# Patient Record
Sex: MALE | Race: BLACK OR AFRICAN AMERICAN | Employment: FULL TIME | URBAN - METROPOLITAN AREA
[De-identification: names, ages, dates, MRNs, and addresses within clinical notes are randomized per-mention and may not be internally consistent; named-entity substitution may affect disease eponyms.]

---

## 2017-01-03 ENCOUNTER — TELEPHONE (OUTPATIENT)
Dept: INTERNAL MEDICINE CLINIC | Age: 42
End: 2017-01-03

## 2017-01-16 DIAGNOSIS — I10 ESSENTIAL HYPERTENSION: ICD-10-CM

## 2017-01-16 NOTE — TELEPHONE ENCOUNTER
Future Appointments:  4/28/2017 3:30 PM Jie Mike MD  Last Appointment:  12/30/2016     Called and spoke with pt, who verified meds he needs refilled. Requested med refills pended and sent to Dr. Melissa Shelton for approval; pt aware.

## 2017-01-17 RX ORDER — METFORMIN HYDROCHLORIDE 750 MG/1
1500 TABLET, EXTENDED RELEASE ORAL DAILY
Qty: 60 TAB | Refills: 11 | Status: SHIPPED | OUTPATIENT
Start: 2017-01-17 | End: 2018-04-30

## 2017-01-17 RX ORDER — GLIMEPIRIDE 4 MG/1
4 TABLET ORAL
Qty: 30 TAB | Refills: 11 | Status: SHIPPED | OUTPATIENT
Start: 2017-01-17 | End: 2018-04-08 | Stop reason: SDUPTHER

## 2017-01-17 RX ORDER — LANCETS
EACH MISCELLANEOUS
Qty: 90 EACH | Refills: 11 | Status: SHIPPED | OUTPATIENT
Start: 2017-01-17 | End: 2018-08-14 | Stop reason: SDUPTHER

## 2017-01-17 RX ORDER — LOSARTAN POTASSIUM AND HYDROCHLOROTHIAZIDE 25; 100 MG/1; MG/1
1 TABLET ORAL DAILY
Qty: 30 TAB | Refills: 11 | Status: SHIPPED | OUTPATIENT
Start: 2017-01-17 | End: 2017-07-09

## 2017-05-03 ENCOUNTER — OFFICE VISIT (OUTPATIENT)
Dept: INTERNAL MEDICINE CLINIC | Age: 42
End: 2017-05-03

## 2017-05-03 VITALS
TEMPERATURE: 98.5 F | HEART RATE: 78 BPM | WEIGHT: 315 LBS | OXYGEN SATURATION: 96 % | HEIGHT: 71 IN | DIASTOLIC BLOOD PRESSURE: 99 MMHG | RESPIRATION RATE: 16 BRPM | BODY MASS INDEX: 44.1 KG/M2 | SYSTOLIC BLOOD PRESSURE: 141 MMHG

## 2017-05-03 DIAGNOSIS — I10 ESSENTIAL HYPERTENSION: ICD-10-CM

## 2017-05-03 DIAGNOSIS — E78.5 HYPERLIPIDEMIA, UNSPECIFIED HYPERLIPIDEMIA TYPE: ICD-10-CM

## 2017-05-03 DIAGNOSIS — K76.0 NAFLD (NONALCOHOLIC FATTY LIVER DISEASE): ICD-10-CM

## 2017-05-03 NOTE — PATIENT INSTRUCTIONS
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
## 2017-05-03 NOTE — MR AVS SNAPSHOT
Visit Information Date & Time Provider Department Dept. Phone Encounter #  
 5/3/2017  3:45 PM Den Ryder, [address] [place] [address],[address] [phone] [id number] Follow-up Instructions Return in about 4 months (around 9/3/2017) for DM. Upcoming Health Maintenance Date Due  
 EYE EXAM RETINAL OR DILATED Q1 8/2/1984 Pneumococcal 19-64 Medium Risk (1 of 1 - PPSV23) 8/2/1993 DTaP/Tdap/Td series (1 - Tdap) 8/2/1995 FOOT EXAM Q1 6/30/2017 HEMOGLOBIN A1C Q6M 6/30/2017 INFLUENZA AGE 9 TO ADULT 8/1/2017 MICROALBUMIN Q1 9/28/2017 LIPID PANEL Q1 12/30/2017 Allergies as of 5/3/2017  Review Complete On: 5/3/2017 By: Den Ryder MD  
 No Known Allergies Current Immunizations  Never Reviewed Name Date Influenza Vaccine (Quad) PF 12/30/2016, 1/26/2016 Not reviewed this visit You Were Diagnosed With   
  
 Codes Comments Uncontrolled diabetes mellitus type 2 without complications, unspecified long term insulin use status [facility]    -  Primary ICD-10-CM: E11.65 ICD-9-CM: 250.02 Essential hypertension     ICD-10-CM: I10 
ICD-9-CM: 401.9 Hyperlipidemia, unspecified hyperlipidemia type     ICD-10-CM: E78.5 ICD-9-CM: 272.4 NAFLD (nonalcoholic fatty liver disease)     ICD-10-CM: K76.0 ICD-9-CM: 571.8 Vitals BP Pulse Temp Resp Height(growth percentile) Weight(growth percentile) (!) 141/99 (BP 1 Location: Left arm, BP Patient Position: Sitting) 78 98.5 °F (36.9 °C) (Oral) 16 5' 11\" (1.803 m) 348 lb (157.9 kg) SpO2 BMI Smoking Status 96% 48.54 kg/m2 Never Smoker BMI and BSA Data Body Mass Index Body Surface Area 48.54 kg/m 2 2.81 m 2 Preferred Pharmacy Pharmacy Name Phone [facility] [address]  [place] [phone] Your Updated Medication List  
  
   
 This list is accurate as of: 5/3/17  4:36 PM.  Always use your most recent med list.  
  
  
  
  
 Blood-Glucose Meter monitoring kit Use as directed to test blood sugar three times daily -DX-DM-E11.9  
  
 canagliflozin 300 mg tablet Commonly known as:  Larinda Harjit Take 1 Tab by mouth Daily (before breakfast). diclofenac EC 50 mg EC tablet Commonly known as:  VOLTAREN Take 1 Tab by mouth two (2) times a day. FISH OIL PO Take  by mouth.  
  
 glimepiride 4 mg tablet Commonly known as:  AMARYL Take 1 Tab by mouth every morning. glucose blood VI test strips strip Commonly known as:  blood glucose test  
Use as directed to test blood sugar three times daily -DX-DM-E11.9 Lancets Misc Use as directed to test blood sugar three times daily -DX-DM-E11.9  
  
 losartan-hydroCHLOROthiazide 100-25 mg per tablet Commonly known as:  HYZAAR Take 1 Tab by mouth daily. metFORMIN  mg tablet Commonly known as:  GLUCOPHAGE XR Take 2 Tabs by mouth daily. naproxen 500 mg tablet Commonly known as:  NAPROSYN Take 1 Tab by mouth two (2) times daily (with meals). ondansetron 4 mg disintegrating tablet Commonly known as:  ZOFRAN ODT Take 1 Tab by mouth every eight (8) hours as needed for Nausea. traMADol 50 mg tablet Commonly known as:  ULTRAM  
Take 1 Tab by mouth every six (6) hours as needed for Pain. Max Daily Amount: 200 mg. We Performed the Following AMB POC FUNDUS PHOTOGRAPHY WITH INTERP AND REPORT [62947 CPT(R)] HEMOGLOBIN A1C WITH EAG [54286 CPT(R)] LIPID PANEL [39596 CPT(R)] METABOLIC PANEL, COMPREHENSIVE [59828 CPT(R)] MICROALBUMIN, UR, RAND [id number] CPT(R)] Follow-up Instructions Return in about 4 months (around 9/3/2017) for DM. Patient Instructions Learning About Cutting Calories How do calories affect your weight? Food gives your body energy. Energy from the food you eat is measured in calories. This energy keeps your heart beating, your brain active, and your muscles working. Your body needs a certain number of calories each day. After your body uses the calories it needs, it stores extra calories as fat. To lose weight safely, you have to eat fewer calories while eating in a healthy way. How many calories do you need each day? The more active you are, the more calories you need. When you are less active, you need fewer calories. How many calories you need each day also depends on several things, including your age and whether you are male or female. Here are some general guidelines for adults: 
· Less active women and older adults need 1,600 to 2,000 calories each day. · Active women and less active men need 2,000 to 2,400 calories each day. · Active men need 2,400 to 3,000 calories each day. How can you cut calories and eat healthy meals? Whole grains, vegetables and fruits, and dried beans are good lower-calorie foods. They give you lots of nutrients and fiber. And they fill you up. Sweets, energy drinks, and soda pop are high in calories. They give you few nutrients and no fiber. Try to limit soda pop, fruit juice, and energy drinks. Drink water instead. Some fats can be part of a healthy diet. But cutting back on fats from highly processed foods like fast foods and many snack foods is a good way to lower the calories in your diet. Also, use smaller amounts of fats like butter, margarine, salad dressing, and mayonnaise. Add fresh garlic, lemon, or herbs to your meals to add flavor without adding fat. Meats and dairy products can be a big source of hidden fats. Try to choose lean or low-fat versions of these products. Fat-free cookies, candies, chips, and frozen treats can still be high in sugar and calories.  Some fat-free foods have more calories than regular ones. Eat fat-free treats in moderation, as you would other foods. If your favorite foods are high in fat, salt, sugar, or calories, limit how often you eat them. Eat smaller servings, or look for healthy substitutes. Fill up on fruits, vegetables, and whole grains. Eating at home · Use meat as a side dish instead of as the main part of your meal. 
· Try main dishes that use whole wheat pasta, brown rice, dried beans, or vegetables. · Find ways to cook with little or no fat, such as broiling, steaming, or grilling. · Use cooking spray instead of oil. If you use oil, use a monounsaturated oil, such as canola or olive oil. · Trim fat from meats before you cook them. · Drain off fat after you brown the meat or while you roast it. · Chill soups and stews after you cook them. Then skim the fat off the top after it hardens. Eating out · Order foods that are broiled or poached rather than fried or breaded. · Cut back on the amount of butter or margarine that you use on bread. · Order sauces, gravies, and salad dressings on the side, and use only a little. · When you order pasta, choose tomato sauce rather than cream sauce. · Ask for salsa with your baked potato instead of sour cream, butter, cheese, or walekr. · Order meals in a small size instead of upgrading to a large. · Share an entree, or take part of your food home to eat as another meal. 
· Share appetizers and desserts. Where can you learn more? Go to http://chang-karson.info/. Enter 99 [id number] in the search box to learn more about \"Learning About Cutting Calories. \" Current as of: November 9, 2016 Content Version: 11.2 © 5045-8686 [employer]. Care instructions adapted under license by [employer] (which disclaims liability or warranty for this information).  If you have questions about a medical condition or this instruction, always ask your healthcare professional. [employer] disclaims any warranty or liability for your use of this information. Introducing [facility]! Isacmoi Lazo introduces [employer] patient portal. Now you can access parts of your medical record, email your doctor's office, and request medication refills online. 1. In your internet browser, go to [email or web address] 2. Click on the First Time User? Click Here link in the Sign In box. You will see the New Member Sign Up page. 3. Enter your Compring Access Code exactly as it appears below. You will not need to use this code after youve completed the sign-up process. If you do not sign up before the expiration date, you must request a new code. · Compring Access Code: [email or web address] Expires: 8/1/2017  4:36 PM 
 
4. Enter the last four digits of your Social Security Number (xxxx) and Date of Birth (mm/dd/yyyy) as indicated and click Submit. You will be taken to the next sign-up page. 5. Create a Compring ID. This will be your Compring login ID and cannot be changed, so think of one that is secure and easy to remember. 6. Create a Compring password. You can change your password at any time. 7. Enter your Password Reset Question and Answer. This can be used at a later time if you forget your password. 8. Enter your e-mail address. You will receive e-mail notification when new information is available in [address] 9. Click Sign Up. You can now view and download portions of your medical record. 10. Click the Download Summary menu link to download a portable copy of your medical information. If you have questions, please visit the Frequently Asked Questions section of the [employer]ring website. Remember, [employer] is NOT to be used for urgent needs. For medical emergencies, dial 911. Now available from your iPhone and Android! Please provide this summary of care documentation to your next provider. Your primary care clinician is listed as [place]. If you have any questions after today's visit, please call [phone].

## 2017-07-07 ENCOUNTER — OFFICE VISIT (OUTPATIENT)
Dept: INTERNAL MEDICINE CLINIC | Age: 42
End: 2017-07-07

## 2017-07-07 VITALS
OXYGEN SATURATION: 96 % | RESPIRATION RATE: 20 BRPM | HEART RATE: 106 BPM | WEIGHT: 313.4 LBS | TEMPERATURE: 98 F | BODY MASS INDEX: 43.88 KG/M2 | HEIGHT: 71 IN | SYSTOLIC BLOOD PRESSURE: 143 MMHG | DIASTOLIC BLOOD PRESSURE: 88 MMHG

## 2017-07-07 DIAGNOSIS — R10.13 EPIGASTRIC ABDOMINAL PAIN: Primary | ICD-10-CM

## 2017-07-07 RX ORDER — ONDANSETRON 4 MG/1
4 TABLET, ORALLY DISINTEGRATING ORAL
Qty: 30 TAB | Refills: 1 | Status: SHIPPED | OUTPATIENT
Start: 2017-07-07 | End: 2017-07-09

## 2017-07-07 RX ORDER — FAMOTIDINE 20 MG/1
20 TABLET, FILM COATED ORAL 2 TIMES DAILY
Qty: 30 TAB | Refills: 11 | Status: SHIPPED | OUTPATIENT
Start: 2017-07-07 | End: 2017-07-09

## 2017-07-07 NOTE — PROGRESS NOTES
SUBJECTIVE  Mr. Estephania Alexander presents today acutely for     Chief Complaint   Patient presents with    Epigastric Pain     pt here today c/o pain in stomach,diarrhea vomiting, and not being able to keep anything down x 2 weeks       \"Upset stomach early in the morning. \"  Also late at night. \"Hard to hold stuff down. \"  He has some pain in epigastric area. Emesis is mostly liquid, \"mostly what I ate. \" Nonbloody, nonbilious. He has been having about 1 stool, soft. \"I just feel drained. \"  Symptoms present for 2 weeks. OBJECTIVE  Visit Vitals    /88 (BP 1 Location: Right arm, BP Patient Position: Sitting)    Pulse (!) 106    Temp 98 °F (36.7 °C) (Oral)    Resp 20    Ht 5' 11\" (1.803 m)    Wt 313 lb 6.4 oz (142.2 kg)    SpO2 96%    BMI 43.71 kg/m2     Gen: Pleasant 42 y.o.  male in [facility].   HEENT: PERRLA. EOMI. OP moist and pink.  Neck: Supple.  No LAD.  HEART: RRR, No M/G/R.   LUNGS: CTAB No W/R.   ABDOMEN: S, Vague epigastric tenderness, no guarding or RT, ND, BS+.   EXTREMITIES: Warm. No C/C/E. MUSCULOSKELETAL: Normal ROM, muscle strength 5/5 all groups. NEURO: Alert and oriented x 3.  Cranial nerves grossly intact.  No focal sensory or motor deficits noted. SKIN: Warm. Dry. No rashes or other lesions noted. ASSESSMENT / PLAN    ICD-10-CM ICD-9-CM    1. Epigastric abdominal pain R10.13 789.06 CBC WITH AUTOMATED DIFF      METABOLIC PANEL, COMPREHENSIVE      AMYLASE      LIPASE      US ABD COMP      REFERRAL TO GASTROENTEROLOGY       I have reviewed with the patient details of the assessment and plan and all questions were answered. Relevant patient education was performed. Follow-up Disposition:  Return if symptoms worsen or fail to improve.

## 2017-07-07 NOTE — PATIENT INSTRUCTIONS
Avoid \"NSAIDs\" (Nonsteroidal antiinflammatory drugs, such as diclofenac, ibu profen, aleve). Avoid Alcohol. Avoid highly acidic foods and drinks. Abdominal Pain: Care Instructions  Your Care Instructions    Abdominal pain has many possible causes. Some aren't serious and get better on their own in a few days. Others need more testing and treatment. If your pain continues or gets worse, you need to be rechecked and may need more tests to find out what is wrong. You may need surgery to correct the problem. Don't ignore new symptoms, such as fever, nausea and vomiting, urination problems, pain that gets worse, and dizziness. These may be signs of a more serious problem. Your doctor may have recommended a follow-up visit in the next 8 to 12 hours. If you are not getting better, you may need more tests or treatment. The doctor has checked you carefully, but problems can develop later. If you notice any problems or new symptoms, get medical treatment right away. Follow-up care is a key part of your treatment and safety. Be sure to make and go to all appointments, and call your doctor if you are having problems. It's also a good idea to know your test results and keep a list of the medicines you take. How can you care for yourself at home? · Rest until you feel better. · To prevent dehydration, drink plenty of fluids, enough so that your urine is light yellow or clear like water. Choose water and other caffeine-free clear liquids until you feel better. If you have kidney, heart, or liver disease and have to limit fluids, talk with your doctor before you increase the amount of fluids you drink. · If your stomach is upset, eat mild foods, such as rice, dry toast or crackers, bananas, and applesauce. Try eating several small meals instead of two or three large ones. · Wait until 48 hours after all symptoms have gone away before you have spicy foods, alcohol, and drinks that contain caffeine.   · Do not eat foods that are high in fat. · Avoid anti-inflammatory medicines such as aspirin, ibuprofen (Advil, Motrin), and naproxen (Aleve). These can cause stomach upset. Talk to your doctor if you take daily aspirin for another health problem. When should you call for help? Call 911 anytime you think you may need emergency care. For example, call if:  · You passed out (lost consciousness). · You pass maroon or very bloody stools. · You vomit blood or what looks like coffee grounds. · You have new, severe belly pain. Call your doctor now or seek immediate medical care if:  · Your pain gets worse, especially if it becomes focused in one area of your belly. · You have a new or higher fever. · Your stools are black and look like tar, or they have streaks of blood. · You have unexpected vaginal bleeding. · You have symptoms of a urinary tract infection. These may include:  ¨ Pain when you urinate. ¨ Urinating more often than usual.  ¨ Blood in your urine. · You are dizzy or lightheaded, or you feel like you may faint. Watch closely for changes in your health, and be sure to contact your doctor if:  · You are not getting better after 1 day (24 hours). Where can you learn more? Go to http://chang-karson.info/. Enter K812 in the search box to learn more about \"Abdominal Pain: Care Instructions. \"  Current as of: March 20, 2017  Content Version: 11.3  © 9010-8829 [employer]. Care instructions adapted under license by [employer] (which disclaims liability or warranty for this information). If you have questions about a medical condition or this instruction, always ask your healthcare professional. [place] [address] any warranty or liability for your use of this information.

## 2017-07-07 NOTE — LETTER
NOTIFICATION RETURN TO WORK / SCHOOL 
 
7/7/2017 11:14 AM 
 
Mr. Jake Duran [address] [facility] [phone] To Whom It May Concern: 
 
Jake Duran is currently under the care of [facility]. He will return to work/school on: 7/8/17. If there are questions or concerns please have the patient contact our office.  
 
 
 
Sincerely, 
 
 
Crystal Garcia MD

## 2017-07-07 NOTE — MR AVS SNAPSHOT
Visit Information Date & Time Provider Department Dept. Phone Encounter #  
 7/7/2017 10:30 AM Kimberly Olivarez, [address] [id number] Follow-up Instructions Return if symptoms worsen or fail to improve. Follow-up and Disposition History Your Appointments 9/5/2017  9:45 AM  
ROUTINE CARE with Kimberly Olivarez MD  
[facility] [address]) Appt Note: 4 month follow up  
 [facility] [address] [id number]  
[address] [facility] [address] [place] [address] Upcoming Health Maintenance Date Due Pneumococcal 19-64 Medium Risk (1 of 1 - PPSV23) 8/2/1993 DTaP/Tdap/Td series (1 - Tdap) 8/2/1995 FOOT EXAM Q1 6/30/2017 HEMOGLOBIN A1C Q6M 6/30/2017 INFLUENZA AGE 9 TO ADULT 8/1/2017 MICROALBUMIN Q1 9/28/2017 LIPID PANEL Q1 12/30/2017 EYE EXAM RETINAL OR DILATED Q1 5/4/2018 Allergies as of 7/7/2017  Review Complete On: 7/7/2017 By: Kimberly Olivarez MD  
 No Known Allergies Current Immunizations  Never Reviewed Name Date Influenza Vaccine (Quad) PF 12/30/2016, 1/26/2016 Not reviewed this visit You Were Diagnosed With   
  
 Codes Comments Epigastric abdominal pain    -  Primary ICD-10-CM: R10.13 ICD-9-CM: 789.06 Vitals BP Pulse Temp Resp Height(growth percentile) Weight(growth percentile) 143/88 (BP 1 Location: Right arm, BP Patient Position: Sitting) (!) 106 98 °F (36.7 °C) (Oral) 20 5' 11\" (1.803 m) 313 lb 6.4 oz (142.2 kg) SpO2 BMI Smoking Status 96% 43.71 kg/m2 Never Smoker Vitals History BMI and BSA Data Body Mass Index Body Surface Area 43.71 kg/m 2 2.67 m 2 Preferred Pharmacy Pharmacy Name Phone [facility] [address]  [place] [phone] Your Updated Medication List  
  
   
 This list is accurate as of: 7/7/17  4:32 PM.  Always use your most recent med list.  
  
  
  
  
 Blood-Glucose Meter monitoring kit Use as directed to test blood sugar three times daily -DX-DM-E11.9  
  
 canagliflozin 300 mg tablet Commonly known as:  Racheal Castellanos Take 1 Tab by mouth Daily (before breakfast). diclofenac EC 50 mg EC tablet Commonly known as:  VOLTAREN Take 1 Tab by mouth two (2) times a day. famotidine 20 mg tablet Commonly known as:  PEPCID Take 1 Tab by mouth two (2) times a day. FISH OIL PO Take  by mouth.  
  
 glimepiride 4 mg tablet Commonly known as:  AMARYL Take 1 Tab by mouth every morning. glucose blood VI test strips strip Commonly known as:  blood glucose test  
Use as directed to test blood sugar three times daily -DX-DM-E11.9 Lancets Misc Use as directed to test blood sugar three times daily -DX-DM-E11.9  
  
 losartan-hydroCHLOROthiazide 100-25 mg per tablet Commonly known as:  HYZAAR Take 1 Tab by mouth daily. metFORMIN  mg tablet Commonly known as:  GLUCOPHAGE XR Take 2 Tabs by mouth daily. naproxen 500 mg tablet Commonly known as:  NAPROSYN Take 1 Tab by mouth two (2) times daily (with meals). ondansetron 4 mg disintegrating tablet Commonly known as:  ZOFRAN ODT Take 1 Tab by mouth every eight (8) hours as needed for Nausea. traMADol 50 mg tablet Commonly known as:  ULTRAM  
Take 1 Tab by mouth every six (6) hours as needed for Pain. Max Daily Amount: 200 mg. Prescriptions Sent to Pharmacy Refills  
 ondansetron (ZOFRAN ODT) 4 mg disintegrating tablet 1 Sig: Take 1 Tab by mouth every eight (8) hours as needed for Nausea. Class: Normal  
 Pharmacy: [facility] [address] #: [phone]  Route: Oral  
 famotidine (PEPCID) 20 mg tablet 11  
 Sig: Take 1 Tab by mouth two (2) times a day. Class: Normal  
 Pharmacy: [facility] [address] #: [phone] Route: Oral  
  
We Performed the Following AMYLASE [id number] CPT(R)] CBC WITH AUTOMATED DIFF [20415 CPT(R)] LIPASE [id number] CPT(R)] METABOLIC PANEL, COMPREHENSIVE [38209 CPT(R)] REFERRAL TO GASTROENTEROLOGY [PIV85 Custom] Follow-up Instructions Return if symptoms worsen or fail to improve. To-Do List   
 07/07/2017 Imaging:  US ABD COMP Referral Information Referral ID Referred By Referred To  
  
 [id number] Rose Gold MD   
   [address]  Phone: [phone] Fax: [phone] Visits Status Start Date End Date 1 New Request 7/7/17 7/7/18 If your referral has a status of pending review or denied, additional information will be sent to support the outcome of this decision. Patient Instructions Avoid \"NSAIDs\" (Nonsteroidal antiinflammatory drugs, such as diclofenac, ibu profen, aleve). Avoid Alcohol. Avoid highly acidic foods and drinks. Abdominal Pain: Care Instructions Your Care Instructions Abdominal pain has many possible causes. Some aren't serious and get better on their own in a few days. Others need more testing and treatment. If your pain continues or gets worse, you need to be rechecked and may need more tests to find out what is wrong. You may need surgery to correct the problem. Don't ignore new symptoms, such as fever, nausea and vomiting, urination problems, pain that gets worse, and dizziness. These may be signs of a more serious problem. Your doctor may have recommended a follow-up visit in the next 8 to 12 hours. If you are not getting better, you may need more tests or treatment. The doctor has checked you carefully, but problems can develop later.  If you notice any problems or new symptoms, get medical treatment right away. Follow-up care is a key part of your treatment and safety. Be sure to make and go to all appointments, and call your doctor if you are having problems. It's also a good idea to know your test results and keep a list of the medicines you take. How can you care for yourself at home? · Rest until you feel better. · To prevent dehydration, drink plenty of fluids, enough so that your urine is light yellow or clear like water. Choose water and other caffeine-free clear liquids until you feel better. If you have kidney, heart, or liver disease and have to limit fluids, talk with your doctor before you increase the amount of fluids you drink. · If your stomach is upset, eat mild foods, such as rice, dry toast or crackers, bananas, and applesauce. Try eating several small meals instead of two or three large ones. · Wait until 48 hours after all symptoms have gone away before you have spicy foods, alcohol, and drinks that contain caffeine. · Do not eat foods that are high in fat. · Avoid anti-inflammatory medicines such as aspirin, ibuprofen (Advil, Motrin), and naproxen (Aleve). These can cause stomach upset. Talk to your doctor if you take daily aspirin for another health problem. When should you call for help? Call 911 anytime you think you may need emergency care. For example, call if: 
· You passed out (lost consciousness). · You pass maroon or very bloody stools. · You vomit blood or what looks like coffee grounds. · You have new, severe belly pain. Call your doctor now or seek immediate medical care if: 
· Your pain gets worse, especially if it becomes focused in one area of your belly. · You have a new or higher fever. · Your stools are black and look like tar, or they have streaks of blood. · You have unexpected vaginal bleeding. · You have symptoms of a urinary tract infection. These may include: 
¨ Pain when you urinate. ¨ Urinating more often than usual. 
¨ Blood in your urine. · You are dizzy or lightheaded, or you feel like you may faint. Watch closely for changes in your health, and be sure to contact your doctor if: 
· You are not getting better after 1 day (24 hours). Where can you learn more? Go to http://chang-karson.info/. Enter R373 in the search box to learn more about \"Abdominal Pain: Care Instructions. \" Current as of: March 20, 2017 Content Version: 11.3 © 8190-1362 t-Art. Care instructions adapted under license by [employer] (which disclaims liability or warranty for this information). If you have questions about a medical condition or this instruction, always ask your healthcare professional. [address] any warranty or liability for your use of this information. Introducing [facility]! Aaron Saez introduces Me[employer] patient portal. Now you can access parts of your medical record, email your doctor's office, and request medication refills online. 1. In your internet browser, go to [email or web address] 2. Click on the First Time User? Click Here link in the Sign In box. You will see the New Member Sign Up page. 3. Enter your MeBeam Access Code exactly as it appears below. You will not need to use this code after youve completed the sign-up process. If you do not sign up before the expiration date, you must request a new code. · MeBeam Access Code: [email or web address] Expires: 8/1/2017  4:36 PM 
 
4. Enter the last four digits of your Social Security Number (xxxx) and Date of Birth (mm/dd/yyyy) as indicated and click Submit. You will be taken to the next sign-up page. 5. Create a MeBeam ID. This will be your MeBeam login ID and cannot be changed, so think of one that is secure and easy to remember. 6. Create a MeBeam password. You can change your password at any time. 7. Enter your Password Reset Question and Answer. This can be used at a later time if you forget your password. 8. Enter your e-mail address. You will receive e-mail notification when new information is available in [address] 9. Click Sign Up. You can now view and download portions of your medical record. 10. Click the Download Summary menu link to download a portable copy of your medical information. If you have questions, please visit the Frequently Asked Questions section of the [employer] website. Remember, [employer] is NOT to be used for urgent needs. For medical emergencies, dial 911. Now available from your iPhone and Android! Please provide this summary of care documentation to your next provider. Your primary care clinician is listed as [place]. If you have any questions after today's visit, please call [phone].

## 2017-07-07 NOTE — PROGRESS NOTES
1. Have you been to the ER, urgent care clinic since your last visit? Hospitalized since your last visit?no    2. Have you seen or consulted any other health care providers outside of the [address] [place] [address] since your last visit? Include any pap smears or colon screening.  no

## 2017-07-08 ENCOUNTER — TELEPHONE (OUTPATIENT)
Dept: INTERNAL MEDICINE CLINIC | Age: 42
End: 2017-07-08

## 2017-07-08 LAB
ALBUMIN SERPL-MCNC: 4.3 G/DL (ref 3.5–5.5)
ALBUMIN/GLOB SERPL: 1.2 {RATIO} (ref 1.2–2.2)
ALP SERPL-CCNC: 86 IU/L (ref 39–117)
ALT SERPL-CCNC: 34 IU/L (ref 0–44)
AMYLASE SERPL-CCNC: 86 U/L (ref 31–124)
AST SERPL-CCNC: 27 IU/L (ref 0–40)
BASOPHILS # BLD AUTO: 0 X10E3/UL (ref 0–0.2)
BASOPHILS NFR BLD AUTO: 0 %
BILIRUB SERPL-MCNC: 0.5 MG/DL (ref 0–1.2)
BUN SERPL-MCNC: 12 MG/DL (ref 6–24)
BUN/CREAT SERPL: 9 (ref 9–20)
CALCIUM SERPL-MCNC: 10.1 MG/DL (ref 8.7–10.2)
CHLORIDE SERPL-SCNC: 94 MMOL/L (ref 96–106)
CO2 SERPL-SCNC: 26 MMOL/L (ref 18–29)
CREAT SERPL-MCNC: 1.32 MG/DL (ref 0.76–1.27)
EOSINOPHIL # BLD AUTO: 0.1 X10E3/UL (ref 0–0.4)
EOSINOPHIL NFR BLD AUTO: 1 %
ERYTHROCYTE [DISTWIDTH] IN BLOOD BY AUTOMATED COUNT: 12 % (ref 12.3–15.4)
GLOBULIN SER CALC-MCNC: 3.6 G/DL (ref 1.5–4.5)
GLUCOSE SERPL-MCNC: 543 MG/DL (ref 65–99)
HCT VFR BLD AUTO: 49.9 % (ref 37.5–51)
HGB BLD-MCNC: 16.1 G/DL (ref 12.6–17.7)
IMM GRANULOCYTES # BLD: 0 X10E3/UL (ref 0–0.1)
IMM GRANULOCYTES NFR BLD: 0 %
LIPASE SERPL-CCNC: 63 U/L (ref 0–59)
LYMPHOCYTES # BLD AUTO: 1.8 X10E3/UL (ref 0.7–3.1)
LYMPHOCYTES NFR BLD AUTO: 26 %
MCH RBC QN AUTO: 31.9 PG (ref 26.6–33)
MCHC RBC AUTO-ENTMCNC: 32.3 G/DL (ref 31.5–35.7)
MCV RBC AUTO: 99 FL (ref 79–97)
MONOCYTES # BLD AUTO: 0.4 X10E3/UL (ref 0.1–0.9)
MONOCYTES NFR BLD AUTO: 6 %
NEUTROPHILS # BLD AUTO: 4.7 X10E3/UL (ref 1.4–7)
NEUTROPHILS NFR BLD AUTO: 67 %
PLATELET # BLD AUTO: 235 X10E3/UL (ref 150–379)
POTASSIUM SERPL-SCNC: 4.7 MMOL/L (ref 3.5–5.2)
PROT SERPL-MCNC: 7.9 G/DL (ref 6–8.5)
RBC # BLD AUTO: 5.05 X10E6/UL (ref 4.14–5.8)
SODIUM SERPL-SCNC: 137 MMOL/L (ref 134–144)
WBC # BLD AUTO: 7 X10E3/UL (ref 3.4–10.8)

## 2017-07-08 PROCEDURE — 81001 URINALYSIS AUTO W/SCOPE: CPT | Performed by: EMERGENCY MEDICINE

## 2017-07-08 PROCEDURE — 96365 THER/PROPH/DIAG IV INF INIT: CPT

## 2017-07-08 PROCEDURE — 99285 EMERGENCY DEPT VISIT HI MDM: CPT

## 2017-07-08 PROCEDURE — 96366 THER/PROPH/DIAG IV INF ADDON: CPT

## 2017-07-08 PROCEDURE — 96361 HYDRATE IV INFUSION ADD-ON: CPT

## 2017-07-08 NOTE — TELEPHONE ENCOUNTER
I also got a page from [facility].   He has glc > 500 and elevated anion gap indicating DKA. I called him and left a message to go to ER.      VESTA

## 2017-07-09 ENCOUNTER — HOSPITAL ENCOUNTER (INPATIENT)
Age: 42
LOS: 2 days | Discharge: HOME | DRG: 638 | End: 2017-07-11
Attending: EMERGENCY MEDICINE | Admitting: INTERNAL MEDICINE
Payer: COMMERCIAL

## 2017-07-09 DIAGNOSIS — N17.9 AKI (ACUTE KIDNEY INJURY) (HCC): ICD-10-CM

## 2017-07-09 DIAGNOSIS — E11.00 HYPEROSMOLAR NON-KETOTIC STATE IN PATIENT WITH TYPE 2 DIABETES MELLITUS (HCC): Primary | ICD-10-CM

## 2017-07-09 LAB
ADMINISTERED INITIALS, ADMINIT: NORMAL
ALBUMIN SERPL BCP-MCNC: 3.2 G/DL (ref 3.5–5)
ALBUMIN/GLOB SERPL: 0.8 {RATIO} (ref 1.1–2.2)
ALP SERPL-CCNC: 75 U/L (ref 45–117)
ALT SERPL-CCNC: 40 U/L (ref 12–78)
ANION GAP BLD CALC-SCNC: 10 MMOL/L (ref 5–15)
ANION GAP BLD CALC-SCNC: 9 MMOL/L (ref 5–15)
ANION GAP BLD CALC-SCNC: 9 MMOL/L (ref 5–15)
APPEARANCE UR: CLEAR
AST SERPL W P-5'-P-CCNC: 27 U/L (ref 15–37)
BACTERIA URNS QL MICRO: NEGATIVE /HPF
BASOPHILS # BLD AUTO: 0 K/UL (ref 0–0.1)
BASOPHILS # BLD: 0 % (ref 0–1)
BILIRUB SERPL-MCNC: 0.5 MG/DL (ref 0.2–1)
BILIRUB UR QL: NEGATIVE
BUN SERPL-MCNC: 11 MG/DL (ref 6–20)
BUN SERPL-MCNC: 13 MG/DL (ref 6–20)
BUN SERPL-MCNC: 16 MG/DL (ref 6–20)
BUN/CREAT SERPL: 10 (ref 12–20)
CALCIUM SERPL-MCNC: 8.1 MG/DL (ref 8.5–10.1)
CALCIUM SERPL-MCNC: 8.4 MG/DL (ref 8.5–10.1)
CALCIUM SERPL-MCNC: 8.6 MG/DL (ref 8.5–10.1)
CHLORIDE SERPL-SCNC: 103 MMOL/L (ref 97–108)
CHLORIDE SERPL-SCNC: 89 MMOL/L (ref 97–108)
CHLORIDE SERPL-SCNC: 99 MMOL/L (ref 97–108)
CO2 SERPL-SCNC: 25 MMOL/L (ref 21–32)
CO2 SERPL-SCNC: 26 MMOL/L (ref 21–32)
CO2 SERPL-SCNC: 27 MMOL/L (ref 21–32)
COLOR UR: ABNORMAL
CREAT SERPL-MCNC: 1.06 MG/DL (ref 0.7–1.3)
CREAT SERPL-MCNC: 1.25 MG/DL (ref 0.7–1.3)
CREAT SERPL-MCNC: 1.6 MG/DL (ref 0.7–1.3)
D50 ADMINISTERED, D50ADM: 0 ML
D50 ORDER, D50ORD: 0 ML
EOSINOPHIL # BLD: 0.1 K/UL (ref 0–0.4)
EOSINOPHIL NFR BLD: 1 % (ref 0–7)
EPITH CASTS URNS QL MICRO: ABNORMAL /LPF
ERYTHROCYTE [DISTWIDTH] IN BLOOD BY AUTOMATED COUNT: 11.7 % (ref 11.5–14.5)
EST. AVERAGE GLUCOSE BLD GHB EST-MCNC: 315 MG/DL
GLOBULIN SER CALC-MCNC: 3.9 G/DL (ref 2–4)
GLSCOM COMMENTS: NORMAL
GLUCOSE BLD STRIP.AUTO-MCNC: 221 MG/DL (ref 65–100)
GLUCOSE BLD STRIP.AUTO-MCNC: 235 MG/DL (ref 65–100)
GLUCOSE BLD STRIP.AUTO-MCNC: 249 MG/DL (ref 65–100)
GLUCOSE BLD STRIP.AUTO-MCNC: 251 MG/DL (ref 65–100)
GLUCOSE BLD STRIP.AUTO-MCNC: 295 MG/DL (ref 65–100)
GLUCOSE BLD STRIP.AUTO-MCNC: 298 MG/DL (ref 65–100)
GLUCOSE BLD STRIP.AUTO-MCNC: 327 MG/DL (ref 65–100)
GLUCOSE BLD STRIP.AUTO-MCNC: 350 MG/DL (ref 65–100)
GLUCOSE BLD STRIP.AUTO-MCNC: 416 MG/DL (ref 65–100)
GLUCOSE BLD STRIP.AUTO-MCNC: 535 MG/DL (ref 65–100)
GLUCOSE BLD STRIP.AUTO-MCNC: 542 MG/DL (ref 65–100)
GLUCOSE BLD STRIP.AUTO-MCNC: >600 MG/DL (ref 65–100)
GLUCOSE SERPL-MCNC: 270 MG/DL (ref 65–100)
GLUCOSE SERPL-MCNC: 439 MG/DL (ref 65–100)
GLUCOSE SERPL-MCNC: 872 MG/DL (ref 65–100)
GLUCOSE UR STRIP.AUTO-MCNC: >1000 MG/DL
GLUCOSE, GLC: 221 MG/DL
GLUCOSE, GLC: 235 MG/DL
GLUCOSE, GLC: 249 MG/DL
GLUCOSE, GLC: 251 MG/DL
GLUCOSE, GLC: 298 MG/DL
GLUCOSE, GLC: 350 MG/DL
GLUCOSE, GLC: 416 MG/DL
GLUCOSE, GLC: 542 MG/DL
GLUCOSE, GLC: 601 MG/DL
HBA1C MFR BLD: 12.6 % (ref 4.2–6.3)
HCT VFR BLD AUTO: 42 % (ref 36.6–50.3)
HGB BLD-MCNC: 14.7 G/DL (ref 12.1–17)
HGB UR QL STRIP: NEGATIVE
HIGH TARGET, HITG: 300 MG/DL
INSULIN ADMINSTERED, INSADM: 0 UNITS/HOUR
INSULIN ADMINSTERED, INSADM: 10.7 UNITS/HOUR
INSULIN ADMINSTERED, INSADM: 10.8 UNITS/HOUR
INSULIN ADMINSTERED, INSADM: 11.6 UNITS/HOUR
INSULIN ADMINSTERED, INSADM: 14.5 UNITS/HOUR
INSULIN ADMINSTERED, INSADM: 6.4 UNITS/HOUR
INSULIN ADMINSTERED, INSADM: 7 UNITS/HOUR
INSULIN ADMINSTERED, INSADM: 7.6 UNITS/HOUR
INSULIN ADMINSTERED, INSADM: 9.5 UNITS/HOUR
INSULIN ORDER, INSORD: 10.7 UNITS/HOUR
INSULIN ORDER, INSORD: 10.8 UNITS/HOUR
INSULIN ORDER, INSORD: 11.6 UNITS/HOUR
INSULIN ORDER, INSORD: 14.5 UNITS/HOUR
INSULIN ORDER, INSORD: 6.4 UNITS/HOUR
INSULIN ORDER, INSORD: 7 UNITS/HOUR
INSULIN ORDER, INSORD: 7.6 UNITS/HOUR
INSULIN ORDER, INSORD: 7.6 UNITS/HOUR
INSULIN ORDER, INSORD: 9.5 UNITS/HOUR
KETONES UR QL STRIP.AUTO: ABNORMAL MG/DL
LEUKOCYTE ESTERASE UR QL STRIP.AUTO: NEGATIVE
LIPASE SERPL-CCNC: 338 U/L (ref 73–393)
LOW TARGET, LOT: 200 MG/DL
LYMPHOCYTES # BLD AUTO: 33 % (ref 12–49)
LYMPHOCYTES # BLD: 2.2 K/UL (ref 0.8–3.5)
MAGNESIUM SERPL-MCNC: 2.3 MG/DL (ref 1.6–2.4)
MAGNESIUM SERPL-MCNC: 2.6 MG/DL (ref 1.6–2.4)
MAGNESIUM SERPL-MCNC: 2.6 MG/DL (ref 1.6–2.4)
MCH RBC QN AUTO: 33.2 PG (ref 26–34)
MCHC RBC AUTO-ENTMCNC: 35 G/DL (ref 30–36.5)
MCV RBC AUTO: 94.8 FL (ref 80–99)
MINUTES UNTIL NEXT BG, NBG: 120 MIN
MINUTES UNTIL NEXT BG, NBG: 120 MIN
MINUTES UNTIL NEXT BG, NBG: 60 MIN
MONOCYTES # BLD: 0.5 K/UL (ref 0–1)
MONOCYTES NFR BLD AUTO: 7 % (ref 5–13)
MULTIPLIER, MUL: 0.02
MULTIPLIER, MUL: 0.03
MULTIPLIER, MUL: 0.03
MULTIPLIER, MUL: 0.04
NEUTS SEG # BLD: 4 K/UL (ref 1.8–8)
NEUTS SEG NFR BLD AUTO: 59 % (ref 32–75)
NITRITE UR QL STRIP.AUTO: NEGATIVE
ORDER INITIALS, ORDINIT: NORMAL
PH UR STRIP: 5.5 [PH] (ref 5–8)
PHOSPHATE SERPL-MCNC: 2.2 MG/DL (ref 2.6–4.7)
PLATELET # BLD AUTO: 201 K/UL (ref 150–400)
POTASSIUM SERPL-SCNC: 3.4 MMOL/L (ref 3.5–5.1)
POTASSIUM SERPL-SCNC: 3.6 MMOL/L (ref 3.5–5.1)
POTASSIUM SERPL-SCNC: 4.3 MMOL/L (ref 3.5–5.1)
PROT SERPL-MCNC: 7.1 G/DL (ref 6.4–8.2)
PROT UR STRIP-MCNC: NEGATIVE MG/DL
RBC # BLD AUTO: 4.43 M/UL (ref 4.1–5.7)
RBC #/AREA URNS HPF: ABNORMAL /HPF (ref 0–5)
SERVICE CMNT-IMP: ABNORMAL
SODIUM SERPL-SCNC: 125 MMOL/L (ref 136–145)
SODIUM SERPL-SCNC: 134 MMOL/L (ref 136–145)
SODIUM SERPL-SCNC: 138 MMOL/L (ref 136–145)
SP GR UR REFRACTOMETRY: <1.005 (ref 1–1.03)
UA: UC IF INDICATED,UAUC: ABNORMAL
UROBILINOGEN UR QL STRIP.AUTO: 0.2 EU/DL (ref 0.2–1)
WBC # BLD AUTO: 6.7 K/UL (ref 4.1–11.1)
WBC URNS QL MICRO: ABNORMAL /HPF (ref 0–4)

## 2017-07-09 PROCEDURE — 84100 ASSAY OF PHOSPHORUS: CPT | Performed by: EMERGENCY MEDICINE

## 2017-07-09 PROCEDURE — 83036 HEMOGLOBIN GLYCOSYLATED A1C: CPT | Performed by: EMERGENCY MEDICINE

## 2017-07-09 PROCEDURE — 80048 BASIC METABOLIC PNL TOTAL CA: CPT | Performed by: EMERGENCY MEDICINE

## 2017-07-09 PROCEDURE — 74011000258 HC RX REV CODE- 258: Performed by: INTERNAL MEDICINE

## 2017-07-09 PROCEDURE — 82962 GLUCOSE BLOOD TEST: CPT

## 2017-07-09 PROCEDURE — 74011636637 HC RX REV CODE- 636/637: Performed by: INTERNAL MEDICINE

## 2017-07-09 PROCEDURE — 80053 COMPREHEN METABOLIC PANEL: CPT | Performed by: EMERGENCY MEDICINE

## 2017-07-09 PROCEDURE — 83690 ASSAY OF LIPASE: CPT | Performed by: EMERGENCY MEDICINE

## 2017-07-09 PROCEDURE — 36415 COLL VENOUS BLD VENIPUNCTURE: CPT | Performed by: EMERGENCY MEDICINE

## 2017-07-09 PROCEDURE — 65660000000 HC RM CCU STEPDOWN

## 2017-07-09 PROCEDURE — 74011250636 HC RX REV CODE- 250/636: Performed by: INTERNAL MEDICINE

## 2017-07-09 PROCEDURE — 74011250636 HC RX REV CODE- 250/636: Performed by: EMERGENCY MEDICINE

## 2017-07-09 PROCEDURE — 85025 COMPLETE CBC W/AUTO DIFF WBC: CPT | Performed by: EMERGENCY MEDICINE

## 2017-07-09 PROCEDURE — 74011250637 HC RX REV CODE- 250/637: Performed by: INTERNAL MEDICINE

## 2017-07-09 PROCEDURE — 74011000258 HC RX REV CODE- 258: Performed by: EMERGENCY MEDICINE

## 2017-07-09 PROCEDURE — 74011636637 HC RX REV CODE- 636/637: Performed by: EMERGENCY MEDICINE

## 2017-07-09 PROCEDURE — 83735 ASSAY OF MAGNESIUM: CPT | Performed by: EMERGENCY MEDICINE

## 2017-07-09 RX ORDER — SODIUM CHLORIDE 0.9 % (FLUSH) 0.9 %
5-10 SYRINGE (ML) INJECTION EVERY 8 HOURS
Status: DISCONTINUED | OUTPATIENT
Start: 2017-07-09 | End: 2017-07-09

## 2017-07-09 RX ORDER — SODIUM CHLORIDE 0.9 % (FLUSH) 0.9 %
5-10 SYRINGE (ML) INJECTION AS NEEDED
Status: DISCONTINUED | OUTPATIENT
Start: 2017-07-09 | End: 2017-07-09

## 2017-07-09 RX ORDER — ONDANSETRON 2 MG/ML
4 INJECTION INTRAMUSCULAR; INTRAVENOUS
Status: DISCONTINUED | OUTPATIENT
Start: 2017-07-09 | End: 2017-07-11 | Stop reason: HOSPADM

## 2017-07-09 RX ORDER — MAGNESIUM SULFATE 100 %
4 CRYSTALS MISCELLANEOUS AS NEEDED
Status: DISCONTINUED | OUTPATIENT
Start: 2017-07-09 | End: 2017-07-11 | Stop reason: HOSPADM

## 2017-07-09 RX ORDER — SODIUM CHLORIDE 0.9 % (FLUSH) 0.9 %
5-10 SYRINGE (ML) INJECTION AS NEEDED
Status: DISCONTINUED | OUTPATIENT
Start: 2017-07-09 | End: 2017-07-11 | Stop reason: HOSPADM

## 2017-07-09 RX ORDER — ACETAMINOPHEN 325 MG/1
650 TABLET ORAL
Status: DISCONTINUED | OUTPATIENT
Start: 2017-07-09 | End: 2017-07-11 | Stop reason: HOSPADM

## 2017-07-09 RX ORDER — INSULIN GLARGINE 100 [IU]/ML
10 INJECTION, SOLUTION SUBCUTANEOUS DAILY
Status: DISCONTINUED | OUTPATIENT
Start: 2017-07-09 | End: 2017-07-10

## 2017-07-09 RX ORDER — DEXTROSE 50 % IN WATER (D50W) INTRAVENOUS SYRINGE
12.5-25 AS NEEDED
Status: DISCONTINUED | OUTPATIENT
Start: 2017-07-09 | End: 2017-07-11 | Stop reason: HOSPADM

## 2017-07-09 RX ORDER — ENOXAPARIN SODIUM 100 MG/ML
40 INJECTION SUBCUTANEOUS EVERY 12 HOURS
Status: DISCONTINUED | OUTPATIENT
Start: 2017-07-09 | End: 2017-07-11 | Stop reason: HOSPADM

## 2017-07-09 RX ORDER — INSULIN LISPRO 100 [IU]/ML
INJECTION, SOLUTION INTRAVENOUS; SUBCUTANEOUS
Status: DISCONTINUED | OUTPATIENT
Start: 2017-07-09 | End: 2017-07-11 | Stop reason: HOSPADM

## 2017-07-09 RX ORDER — SODIUM CHLORIDE 9 MG/ML
150 INJECTION, SOLUTION INTRAVENOUS CONTINUOUS
Status: DISCONTINUED | OUTPATIENT
Start: 2017-07-09 | End: 2017-07-09

## 2017-07-09 RX ORDER — INSULIN LISPRO 100 [IU]/ML
INJECTION, SOLUTION INTRAVENOUS; SUBCUTANEOUS
Status: DISCONTINUED | OUTPATIENT
Start: 2017-07-09 | End: 2017-07-09

## 2017-07-09 RX ORDER — SODIUM CHLORIDE 0.9 % (FLUSH) 0.9 %
5-10 SYRINGE (ML) INJECTION EVERY 8 HOURS
Status: DISCONTINUED | OUTPATIENT
Start: 2017-07-09 | End: 2017-07-11 | Stop reason: HOSPADM

## 2017-07-09 RX ORDER — POTASSIUM CHLORIDE 20 MEQ/1
20 TABLET, EXTENDED RELEASE ORAL
Status: COMPLETED | OUTPATIENT
Start: 2017-07-09 | End: 2017-07-09

## 2017-07-09 RX ORDER — DEXTROSE MONOHYDRATE AND SODIUM CHLORIDE 5; .9 G/100ML; G/100ML
150 INJECTION, SOLUTION INTRAVENOUS CONTINUOUS
Status: DISCONTINUED | OUTPATIENT
Start: 2017-07-09 | End: 2017-07-11 | Stop reason: HOSPADM

## 2017-07-09 RX ADMIN — SODIUM CHLORIDE 7.6 UNITS/HR: 900 INJECTION, SOLUTION INTRAVENOUS at 09:02

## 2017-07-09 RX ADMIN — ENOXAPARIN SODIUM 40 MG: 40 INJECTION SUBCUTANEOUS at 09:54

## 2017-07-09 RX ADMIN — SODIUM CHLORIDE 1000 ML: 900 INJECTION, SOLUTION INTRAVENOUS at 05:24

## 2017-07-09 RX ADMIN — INSULIN LISPRO 5 UNITS: 100 INJECTION, SOLUTION INTRAVENOUS; SUBCUTANEOUS at 17:42

## 2017-07-09 RX ADMIN — ENOXAPARIN SODIUM 40 MG: 40 INJECTION SUBCUTANEOUS at 21:59

## 2017-07-09 RX ADMIN — INSULIN LISPRO 4 UNITS: 100 INJECTION, SOLUTION INTRAVENOUS; SUBCUTANEOUS at 14:47

## 2017-07-09 RX ADMIN — DEXTROSE MONOHYDRATE AND SODIUM CHLORIDE 150 ML/HR: 5; .9 INJECTION, SOLUTION INTRAVENOUS at 09:54

## 2017-07-09 RX ADMIN — INSULIN GLARGINE 10 UNITS: 100 INJECTION, SOLUTION SUBCUTANEOUS at 13:36

## 2017-07-09 RX ADMIN — SODIUM CHLORIDE 11.6 UNITS/HR: 900 INJECTION, SOLUTION INTRAVENOUS at 06:05

## 2017-07-09 RX ADMIN — SODIUM CHLORIDE 1000 ML: 900 INJECTION, SOLUTION INTRAVENOUS at 00:58

## 2017-07-09 RX ADMIN — SODIUM CHLORIDE 6.4 UNITS/HR: 900 INJECTION, SOLUTION INTRAVENOUS at 10:10

## 2017-07-09 RX ADMIN — SODIUM CHLORIDE 10.8 UNITS/HR: 900 INJECTION, SOLUTION INTRAVENOUS at 02:39

## 2017-07-09 RX ADMIN — SODIUM CHLORIDE 7 UNITS/HR: 900 INJECTION, SOLUTION INTRAVENOUS at 11:25

## 2017-07-09 RX ADMIN — INSULIN LISPRO 9 UNITS: 100 INJECTION, SOLUTION INTRAVENOUS; SUBCUTANEOUS at 21:58

## 2017-07-09 RX ADMIN — POTASSIUM CHLORIDE 20 MEQ: 1500 TABLET, EXTENDED RELEASE ORAL at 09:56

## 2017-07-09 RX ADMIN — Medication 10 ML: at 21:58

## 2017-07-09 RX ADMIN — SODIUM CHLORIDE 150 ML/HR: 900 INJECTION, SOLUTION INTRAVENOUS at 03:49

## 2017-07-09 NOTE — IP AVS SNAPSHOT
Current Discharge Medication List  
  
START taking these medications Dose & Instructions Dispensing Information Comments Morning Noon Evening Bedtime  
 insulin glargine 100 unit/mL injection Commonly known as:  LANTUS Your last dose was: Your next dose is:    
   
   
 Dose:  15 Units 15 Units by SubCUTAneous route nightly. Quantity:  1 Vial  
Refills:  11 CONTINUE these medications which have NOT CHANGED Dose & Instructions Dispensing Information Comments Morning Noon Evening Bedtime Blood-Glucose Meter monitoring kit Your last dose was: Your next dose is:    
   
   
 Use as directed to test blood sugar three times daily -DX-DM-E11.9 Quantity:  1 Kit Refills:  0  
     
   
   
   
  
 FISH OIL PO Your last dose was: Your next dose is: Take  by mouth. Refills:  0  
     
   
   
   
  
 glimepiride 4 mg tablet Commonly known as:  AMARYL Your last dose was: Your next dose is:    
   
   
 Dose:  4 mg Take 1 Tab by mouth every morning. Quantity:  30 Tab Refills:  11  
     
   
   
   
  
 glucose blood VI test strips strip Commonly known as:  blood glucose test  
   
Your last dose was: Your next dose is:    
   
   
 Use as directed to test blood sugar three times daily -DX-DM-E11.9 Quantity:  90 Strip Refills:  11 Lancets Misc Your last dose was: Your next dose is:    
   
   
 Use as directed to test blood sugar three times daily -DX-DM-E11.9 Quantity:  90 Each Refills:  11  
     
   
   
   
  
 metFORMIN  mg tablet Commonly known as:  GLUCOPHAGE XR Your last dose was: Your next dose is:    
   
   
 Dose:  1500 mg Take 2 Tabs by mouth daily. Quantity:  60 Tab Refills:  11 ASK your doctor about these medications Dose & Instructions Dispensing Information Comments Morning Noon Evening Bedtime CANAGLIFLOZIN PO Ask about: Which instructions should I use? Your last dose was: Your next dose is:    
   
   
 Dose:  100 mg Take 100 mg by mouth. Dm   Indications: 'unaaware of names of meds Refills:  0 Where to Get Your Medications These medications were sent to [address], [address], [address] [facility] AT [facility] [address], [address] Hours:  24-hours Phone:  [phone] insulin glargine 100 unit/mL injection

## 2017-07-09 NOTE — PROGRESS NOTES
TRANSFER - IN REPORT:    Verbal report received from Leandra(name) on [address]  being received from ED(unit) for routine progression of care      Report consisted of patients Situation, Background, Assessment and   Recommendations(SBAR). Information from the following report(s) SBAR, Kardex, ED Summary, [facility] and Cardiac Rhythm NSR was reviewed with the receiving nurse. Opportunity for questions and clarification was provided. Assessment completed upon patients arrival to unit and care assumed.            Anival DUBOIS RN

## 2017-07-09 NOTE — ED PROVIDER NOTES
HPI Comments: Linda Villarreal is a 42 y.o. male with PMhx significant for HTN and DM who presents ambulatory to the ED for further evaluation of abnormal lab results from PCP yesterday. The pt states that he has been feeling nauseous, decreased appetite, diarrhea, and generally feeling \"out of it. \" He expresses that he was seen by his PCP yesterday for these sx noting he had blood drawn and was referred to the ED after abnormal results. He discloses that he is supposed to be on oral medications to control his blood sugar but has been non-compliant with his medications for the last 2-3 months. He specifically denies any vomiting, abdominal pain, dysuria, hematuria, fevers, chills, cough, chest pain, headache, or SOB. PCP: Reva Schwartz MD      There are no other complaints, changes or physical findings at this time. Written by Johnathan Zuñiga ED Scribe, as dictated by Cindy Calvert MD     The history is provided by the patient. No  was used. Past Medical History:   Diagnosis Date    Hypertension 10/31/2014    Insomnia 5/6/2013    Morbid obesity ([facility] [address] 5/6/2013    Type II diabetes mellitus, uncontrolled ([facility] [address] 6/30/2016       Past Surgical History:   Procedure Laterality Date    HX HEENT      wisdom teeth x4         Family History:   Problem Relation Age of Onset    Asthma Mother     Diabetes Mother     Diabetes Father        Social History     Social History    Marital status: SINGLE     Spouse name: N/A    Number of children: N/A    Years of education: N/A     Occupational History    Not on file.      Social History Main Topics    Smoking status: Never Smoker    Smokeless tobacco: Never Used    Alcohol use Yes      Comment: socially    Drug use: Yes     Special: Marijuana    Sexual activity: Yes     Partners: Female     Other Topics Concern    Not on file     Social History Narrative         ALLERGIES: Review of patient's allergies indicates no known allergies. Review of Systems   Constitutional: Positive for appetite change. Negative for chills, fatigue and fever. HENT: Negative for congestion, rhinorrhea and sore throat. Eyes: Negative for pain, discharge and visual disturbance. Respiratory: Negative for cough, chest tightness, shortness of breath and wheezing. Cardiovascular: Negative for chest pain, palpitations and leg swelling. Gastrointestinal: Positive for diarrhea and nausea. Negative for abdominal pain, constipation and vomiting. Genitourinary: Negative for dysuria, frequency and hematuria. Musculoskeletal: Negative for arthralgias, back pain and myalgias. Skin: Negative for rash. Neurological: Negative for dizziness, weakness, light-headedness and headaches. Psychiatric/Behavioral: Negative. Patient Vitals for the past 12 hrs:   Temp Pulse Resp BP SpO2   07/09/17 0530 - 95 21 (!) 131/91 92 %   07/09/17 0500 - 93 20 121/78 92 %   07/09/17 0430 - 95 22 131/74 90 %   07/09/17 0402 - (!) 109 21 124/76 92 %   07/09/17 0030 - - - 123/74 98 %   07/08/17 2335 99.1 °F (37.3 °C) (!) 112 16 129/76 98 %      Physical Exam   Constitutional: He is oriented to person, place, and time. He appears well-developed and well-nourished. No distress. HENT:   Head: Normocephalic and atraumatic. Eyes: EOM are normal. Right eye exhibits no discharge. Left eye exhibits no discharge. No scleral icterus. Neck: Normal range of motion. Neck supple. No tracheal deviation present. Cardiovascular: Regular rhythm, normal heart sounds and intact distal pulses. Tachycardia present. Exam reveals no gallop and no friction rub. No murmur heard. Heart rate in the low 100's    Pulmonary/Chest: Effort normal and breath sounds normal. No respiratory distress. He has no wheezes. He has no rales. Abdominal: Soft. He exhibits no distension. There is no tenderness. Musculoskeletal: Normal range of motion. He exhibits no edema.    Lymphadenopathy: He has no cervical adenopathy. Neurological: He is alert and oriented to person, place, and time. Skin: Skin is warm and dry. No rash noted. Psychiatric: He has a normal mood and affect. Nursing note and vitals reviewed. MDM  Number of Diagnoses or Management Options  LITTLE (acute kidney injury) ([facility] [address]): Hyperosmolar non-ketotic state in patient with type 2 diabetes mellitus [facility]):   Diagnosis management comments:     Patient presents to ED with HHS after several months of noncompliance with diabetes medications. , AG 9. Will treat with IVF and insulin gtt. Plan to admit for further management. Amount and/or Complexity of Data Reviewed  Clinical lab tests: ordered and reviewed  Review and summarize past medical records: yes  Discuss the patient with other providers: yes (Hospitalist )    Patient Progress  Patient progress: stable    ED Course       Procedures    CONSULT NOTE:   2:01 AM  Cindy Calvert MD spoke with Dr. Devika Hernandez,   Specialty: Hospitalist  Discussed pt's hx, disposition, and available diagnostic and imaging results. Reviewed care plans. Consultant will evaluate pt for admission. Written by Harry Schumacher ED Scribe, as dictated by Cindy Calvert MD.    CRITICAL CARE NOTE :  6:28 AM  IMPENDING DETERIORATION -Metabolic  ASSOCIATED RISK FACTORS - hyperglycemia  MANAGEMENT- Bedside Assessment and Supervision of Care  INTERPRETATION -  Blood Pressure and labs  INTERVENTIONS - IV fluids and insulin drip   CASE REVIEW - Hospitalist and nursing  TREATMENT RESPONSE -Improved  PERFORMED BY - Self  NOTES   :  I have spent 45 minutes of critical care time involved in lab review, consultations with specialist, family decision- making, bedside attention and documentation. During this entire length of time I was immediately available to the patient . Written by Johnathan Zuñiga ED Scribe as dictated by Cindy Calvert MD     LABORATORY TESTS:  Recent Results (from the past 12 hour(s))   URINALYSIS W/ REFLEX CULTURE    Collection Time: 07/08/17 11:45 PM   Result Value Ref Range    Color YELLOW/STRAW      Appearance CLEAR CLEAR      Specific gravity <1.005 1.003 - 1.030    pH (UA) 5.5 5.0 - 8.0      Protein NEGATIVE  NEG mg/dL    Glucose >1000 (A) NEG mg/dL    Ketone TRACE (A) NEG mg/dL    Bilirubin NEGATIVE  NEG      Blood NEGATIVE  NEG      Urobilinogen 0.2 0.2 - 1.0 EU/dL    Nitrites NEGATIVE  NEG      Leukocyte Esterase NEGATIVE  NEG      WBC 0-4 0 - 4 /hpf    RBC 0-5 0 - 5 /hpf    Epithelial cells FEW FEW /lpf    Bacteria NEGATIVE  NEG /hpf    UA:UC IF INDICATED CULTURE NOT INDICATED BY UA RESULT CNI     GLUCOSE, POC    Collection Time: 07/09/17 12:23 AM   Result Value Ref Range    Glucose (POC) >600 (HH) 65 - 100 mg/dL    Performed by Yvonne Martinez    GLUCOSE, POC    Collection Time: 07/09/17 12:24 AM   Result Value Ref Range    Glucose (POC) >600 (HH) 65 - 100 mg/dL    Performed by Karthik Balderas    CBC WITH AUTOMATED DIFF    Collection Time: 07/09/17 12:47 AM   Result Value Ref Range    WBC 6.7 4.1 - 11.1 K/uL    RBC 4.43 4.10 - 5.70 M/uL    HGB 14.7 12.1 - 17.0 g/dL    HCT 42.0 36.6 - 50.3 %    MCV 94.8 80.0 - 99.0 FL    MCH 33.2 26.0 - 34.0 PG    MCHC 35.0 30.0 - 36.5 g/dL    RDW 11.7 11.5 - 14.5 %    PLATELET 023 745 - 097 K/uL    NEUTROPHILS 59 32 - 75 %    LYMPHOCYTES 33 12 - 49 %    MONOCYTES 7 5 - 13 %    EOSINOPHILS 1 0 - 7 %    BASOPHILS 0 0 - 1 %    ABS. NEUTROPHILS 4.0 1.8 - 8.0 K/UL    ABS. LYMPHOCYTES 2.2 0.8 - 3.5 K/UL    ABS. MONOCYTES 0.5 0.0 - 1.0 K/UL    ABS. EOSINOPHILS 0.1 0.0 - 0.4 K/UL    ABS.  BASOPHILS 0.0 0.0 - 0.1 K/UL   METABOLIC PANEL, COMPREHENSIVE    Collection Time: 07/09/17 12:47 AM   Result Value Ref Range    Sodium 125 (L) 136 - 145 mmol/L    Potassium 4.3 3.5 - 5.1 mmol/L    Chloride 89 (L) 97 - 108 mmol/L    CO2 27 21 - 32 mmol/L    Anion gap 9 5 - 15 mmol/L    Glucose 872 (HH) 65 - 100 mg/dL    BUN 16 6 - 20 MG/DL Creatinine 1.60 (H) 0.70 - 1.30 MG/DL    BUN/Creatinine ratio 10 (L) 12 - 20      GFR est AA 58 (L) >60 ml/min/1.73m2    GFR est non-AA 48 (L) >60 ml/min/1.73m2    Calcium 8.4 (L) 8.5 - 10.1 MG/DL    Bilirubin, total 0.5 0.2 - 1.0 MG/DL    ALT (SGPT) 40 12 - 78 U/L    AST (SGOT) 27 15 - 37 U/L    Alk.  phosphatase 75 45 - 117 U/L    Protein, total 7.1 6.4 - 8.2 g/dL    Albumin 3.2 (L) 3.5 - 5.0 g/dL    Globulin 3.9 2.0 - 4.0 g/dL    A-G Ratio 0.8 (L) 1.1 - 2.2     MAGNESIUM    Collection Time: 07/09/17 12:47 AM   Result Value Ref Range    Magnesium 2.3 1.6 - 2.4 mg/dL   LIPASE    Collection Time: 07/09/17 12:47 AM   Result Value Ref Range    Lipase 338 73 - 393 U/L   GLUCOSE, POC    Collection Time: 07/09/17  2:32 AM   Result Value Ref Range    Glucose (POC) >600 (HH) 65 - 100 mg/dL    Performed by Ceferino Ruiz    Collection Time: 07/09/17  2:38 AM   Result Value Ref Range    Glucose 601 mg/dL    Insulin order 10.8 units/hour    Insulin adminstered 10.8 units/hour    Multiplier 0.020     Low target 200 mg/dL    High target 300 mg/dL    D50 order 0.0 ml    D50 administered 0.00 ml    Minutes until next BG 60 min    Order initials BKA     Administered initials BKA     GLSCOM Comments verified by Kimberlee Osman RN    GLUCOSE, POC    Collection Time: 07/09/17  3:46 AM   Result Value Ref Range    Glucose (POC) 542 (H) 65 - 100 mg/dL    Performed by Ceferino Ruiz    Collection Time: 07/09/17  3:48 AM   Result Value Ref Range    Glucose 542 mg/dL    Insulin order 14.5 units/hour    Insulin adminstered 14.5 units/hour    Multiplier 0.030     Low target 200 mg/dL    High target 300 mg/dL    D50 order 0.0 ml    D50 administered 0.00 ml    Minutes until next BG 60 min    Order initials TN     Administered initials TN     GLSCOM Comments     GLUCOSE, POC    Collection Time: 07/09/17  4:54 AM   Result Value Ref Range    Glucose (POC) 416 (H) 65 - 100 mg/dL    Performed by Peggyann Schwab R. GLUCOSTABILIZER    Collection Time: 07/09/17  4:55 AM   Result Value Ref Range    Glucose 416 mg/dL    Insulin order 10.7 units/hour    Insulin adminstered 10.7 units/hour    Multiplier 0.030     Low target 200 mg/dL    High target 300 mg/dL    D50 order 0.0 ml    D50 administered 0.00 ml    Minutes until next BG 60 min    Order initials arl     Administered initials arl     GLSCOM Comments     METABOLIC PANEL, BASIC    Collection Time: 07/09/17  4:58 AM   Result Value Ref Range    Sodium 134 (L) 136 - 145 mmol/L    Potassium 3.4 (L) 3.5 - 5.1 mmol/L    Chloride 99 97 - 108 mmol/L    CO2 25 21 - 32 mmol/L    Anion gap 10 5 - 15 mmol/L    Glucose 439 (H) 65 - 100 mg/dL    BUN 13 6 - 20 MG/DL    Creatinine 1.25 0.70 - 1.30 MG/DL    BUN/Creatinine ratio 10 (L) 12 - 20      GFR est AA >60 >60 ml/min/1.73m2    GFR est non-AA >60 >60 ml/min/1.73m2    Calcium 8.6 8.5 - 10.1 MG/DL   MAGNESIUM    Collection Time: 07/09/17  4:58 AM   Result Value Ref Range    Magnesium 2.6 (H) 1.6 - 2.4 mg/dL   PHOSPHORUS    Collection Time: 07/09/17  4:58 AM   Result Value Ref Range    Phosphorus 2.2 (L) 2.6 - 4.7 MG/DL   GLUCOSE, POC    Collection Time: 07/09/17  6:04 AM   Result Value Ref Range    Glucose (POC) 350 (H) 65 - 100 mg/dL    Performed by Juan Francisco MEHTA     GLUCOSTABILIZER    Collection Time: 07/09/17  6:04 AM   Result Value Ref Range    Glucose 350 mg/dL    Insulin order 11.6 units/hour    Insulin adminstered 11.6 units/hour    Multiplier 0.040     Low target 200 mg/dL    High target 300 mg/dL    D50 order 0.0 ml    D50 administered 0.00 ml    Minutes until next BG 60 min    Order initials arl     Administered initials arl     GLSCOM Comments           MEDICATIONS GIVEN:  Medications   sodium chloride (NS) flush 5-10 mL (not administered)   sodium chloride (NS) flush 5-10 mL (not administered)   insulin regular (NOVOLIN R, HUMULIN R) 100 Units in 0.9% sodium chloride 100 mL infusion (11.6 Units/hr IntraVENous New Bag 7/9/17 5765)   insulin lispro (HUMALOG) injection (not administered)   glucose chewable tablet 16 g (not administered)   dextrose (D50W) injection syrg 12.5-25 g (not administered)   glucagon (GLUCAGEN) injection 1 mg (not administered)   0.9% sodium chloride infusion (150 mL/hr IntraVENous New Bag 7/9/17 0349)   sodium chloride 0.9 % bolus infusion 1,000 mL (1,000 mL IntraVENous New Bag 7/9/17 0058)   sodium chloride 0.9 % bolus infusion 1,000 mL (1,000 mL IntraVENous New Bag 7/9/17 0524)       IMPRESSION:  1. Hyperosmolar non-ketotic state in patient with type 2 diabetes mellitus ([facility] [address]    2. LITTLE (acute kidney injury) ([facility]        PLAN:  1. Admission  Admit Note:  2:01 AM  Patient is being admitted to the hospital by Dr. Estuardo Youngblood. The results of their tests and reasons for their admission have been discussed with the patient and/or available family. They convey their agreement and understanding for the need to be admitted and for their admission diagnosis. Written by Valentino Kitchen Barrett, ED Scribe, as dictated by Marilyn Cazares MD.    Attestation: This note is prepared by Nayan Bowen. Yogesh, acting as Scribe for Marilyn Cazares MD.      Marilyn Cazares MD: The scribe's documentation has been prepared under my direction and personally reviewed by me in its entirety. I confirm that the note above accurately reflects all work, treatment, procedures, and medical decision making performed by me.

## 2017-07-09 NOTE — ED NOTES
Spoke with Dr. Ruddy Connolly, patient will now use a sliding scale for insulin and no maintenance IV fluids needed at this time. Tray ordered for patient, will assess glucose prior to eating.

## 2017-07-09 NOTE — ED NOTES
Patient ate a portion of his lunch. Carb count conversion is 4 units of Lispro which was administered. Patient resting in bed in no apparent distress. Call bell in reach, bed in low locked position.

## 2017-07-09 NOTE — PROGRESS NOTES
S: Mr. Eddie Duncan was seen by me today during rounds. At this time, he is resting comfortably. His abdom discomfort and nausea have improved. The patient has no new complaints today. ROS otherwise unremarkable except as noted elsewhere. O: Blood pressure 135/83, pulse 86, temperature 99.1 °F (37.3 °C), resp. rate 17, height 5' 10\" (1.778 m), weight 319 lb 3.6 oz (144.8 kg), SpO2 95 %. Gen: Patient is in no acute distress. Lungs: CTAB. Heart: RRR. Abd: S, NT, ND, BS present. Extremities: Warm.     Recent Results (from the past 12 hour(s))   URINALYSIS W/ REFLEX CULTURE    Collection Time: 07/08/17 11:45 PM   Result Value Ref Range    Color YELLOW/STRAW      Appearance CLEAR CLEAR      Specific gravity <1.005 1.003 - 1.030    pH (UA) 5.5 5.0 - 8.0      Protein NEGATIVE  NEG mg/dL    Glucose >1000 (A) NEG mg/dL    Ketone TRACE (A) NEG mg/dL    Bilirubin NEGATIVE  NEG      Blood NEGATIVE  NEG      Urobilinogen 0.2 0.2 - 1.0 EU/dL    Nitrites NEGATIVE  NEG      Leukocyte Esterase NEGATIVE  NEG      WBC 0-4 0 - 4 /hpf    RBC 0-5 0 - 5 /hpf    Epithelial cells FEW FEW /lpf    Bacteria NEGATIVE  NEG /hpf    UA:UC IF INDICATED CULTURE NOT INDICATED BY UA RESULT CNI     GLUCOSE, POC    Collection Time: 07/09/17 12:23 AM   Result Value Ref Range    Glucose (POC) >600 (HH) 65 - 100 mg/dL    Performed by Yvonne Martinez    GLUCOSE, POC    Collection Time: 07/09/17 12:24 AM   Result Value Ref Range    Glucose (POC) >600 (HH) 65 - 100 mg/dL    Performed by Karthik Balderas    CBC WITH AUTOMATED DIFF    Collection Time: 07/09/17 12:47 AM   Result Value Ref Range    WBC 6.7 4.1 - 11.1 K/uL    RBC 4.43 4.10 - 5.70 M/uL    HGB 14.7 12.1 - 17.0 g/dL    HCT 42.0 36.6 - 50.3 %    MCV 94.8 80.0 - 99.0 FL    MCH 33.2 26.0 - 34.0 PG    MCHC 35.0 30.0 - 36.5 g/dL    RDW 11.7 11.5 - 14.5 %    PLATELET 458 314 - 439 K/uL    NEUTROPHILS 59 32 - 75 %    LYMPHOCYTES 33 12 - 49 %    MONOCYTES 7 5 - 13 %    EOSINOPHILS 1 0 - 7 %    BASOPHILS 0 0 - 1 %    ABS. NEUTROPHILS 4.0 1.8 - 8.0 K/UL    ABS. LYMPHOCYTES 2.2 0.8 - 3.5 K/UL    ABS. MONOCYTES 0.5 0.0 - 1.0 K/UL    ABS. EOSINOPHILS 0.1 0.0 - 0.4 K/UL    ABS. BASOPHILS 0.0 0.0 - 0.1 K/UL   METABOLIC PANEL, COMPREHENSIVE    Collection Time: 07/09/17 12:47 AM   Result Value Ref Range    Sodium 125 (L) 136 - 145 mmol/L    Potassium 4.3 3.5 - 5.1 mmol/L    Chloride 89 (L) 97 - 108 mmol/L    CO2 27 21 - 32 mmol/L    Anion gap 9 5 - 15 mmol/L    Glucose 872 (HH) 65 - 100 mg/dL    BUN 16 6 - 20 MG/DL    Creatinine 1.60 (H) 0.70 - 1.30 MG/DL    BUN/Creatinine ratio 10 (L) 12 - 20      GFR est AA 58 (L) >60 ml/min/1.73m2    GFR est non-AA 48 (L) >60 ml/min/1.73m2    Calcium 8.4 (L) 8.5 - 10.1 MG/DL    Bilirubin, total 0.5 0.2 - 1.0 MG/DL    ALT (SGPT) 40 12 - 78 U/L    AST (SGOT) 27 15 - 37 U/L    Alk.  phosphatase 75 45 - 117 U/L    Protein, total 7.1 6.4 - 8.2 g/dL    Albumin 3.2 (L) 3.5 - 5.0 g/dL    Globulin 3.9 2.0 - 4.0 g/dL    A-G Ratio 0.8 (L) 1.1 - 2.2     MAGNESIUM    Collection Time: 07/09/17 12:47 AM   Result Value Ref Range    Magnesium 2.3 1.6 - 2.4 mg/dL   LIPASE    Collection Time: 07/09/17 12:47 AM   Result Value Ref Range    Lipase 338 73 - 393 U/L   GLUCOSE, POC    Collection Time: 07/09/17  2:32 AM   Result Value Ref Range    Glucose (POC) >600 (HH) 65 - 100 mg/dL    Performed by Milton Muhammad    Collection Time: 07/09/17  2:38 AM   Result Value Ref Range    Glucose 601 mg/dL    Insulin order 10.8 units/hour    Insulin adminstered 10.8 units/hour    Multiplier 0.020     Low target 200 mg/dL    High target 300 mg/dL    D50 order 0.0 ml    D50 administered 0.00 ml    Minutes until next BG 60 min    Order initials BKA     Administered initials BKA     GLSCOM Comments verified by Sheela Boland RN    GLUCOSE, POC    Collection Time: 07/09/17  3:46 AM   Result Value Ref Range    Glucose (POC) 542 (H) 65 - 100 mg/dL    Performed by Lisette MCNALLY    GLUCOSTABILIZER Collection Time: 07/09/17  3:48 AM   Result Value Ref Range    Glucose 542 mg/dL    Insulin order 14.5 units/hour    Insulin adminstered 14.5 units/hour    Multiplier 0.030     Low target 200 mg/dL    High target 300 mg/dL    D50 order 0.0 ml    D50 administered 0.00 ml    Minutes until next BG 60 min    Order initials TN     Administered initials TN     GLSCOM Comments     GLUCOSE, POC    Collection Time: 07/09/17  4:54 AM   Result Value Ref Range    Glucose (POC) 416 (H) 65 - 100 mg/dL    Performed by Tanisha MEHTA     GLUCOSTABILIZER    Collection Time: 07/09/17  4:55 AM   Result Value Ref Range    Glucose 416 mg/dL    Insulin order 10.7 units/hour    Insulin adminstered 10.7 units/hour    Multiplier 0.030     Low target 200 mg/dL    High target 300 mg/dL    D50 order 0.0 ml    D50 administered 0.00 ml    Minutes until next BG 60 min    Order initials arl     Administered initials arl     GLSCOM Comments     METABOLIC PANEL, BASIC    Collection Time: 07/09/17  4:58 AM   Result Value Ref Range    Sodium 134 (L) 136 - 145 mmol/L    Potassium 3.4 (L) 3.5 - 5.1 mmol/L    Chloride 99 97 - 108 mmol/L    CO2 25 21 - 32 mmol/L    Anion gap 10 5 - 15 mmol/L    Glucose 439 (H) 65 - 100 mg/dL    BUN 13 6 - 20 MG/DL    Creatinine 1.25 0.70 - 1.30 MG/DL    BUN/Creatinine ratio 10 (L) 12 - 20      GFR est AA >60 >60 ml/min/1.73m2    GFR est non-AA >60 >60 ml/min/1.73m2    Calcium 8.6 8.5 - 10.1 MG/DL   MAGNESIUM    Collection Time: 07/09/17  4:58 AM   Result Value Ref Range    Magnesium 2.6 (H) 1.6 - 2.4 mg/dL   PHOSPHORUS    Collection Time: 07/09/17  4:58 AM   Result Value Ref Range    Phosphorus 2.2 (L) 2.6 - 4.7 MG/DL   HEMOGLOBIN A1C WITH EAG    Collection Time: 07/09/17  4:58 AM   Result Value Ref Range    Hemoglobin A1c 12.6 (H) 4.2 - 6.3 %    Est. average glucose 315 mg/dL   GLUCOSE, POC    Collection Time: 07/09/17  6:04 AM   Result Value Ref Range    Glucose (POC) 350 (H) 65 - 100 mg/dL    Performed by Loyd Jolley Brayan Caldera. GLUCOSTABILIZER    Collection Time: 07/09/17  6:04 AM   Result Value Ref Range    Glucose 350 mg/dL    Insulin order 11.6 units/hour    Insulin adminstered 11.6 units/hour    Multiplier 0.040     Low target 200 mg/dL    High target 300 mg/dL    D50 order 0.0 ml    D50 administered 0.00 ml    Minutes until next BG 60 min    Order initials arl     Administered initials arl     GLSCOM Comments     GLUCOSE, POC    Collection Time: 07/09/17  7:28 AM   Result Value Ref Range    Glucose (POC) 298 (H) 65 - 100 mg/dL    Performed by Suraj MEHTA     GLUCOSTABILIZER    Collection Time: 07/09/17  7:28 AM   Result Value Ref Range    Glucose 298 mg/dL    Insulin order 9.5 units/hour    Insulin adminstered 9.5 units/hour    Multiplier 0.040     Low target 200 mg/dL    High target 300 mg/dL    D50 order 0.0 ml    D50 administered 0.00 ml    Minutes until next BG 60 min    Order initials arl     Administered initials arl     GLSCOM Comments     GLUCOSE, POC    Collection Time: 07/09/17  8:59 AM   Result Value Ref Range    Glucose (POC) 249 (H) 65 - 100 mg/dL    Performed by Sesar Tyler    Collection Time: 07/09/17  9:00 AM   Result Value Ref Range    Glucose 249 mg/dL    Insulin order 7.6 units/hour    Insulin adminstered 7.6 units/hour    Multiplier 0.040     Low target 200 mg/dL    High target 300 mg/dL    D50 order 0.0 ml    D50 administered 0.00 ml    Minutes until next BG 60 min    Order initials aa     Administered initials aa     GLSCOM Comments     GLUCOSE, POC    Collection Time: 07/09/17 10:01 AM   Result Value Ref Range    Glucose (POC) 221 (H) 65 - 100 mg/dL    Performed by Sesar Tyler    Collection Time: 07/09/17 10:09 AM   Result Value Ref Range    Glucose 221 mg/dL    Insulin order 6.4 units/hour    Insulin adminstered 6.4 units/hour    Multiplier 0.040     Low target 200 mg/dL    High target 300 mg/dL    D50 order 0.0 ml    D50 administered 0.00 ml    Minutes until next BG 60 min    Order initials aa     Administered initials aa     GLSCOM Comments          A: Active Problems: Morbid obesity ([facility] [address] (5/6/2013)      Type [facility], [facility] (6/30/2016)      Hyperosmolar non-ketotic state in patient with type 2 diabetes mellitus ([facility] [address] (7/9/2017)      LITTLE (acute kidney injury) ([facility] (7/9/2017)          P: Sugars improving. Continue insulin drip per protocol. Shift to basal insulin soon.

## 2017-07-09 NOTE — IP AVS SNAPSHOT
[address] [phone] Patient: Lelia Donald MRN: [id number] KI You are allergic to the following No active allergies Recent Documentation Height Weight BMI Smoking Status 1.778 m 144.8 kg 45.8 kg/m2 Never Smoker Emergency Contacts Name Discharge Info Relation Home Work Mobile Carmen Montalvo CAREGIVER [3] Mother [14] [phone] Catarino  Parent [1] [phone] About your hospitalization You were admitted on:  2017 You last received care in the:  [facility] PROGRESSIVE CARE You were discharged on:  2017 Why you were hospitalized Your primary diagnosis was:  Not on File Your diagnoses also included:  Hyperosmolar Non-Ketotic State In Patient With Type 2 Diabetes Mellitus (Hcc), Type Ii Diabetes Mellitus, Uncontrolled (Hcc), Morbid Obesity (Hcc), Bao (Acute Kidney Injury) (Hcc) Providers Seen During Your Hospitalizations Provider Role Specialty Primary office phone Venkatesh Barragan MD Attending Provider Emergency Medicine [phone] Malgorzata Christensen MD Attending Provider Internal Medicine [phone] Your Primary Care Physician (PCP) Primary Care Physician Office Phone Office Fax Lyubov Ta [phone] Follow-up Information Follow up With Details Comments Contact Info Malgorzata Christensen MD Go on 2017 PCP follow up at 10:45 AM [address] [phone] Your Appointments 2017 10:45 AM EDT HOSPITAL FOLLOW-UP with Malgorzata Christensen, [address] [place] [address],[address] [facility]) [address] [phone] Current Discharge Medication List  
  
 START taking these medications Dose & Instructions Dispensing Information Comments Morning Noon Evening Bedtime  
 insulin glargine 100 unit/mL injection Commonly known as:  LANTUS Your last dose was: Your next dose is:    
   
   
 Dose:  25 Units 25 Units by SubCUTAneous route nightly. Quantity:  1 Vial  
Refills:  11 CONTINUE these medications which have CHANGED Dose & Instructions Dispensing Information Comments Morning Noon Evening Bedtime  
 canagliflozin 100 mg tablet Commonly known as:  Vj Little What changed:   
- medication strength - when to take this 
- additional instructions Your last dose was: Your next dose is:    
   
   
 Dose:  100 mg Take 1 Tab by mouth Daily (before breakfast). Quantity:  30 Tab Refills:  11 CONTINUE these medications which have NOT CHANGED Dose & Instructions Dispensing Information Comments Morning Noon Evening Bedtime Blood-Glucose Meter monitoring kit Your last dose was: Your next dose is:    
   
   
 Use as directed to test blood sugar three times daily -DX-DM-E11.9 Quantity:  1 Kit Refills:  0  
     
   
   
   
  
 FISH OIL PO Your last dose was: Your next dose is: Take  by mouth. Refills:  0  
     
   
   
   
  
 glimepiride 4 mg tablet Commonly known as:  AMARYL Your last dose was: Your next dose is:    
   
   
 Dose:  4 mg Take 1 Tab by mouth every morning. Quantity:  30 Tab Refills:  11  
     
   
   
   
  
 glucose blood VI test strips strip Commonly known as:  blood glucose test  
   
Your last dose was: Your next dose is:    
   
   
 Use as directed to test blood sugar three times daily -DX-DM-E11.9 Quantity:  90 Strip Refills:  11 Lancets Misc Your last dose was: Your next dose is: Use as directed to test blood sugar three times daily -DX-DM-E11.9 Quantity:  90 Each Refills:  11  
     
   
   
   
  
 metFORMIN  mg tablet Commonly known as:  GLUCOPHAGE XR Your last dose was: Your next dose is:    
   
   
 Dose:  1500 mg Take 2 Tabs by mouth daily. Quantity:  60 Tab Refills:  11 Where to Get Your Medications Information on where to get these meds will be given to you by the nurse or doctor. ! Ask your nurse or doctor about these medications  
  canagliflozin 100 mg tablet  
 insulin glargine 100 unit/mL injection Discharge Instructions PATIENT DISCHARGE INSTRUCTIONS PATIENT DISCHARGE INSTRUCTIONS [facility] / [id number] : 1975 Admitted 2017 Discharged: 7/10/2017 · It is important that you take the medication exactly as they are prescribed. · Keep your medication in the bottles provided by the pharmacist and keep a list of the medication names, dosages, and times to be taken in your wallet. · Do not take other medications without consulting your doctor. What to do at [facility] Recommended Diet: Diabetic Diet Recommended Activity: Activity as tolerated Signed By: Malgorzata Christensen MD   
 July 10, 2017 [employer] Activation Thank you for requesting access to [employer]. Please follow the instructions below to securely access and download your online medical record. [employer] allows you to send messages to your doctor, view your test results, renew your prescriptions, schedule appointments, and more. How Do I Sign Up? 1. In your internet browser, go to [email or web address] 
2. Click on the First Time User? Click Here link in the Sign In box. You will be redirect to the New Member Sign Up page. 3. Enter your [employer] Access Code exactly as it appears below. You will not need to use this code after youve completed the sign-up process.  If you do not sign up before the expiration date, you must request a new code. [employer] Access Code: [email or web address] Expires: 2017  4:36 PM (This is the date your [employer] access code will ) 4. Enter the last four digits of your Social Security Number (xxxx) and Date of Birth (mm/dd/yyyy) as indicated and click Submit. You will be taken to the next sign-up page. 5. Create a [employer] ID. This will be your [employer] login ID and cannot be changed, so think of one that is secure and easy to remember. 6. Create a [employer] password. You can change your password at any time. 7. Enter your Password Reset Question and Answer. This can be used at a later time if you forget your password. 8. Enter your e-mail address. You will receive e-mail notification when new information is available in [address] 9. Click Sign Up. You can now view and download portions of your medical record. 10. Click the Download Summary menu link to download a portable copy of your medical information. Additional Information If you have questions, please visit the Frequently Asked Questions section of the [employer] website at [email or web address] Remember, [employer] is NOT to be used for urgent needs. For medical emergencies, dial 911. Discharge Orders None General Information Please provide this summary of care documentation to your next provider. Introducing [facility] [place] [facility]! Chandrika Roman introduces [employer] patient portal. Now you can access parts of your medical record, email your doctor's office, and request medication refills online. 1. In your internet browser, go to [email or web address] 2. Click on the First Time User? Click Here link in the Sign In box. You will see the New Member Sign Up page. 3. Enter your [employer] Access Code exactly as it appears below. You will not need to use this code after youve completed the sign-up process.  If you do not sign up before the expiration date, you must request a new code. · [employer] Access Code: [email or web address] Expires: 8/1/2017  4:36 PM 
 
4. Enter the last four digits of your Social Security Number (xxxx) and Date of Birth (mm/dd/yyyy) as indicated and click Submit. You will be taken to the next sign-up page. 5. Create a [employer] ID. This will be your [employer] login ID and cannot be changed, so think of one that is secure and easy to remember. 6. Create a [employer] password. You can change your password at any time. 7. Enter your Password Reset Question and Answer. This can be used at a later time if you forget your password. 8. Enter your e-mail address. You will receive e-mail notification when new information is available in [address] 9. Click Sign Up. You can now view and download portions of your medical record. 10. Click the Download Summary menu link to download a portable copy of your medical information. If you have questions, please visit the Frequently Asked Questions section of the [employer] website. Remember, [employer] is NOT to be used for urgent needs. For medical emergencies, dial 911. Now available from your iPhone and Android! Patient Signature:  ____________________________________________________________ Date:  ____________________________________________________________  
  
[facility] Endo Provider Signature:  ____________________________________________________________ Date:  ____________________________________________________________

## 2017-07-09 NOTE — H&P
Hospitalist Admission Note    NAME: Armani Mendoza   :  1975   MRN:  [id number]     Date/Time:  2017 5:22 AM    Patient PCP: Eduin Rascon MD  ________________________________________________________________________    My assessment of this patient's clinical condition and my plan of care is as follows. Assessment / Plan:  Non Ketotic Hyperosmolar State with baseline DM type 2  Admit to PCU  Given 1L bolus will give another 1L bolus  Aggressive IV hydration  Insulin ggt for now  Non compliant with home regimen, will hold home regimen for now  Check A1C  Frequent BMP to make sure lytes remain stable    Pseudohyponatremia due to hyperosmolar state  Monitor Na while treated    LITTLE (acute kidney injury)  Due to hyperosmolar state  IVF and monitor lytes    Morbid obesity   Need diet and exercise    Code Status: Full    DVT Prophylaxis: Lovenox    Baseline: functional        Subjective:   CHIEF COMPLAINT: told by primary care physician office to come to ED    HISTORY OF PRESENT ILLNESS:     Russell Angelo is a 42 y.o.  male who presents to ED because he was called by PCP office to come to ED because of abnormal lab. Pt has known history of DM, seems to be on multiple medication but is taking only one medication of which he doesn't know the name. Pt complaining of frequent urination and increase thirst. He went to lab work at PCP office and yesterday he received a call to come to ED because of abnormal lab. Pt denies any fever, chills, nausea, vomiting, diarrhea, chest pain, shortness of breath. In ED he noted to have blood sugar in 800s and Cr above his baseline. We were asked to admit for work up and evaluation of the above problems.      Past Medical History:   Diagnosis Date    Hypertension 10/31/2014    Insomnia 2013    Morbid obesity ([address] 2013    Type II diabetes mellitus, uncontrolled ([address] 2016        Past Surgical History:   Procedure Laterality Date  HX HEENT      wisdom teeth x4       Social History   Substance Use Topics    Smoking status: Never Smoker    Smokeless tobacco: Never Used    Alcohol use Yes      Comment: socially        Family History   Problem Relation Age of Onset   [facility] Mother     Diabetes Mother     Diabetes Father      No Known Allergies     Prior to Admission medications    Medication Sig Start Date End Date Taking? Authorizing Provider   metFORMIN ER (GLUCOPHAGE XR) 750 mg tablet Take 2 Tabs by mouth daily. 1/17/17  Yes Robert Dias MD   glimepiride (AMARYL) 4 mg tablet Take 1 Tab by mouth every morning. 1/17/17  Yes Robert Dias MD   canagliflozin [facility]) 300 mg tablet Take 1 Tab by mouth Daily (before breakfast). 1/17/17  Yes Robert Dias MD   Lancets misc Use as directed to test blood sugar three times daily -DX-DM-E11.9 1/17/17  Yes Robert Dias MD   glucose blood VI test strips (BLOOD GLUCOSE TEST) strip Use as directed to test blood sugar three times daily -DX-DM-E11.9 1/17/17  Yes Robert Dias MD   Blood-Glucose Meter monitoring kit Use as directed to test blood sugar three times daily -DX-DM-E11.9 1/29/16  Yes Robert Dias MD   [facility] ACID/EPA (FISH OIL PO) Take  by mouth. Yes Historical Provider       REVIEW OF SYSTEMS:     I am not able to complete the review of systems because:    The patient is intubated and sedated    The patient has altered mental status due to his acute medical problems    The patient has baseline aphasia from prior stroke(s)    The patient has baseline dementia and is not reliable historian    The patient is in acute medical distress and unable to provide information           Total of 12 systems reviewed as follows:       POSITIVE= underlined text  Negative = text not underlined  General:  fever, chills, sweats, generalized weakness, weight loss/gain,      loss of appetite, polydipsia  Eyes:    blurred vision, eye pain, loss of vision, double vision  ENT: rhinorrhea, pharyngitis   Respiratory:   cough, sputum production, SOB, WELSH, wheezing, pleuritic pain   Cardiology:   chest pain, palpitations, orthopnea, PND, edema, syncope   Gastrointestinal:  abdominal pain , N/V, diarrhea, dysphagia, constipation, bleeding   Genitourinary:  frequency, urgency, dysuria, hematuria, incontinence   Muskuloskeletal :  arthralgia, myalgia, back pain  Hematology:  easy bruising, nose or gum bleeding, lymphadenopathy   Dermatological: rash, ulceration, pruritis, color change / jaundice  Endocrine:   hot flashes or polydipsia   Neurological:  headache, dizziness, confusion, focal weakness, paresthesia,     Speech difficulties, memory loss, gait difficulty  Psychological: Feelings of anxiety, depression, agitation    Objective:   VITALS:    Visit Vitals    /74    Pulse (!) 112    Temp 99.1 °F (37.3 °C)    Resp 16    Ht 5' 10\" (1.778 m)    Wt 144.8 kg (319 lb 3.6 oz)    SpO2 98%    BMI 45.8 kg/m2       PHYSICAL EXAM:    General:    Alert, cooperative, no distress, appears stated age. HEENT: Atraumatic, anicteric sclerae, pink conjunctivae     No oral ulcers, mucosa dry, throat clear, dentition fair  Neck:  Supple, symmetrical,  thyroid: non tender  Lungs:   Clear to auscultation bilaterally. No Wheezing or Rhonchi. No rales. Chest wall:  No tenderness  No Accessory muscle use. Heart:   Regular  rhythm,  No  murmur   No edema  Abdomen:   Soft, non-tender. Not distended. Bowel sounds normal  Extremities: No cyanosis. No clubbing,      Skin turgor normal, Capillary refill normal, Radial dial pulse 2+  Skin:     Not pale. Not Jaundiced  No rashes   Psych:  Good insight. Not depressed. Not anxious or agitated. Neurologic: EOMs intact. No facial asymmetry. No aphasia or slurred speech. Symmetrical strength, Sensation grossly intact.  Alert and oriented X 4.     _______________________________________________________________________  Care Plan discussed with: Comments   Patient y    Family      RN y    Care Manager                    Consultant:  fatoumata ED physician   _______________________________________________________________________  Expected  Disposition:   Home with Family y   HH/PT/OT/RN    SNF/LTC    DOMI    ________________________________________________________________________  TOTAL TIME: 61 Minutes    Critical Care Provided     Minutes non procedure based      Comments    y Reviewed previous records   >50% of visit spent in counseling and coordination of care y Discussion with patient and questions answered       ________________________________________________________________________  Signed: Aung Lujan MD    Procedures: see electronic medical records for all procedures/Xrays and details which were not copied into this note but were reviewed prior to creation of Plan.     LAB DATA REVIEWED:    Recent Results (from the past 24 hour(s))   URINALYSIS W/ REFLEX CULTURE    Collection Time: 07/08/17 11:45 PM   Result Value Ref Range    Color YELLOW/STRAW      Appearance CLEAR CLEAR      Specific gravity <1.005 1.003 - 1.030    pH (UA) 5.5 5.0 - 8.0      Protein NEGATIVE  NEG mg/dL    Glucose >1000 (A) NEG mg/dL    Ketone TRACE (A) NEG mg/dL    Bilirubin NEGATIVE  NEG      Blood NEGATIVE  NEG      Urobilinogen 0.2 0.2 - 1.0 EU/dL    Nitrites NEGATIVE  NEG      Leukocyte Esterase NEGATIVE  NEG      WBC 0-4 0 - 4 /hpf    RBC 0-5 0 - 5 /hpf    Epithelial cells FEW FEW /lpf    Bacteria NEGATIVE  NEG /hpf    UA:UC IF INDICATED CULTURE NOT INDICATED BY UA RESULT CNI     GLUCOSE, POC    Collection Time: 07/09/17 12:23 AM   Result Value Ref Range    Glucose (POC) >600 (HH) 65 - 100 mg/dL    Performed by Aldo Brunner    GLUCOSE, POC    Collection Time: 07/09/17 12:24 AM   Result Value Ref Range    Glucose (POC) >600 (HH) 65 - 100 mg/dL    Performed by Nataliia Balderas    CBC WITH AUTOMATED DIFF    Collection Time: 07/09/17 12:47 AM   Result Value Ref Range    WBC 6.7 4.1 - 11.1 K/uL    RBC 4.43 4.10 - 5.70 M/uL    HGB 14.7 12.1 - 17.0 g/dL    HCT 42.0 36.6 - 50.3 %    MCV 94.8 80.0 - 99.0 FL    MCH 33.2 26.0 - 34.0 PG    MCHC 35.0 30.0 - 36.5 g/dL    RDW 11.7 11.5 - 14.5 %    PLATELET 443 455 - 544 K/uL    NEUTROPHILS 59 32 - 75 %    LYMPHOCYTES 33 12 - 49 %    MONOCYTES 7 5 - 13 %    EOSINOPHILS 1 0 - 7 %    BASOPHILS 0 0 - 1 %    ABS. NEUTROPHILS 4.0 1.8 - 8.0 K/UL    ABS. LYMPHOCYTES 2.2 0.8 - 3.5 K/UL    ABS. MONOCYTES 0.5 0.0 - 1.0 K/UL    ABS. EOSINOPHILS 0.1 0.0 - 0.4 K/UL    ABS. BASOPHILS 0.0 0.0 - 0.1 K/UL   METABOLIC PANEL, COMPREHENSIVE    Collection Time: 07/09/17 12:47 AM   Result Value Ref Range    Sodium 125 (L) 136 - 145 mmol/L    Potassium 4.3 3.5 - 5.1 mmol/L    Chloride 89 (L) 97 - 108 mmol/L    CO2 27 21 - 32 mmol/L    Anion gap 9 5 - 15 mmol/L    Glucose 872 (HH) 65 - 100 mg/dL    BUN 16 6 - 20 MG/DL    Creatinine 1.60 (H) 0.70 - 1.30 MG/DL    BUN/Creatinine ratio 10 (L) 12 - 20      GFR est AA 58 (L) >60 ml/min/1.73m2    GFR est non-AA 48 (L) >60 ml/min/1.73m2    Calcium 8.4 (L) 8.5 - 10.1 MG/DL    Bilirubin, total 0.5 0.2 - 1.0 MG/DL    ALT (SGPT) 40 12 - 78 U/L    AST (SGOT) 27 15 - 37 U/L    Alk.  phosphatase 75 45 - 117 U/L    Protein, total 7.1 6.4 - 8.2 g/dL    Albumin 3.2 (L) 3.5 - 5.0 g/dL    Globulin 3.9 2.0 - 4.0 g/dL    A-G Ratio 0.8 (L) 1.1 - 2.2     MAGNESIUM    Collection Time: 07/09/17 12:47 AM   Result Value Ref Range    Magnesium 2.3 1.6 - 2.4 mg/dL   LIPASE    Collection Time: 07/09/17 12:47 AM   Result Value Ref Range    Lipase 338 73 - 393 U/L   GLUCOSE, POC    Collection Time: 07/09/17  2:32 AM   Result Value Ref Range    Glucose (POC) >600 (HH) 65 - 100 mg/dL    Performed by Corinne Gold    Collection Time: 07/09/17  2:38 AM   Result Value Ref Range    Glucose 601 mg/dL    Insulin order 10.8 units/hour    Insulin adminstered 10.8 units/hour    Multiplier 0.020     Low target 200 mg/dL    High target 300 mg/dL    D50 order 0.0 ml    D50 administered 0.00 ml    Minutes until next BG 60 min    Order initials BKA     Administered initials BKA     GLSCOM Comments verified by Ramu Shi RN    GLUCOSE, POC    Collection Time: 07/09/17  3:46 AM   Result Value Ref Range    Glucose (POC) 542 (H) 65 - 100 mg/dL    Performed by [employer]    Collection Time: 07/09/17  3:48 AM   Result Value Ref Range    Glucose 542 mg/dL    Insulin order 14.5 units/hour    Insulin adminstered 14.5 units/hour    Multiplier 0.030     Low target 200 mg/dL    High target 300 mg/dL    D50 order 0.0 ml    D50 administered 0.00 ml    Minutes until next BG 60 min    Order initials TN     Administered initials TN     GLSCOM Comments     GLUCOSE, POC    Collection Time: 07/09/17  4:54 AM   Result Value Ref Range    Glucose (POC) 416 (H) 65 - 100 mg/dL    Performed by Bakari MEHTA     GLUCOSTABILIZER    Collection Time: 07/09/17  4:55 AM   Result Value Ref Range    Glucose 416 mg/dL    Insulin order 10.7 units/hour    Insulin adminstered 10.7 units/hour    Multiplier 0.030     Low target 200 mg/dL    High target 300 mg/dL    D50 order 0.0 ml    D50 administered 0.00 ml    Minutes until next BG 60 min    Order initials arl     Administered initials arl     GLSCOM Comments

## 2017-07-09 NOTE — ROUTINE PROCESS
TRANSFER - OUT REPORT:    Verbal report given to [address] [place] [id number] Se RN (name) on Carly Serrato  being transferred to [facility]) for routine progression of care       Report consisted of patients Situation, Background, Assessment and   Recommendations(SBAR). Information from the following report(s) SBAR, ED Summary and MAR was reviewed with the receiving nurse. Lines:   Peripheral IV 07/09/17 Right Hand (Active)   Site Assessment Clean, dry, & intact 7/9/2017  1:42 AM   Phlebitis Assessment 0 7/9/2017  1:42 AM   Infiltration Assessment 0 7/9/2017  1:42 AM   Dressing Status Clean, dry, & intact 7/9/2017  1:42 AM   Dressing Type Transparent 7/9/2017  1:42 AM   Hub Color/Line Status Pink;Flushed 7/9/2017  1:42 AM       Peripheral IV 07/09/17 Left Hand (Active)   Site Assessment Clean, dry, & intact 7/9/2017  1:42 AM   Phlebitis Assessment 0 7/9/2017  1:42 AM   Infiltration Assessment 0 7/9/2017  1:42 AM   Dressing Status Clean, dry, & intact 7/9/2017  1:42 AM   Dressing Type Transparent 7/9/2017  1:42 AM   Hub Color/Line Status Pink;Flushed 7/9/2017  1:42 AM        Opportunity for questions and clarification was provided.       Patient transported with:   Registered Nurse

## 2017-07-09 NOTE — IP AVS SNAPSHOT
[address] [facility] 
[phone] Patient: Maris Hwang MRN: [id number] WVN:3[birth year]29 You are allergic to the following No active allergies Recent Documentation Height Weight BMI Smoking Status 1.778 m 144.8 kg 45.8 kg/m2 Never Smoker Emergency Contacts Name Discharge Info Relation Home Work Mobile Shoshana Robertson CAREGIVER [3] Mother [14] [phone] Catarino  Parent [1] [phone] About your hospitalization You were admitted on:  July 9, 2017 You last received care in the:  [facility] PROGRESSIVE CARE You were discharged on:  July 11, 2017 Unit phone number:  [phone] Why you were hospitalized Your primary diagnosis was:  Not on File Your diagnoses also included:  Hyperosmolar Non-Ketotic State In Patient With Type 2 Diabetes Mellitus (Hcc), Type Ii Diabetes Mellitus, Uncontrolled (Hcc), Morbid Obesity (Hcc), Bao (Acute Kidney Injury) (Hcc) Providers Seen During Your Hospitalizations Provider Role Specialty Primary office phone Fercho Mancini MD Attending Provider Emergency Medicine [phone] Josue Garcia MD Attending Provider Internal Medicine [phone] Your Primary Care Physician (PCP) Primary Care Physician Office Phone Office Fax Johanny Pino [phone] Follow-up Information Follow up With Details Comments Contact Info Josue Garcia MD Go on 7/17/2017 PCP follow up at 10:45 AM Silvia Campbell [address] [facility] [address] [facility] 
[phone] Your Appointments Monday July 17, 2017 10:45 AM EDT HOSPITAL FOLLOW-UP with Josue Garcia, [address] [place] [address] [address] [facility]  
[phone] Current Discharge Medication List  
 START taking these medications Dose & Instructions Dispensing Information Comments Morning Noon Evening Bedtime  
 insulin glargine 100 unit/mL injection Commonly known as:  LANTUS Your last dose was: Your next dose is:    
   
   
 Dose:  25 Units 25 Units by SubCUTAneous route nightly. Quantity:  1 Vial  
Refills:  11 CONTINUE these medications which have CHANGED Dose & Instructions Dispensing Information Comments Morning Noon Evening Bedtime  
 canagliflozin 100 mg tablet Commonly known as:  Lars Liu What changed:   
- medication strength - when to take this 
- additional instructions Your last dose was: Your next dose is:    
   
   
 Dose:  100 mg Take 1 Tab by mouth Daily (before breakfast). Quantity:  30 Tab Refills:  11 CONTINUE these medications which have NOT CHANGED Dose & Instructions Dispensing Information Comments Morning Noon Evening Bedtime Blood-Glucose Meter monitoring kit Your last dose was: Your next dose is:    
   
   
 Use as directed to test blood sugar three times daily -DX-DM-E11.9 Quantity:  1 Kit Refills:  0  
     
   
   
   
  
 FISH OIL PO Your last dose was: Your next dose is: Take  by mouth. Refills:  0  
     
   
   
   
  
 glimepiride 4 mg tablet Commonly known as:  AMARYL Your last dose was: Your next dose is:    
   
   
 Dose:  4 mg Take 1 Tab by mouth every morning. Quantity:  30 Tab Refills:  11  
     
   
   
   
  
 glucose blood VI test strips strip Commonly known as:  blood glucose test  
   
Your last dose was: Your next dose is:    
   
   
 Use as directed to test blood sugar three times daily -DX-DM-E11.9 Quantity:  90 Strip Refills:  11 Lancets Misc Your last dose was: Your next dose is: Use as directed to test blood sugar three times daily -DX-DM-E11.9 Quantity:  90 Each Refills:  11  
     
   
   
   
  
 metFORMIN  mg tablet Commonly known as:  GLUCOPHAGE XR Your last dose was: Your next dose is:    
   
   
 Dose:  1500 mg Take 2 Tabs by mouth daily. Quantity:  60 Tab Refills:  11 Where to Get Your Medications Information on where to get these meds will be given to you by the nurse or doctor. ! Ask your nurse or doctor about these medications  
  canagliflozin 100 mg tablet  
 insulin glargine 100 unit/mL injection Discharge Instructions PATIENT DISCHARGE INSTRUCTIONS PATIENT DISCHARGE INSTRUCTIONS Eddie Duncan / [id number] : 1975 Admitted 2017 Discharged: 7/10/2017 · It is important that you take the medication exactly as they are prescribed. · Keep your medication in the bottles provided by the pharmacist and keep a list of the medication names, dosages, and times to be taken in your wallet. · Do not take other medications without consulting your doctor. What to do at [facility] Recommended Diet: Diabetic Diet Recommended Activity: Activity as tolerated Signed By: Li Silvestre MD   
 July 10, 2017 Stazoo.com Activation Thank you for requesting access to Stazoo.[email or web address]. Please follow the instructions below to securely access and download your online medical record. Stazoo.com allows you to send messages to your doctor, view your test results, renew your prescriptions, schedule appointments, and more. How Do I Sign Up? 1. In your internet browser, go to [email or web address] 
2. Click on the First Time User? Click Here link in the Sign In box. You will be redirect to the New Member Sign Up page. 3. Enter your Stazoo.com Access Code exactly as it appears below. You will not need to use this code after youve completed the sign-up process.  If you do not sign up before the expiration date, you must request a new code. Skillz Access Code: [email or web address] Expires: 2017  4:36 PM (This is the date your Skillz access code will ) 4. Enter the last four digits of your Social Security Number (xxxx) and Date of Birth (mm/dd/yyyy) as indicated and click Submit. You will be taken to the next sign-up page. 5. Create a Skillz ID. This will be your Skillz login ID and cannot be changed, so think of one that is secure and easy to remember. 6. Create a Skillz password. You can change your password at any time. 7. Enter your Password Reset Question and Answer. This can be used at a later time if you forget your password. 8. Enter your e-mail address. You will receive e-mail notification when new information is available in [address] 9. Click Sign Up. You can now view and download portions of your medical record. 10. Click the Download Summary menu link to download a portable copy of your medical information. Additional Information If you have questions, please visit the Frequently Asked Questions section of the Skillz website at [email or web address] Remember, Skillz is NOT to be used for urgent needs. For medical emergencies, dial 911. Discharge Orders None Skillz Announcement We are excited to announce that we are making your provider's discharge notes available to you in Skillz. You will see these notes when they are completed and signed by the physician that discharged you from your recent hospital stay. If you have any questions or concerns about any information you see in Skillz, please call the Health Information Department where you were seen or reach out to your Primary Care Provider for more information about your plan of care. Introducing [facility] & HE[facility]!    
 [employer] introduces Skillz patient portal. Now you can access parts of your medical record, email your doctor's office, and request medication refills online. 1. In your internet browser, go to [email or web address] 2. Click on the First Time User? Click Here link in the Sign In box. You will see the New Member Sign Up page. 3. Enter your [employer] Access Code exactly as it appears below. You will not need to use this code after youve completed the sign-up process. If you do not sign up before the expiration date, you must request a new code. · [employer] Access Code: [email or web address] Expires: 8/1/2017  4:36 PM 
 
4. Enter the last four digits of your Social Security Number (xxxx) and Date of Birth (mm/dd/yyyy) as indicated and click Submit. You will be taken to the next sign-up page. 5. Create a [employer] ID. This will be your [employer] login ID and cannot be changed, so think of one that is secure and easy to remember. 6. Create a [employer] password. You can change your password at any time. 7. Enter your Password Reset Question and Answer. This can be used at a later time if you forget your password. 8. Enter your e-mail address. You will receive e-mail notification when new information is available in [address] 9. Click Sign Up. You can now view and download portions of your medical record. 10. Click the Download Summary menu link to download a portable copy of your medical information. If you have questions, please visit the Frequently Asked Questions section of the [employer] website. Remember, [employer] is NOT to be used for urgent needs. For medical emergencies, dial 911. Now available from your iPhone and Android! General Information Please provide this summary of care documentation to your next provider. Patient Signature:  ____________________________________________________________ Date:  ____________________________________________________________  
  
Teddy Endo  Provider Signature: ____________________________________________________________ Date:  ____________________________________________________________

## 2017-07-09 NOTE — ED NOTES
Pt sent by Dr. Michael Rodriguez after office visit yesterday for possible DKA. Pt's only complaint is a mild rib pain rating 3/10 with polyuria and polydipsia. Pt notes he has only been diagnosed with diabetes for about 1 year. Pt has been drinking slurpees the last few days to help with his nausea. Pt placed on monitor x3, call bell within reach and plan of care discussed.

## 2017-07-09 NOTE — PROGRESS NOTES
6:40 PM  Pt arrived to room, ambulatory without c/o. Pt has already had dinner and been treated with SSI.      Primary Nurse Kristen Boogie RN and LEIGHA Neil performed a dual skin assessment on this patient No impairment noted  Jose score is 23

## 2017-07-09 NOTE — IP AVS SNAPSHOT
Summary of Care Report The Summary of Care report has been created to help improve care coordination. Users with access to Globa.li or [address] (Web-based application) may access additional patient information including the Discharge Summary. If you are not currently a [address] user and need more information, please call the number listed below in the [address] section and ask to be connected with Medical Records. Facility Information Name Address Phone [address] [address] [phone] Patient Information Patient Name Sex JOSÉ Ramirez ([id number]) Male 1975 Discharge Information Admitting Provider Service Area Unit Sania Mejias MD / [facility] / [phone] Discharge Provider Discharge Date/Time Discharge Disposition Destination (none) 2017 (Pending) AHR (none) Patient Language Language ENGLISH [13] Hospital Problems as of 2017  Reviewed: 2017  5:25 AM by Sania Mejias MD  
  
  
  
 Class Noted - Resolved Last Modified POA Active Problems Morbid obesity ([address]  2013 - Present 2017 by Sania Mejias MD Yes Entered by Kirsten Brody MD  
  Type II diabetes mellitus, uncontrolled ([address]  2016 - Present 2017 by Sania Mejias MD Yes Entered by Kirsten Brody MD  
  Hyperosmolar non-ketotic state in patient with type 2 diabetes mellitus ([address]  2017 - Present 2017 by Sania Mejias MD Unknown Entered by Sania Mejias MD  
  LITTLE (acute kidney injury) ([address]  2017 - Present 2017 by Sania Mejias MD Unknown Entered by Sania Mejias MD  
  
Non-Hospital Problems as of 2017  Reviewed: 2017  5:25 AM by Sania Mejias MD  
  
  
  
 Class Noted - Resolved Last Modified Active Problems Hyperlipidemia  5/1/2012 - Present 5/1/2012 by Juanita Macias MD  
  Entered by Juanita Macias MD  
  Insomnia  5/6/2013 - Present 5/6/2013 by Juanita Macias MD  
  Entered by Juanita Macias MD  
  Pain  10/16/2014 - Present 10/16/2014 by Morenita Bo NP Entered by Morenita Bo NP Hypertension  10/31/2014 - Present 10/31/2014 by Juanita Macias MD  
  Entered by Juanita Macias MD  
  NAFLD (nonalcoholic fatty liver disease)  7/27/2016 - Present 7/27/2016 by Tania Brewer NP Entered by Tania Brewer NP You are allergic to the following No active allergies Current Discharge Medication List  
  
START taking these medications Dose & Instructions Dispensing Information Comments  
 insulin glargine 100 unit/mL injection Commonly known as:  LANTUS Dose:  25 Units 25 Units by SubCUTAneous route nightly. Quantity:  1 Vial  
Refills:  11 CONTINUE these medications which have CHANGED Dose & Instructions Dispensing Information Comments  
 canagliflozin 100 mg tablet Commonly known as:  Yudy Kent What changed:   
- medication strength - when to take this 
- additional instructions Dose:  100 mg Take 1 Tab by mouth Daily (before breakfast). Quantity:  30 Tab Refills:  11 CONTINUE these medications which have NOT CHANGED Dose & Instructions Dispensing Information Comments Blood-Glucose Meter monitoring kit Use as directed to test blood sugar three times daily -DX-DM-E11.9 Quantity:  1 Kit Refills:  0  
   
 FISH OIL PO Take  by mouth. Refills:  0  
   
 glimepiride 4 mg tablet Commonly known as:  AMARYL Dose:  4 mg Take 1 Tab by mouth every morning. Quantity:  30 Tab Refills:  11  
   
 glucose blood VI test strips strip Commonly known as:  blood glucose test  
 Use as directed to test blood sugar three times daily -DX-DM-E11.9 Quantity:  90 Strip Refills:  11  
 Lancets Misc Use as directed to test blood sugar three times daily -DX-DM-E11.9 Quantity:  90 Each Refills:  11  
   
 metFORMIN  mg tablet Commonly known as:  GLUCOPHAGE XR Dose:  1500 mg Take 2 Tabs by mouth daily. Quantity:  60 Tab Refills:  11  
   
  
  
  
Current Immunizations Name Date Influenza Vaccine (Quad) PF 2016, 2016 Follow-up Information Follow up With Details Comments Contact Info Dillan Scott MD Go on 2017 PCP follow up at 10:45 AM [address] [facility] [address] [place] [address] 
[phone] Discharge Instructions PATIENT DISCHARGE INSTRUCTIONS PATIENT DISCHARGE INSTRUCTIONS Melvin Jiménez / [id number] : 1975 Admitted 2017 Discharged: 7/10/2017 · It is important that you take the medication exactly as they are prescribed. · Keep your medication in the bottles provided by the pharmacist and keep a list of the medication names, dosages, and times to be taken in your wallet. · Do not take other medications without consulting your doctor. What to do at [facility] Recommended Diet: Diabetic Diet Recommended Activity: Activity as tolerated Signed By: Dillan Scott MD   
 July 10, 2017 Cubeyou Activation Thank you for requesting access to Cubeyou. Please follow the instructions below to securely access and download your online medical record. Cubeyou allows you to send messages to your doctor, view your test results, renew your prescriptions, schedule appointments, and more. How Do I Sign Up? 1. In your internet browser, go to [email or web address] 
2. Click on the First Time User? Click Here link in the Sign In box. You will be redirect to the New Member Sign Up page. 3. Enter your Cub[employer]ou Access Code exactly as it appears below. You will not need to use this code after youve completed the sign-up process.  If you do not sign up before the expiration date, you must request a new code. [employer] Access Code: [email or web address] Expires: 2017  4:36 PM (This is the date your [employer] access code will ) 4. Enter the last four digits of your Social Security Number (xxxx) and Date of Birth (mm/dd/yyyy) as indicated and click Submit. You will be taken to the next sign-up page. 5. Create a [employer] ID. This will be your [employer] login ID and cannot be changed, so think of one that is secure and easy to remember. 6. Create a [employer] password. You can change your password at any time. 7. Enter your Password Reset Question and Answer. This can be used at a later time if you forget your password. 8. Enter your e-mail address. You will receive e-mail notification when new information is available in [address] 9. Click Sign Up. You can now view and download portions of your medical record. 10. Click the Download Summary menu link to download a portable copy of your medical information. Additional Information If you have questions, please visit the Frequently Asked Questions section of the [employer] website at [email or web address]://"[email or web address]. Criers [email or web address]/[email or web address] Remember, [employer] is NOT to be used for urgent needs. For medical emergencies, dial 911. Chart Review Routing History Recipient Method Report Sent By Kun Dias MD  
Phone: [phone] In Basket IP Auto Routed Notes Katie Couch MD [[id number]] 2017  5:33 AM 2017

## 2017-07-09 NOTE — ED NOTES
Provided pt with water and diabetes education about what fluids are good to intake when thirsty and to stay away from sugary beverages such as slurpees and gatorade.

## 2017-07-09 NOTE — ED NOTES
Received verbal report from [facility] and will assume care of the patient at this time. Patient resting in bed with eyes closed, no apparent distress. O2 2L via nasal cannula in place due to patient's sleep apnea overnight. Kylie MARIEL.

## 2017-07-10 LAB
ALBUMIN SERPL BCP-MCNC: 2.8 G/DL (ref 3.5–5)
ALBUMIN/GLOB SERPL: 0.7 {RATIO} (ref 1.1–2.2)
ALP SERPL-CCNC: 67 U/L (ref 45–117)
ALT SERPL-CCNC: 56 U/L (ref 12–78)
ANION GAP BLD CALC-SCNC: 8 MMOL/L (ref 5–15)
AST SERPL W P-5'-P-CCNC: 49 U/L (ref 15–37)
BASOPHILS # BLD AUTO: 0 K/UL (ref 0–0.1)
BASOPHILS # BLD: 0 % (ref 0–1)
BILIRUB SERPL-MCNC: 0.8 MG/DL (ref 0.2–1)
BUN SERPL-MCNC: 13 MG/DL (ref 6–20)
BUN/CREAT SERPL: 12 (ref 12–20)
CALCIUM SERPL-MCNC: 8.3 MG/DL (ref 8.5–10.1)
CHLORIDE SERPL-SCNC: 97 MMOL/L (ref 97–108)
CO2 SERPL-SCNC: 25 MMOL/L (ref 21–32)
CREAT SERPL-MCNC: 1.06 MG/DL (ref 0.7–1.3)
EOSINOPHIL # BLD: 0.2 K/UL (ref 0–0.4)
EOSINOPHIL NFR BLD: 3 % (ref 0–7)
ERYTHROCYTE [DISTWIDTH] IN BLOOD BY AUTOMATED COUNT: 11.6 % (ref 11.5–14.5)
GLOBULIN SER CALC-MCNC: 3.8 G/DL (ref 2–4)
GLUCOSE BLD STRIP.AUTO-MCNC: 367 MG/DL (ref 65–100)
GLUCOSE BLD STRIP.AUTO-MCNC: 367 MG/DL (ref 65–100)
GLUCOSE BLD STRIP.AUTO-MCNC: 388 MG/DL (ref 65–100)
GLUCOSE BLD STRIP.AUTO-MCNC: 479 MG/DL (ref 65–100)
GLUCOSE SERPL-MCNC: 477 MG/DL (ref 65–100)
HCT VFR BLD AUTO: 41.5 % (ref 36.6–50.3)
HGB BLD-MCNC: 14.4 G/DL (ref 12.1–17)
LYMPHOCYTES # BLD AUTO: 35 % (ref 12–49)
LYMPHOCYTES # BLD: 2.7 K/UL (ref 0.8–3.5)
MCH RBC QN AUTO: 32.1 PG (ref 26–34)
MCHC RBC AUTO-ENTMCNC: 34.7 G/DL (ref 30–36.5)
MCV RBC AUTO: 92.6 FL (ref 80–99)
MONOCYTES # BLD: 0.5 K/UL (ref 0–1)
MONOCYTES NFR BLD AUTO: 7 % (ref 5–13)
NEUTS SEG # BLD: 4.3 K/UL (ref 1.8–8)
NEUTS SEG NFR BLD AUTO: 55 % (ref 32–75)
PLATELET # BLD AUTO: 166 K/UL (ref 150–400)
POTASSIUM SERPL-SCNC: 4.4 MMOL/L (ref 3.5–5.1)
PROT SERPL-MCNC: 6.6 G/DL (ref 6.4–8.2)
RBC # BLD AUTO: 4.48 M/UL (ref 4.1–5.7)
SERVICE CMNT-IMP: ABNORMAL
SODIUM SERPL-SCNC: 130 MMOL/L (ref 136–145)
WBC # BLD AUTO: 7.6 K/UL (ref 4.1–11.1)

## 2017-07-10 PROCEDURE — 74011636637 HC RX REV CODE- 636/637: Performed by: INTERNAL MEDICINE

## 2017-07-10 PROCEDURE — 80053 COMPREHEN METABOLIC PANEL: CPT | Performed by: INTERNAL MEDICINE

## 2017-07-10 PROCEDURE — 74011250637 HC RX REV CODE- 250/637: Performed by: INTERNAL MEDICINE

## 2017-07-10 PROCEDURE — 74011250636 HC RX REV CODE- 250/636: Performed by: INTERNAL MEDICINE

## 2017-07-10 PROCEDURE — 82962 GLUCOSE BLOOD TEST: CPT

## 2017-07-10 PROCEDURE — 85025 COMPLETE CBC W/AUTO DIFF WBC: CPT | Performed by: INTERNAL MEDICINE

## 2017-07-10 PROCEDURE — 65660000000 HC RM CCU STEPDOWN

## 2017-07-10 PROCEDURE — 36415 COLL VENOUS BLD VENIPUNCTURE: CPT | Performed by: INTERNAL MEDICINE

## 2017-07-10 RX ORDER — METFORMIN HYDROCHLORIDE 500 MG/1
1000 TABLET, EXTENDED RELEASE ORAL
Status: DISCONTINUED | OUTPATIENT
Start: 2017-07-11 | End: 2017-07-11 | Stop reason: HOSPADM

## 2017-07-10 RX ORDER — INSULIN LISPRO 100 [IU]/ML
8 INJECTION, SOLUTION INTRAVENOUS; SUBCUTANEOUS ONCE
Status: COMPLETED | OUTPATIENT
Start: 2017-07-10 | End: 2017-07-10

## 2017-07-10 RX ORDER — INSULIN GLARGINE 100 [IU]/ML
15 INJECTION, SOLUTION SUBCUTANEOUS DAILY
Status: DISCONTINUED | OUTPATIENT
Start: 2017-07-11 | End: 2017-07-10

## 2017-07-10 RX ORDER — INSULIN LISPRO 100 [IU]/ML
5 INJECTION, SOLUTION INTRAVENOUS; SUBCUTANEOUS ONCE
Status: COMPLETED | OUTPATIENT
Start: 2017-07-10 | End: 2017-07-10

## 2017-07-10 RX ORDER — INSULIN GLARGINE 100 [IU]/ML
15 INJECTION, SOLUTION SUBCUTANEOUS
Qty: 1 VIAL | Refills: 11 | Status: SHIPPED | OUTPATIENT
Start: 2017-07-10 | End: 2017-07-11

## 2017-07-10 RX ORDER — INSULIN GLARGINE 100 [IU]/ML
25 INJECTION, SOLUTION SUBCUTANEOUS DAILY
Status: DISCONTINUED | OUTPATIENT
Start: 2017-07-11 | End: 2017-07-11 | Stop reason: HOSPADM

## 2017-07-10 RX ORDER — GLIMEPIRIDE 1 MG/1
4 TABLET ORAL
Status: DISCONTINUED | OUTPATIENT
Start: 2017-07-10 | End: 2017-07-11 | Stop reason: HOSPADM

## 2017-07-10 RX ORDER — METFORMIN HYDROCHLORIDE 500 MG/1
1000 TABLET, EXTENDED RELEASE ORAL
Status: COMPLETED | OUTPATIENT
Start: 2017-07-10 | End: 2017-07-10

## 2017-07-10 RX ADMIN — INSULIN LISPRO 5 UNITS: 100 INJECTION, SOLUTION INTRAVENOUS; SUBCUTANEOUS at 18:33

## 2017-07-10 RX ADMIN — INSULIN GLARGINE 10 UNITS: 100 INJECTION, SOLUTION SUBCUTANEOUS at 08:57

## 2017-07-10 RX ADMIN — GLIMEPIRIDE 4 MG: 1 TABLET ORAL at 12:38

## 2017-07-10 RX ADMIN — INSULIN LISPRO 8 UNITS: 100 INJECTION, SOLUTION INTRAVENOUS; SUBCUTANEOUS at 12:40

## 2017-07-10 RX ADMIN — Medication 10 ML: at 05:50

## 2017-07-10 RX ADMIN — INSULIN LISPRO 7 UNITS: 100 INJECTION, SOLUTION INTRAVENOUS; SUBCUTANEOUS at 12:39

## 2017-07-10 RX ADMIN — INSULIN LISPRO 7 UNITS: 100 INJECTION, SOLUTION INTRAVENOUS; SUBCUTANEOUS at 18:33

## 2017-07-10 RX ADMIN — ENOXAPARIN SODIUM 40 MG: 40 INJECTION SUBCUTANEOUS at 21:49

## 2017-07-10 RX ADMIN — INSULIN LISPRO 4 UNITS: 100 INJECTION, SOLUTION INTRAVENOUS; SUBCUTANEOUS at 21:50

## 2017-07-10 RX ADMIN — METFORMIN HYDROCHLORIDE 1000 MG: 500 TABLET, EXTENDED RELEASE ORAL at 12:38

## 2017-07-10 RX ADMIN — INSULIN LISPRO 7 UNITS: 100 INJECTION, SOLUTION INTRAVENOUS; SUBCUTANEOUS at 08:58

## 2017-07-10 RX ADMIN — Medication 5 ML: at 14:00

## 2017-07-10 RX ADMIN — Medication 10 ML: at 21:50

## 2017-07-10 NOTE — PROGRESS NOTES
CM Initial Assessment        Current admission assessment--  Patient came into ed for abdnormal lab from pcp office. He also complained of nausea, decrease appetite, and diarrhea. He lives in one story home and he takes care of his mother. He works. He states prior to coming into the hospital he was independent in adl's and iadl's. He gets his medications from [facility] on [address]. He denies using home health or rehab in the past. He states he was being hard headed when asked why he did not take his blood sugar medication for 2-3 months. Informed him of the importance of taking his medication and keeping check on his blood sugar. He voiced understanding. He states he went to a class that his pcp had sent him to and he is interested in getting more education about diabetes. Patient has a consult with diabetes educator while here in the hospital. Nursing aware. Patient will be going home when medically stable. Care Management Interventions  PCP Verified by CM: Yes  Discharge Durable Medical Equipment:  (Patient has glaucometer, cpap at home. )  Current Support Network:  Other (His mother lives with him.)  Confirm Follow Up Transport: Family  Plan discussed with Pt/Family/Caregiver: Yes  Discharge Location  Discharge Placement: Home                Shauna [id number] EXT [id number]

## 2017-07-10 NOTE — PROGRESS NOTES
Bedside shift change report given to [address] (oncoming nurse) by Sirena Lomas RN (offgoing nurse). Report included the following information SBAR, Kardex, Procedure Summary, Intake/Output, MAR, Recent Results and Cardiac Rhythm Lines.

## 2017-07-10 NOTE — PROGRESS NOTES
Paged  [facility]IZON because patients blood sugar was 535 @ 2140;  Orders received to give max dose on scale plus an additional 5 units. Will continue to monitor .

## 2017-07-10 NOTE — PROGRESS NOTES
S: Mr. Arslan River was seen by me today during rounds. At this time, he is resting comfortably. His abdom discomfort and nausea have improved. The patient has no new complaints today. ROS otherwise unremarkable except as noted elsewhere. O: Blood pressure 122/69, pulse 93, temperature 98.6 °F (37 °C), resp. rate 18, height 5' 10\" (1.778 m), weight 319 lb 3.6 oz (144.8 kg), SpO2 98 %. Gen: Patient is in no acute distress. Lungs: CTAB. Heart: RRR. Abd: S, NT, ND, BS present. Extremities: Warm. Recent Results (from the past 12 hour(s))   METABOLIC PANEL, COMPREHENSIVE    Collection Time: 07/10/17  3:02 AM   Result Value Ref Range    Sodium 130 (L) 136 - 145 mmol/L    Potassium 4.4 3.5 - 5.1 mmol/L    Chloride 97 97 - 108 mmol/L    CO2 25 21 - 32 mmol/L    Anion gap 8 5 - 15 mmol/L    Glucose 477 (H) 65 - 100 mg/dL    BUN 13 6 - 20 MG/DL    Creatinine 1.06 0.70 - 1.30 MG/DL    BUN/Creatinine ratio 12 12 - 20      GFR est AA >60 >60 ml/min/1.73m2    GFR est non-AA >60 >60 ml/min/1.73m2    Calcium 8.3 (L) 8.5 - 10.1 MG/DL    Bilirubin, total 0.8 0.2 - 1.0 MG/DL    ALT (SGPT) 56 12 - 78 U/L    AST (SGOT) 49 (H) 15 - 37 U/L    Alk. phosphatase 67 45 - 117 U/L    Protein, total 6.6 6.4 - 8.2 g/dL    Albumin 2.8 (L) 3.5 - 5.0 g/dL    Globulin 3.8 2.0 - 4.0 g/dL    A-G Ratio 0.7 (L) 1.1 - 2.2     CBC WITH AUTOMATED DIFF    Collection Time: 07/10/17  3:02 AM   Result Value Ref Range    WBC 7.6 4.1 - 11.1 K/uL    RBC 4.48 4.10 - 5.70 M/uL    HGB 14.4 12.1 - 17.0 g/dL    HCT 41.5 36.6 - 50.3 %    MCV 92.6 80.0 - 99.0 FL    MCH 32.1 26.0 - 34.0 PG    MCHC 34.7 30.0 - 36.5 g/dL    RDW 11.6 11.5 - 14.5 %    PLATELET 339 958 - 252 K/uL    NEUTROPHILS 55 32 - 75 %    LYMPHOCYTES 35 12 - 49 %    MONOCYTES 7 5 - 13 %    EOSINOPHILS 3 0 - 7 %    BASOPHILS 0 0 - 1 %    ABS. NEUTROPHILS 4.3 1.8 - 8.0 K/UL    ABS. LYMPHOCYTES 2.7 0.8 - 3.5 K/UL    ABS. MONOCYTES 0.5 0.0 - 1.0 K/UL    ABS. EOSINOPHILS 0.2 0.0 - 0.4 K/UL    ABS. BASOPHILS 0.0 0.0 - 0.1 K/UL   GLUCOSE, POC    Collection Time: 07/10/17  7:13 AM   Result Value Ref Range    Glucose (POC) 388 (H) 65 - 100 mg/dL    Performed by [facility] (PCT)    GLUCOSE, POC    Collection Time: 07/10/17 11:20 AM   Result Value Ref Range    Glucose (POC) 479 (H) 65 - 100 mg/dL    Performed by [facility] (PCT)         A: Active Problems: Morbid obesity ([facility] (5/6/2013)      Type II diabetes mellitus, uncontrolled ([facility] (6/30/2016)      Hyperosmolar non-ketotic state in patient with type 2 diabetes mellitus ([facility] (7/9/2017)      LITTLE (acute kidney injury) ([facility] (7/9/2017)          P: Sugars still quite high. Off insulin drip per protocol, on basal insulin. Increase lantus dose. Add back home metformin and glimepiride. (There appears to be no way to order inpatient Invokana).

## 2017-07-10 NOTE — DIABETES MGMT
DTC Consult Note    Recommendations/ Comments: If appropriate, please consider resuming home medications at discharge. Patient has not been taking his diabetes medications for over 7 months. Patient states if he is given insulin at discharge, he likely will not take it. Consult received for:  [x]             Assessment of home management     [x]             Insulin instruction- declined- states he gives his mother inulin everyday and is aware how to give it. Chart reviewed and initial evaluation complete on [address]. Patient is a 42 y.o. male with known diabetes- has not been taking his diabetes medications. Patient attended DTC classes last year when he was first diagnosed. Patient is knowledgable about diabetes, however, admits that he has been noncompliant given taking care of his mother. BG monitoring at home 0 times per day. Assessed and instructed patient on the following:   ·  interpretation of lab results, blood sugar goals, exercise, nutrition and referred to Diabetes Educator    Encouraged the following:   · increased exercise, dietary modifications: omit sodas    Provided patient with the following: [x]             Survival skills education materials               [x]             Insulin education materials                          [x]             Outpatient DTC contact number        Discussed with patient and/or family need for follow up appointment for diabetes management after discharge. Appointment has been scheduled for 7/17/17 to follow up with Dr. Marcos French.       A1c:   Lab Results   Component Value Date/Time    Hemoglobin A1c 12.6 07/09/2017 04:58 AM       Recent Glucose Results:   Lab Results   Component Value Date/Time     (H) 07/10/2017 03:02 AM    GLUCPOC 388 (H) 07/10/2017 07:13 AM    GLUCPOC 535 (H) 07/09/2017 09:37 PM    GLUCPOC 295 (H) 07/09/2017 05:17 PM        Lab Results   Component Value Date/Time    Creatinine 1.06 07/10/2017 03:02 AM Estimated Creatinine Clearance: 130.6 mL/min (based on Cr of 1.06). Active Orders   Diet    DIET DIABETIC CONSISTENT CARB Regular        PO intake: No data found. Will continue to follow as needed. Thank you.     Edie Francois, [address], [address] [facility]  Office:  [phone]

## 2017-07-10 NOTE — DISCHARGE INSTRUCTIONS
PATIENT DISCHARGE INSTRUCTIONS      PATIENT DISCHARGE INSTRUCTIONS    Estephania Alexander / [id number] : 1975    Admitted 2017 Discharged: 7/10/2017       · It is important that you take the medication exactly as they are prescribed. · Keep your medication in the bottles provided by the pharmacist and keep a list of the medication names, dosages, and times to be taken in your wallet. · Do not take other medications without consulting your doctor. What to do at Home    Recommended Diet: Diabetic Diet    Recommended Activity: Activity as tolerated        Signed By: Daniel Campos MD     July 10, 2017       [employer] Activation    Thank you for requesting access to [employer]. Please follow the instructions below to securely access and download your online medical record. [employer] allows you to send messages to your doctor, view your test results, renew your prescriptions, schedule appointments, and more. How Do I Sign Up? 1. In your internet browser, go to [email or web address]  2. Click on the First Time User? Click Here link in the Sign In box. You will be redirect to the New Member Sign Up page. 3. Enter your [employer] Access Code exactly as it appears below. You will not need to use this code after youve completed the sign-up process. If you do not sign up before the expiration date, you must request a new code. [employer] Access Code: [email or web address]  Expires: 2017  4:36 PM (This is the date your [employer] access code will )    4. Enter the last four digits of your Social Security Number (xxxx) and Date of Birth (mm/dd/yyyy) as indicated and click Submit. You will be taken to the next sign-up page. 5. Create a [employer] ID. This will be your [employer] login ID and cannot be changed, so think of one that is secure and easy to remember. 6. Create a [employer] password. You can change your password at any time. 7. Enter your Password Reset Question and Answer.  This can be used at a later time if you forget your password. 8. Enter your e-mail address. You will receive e-mail notification when new information is available in [address] 9. Click Sign Up. You can now view and download portions of your medical record. 10. Click the Download Summary menu link to download a portable copy of your medical information. Additional Information    If you have questions, please visit the Frequently Asked Questions section of the [employer] website at [email or web address]. [email or web address] Remember, [employer] is NOT to be used for urgent needs. For medical emergencies, dial 911.

## 2017-07-10 NOTE — ROUTINE PROCESS
Dr Sena Wu paged due to current blood sugar of 479, and other high blood sugars this am.  Discharge cancelled. Intend to have pt give his own insulin.

## 2017-07-11 ENCOUNTER — TELEPHONE (OUTPATIENT)
Dept: INTERNAL MEDICINE CLINIC | Age: 42
End: 2017-07-11

## 2017-07-11 ENCOUNTER — OFFICE VISIT (OUTPATIENT)
Dept: INTERNAL MEDICINE CLINIC | Age: 42
End: 2017-07-11

## 2017-07-11 VITALS
WEIGHT: 315 LBS | RESPIRATION RATE: 16 BRPM | TEMPERATURE: 98.6 F | DIASTOLIC BLOOD PRESSURE: 83 MMHG | HEART RATE: 93 BPM | OXYGEN SATURATION: 98 % | BODY MASS INDEX: 45.1 KG/M2 | HEIGHT: 70 IN | SYSTOLIC BLOOD PRESSURE: 130 MMHG

## 2017-07-11 VITALS — HEIGHT: 70 IN

## 2017-07-11 DIAGNOSIS — E11.00 HYPEROSMOLAR NON-KETOTIC STATE IN PATIENT WITH TYPE 2 DIABETES MELLITUS (HCC): Primary | ICD-10-CM

## 2017-07-11 LAB
GLUCOSE BLD STRIP.AUTO-MCNC: 330 MG/DL (ref 65–100)
GLUCOSE BLD STRIP.AUTO-MCNC: 333 MG/DL (ref 65–100)
SERVICE CMNT-IMP: ABNORMAL
SERVICE CMNT-IMP: ABNORMAL

## 2017-07-11 PROCEDURE — 74011636637 HC RX REV CODE- 636/637: Performed by: INTERNAL MEDICINE

## 2017-07-11 PROCEDURE — 82962 GLUCOSE BLOOD TEST: CPT

## 2017-07-11 PROCEDURE — 74011250637 HC RX REV CODE- 250/637: Performed by: INTERNAL MEDICINE

## 2017-07-11 RX ORDER — INSULIN GLARGINE 100 [IU]/ML
25 INJECTION, SOLUTION SUBCUTANEOUS
Qty: 1 VIAL | Refills: 11 | Status: ON HOLD | OUTPATIENT
Start: 2017-07-11 | End: 2018-04-30

## 2017-07-11 RX ADMIN — INSULIN LISPRO 7 UNITS: 100 INJECTION, SOLUTION INTRAVENOUS; SUBCUTANEOUS at 12:05

## 2017-07-11 RX ADMIN — INSULIN GLARGINE 25 UNITS: 100 INJECTION, SOLUTION SUBCUTANEOUS at 08:15

## 2017-07-11 RX ADMIN — INSULIN LISPRO 7 UNITS: 100 INJECTION, SOLUTION INTRAVENOUS; SUBCUTANEOUS at 08:14

## 2017-07-11 RX ADMIN — GLIMEPIRIDE 4 MG: 1 TABLET ORAL at 08:16

## 2017-07-11 NOTE — PROGRESS NOTES
SUBJECTIVE  Mr. Mariano Zamora presents today acutely for     Chief Complaint   Patient presents with   [facility] Follow Up     pt here today for ER f/u        He was just admitted for [facility], and released from hospital today. See the relevant discharge summary. He wants a work note to stay out of work for the next week. OBJECTIVE  Visit Vitals    Ht 5' 10\" (1.778 m)     Gen: Pleasant 42 y.o.  male in NAD.        ASSESSMENT / PLAN    ICD-10-CM ICD-9-CM    1. Hyperosmolar non-ketotic state in patient with type 2 diabetes mellitus [facility] E11.01 250.20      Work note given. I have reviewed with the patient details of the assessment and plan and all questions were answered. Relevant patient education was performed. Follow-up Disposition: Keep planned visit in 1 week.

## 2017-07-11 NOTE — PROGRESS NOTES
1. Have you been to the ER, urgent care clinic since your last visit? Hospitalized since your last visit?no    2. Have you seen or consulted any other health care providers outside of the [address] [place] [address] since your last visit? Include any pap smears or colon screening.  no

## 2017-07-11 NOTE — PROGRESS NOTES
PCU SHIFT NURSING NOTE      Bedside and Verbal shift change report given to Allison Phillips (oncoming nurse) by Venkatesh Lopez (offgoing nurse). Report included the following information SBAR, Kardex and Recent Results. Shift Summary:   Paged Dr. Jaylin Pitts for order for patient's blood sugar 367. Admission Date 7/9/2017   Admission Diagnosis Hyperosmolar non-ketotic state in patient with type 2 diabetes mellitus ([facility]   Consults IP CONSULT TO PRIMARY CARE PROVIDER        Consults   []PT   []OT   []Speech   []Case Management      [] Palliative      Cardiac Monitoring Order   []Yes   []No     IV drips   []Yes    Drip:                            Dose:  Drip:                            Dose:  Drip:                            Dose:   []No     GI Prophylaxis   []Yes   []No         DVT Prophylaxis   SCDs:             Gen stockings:         [] Medication   []Contraindicated   []None      Activity Level Activity Level: Up ad verena     Activity Assistance: No assistance needed   Purposeful Rounding every 1-2 hour? []Yes   Lamb Score  Total Score: 1   Bed Alarm (If score 3 or >)   []Yes   [] Refused (See signed refusal form in chart)   Jose Score  Jose Score: 23   Jose Score (if score 14 or less)   []PMT consult   []Wound Care consult      []Specialty bed   [] Nutrition consult          Needs prior to discharge:   Home O2 required:    []Yes   []No    If yes, how much O2 required?     Other:    Last Bowel Movement: Last Bowel Movement Date: 07/09/17      Influenza Vaccine Received Flu Vaccine for Current Season (usually Sept-March): Yes        Pneumonia Vaccine           Diet Active Orders   Diet    DIET DIABETIC CONSISTENT CARB Regular      LDAs               Peripheral IV 07/09/17 Right Hand (Active)   Site Assessment Clean, dry, & intact 7/10/2017  4:51 PM   Phlebitis Assessment 0 7/10/2017  4:51 PM   Infiltration Assessment 0 7/10/2017  4:51 PM   Dressing Status Clean, dry, & intact 7/10/2017  4:51 PM   Dressing Type Transparent 7/10/2017  4:51 PM   Hub Color/Line Status Pink;Flushed 7/10/2017  4:51 PM       Peripheral IV 07/09/17 Left Hand (Active)   Site Assessment Clean, dry, & intact 7/10/2017  4:51 PM   Phlebitis Assessment 0 7/10/2017  4:51 PM   Infiltration Assessment 0 7/10/2017  4:51 PM   Dressing Status Clean, dry, & intact 7/10/2017  4:51 PM   Dressing Type Transparent 7/10/2017  4:51 PM   Hub Color/Line Status Pink;Flushed 7/10/2017  4:51 PM                      Urinary Catheter      Intake & Output   Date 07/09/17 1900 - 07/10/17 0659 07/10/17 0700 - 07/11/17 0659   Shift 9759-0944 24 Hour Total 9666-8877 8907-3233 24 Hour Total   I  N  T  A  K  E   P.O.   1400  1400      P. O.   1400  1400    Shift Total  (mL/kg)   1400  (9.7)  1400  (9.7)   O  U  T  P  U  T   Shift Total  (mL/kg)        NET   1400  1400   Weight (kg) 144.8 144.8 144.8 144.8 144.8         Readmission Risk Assessment Tool Score Low Risk            12       Total Score        3 Relationship with PCP    4 More than 1 Admission in calendar year    5 Charlson Comorbidity Score        Criteria that do not apply:    Patient Living Status    Patient Length of Stay > 5    Patient Insurance is Medicare, Medicaid or Self Pay       Expected Length of Stay 3d 0h   Actual Length of Stay 1

## 2017-07-11 NOTE — PROGRESS NOTES
Visited by Gretchen Jansen Partner 7/10/17.     Sophie Rees, [facility] Paging Service  [phone])

## 2017-07-11 NOTE — TELEPHONE ENCOUNTER
Patient states he needs a call back today in reference to what his dosage on his insulin is that he's supposed to take tonight as patient states he was recently discharged from the Hospital. Please call to advise.  Thank you

## 2017-07-11 NOTE — DISCHARGE SUMMARY
Physician Discharge Summary     Patient ID:  Elizabeth Gomez  [id number]  12 y.o.  1975    Admit date: 7/9/2017    Discharge date and time: 7/11/2017    Admission Diagnoses: Hyperosmolar non-ketotic state in patient with type 2 diabe*    Discharge Diagnoses:  Principal Diagnosis                                               Active Problems: Morbid obesity ([facility] (5/6/2013)      Type II diabetes mellitus, uncontrolled ([facility] (6/30/2016)      Hyperosmolar non-ketotic state in patient with type 2 diabetes mellitus [facility] (7/9/2017)      LITTLE (acute kidney injury) ([facility] (7/9/2017)       Consults: None    Significant Diagnostic Studies:   None. Hospital Course: Mr. Elizabeth Gomez is a patient of mine who presented with the problems above. See the initial H and P for full details. Marla Daigle is a 42 y.o.  male who presents to ED because he was called by PCP office to come to ED because of abnormal lab. Pt has known history of DM, seems to be on multiple medication but is taking only one medication of which he doesn't know the name. Pt complaining of frequent urination and increase thirst. He went to lab work at PCP office and yesterday he received a call to come to ED because of abnormal lab. Pt denies any fever, chills, nausea, vomiting, diarrhea, chest pain, shortness of breath. In ED he noted to have blood sugar in 800s and Cr above his baseline. He was admitted and treated per protocol with IV fluids and IV insulin. His glucose improved, and we started him on lantus insulin, which he is to continue at home. His abdominal discomfort and nausea have improved. Discharge Exam:  Visit Vitals    /83    Pulse 93    Temp 98.6 °F (37 °C)    Resp 16    Ht 5' 10\" (1.778 m)    Wt 319 lb 3.6 oz (144.8 kg)    SpO2 98%    BMI 45.8 kg/m2     Gen: Pleasant 42 y.o. obese male in NAD.   HEENT: PERRLA. EOMI.  OP moist and pink.  Neck: Supple.  No LAD.  HEART: RRR, No M/G/R.   LUNGS: CTAB No W/R.   ABDOMEN: S, NT, ND, BS+.   EXTREMITIES: Warm. SKIN: Warm. Dry. No rashes or other lesions noted. Disposition: home    Patient Instructions:   Current Discharge Medication List      START taking these medications    Details   insulin glargine (LANTUS) 100 unit/mL injection 25 Units by SubCUTAneous route nightly. Qty: 1 Vial, Refills: 11         CONTINUE these medications which have CHANGED    Details   canagliflozin (INVOKANA) 100 mg tablet Take 1 Tab by mouth Daily (before breakfast). Qty: 30 Tab, Refills: 11         CONTINUE these medications which have NOT CHANGED    Details   metFORMIN ER (GLUCOPHAGE XR) 750 mg tablet Take 2 Tabs by mouth daily. Qty: 60 Tab, Refills: 11    Associated Diagnoses: Uncontrolled diabetes mellitus type 2 without complications, unspecified long term insulin use status (HCC)      glimepiride (AMARYL) 4 mg tablet Take 1 Tab by mouth every morning. Qty: 30 Tab, Refills: 11    Associated Diagnoses: Uncontrolled diabetes mellitus type 2 without complications, unspecified long term insulin use status (HCC)      Lancets misc Use as directed to test blood sugar three times daily -DX-DM-E11.9  Qty: 90 Each, Refills: 11    Associated Diagnoses: Uncontrolled diabetes mellitus type 2 without complications, unspecified long term insulin use status (HCC)      glucose blood VI test strips (BLOOD GLUCOSE TEST) strip Use as directed to test blood sugar three times daily -DX-DM-E11.9  Qty: 90 Strip, Refills: 11    Associated Diagnoses: Uncontrolled diabetes mellitus type 2 without complications, unspecified long term insulin use status (HCC)      Blood-Glucose Meter monitoring kit Use as directed to test blood sugar three times daily -DX-DM-E11.9  Qty: 1 Kit, Refills: 0      DOCOSAHEXANOIC ACID/EPA (FISH OIL PO) Take  by mouth.            Activity: Activity as tolerated  Diet: Diabetic Diet      Follow-up With  Details  Why  Contact Andrew Cardenas on 7/17/2017  PCP follow up at 10:45 AM  [facility] [address]  [place] [facility]  [phone]              Signed:  Sima Hernandez MD  7/11/2017  8:44 AM    Note: Greater than 30 minutes were spent on activities related to this discharge.

## 2017-07-20 ENCOUNTER — TELEPHONE (OUTPATIENT)
Dept: INTERNAL MEDICINE CLINIC | Age: 42
End: 2017-07-20

## 2017-07-20 NOTE — TELEPHONE ENCOUNTER
Patient states he needs a call back in reference to documentation for the patient to have been out from work on 7/7/17. Please call to discuss.  Thank you

## 2017-07-25 ENCOUNTER — TELEPHONE (OUTPATIENT)
Dept: INTERNAL MEDICINE CLINIC | Age: 42
End: 2017-07-25

## 2017-07-25 RX ORDER — PRAVASTATIN SODIUM 20 MG/1
20 TABLET ORAL
Qty: 30 TAB | Refills: 11 | Status: SHIPPED | OUTPATIENT
Start: 2017-07-25 | End: 2018-08-14 | Stop reason: SDUPTHER

## 2017-09-05 ENCOUNTER — OFFICE VISIT (OUTPATIENT)
Dept: INTERNAL MEDICINE CLINIC | Age: 42
End: 2017-09-05

## 2017-09-05 VITALS
HEART RATE: 92 BPM | RESPIRATION RATE: 16 BRPM | SYSTOLIC BLOOD PRESSURE: 134 MMHG | DIASTOLIC BLOOD PRESSURE: 87 MMHG | WEIGHT: 315 LBS | OXYGEN SATURATION: 96 % | TEMPERATURE: 98.4 F | BODY MASS INDEX: 45.1 KG/M2 | HEIGHT: 70 IN

## 2017-09-05 DIAGNOSIS — Z23 ENCOUNTER FOR IMMUNIZATION: ICD-10-CM

## 2017-09-05 DIAGNOSIS — N17.9 AKI (ACUTE KIDNEY INJURY) (HCC): ICD-10-CM

## 2017-09-05 DIAGNOSIS — I10 ESSENTIAL HYPERTENSION: ICD-10-CM

## 2017-09-05 DIAGNOSIS — E78.5 HYPERLIPIDEMIA, UNSPECIFIED HYPERLIPIDEMIA TYPE: ICD-10-CM

## 2017-09-05 DIAGNOSIS — Z11.3 ROUTINE SCREENING FOR STI (SEXUALLY TRANSMITTED INFECTION): ICD-10-CM

## 2017-09-05 DIAGNOSIS — K76.0 NAFLD (NONALCOHOLIC FATTY LIVER DISEASE): ICD-10-CM
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
## 2017-09-05 NOTE — MR AVS SNAPSHOT
Visit Information Date & Time Provider Department Dept. Phone Encounter #  
 9/5/2017  9:45 AM Reva Schwartz, [address] [id number] Follow-up Instructions Return in about 2 months (around 11/5/2017) for DM; Bring log of sugars. Upcoming Health Maintenance Date Due Pneumococcal 19-64 Medium Risk (1 of 1 - PPSV23) 8/2/1993 DTaP/Tdap/Td series (1 - Tdap) 8/2/1995 FOOT EXAM Q1 6/30/2017 INFLUENZA AGE 9 TO ADULT 8/1/2017 MICROALBUMIN Q1 9/28/2017 LIPID PANEL Q1 12/30/2017 HEMOGLOBIN A1C Q6M 1/9/2018 EYE EXAM RETINAL OR DILATED Q1 5/4/2018 Allergies as of 9/5/2017  Review Complete On: 9/5/2017 By: Krystal Young No Known Allergies Current Immunizations  Never Reviewed Name Date Influenza Vaccine (Quad) PF 12/30/2016, 1/26/2016 Not reviewed this visit You Were Diagnosed With   
  
 Codes Comments Uncontrolled diabetes mellitus type 2 without complications, unspecified long term insulin use status ([facility]    -  Primary ICD-10-CM: E11.65 ICD-9-CM: 250.02 Essential hypertension     ICD-10-CM: I10 
ICD-9-CM: 401.9 Hyperlipidemia, unspecified hyperlipidemia type     ICD-10-CM: E78.5 ICD-9-CM: 272.4 NAFLD (nonalcoholic fatty liver disease)     ICD-10-CM: K76.0 ICD-9-CM: 571.8 LITTLE (acute kidney injury) ([facility].)     ICD-10-CM: N17.9 ICD-9-CM: 584.9 Routine screening for STI (sexually transmitted infection)     ICD-10-CM: Z11.3 ICD-9-CM: V74.5 Vitals BP Pulse Temp Resp Height(growth percentile) Weight(growth percentile) 134/87 (BP 1 Location: Left arm, BP Patient Position: Sitting) 92 99 °F (37.2 °C) (Oral) 16 5' 10\" (1.778 m) 333 lb (151 kg) SpO2 BMI Smoking Status 96% 47.78 kg/m2 Never Smoker Vitals History BMI and BSA Data Body Mass Index Body Surface Area 47.78 kg/m 2 2.73 m 2 Preferred Pharmacy Pharmacy Name Phone [facility] [address]  [place] [phone] Your Updated Medication List  
  
   
This list is accurate as of: 9/5/17 10:22 AM.  Always use your most recent med list.  
  
  
  
  
 Blood-Glucose Meter monitoring kit Use as directed to test blood sugar three times daily -DX-DM-E11.9  
  
 canagliflozin 100 mg tablet Commonly known as:  Neda  Take 1 Tab by mouth Daily (before breakfast). FISH OIL PO Take  by mouth.  
  
 glimepiride 4 mg tablet Commonly known as:  AMARYL Take 1 Tab by mouth every morning. glucose blood VI test strips strip Commonly known as:  blood glucose test  
Use as directed to test blood sugar three times daily -DX-DM-E11.9  
  
 insulin glargine 100 unit/mL injection Commonly known as:  LANTUS  
25 Units by SubCUTAneous route nightly. Lancets Misc Use as directed to test blood sugar three times daily -DX-DM-E11.9  
  
 metFORMIN  mg tablet Commonly known as:  GLUCOPHAGE XR Take 2 Tabs by mouth daily. pravastatin 20 mg tablet Commonly known as:  PRAVACHOL Take 1 Tab by mouth nightly. We Performed the Following CBC WITH AUTOMATED DIFF [78526 CPT(R)] HEMOGLOBIN A1C WITH EAG [26583 CPT(R)] HEP B SURFACE AB [id number] CPT(R)] HEP B SURFACE AG [id number] CPT(R)] HEPATITIS C AB [81135 CPT(R)] HIV 1/2 AG/AB, 4TH GENERATION,W RFLX CONFIRM [[id number] Custom] LIPID PANEL [23541 CPT(R)] METABOLIC PANEL, COMPREHENSIVE [79788 CPT(R)] MICROALBUMIN, UR, RAND [id number] CPT(R)] RPR [62599 CPT(R)] Follow-up Instructions Return in about 2 months (around 11/5/2017) for DM; Bring log of sugars. Introducing hospitals & HEALTH SERVICES! [employer] introduces [employer] patient portal. Now you can access parts of your medical record, email your doctor's office, and request medication refills online.    
 
1. In your internet browser, go to [email or web address] 2. Click on the First Time User? Click Here link in the Sign In box. You will see the New Member Sign Up page. 3. Enter your Miradore Access Code exactly as it appears below. You will not need to use this code after youve completed the sign-up process. If you do not sign up before the expiration date, you must request a new code. · Miradore Access Code: [email or web address] Expires: 10/28/2017 12:32 PM 
 
4. Enter the last four digits of your Social Security Number (xxxx) and Date of Birth (mm/dd/yyyy) as indicated and click Submit. You will be taken to the next sign-up page. 5. Create a INSOMENIAt ID. This will be your Miradore login ID and cannot be changed, so think of one that is secure and easy to remember. 6. Create a Miradore password. You can change your password at any time. 7. Enter your Password Reset Question and Answer. This can be used at a later time if you forget your password. 8. Enter your e-mail address. You will receive e-mail notification when new information is available in [address] 9. Click Sign Up. You can now view and download portions of your medical record. 10. Click the Download Summary menu link to download a portable copy of your medical information. If you have questions, please visit the Frequently Asked Questions section of the Miradore website. Remember, Miradore is NOT to be used for urgent needs. For medical emergencies, dial 911. Now available from your iPhone and Android! Please provide this summary of care documentation to your next provider. Your primary care clinician is listed as [place]. If you have any questions after today's visit, please call [phone].

## 2017-09-05 NOTE — PROGRESS NOTES
1. Have you been to the ER, urgent care clinic since your last visit? Hospitalized since your last visit?no    2. Have you seen or consulted any other health care providers outside of the [address] [place] [address] since your last visit? Include any pap smears or colon screening.  no

## 2017-09-07 ENCOUNTER — TELEPHONE (OUTPATIENT)
Dept: INTERNAL MEDICINE CLINIC | Age: 42
End: 2017-09-07

## 2017-09-07 LAB
ALBUMIN SERPL-MCNC: 4.1 G/DL (ref 3.5–5.5)
ALBUMIN/GLOB SERPL: 1.5 {RATIO} (ref 1.2–2.2)
ALP SERPL-CCNC: 51 IU/L (ref 39–117)
ALT SERPL-CCNC: 37 IU/L (ref 0–44)
AST SERPL-CCNC: 32 IU/L (ref 0–40)
BASOPHILS # BLD AUTO: 0 X10E3/UL (ref 0–0.2)
BASOPHILS NFR BLD AUTO: 0 %
BILIRUB SERPL-MCNC: <0.2 MG/DL (ref 0–1.2)
BUN SERPL-MCNC: 15 MG/DL (ref 6–24)
BUN/CREAT SERPL: 16 (ref 9–20)
CALCIUM SERPL-MCNC: 9.4 MG/DL (ref 8.7–10.2)
CHLORIDE SERPL-SCNC: 101 MMOL/L (ref 96–106)
CHOLEST SERPL-MCNC: 130 MG/DL (ref 100–199)
CO2 SERPL-SCNC: 25 MMOL/L (ref 18–29)
CREAT SERPL-MCNC: 0.93 MG/DL (ref 0.76–1.27)
EOSINOPHIL # BLD AUTO: 0.1 X10E3/UL (ref 0–0.4)
EOSINOPHIL NFR BLD AUTO: 2 %
ERYTHROCYTE [DISTWIDTH] IN BLOOD BY AUTOMATED COUNT: 13.1 % (ref 12.3–15.4)
EST. AVERAGE GLUCOSE BLD GHB EST-MCNC: 126 MG/DL
GLOBULIN SER CALC-MCNC: 2.8 G/DL (ref 1.5–4.5)
GLUCOSE SERPL-MCNC: 124 MG/DL (ref 65–99)
HBA1C MFR BLD: 6 % (ref 4.8–5.6)
HBV SURFACE AB SER QL: NON REACTIVE
HBV SURFACE AG SERPL QL IA: NEGATIVE
HCT VFR BLD AUTO: 44.8 % (ref 37.5–51)
HCV AB S/CO SERPL IA: <0.1 S/CO RATIO (ref 0–0.9)
HDLC SERPL-MCNC: 29 MG/DL
HGB BLD-MCNC: 14.7 G/DL (ref 12.6–17.7)
HIV 1+2 AB+HIV1 P24 AG SERPL QL IA: NON REACTIVE
IMM GRANULOCYTES # BLD: 0 X10E3/UL (ref 0–0.1)
IMM GRANULOCYTES NFR BLD: 0 %
LDLC SERPL CALC-MCNC: 50 MG/DL (ref 0–99)
LYMPHOCYTES # BLD AUTO: 2 X10E3/UL (ref 0.7–3.1)
LYMPHOCYTES NFR BLD AUTO: 24 %
MCH RBC QN AUTO: 33.2 PG (ref 26.6–33)
MCHC RBC AUTO-ENTMCNC: 32.8 G/DL (ref 31.5–35.7)
MCV RBC AUTO: 101 FL (ref 79–97)
MICROALBUMIN UR-MCNC: 4.7 UG/ML
MONOCYTES # BLD AUTO: 0.6 X10E3/UL (ref 0.1–0.9)
MONOCYTES NFR BLD AUTO: 7 %
NEUTROPHILS # BLD AUTO: 5.6 X10E3/UL (ref 1.4–7)
NEUTROPHILS NFR BLD AUTO: 67 %
PLATELET # BLD AUTO: 259 X10E3/UL (ref 150–379)
POTASSIUM SERPL-SCNC: 4.1 MMOL/L (ref 3.5–5.2)
PROT SERPL-MCNC: 6.9 G/DL (ref 6–8.5)
RBC # BLD AUTO: 4.43 X10E6/UL (ref 4.14–5.8)
RPR SER QL: NON REACTIVE
SODIUM SERPL-SCNC: 142 MMOL/L (ref 134–144)
TRIGL SERPL-MCNC: 254 MG/DL (ref 0–149)
VLDLC SERPL CALC-MCNC: 51 MG/DL (ref 5–40)
WBC # BLD AUTO: 8.3 X10E3/UL (ref 3.4–10.8)

## 2017-12-05 ENCOUNTER — OFFICE VISIT (OUTPATIENT)
Dept: INTERNAL MEDICINE CLINIC | Age: 42
End: 2017-12-05

## 2017-12-05 VITALS
TEMPERATURE: 98.7 F | SYSTOLIC BLOOD PRESSURE: 122 MMHG | HEART RATE: 87 BPM | OXYGEN SATURATION: 95 % | BODY MASS INDEX: 35.36 KG/M2 | HEIGHT: 70 IN | RESPIRATION RATE: 16 BRPM | DIASTOLIC BLOOD PRESSURE: 82 MMHG | WEIGHT: 247 LBS

## 2017-12-05 DIAGNOSIS — I10 ESSENTIAL HYPERTENSION: ICD-10-CM

## 2017-12-05 DIAGNOSIS — K76.0 NAFLD (NONALCOHOLIC FATTY LIVER DISEASE): ICD-10-CM

## 2017-12-05 DIAGNOSIS — E78.5 HYPERLIPIDEMIA, UNSPECIFIED HYPERLIPIDEMIA TYPE: ICD-10-CM
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
## 2017-12-05 NOTE — PROGRESS NOTES
1. Have you been to the ER, urgent care clinic since your last visit? Hospitalized since your last visit?no  2. Have you seen or consulted any other health care providers outside of the [address] [place] [address] since your last visit? Include any pap smears or colon screening.  no

## 2017-12-05 NOTE — LETTER
NOTIFICATION RETURN TO WORK / SCHOOL 
 
12/5/2017 9:58 AM 
 
Mr. Hailey Thomas [address] [employer] [facility] [phone] To Whom It May Concern: 
 
Hailey Thomas is currently under the care of [facility]. He will return to work/school on: 12/7/17 If there are questions or concerns please have the patient contact our office.  
 
 
 
Sincerely, 
 
 
Martin Valencia MD

## 2017-12-05 NOTE — MR AVS SNAPSHOT
Visit Information Date & Time Provider Department Dept. Phone Encounter #  
 12/5/2017  9:00 AM Xander Hernandez, [address] [place] [address],[address] [phone] [id number] Follow-up Instructions Return in about 4 months (around 4/5/2018) for DM. Upcoming Health Maintenance Date Due Pneumococcal 19-64 Medium Risk (1 of 1 - PPSV23) 8/2/1993 DTaP/Tdap/Td series (1 - Tdap) 8/2/1995 FOOT EXAM Q1 6/30/2017 HEMOGLOBIN A1C Q6M 3/6/2018 EYE EXAM RETINAL OR DILATED Q1 5/4/2018 MICROALBUMIN Q1 9/6/2018 LIPID PANEL Q1 9/6/2018 Allergies as of 12/5/2017  Review Complete On: 12/5/2017 By: Xander Hernandez MD  
 No Known Allergies Current Immunizations  Reviewed on 9/5/2017 Name Date Influenza Vaccine (Quad) PF 9/5/2017, 12/30/2016, 1/26/2016 Not reviewed this visit You Were Diagnosed With   
  
 Codes Comments Uncontrolled diabetes mellitus type 2 without complications, unspecified long term insulin use status ([facility]    -  Primary ICD-10-CM: E11.65 ICD-9-CM: 250.02 Hyperlipidemia, unspecified hyperlipidemia type     ICD-10-CM: E78.5 ICD-9-CM: 272.4 Essential hypertension     ICD-10-CM: I10 
ICD-9-CM: 401.9 NAFLD (nonalcoholic fatty liver disease)     ICD-10-CM: K76.0 ICD-9-CM: 571.8 Vitals BP Pulse Temp Resp Height(growth percentile) Weight(growth percentile) 122/82 (BP 1 Location: Left arm, BP Patient Position: Sitting) 87 98.7 °F (37.1 °C) (Oral) 16 5' 10\" (1.778 m) 247 lb (112 kg) SpO2 BMI Smoking Status 95% 35.44 kg/m2 Never Smoker Vitals History BMI and BSA Data Body Mass Index Body Surface Area  
 35.44 kg/m 2 2.35 m 2 Preferred Pharmacy Pharmacy Name Phone [facility] [address]  [place] [phone] Your Updated Medication List  
  
   
This list is accurate as of: 12/5/17  9:52 AM.  Always use your most recent med list.  
  
  
  
  
 Blood-Glucose Meter monitoring kit Use as directed to test blood sugar three times daily -DX-DM-E11.9  
  
 canagliflozin 100 mg tablet Commonly known as:  Allayne Ringer Take 1 Tab by mouth Daily (before breakfast). FISH OIL PO Take  by mouth.  
  
 glimepiride 4 mg tablet Commonly known as:  AMARYL Take 1 Tab by mouth every morning. glucose blood VI test strips strip Commonly known as:  blood glucose test  
Use as directed to test blood sugar three times daily -DX-DM-E11.9  
  
 insulin glargine 100 unit/mL injection Commonly known as:  LANTUS  
25 Units by SubCUTAneous route nightly. Lancets Misc Use as directed to test blood sugar three times daily -DX-DM-E11.9  
  
 metFORMIN  mg tablet Commonly known as:  GLUCOPHAGE XR Take 2 Tabs by mouth daily. pravastatin 20 mg tablet Commonly known as:  PRAVACHOL Take 1 Tab by mouth nightly. We Performed the Following HEMOGLOBIN A1C WITH EAG [43775 CPT(R)]  DIABETES FOOT EXAM [HM7 Custom] LIPID PANEL [45038 CPT(R)] METABOLIC PANEL, COMPREHENSIVE [99858 CPT(R)] Follow-up Instructions Return in about 4 months (around 4/5/2018) for DM. Patient Instructions Body Mass Index: Care Instructions Your Care Instructions Body mass index (BMI) can help you see if your weight is raising your risk for health problems. It uses a formula to compare how much you weigh with how tall you are. · A BMI lower than 18.5 is considered underweight. · A BMI between 18.5 and 24.9 is considered healthy. · A BMI between 25 and 29.9 is considered overweight. A BMI of 30 or higher is considered obese. If your BMI is in the normal range, it means that you have a lower risk for weight-related health problems.  If your BMI is in the overweight or obese range, you may be at increased risk for weight-related health problems, such as high blood pressure, heart disease, stroke, arthritis or joint pain, and diabetes. If your BMI is in the underweight range, you may be at increased risk for health problems such as fatigue, lower protection (immunity) against illness, muscle loss, bone loss, hair loss, and hormone problems. BMI is just one measure of your risk for weight-related health problems. You may be at higher risk for health problems if you are not active, you eat an unhealthy diet, or you drink too much alcohol or use tobacco products. Follow-up care is a key part of your treatment and safety. Be sure to make and go to all appointments, and call your doctor if you are having problems. It's also a good idea to know your test results and keep a list of the medicines you take. How can you care for yourself at home? · Practice healthy eating habits. This includes eating plenty of fruits, vegetables, whole grains, lean protein, and low-fat dairy. · If your doctor recommends it, get more exercise. Walking is a good choice. Bit by bit, increase the amount you walk every day. Try for at least 30 minutes on most days of the week. · Do not smoke. Smoking can increase your risk for health problems. If you need help quitting, talk to your doctor about stop-smoking programs and medicines. These can increase your chances of quitting for good. · Limit alcohol to 2 drinks a day for men and 1 drink a day for women. Too much alcohol can cause health problems. If you have a BMI higher than 25 · Your doctor may do other tests to check your risk for weight-related health problems. This may include measuring the distance around your waist. A waist measurement of more than 40 inches in men or 35 inches in women can increase the risk of weight-related health problems. · Talk with your doctor about steps you can take to stay healthy or improve your health.  You may need to make lifestyle changes to lose weight and stay healthy, such as changing your diet and getting regular exercise. If you have a BMI lower than 18.5 · Your doctor may do other tests to check your risk for health problems. · Talk with your doctor about steps you can take to stay healthy or improve your health. You may need to make lifestyle changes to gain or maintain weight and stay healthy, such as getting more healthy foods in your diet and doing exercises to build muscle. Where can you learn more? Go to http://chang-karson.info/. Enter S176 in the search box to learn more about \"Body Mass Index: Care Instructions. \" Current as of: October 13, 2016 Content Version: 11.4 © 1151-0075 [employer]. Care instructions adapted under license by [employer] (which disclaims liability or warranty for this information). If you have questions about a medical condition or this instruction, always ask your healthcare professional. [address] any warranty or liability for your use of this information. Introducing [facility] & HEALTH SERVICES! [employer] introduces Elecar patient portal. Now you can access parts of your medical record, email your doctor's office, and request medication refills online. 1. In your internet browser, go to [email or web address] 2. Click on the First Time User? Click Here link in the Sign In box. You will see the New Member Sign Up page. 3. Enter your Elecar Access Code exactly as it appears below. You will not need to use this code after youve completed the sign-up process. If you do not sign up before the expiration date, you must request a new code. · Elecar Access Code: [email or web address] Expires: 3/5/2018  9:46 AM 
 
4. Enter the last four digits of your Social Security Number (xxxx) and Date of Birth (mm/dd/yyyy) as indicated and click Submit. You will be taken to the next sign-up page. 5. Create a [employer] [place] ID. This will be your [employer] login ID and cannot be changed, so think of one that is secure and easy to remember. 6. Create a [employer] [place] password. You can change your password at any time. 7. Enter your Password Reset Question and Answer. This can be used at a later time if you forget your password. 8. Enter your e-mail address. You will receive e-mail notification when new information is available in [address] 9. Click Sign Up. You can now view and download portions of your medical record. 10. Click the Download Summary menu link to download a portable copy of your medical information. If you have questions, please visit the Frequently Asked Questions section of the [employer] website. Remember, [employer] is NOT to be used for urgent needs. For medical emergencies, dial 911. Now available from your iPhone and Android! Please provide this summary of care documentation to your next provider. Your primary care clinician is listed as [place]. If you have any questions after today's visit, please call [phone].

## 2017-12-05 NOTE — PATIENT INSTRUCTIONS
Body Mass Index: Care Instructions  Your Care Instructions    Body mass index (BMI) can help you see if your weight is raising your risk for health problems. It uses a formula to compare how much you weigh with how tall you are. · A BMI lower than 18.5 is considered underweight. · A BMI between 18.5 and 24.9 is considered healthy. · A BMI between 25 and 29.9 is considered overweight. A BMI of 30 or higher is considered obese. If your BMI is in the normal range, it means that you have a lower risk for weight-related health problems. If your BMI is in the overweight or obese range, you may be at increased risk for weight-related health problems, such as high blood pressure, heart disease, stroke, arthritis or joint pain, and diabetes. If your BMI is in the underweight range, you may be at increased risk for health problems such as fatigue, lower protection (immunity) against illness, muscle loss, bone loss, hair loss, and hormone problems. BMI is just one measure of your risk for weight-related health problems. You may be at higher risk for health problems if you are not active, you eat an unhealthy diet, or you drink too much alcohol or use tobacco products. Follow-up care is a key part of your treatment and safety. Be sure to make and go to all appointments, and call your doctor if you are having problems. It's also a good idea to know your test results and keep a list of the medicines you take. How can you care for yourself at home? · Practice healthy eating habits. This includes eating plenty of fruits, vegetables, whole grains, lean protein, and low-fat dairy. · If your doctor recommends it, get more exercise. Walking is a good choice. Bit by bit, increase the amount you walk every day. Try for at least 30 minutes on most days of the week. · Do not smoke. Smoking can increase your risk for health problems. If you need help quitting, talk to your doctor about stop-smoking programs and medicines. These can increase your chances of quitting for good. · Limit alcohol to 2 drinks a day for men and 1 drink a day for women. Too much alcohol can cause health problems. If you have a BMI higher than 25  · Your doctor may do other tests to check your risk for weight-related health problems. This may include measuring the distance around your waist. A waist measurement of more than 40 inches in men or 35 inches in women can increase the risk of weight-related health problems. · Talk with your doctor about steps you can take to stay healthy or improve your health. You may need to make lifestyle changes to lose weight and stay healthy, such as changing your diet and getting regular exercise. If you have a BMI lower than 18.5  · Your doctor may do other tests to check your risk for health problems. · Talk with your doctor about steps you can take to stay healthy or improve your health. You may need to make lifestyle changes to gain or maintain weight and stay healthy, such as getting more healthy foods in your diet and doing exercises to build muscle. Where can you learn more? Go to http://chang-karson.info/. Enter S176 in the search box to learn more about \"Body Mass Index: Care Instructions. \"  Current as of: October 13, 2016  Content Version: 11.4  © 1860-1626 [employer]wise, [employer]. Care instructions adapted under license by [employer] (which disclaims liability or warranty for this information). If you have questions about a medical condition or this instruction, always ask your healthcare professional. [address] any warranty or liability for your use of this information.

## 2017-12-06 LAB
ALBUMIN SERPL-MCNC: 4.1 G/DL (ref 3.5–5.5)
ALBUMIN/GLOB SERPL: 1.4 {RATIO} (ref 1.2–2.2)
ALP SERPL-CCNC: 47 IU/L (ref 39–117)
ALT SERPL-CCNC: 33 IU/L (ref 0–44)
AST SERPL-CCNC: 26 IU/L (ref 0–40)
BILIRUB SERPL-MCNC: 0.4 MG/DL (ref 0–1.2)
BUN SERPL-MCNC: 14 MG/DL (ref 6–24)
BUN/CREAT SERPL: 13 (ref 9–20)
CALCIUM SERPL-MCNC: 9.4 MG/DL (ref 8.7–10.2)
CHLORIDE SERPL-SCNC: 103 MMOL/L (ref 96–106)
CHOLEST SERPL-MCNC: 133 MG/DL (ref 100–199)
CO2 SERPL-SCNC: 25 MMOL/L (ref 18–29)
CREAT SERPL-MCNC: 1.11 MG/DL (ref 0.76–1.27)
EST. AVERAGE GLUCOSE BLD GHB EST-MCNC: 114 MG/DL
GFR SERPLBLD CREATININE-BSD FMLA CKD-EPI: 81 ML/MIN/1.73
GFR SERPLBLD CREATININE-BSD FMLA CKD-EPI: 94 ML/MIN/1.73
GLOBULIN SER CALC-MCNC: 3 G/DL (ref 1.5–4.5)
GLUCOSE SERPL-MCNC: 123 MG/DL (ref 65–99)
HBA1C MFR BLD: 5.6 % (ref 4.8–5.6)
HDLC SERPL-MCNC: 29 MG/DL
LDLC SERPL CALC-MCNC: 61 MG/DL (ref 0–99)
POTASSIUM SERPL-SCNC: 4.2 MMOL/L (ref 3.5–5.2)
PROT SERPL-MCNC: 7.1 G/DL (ref 6–8.5)
SODIUM SERPL-SCNC: 144 MMOL/L (ref 134–144)
TRIGL SERPL-MCNC: 215 MG/DL (ref 0–149)
VLDLC SERPL CALC-MCNC: 43 MG/DL (ref 5–40)

## 2017-12-14 ENCOUNTER — TELEPHONE (OUTPATIENT)
Dept: INTERNAL MEDICINE CLINIC | Age: 42
End: 2017-12-14

## 2018-01-11 ENCOUNTER — TELEPHONE (OUTPATIENT)
Dept: INTERNAL MEDICINE CLINIC | Age: 43
End: 2018-01-11

## 2018-01-11 NOTE — TELEPHONE ENCOUNTER
Identified patient 2 identifiers verified. Patient wants to be called about concerns  taking Invokana. Patient works during the day if he does not answer the phone. He works until 5 in the evening.

## 2018-01-11 NOTE — TELEPHONE ENCOUNTER
Patient has questions about one of the medications he takes in the morning. His number is [phone].        Message received & copied from [facility]

## 2018-01-12 NOTE — TELEPHONE ENCOUNTER
He saw a  ad for Invokana, and is leery of it. Now that his A1C is 5.6, he can try coming off it. Keep a close eye on sugars.

## 2018-04-11 ENCOUNTER — HOSPITAL ENCOUNTER (INPATIENT)
Age: 43
LOS: 18 days | Discharge: HOME | DRG: 871 | End: 2018-04-30
Attending: EMERGENCY MEDICINE | Admitting: INTERNAL MEDICINE
Payer: COMMERCIAL

## 2018-04-11 ENCOUNTER — APPOINTMENT (OUTPATIENT)
Dept: GENERAL RADIOLOGY | Age: 43
DRG: 871 | End: 2018-04-11
Attending: EMERGENCY MEDICINE
Payer: COMMERCIAL

## 2018-04-11 DIAGNOSIS — G93.40 ACUTE ENCEPHALOPATHY: ICD-10-CM

## 2018-04-11 DIAGNOSIS — N17.9 AKI (ACUTE KIDNEY INJURY) (HCC): ICD-10-CM

## 2018-04-11 DIAGNOSIS — E13.01: Primary | ICD-10-CM

## 2018-04-11 DIAGNOSIS — G93.40 ENCEPHALOPATHY: ICD-10-CM

## 2018-04-11 DIAGNOSIS — I10 ESSENTIAL HYPERTENSION: ICD-10-CM

## 2018-04-11 DIAGNOSIS — M62.82 NON-TRAUMATIC RHABDOMYOLYSIS: ICD-10-CM

## 2018-04-11 LAB
BASE DEFICIT BLDV-SCNC: 7.3 MMOL/L
BDY SITE: ABNORMAL
ERYTHROCYTE [DISTWIDTH] IN BLOOD BY AUTOMATED COUNT: 12.5 % (ref 11.5–14.5)
GLUCOSE BLD STRIP.AUTO-MCNC: >600 MG/DL (ref 65–100)
GLUCOSE BLD STRIP.AUTO-MCNC: >600 MG/DL (ref 65–100)
HCO3 BLDV-SCNC: 20 MMOL/L (ref 23–28)
HCT VFR BLD AUTO: 64.1 % (ref 36.6–50.3)
HGB BLD-MCNC: 17.8 G/DL (ref 12.1–17)
LACTATE SERPL-SCNC: 3.7 MMOL/L (ref 0.4–2)
MCH RBC QN AUTO: 33.3 PG (ref 26–34)
MCHC RBC AUTO-ENTMCNC: 27.8 G/DL (ref 30–36.5)
MCV RBC AUTO: 119.8 FL (ref 80–99)
NRBC # BLD: 0 K/UL (ref 0–0.01)
NRBC BLD-RTO: 0 PER 100 WBC
PCO2 BLDV: 48 MMHG (ref 41–51)
PH BLDV: 7.24 [PH] (ref 7.32–7.42)
PLATELET # BLD AUTO: 231 K/UL (ref 150–400)
PO2 BLDV: 46 MMHG (ref 25–40)
RBC # BLD AUTO: 5.35 M/UL (ref 4.1–5.7)
SAO2 % BLDV: 74 % (ref 65–88)
SAO2% DEVICE SAO2% SENSOR NAME: ABNORMAL
SERVICE CMNT-IMP: ABNORMAL
SERVICE CMNT-IMP: ABNORMAL
SPECIMEN SITE: ABNORMAL
WBC # BLD AUTO: 14.3 K/UL (ref 4.1–11.1)

## 2018-04-11 PROCEDURE — 87040 BLOOD CULTURE FOR BACTERIA: CPT | Performed by: EMERGENCY MEDICINE

## 2018-04-11 PROCEDURE — 83605 ASSAY OF LACTIC ACID: CPT | Performed by: EMERGENCY MEDICINE

## 2018-04-11 PROCEDURE — 82803 BLOOD GASES ANY COMBINATION: CPT | Performed by: EMERGENCY MEDICINE

## 2018-04-11 PROCEDURE — 74011250636 HC RX REV CODE- 250/636: Performed by: EMERGENCY MEDICINE

## 2018-04-11 PROCEDURE — 80053 COMPREHEN METABOLIC PANEL: CPT | Performed by: EMERGENCY MEDICINE

## 2018-04-11 PROCEDURE — 74011636637 HC RX REV CODE- 636/637: Performed by: EMERGENCY MEDICINE

## 2018-04-11 PROCEDURE — 85027 COMPLETE CBC AUTOMATED: CPT | Performed by: EMERGENCY MEDICINE

## 2018-04-11 PROCEDURE — 36415 COLL VENOUS BLD VENIPUNCTURE: CPT | Performed by: EMERGENCY MEDICINE

## 2018-04-11 PROCEDURE — 71045 X-RAY EXAM CHEST 1 VIEW: CPT

## 2018-04-11 PROCEDURE — 96361 HYDRATE IV INFUSION ADD-ON: CPT

## 2018-04-11 PROCEDURE — 99285 EMERGENCY DEPT VISIT HI MDM: CPT

## 2018-04-11 PROCEDURE — 96374 THER/PROPH/DIAG INJ IV PUSH: CPT

## 2018-04-11 PROCEDURE — 82962 GLUCOSE BLOOD TEST: CPT

## 2018-04-11 RX ORDER — LOSARTAN POTASSIUM AND HYDROCHLOROTHIAZIDE 25; 100 MG/1; MG/1
1 TABLET ORAL DAILY
COMMUNITY
End: 2018-04-30

## 2018-04-11 RX ADMIN — SODIUM CHLORIDE 1000 ML: 900 INJECTION, SOLUTION INTRAVENOUS at 22:23

## 2018-04-11 RX ADMIN — SODIUM CHLORIDE 1000 ML: 900 INJECTION, SOLUTION INTRAVENOUS at 23:36

## 2018-04-11 RX ADMIN — INSULIN HUMAN 10 UNITS: 100 INJECTION, SOLUTION PARENTERAL at 22:22

## 2018-04-11 NOTE — IP AVS SNAPSHOT
[address] [facility] 280 [facility] 
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 Schedule T/TH/S at 6:15 AM [facility] [address] [facility] 1 [address] Phone:  [phone] Fracisco Garza MD Go on 5/7/2018 with PCP for post hospital follow up appointment at 10:45AM  [facility] [address] [facility] 
[phone] Jet Raza MD  as directed by him, for erosive esophagitis [address] [facility] 
[phone] Jessica Escalante MD  as directed, for hemolytic anemia [address] [facility] 
[phone] Your Scheduled Appointments Monday May 07, 2018 10:45 AM EDT HOSPITAL FOLLOW-UP with Fracisco Garza, [address] [place] [address],[address] [facility]) [facility] [address] [facility]  
[phone] Discharge Orders None A check hussein indicates which time of day the medication should be taken. My Medications START taking these medications Instructions Each Dose to Equal  
 Morning Noon Evening Bedtime  
 pantoprazole 40 mg tablet Commonly known as:  PROTONIX Your last dose was: Your next dose is: Take 1 Tab by mouth Before breakfast and dinner. 40 mg  
    
   
   
   
  
 sevelamer carbonate 800 mg Tab tab Commonly known as:  Eb Lynch Your last dose was: Your next dose is: Take 2 Tabs by mouth three (3) times daily (with meals). 1600 mg  
    
   
   
   
  
 sucralfate 100 mg/mL suspension Commonly known as:  Alana Desai Your last dose was: Your next dose is: Take 10 mL by mouth Before breakfast, lunch, dinner and at bedtime. 1 g CHANGE how you take these medications Instructions Each Dose to Equal  
 Morning Noon Evening Bedtime  
 insulin glargine 100 unit/mL injection Commonly known as:  LANTUS What changed:   
- how much to take - when to take this Your last dose was: Your next dose is:    
   
   
 30 Units by SubCUTAneous route two (2) times a day. 30 Units CONTINUE taking these medications Instructions Each Dose to Equal  
 Morning Noon Evening Bedtime Blood-Glucose Meter monitoring kit Your last dose was: Your next dose is:    
   
   
 Use as directed to test blood sugar three times daily -DX-DM-E11.9 FISH OIL PO Your last dose was: Your next dose is: Take  by mouth.  
     
   
   
   
  
 glimepiride 4 mg tablet Commonly known as:  AMARYL Your last dose was: Your next dose is: TAKE 1 TABLET BY MOUTH EVERY MORNING  
     
   
   
   
  
 glucose blood VI test strips strip Commonly known as:  blood glucose test  
   
Your last dose was: Your next dose is:    
   
   
 Use as directed to test blood sugar three times daily -DX-DM-E11.9 Lancets Misc Your last dose was: Your next dose is:    
   
   
 Use as directed to test blood sugar three times daily -DX-DM-E11.9 pravastatin 20 mg tablet Commonly known as:  PRAVACHOL Your last dose was: Your next dose is: Take 1 Tab by mouth nightly. 20 mg  
    
   
   
   
  
  
STOP taking these medications   
 losartan-hydroCHLOROthiazide 100-25 mg per tablet Commonly known as:  HYZAAR  
   
  
 metFORMIN  mg tablet Commonly known as:  GLUCOPHAGE XR Where to Get Your Medications These medications were sent to [facility] [facility], [address] [facility] AT [facility]  [address] [facility] [address], [address] [place] Hours:  24-hours Phone:  [phone] insulin glargine 100 unit/mL injection  
 pantoprazole 40 mg tablet  
 sevelamer carbonate 800 mg Tab tab  
 sucralfate 100 mg/mL suspension Discharge Instructions PATIENT DISCHARGE INSTRUCTIONS PATIENT DISCHARGE INSTRUCTIONS [facility] / [id number] : 1975 Admitted 2018 Discharged: 2018 · It is important that you take the medication exactly as they are prescribed. · Keep your medication in the bottles provided by the pharmacist and keep a list of the medication names, dosages, and times to be taken in your wallet. · Do not take other medications without consulting your doctor. What to do at [facility] Recommended Diet: Diabetic Diet and Renal Diet Recommended Activity: Activity as tolerated Signed By: Malgorzata Christensen MD   
 2018 Blueknow Announcement We are excited to announce that we are making your provider's discharge notes available to you in Blueknow. You will see these notes when they are completed and signed by the physician that discharged you from your recent hospital stay.   If you have any questions or concerns about any information you see in Blueknow, please call the Health Information Department where you were seen or reach out to your Primary Care Provider for more information about your plan of care. Introducing [facility]! [employer] introduces [employer]AM [employer] patient portal. Now you can access parts of your medical record, email your doctor's office, and request medication refills online. 1. In your internet browser, go to [email or web address] 2. Click on the First Time User? Click Here link in the Sign In box. You will see the New Member Sign Up page. 3. Enter your TabSys Access Code exactly as it appears below. You will not need to use this code after youve completed the sign-up process. If you do not sign up before the expiration date, you must request a new code. · TabSys Access Code: [email or web address] Expires: 7/10/2018  9:59 PM 
 
4. Enter the last four digits of your Social Security Number (xxxx) and Date of Birth (mm/dd/yyyy) as indicated and click Submit. You will be taken to the next sign-up page. 5. Create a TabSys ID. This will be your TabSys login ID and cannot be changed, so think of one that is secure and easy to remember. 6. Create a TabSys password. You can change your password at any time. 7. Enter your Password Reset Question and Answer. This can be used at a later time if you forget your password. 8. Enter your e-mail address. You will receive e-mail notification when new information is available in [address] 9. Click Sign Up. You can now view and download portions of your medical record. 10. Click the Download Summary menu link to download a portable copy of your medical information. If you have questions, please visit the Frequently Asked Questions section of the TabSys website. Remember, TabSys is NOT to be used for urgent needs. For medical emergencies, dial 911. Now available from your iPhone and Android! Introducing Nikolay Moreno As a [employer] patient, I wanted to make you aware of our electronic visit tool called Nikolay Moreno. SPIRIT Navigation allows you to connect within minutes with a medical provider 24 hours a day, seven days a week via a mobile device or tablet or logging into a secure website from your computer. You can access Anyvite from anywhere in the [place]. A virtual visit might be right for you when you have a simple condition and feel like you just dont want to get out of bed, or cant get away from work for an appointment, when your regular [facility] provider is not available (evenings, weekends or holidays), or when youre out of town and need minor care. Electronic visits cost only $49 and if the [facility] provider determines a prescription is needed to treat your condition, one can be electronically transmitted to a nearby pharmacy*. Please take a moment to enroll today if you have not already done so. The enrollment process is free and takes just a few minutes. To enroll, please download the SPIRIT Navigation denver to your tablet or phone, or visit [email or web address] to enroll on your computer. And, as an [address] [place] [address] patient with a [employer] account, the results of your visits will be scanned into your electronic medical record and your primary care provider will be able to view the scanned results. We urge you to continue to see your regular [facility] provider for your ongoing medical care. And while your primary care provider may not be the one available when you seek a [facility] virtual visit, the peace of mind you get from getting a real diagnosis real time can be priceless. For more information on [facility], view our Frequently Asked Questions (FAQs) at [email or web address]. Sincerely, 
 
Leah Montalvo MD 
Chief Medical Officer [address] *:  certain medications cannot be prescribed via Anyvite Unresulted Labs-Please follow up with your PCP about these lab tests Order Current Status IMMUNOELECTROPHORESIS [facility].) Preliminary result Providers Seen During Your Hospitalization Provider Specialty Primary office phone Ozzie Bailey MD Emergency Medicine [phone] Malgorzata Christensen MD Internal Medicine [phone] Your Primary Care Physician (PCP) Primary Care Physician Office Phone Office Fax Lyubov Ta [phone] You are allergic to the following No active allergies Recent Documentation Height Weight BMI Smoking Status 1.753 m 137 kg 44.6 kg/m2 Never Smoker Emergency Contacts Name Discharge Info Relation Home Work Mobile Carmen Montalvo CAREGIVER [3] Mother [14] [phone] [place] [24] [phone] Patient Belongings The following personal items are in your possession at time of discharge: 
  Dental Appliances: None  Visual Aid: None      Home Medications: Locked   Jewelry: None  Clothing: Pants, Shirt    Other Valuables: None Please provide this summary of care documentation to your next provider. Signatures-by signing, you are acknowledging that this After Visit Summary has been reviewed with you and you have received a copy. Patient Signature:  ____________________________________________________________ Date:  ____________________________________________________________  
  
[facility] Provider Signature:  ____________________________________________________________ Date:  ____________________________________________________________

## 2018-04-11 NOTE — IP AVS SNAPSHOT
[address] [facility] 
[phone] Patient: Prasad Javed MRN: [id number] GYR:0[birth year] A check hussein indicates which time of day the medication should be taken. My Medications START taking these medications Instructions Each Dose to Equal  
 Morning Noon Evening Bedtime  
 pantoprazole 40 mg tablet Commonly known as:  PROTONIX Your last dose was: Your next dose is: Take 1 Tab by mouth Before breakfast and dinner. 40 mg  
    
   
   
   
  
 sevelamer carbonate 800 mg Tab tab Commonly known as:  Jacqualine Simmers Your last dose was: Your next dose is: Take 2 Tabs by mouth three (3) times daily (with meals). 1600 mg  
    
   
   
   
  
 sucralfate 100 mg/mL suspension Commonly known as:  Ferdinand Blinks Your last dose was: Your next dose is: Take 10 mL by mouth Before breakfast, lunch, dinner and at bedtime. 1 g CHANGE how you take these medications Instructions Each Dose to Equal  
 Morning Noon Evening Bedtime  
 insulin glargine 100 unit/mL injection Commonly known as:  LANTUS What changed:   
- how much to take - when to take this Your last dose was: Your next dose is:    
   
   
 30 Units by SubCUTAneous route two (2) times a day. 30 Units CONTINUE taking these medications Instructions Each Dose to Equal  
 Morning Noon Evening Bedtime Blood-Glucose Meter monitoring kit Your last dose was: Your next dose is:    
   
   
 Use as directed to test blood sugar three times daily -DX-DM-E11.9 FISH OIL PO Your last dose was: Your next dose is: Take  by mouth.  
     
   
   
   
  
 glimepiride 4 mg tablet Commonly known as:  AMARYL Your last dose was: Your next dose is: TAKE 1 TABLET BY MOUTH EVERY MORNING  
     
   
   
   
  
 glucose blood VI test strips strip Commonly known as:  blood glucose test  
   
Your last dose was: Your next dose is:    
   
   
 Use as directed to test blood sugar three times daily -DX-DM-E11.9 Lancets Misc Your last dose was: Your next dose is:    
   
   
 Use as directed to test blood sugar three times daily -DX-DM-E11.9  
     
   
   
   
  
 pravastatin 20 mg tablet Commonly known as:  PRAVACHOL Your last dose was: Your next dose is: Take 1 Tab by mouth nightly. 20 mg  
    
   
   
   
  
  
STOP taking these medications   
 losartan-hydroCHLOROthiazide 100-25 mg per tablet Commonly known as:  HYZAAR  
   
  
 metFORMIN  mg tablet Commonly known as:  GLUCOPHAGE XR Where to Get Your Medications These medications were sent to [address] [facility] [facility] AT [address] [facility]  [address] [place] [address] [address], [address] [place] [address] Hours:  24-hours Phone:  [phone] insulin glargine 100 unit/mL injection  
 pantoprazole 40 mg tablet  
 sevelamer carbonate 800 mg Tab tab  
 sucralfate 100 mg/mL suspension

## 2018-04-12 PROBLEM — G93.40 ACUTE ENCEPHALOPATHY: Status: ACTIVE | Noted: 2018-04-12

## 2018-04-12 PROBLEM — E13.01 HYPEROSMOLAR COMA DUE TO SECONDARY DIABETES (HCC): Status: ACTIVE | Noted: 2018-04-12

## 2018-04-12 LAB
ADMINISTERED INITIALS, ADMINIT: NORMAL
ALBUMIN SERPL-MCNC: 3.5 G/DL (ref 3.5–5)
ALBUMIN/GLOB SERPL: 0.8 {RATIO} (ref 1.1–2.2)
ALP SERPL-CCNC: 105 U/L (ref 45–117)
ALT SERPL-CCNC: 32 U/L (ref 12–78)
ANION GAP SERPL CALC-SCNC: 11 MMOL/L (ref 5–15)
ANION GAP SERPL CALC-SCNC: 12 MMOL/L (ref 5–15)
ANION GAP SERPL CALC-SCNC: 12 MMOL/L (ref 5–15)
ANION GAP SERPL CALC-SCNC: 17 MMOL/L (ref 5–15)
ANION GAP SERPL CALC-SCNC: 22 MMOL/L (ref 5–15)
ANION GAP SERPL CALC-SCNC: 9 MMOL/L (ref 5–15)
APPEARANCE UR: CLEAR
AST SERPL-CCNC: 14 U/L (ref 15–37)
BACTERIA URNS QL MICRO: NEGATIVE /HPF
BILIRUB SERPL-MCNC: 0.7 MG/DL (ref 0.2–1)
BILIRUB UR QL: NEGATIVE
BUN SERPL-MCNC: 59 MG/DL (ref 6–20)
BUN SERPL-MCNC: 60 MG/DL (ref 6–20)
BUN SERPL-MCNC: 61 MG/DL (ref 6–20)
BUN SERPL-MCNC: 65 MG/DL (ref 6–20)
BUN SERPL-MCNC: 69 MG/DL (ref 6–20)
BUN SERPL-MCNC: 70 MG/DL (ref 6–20)
BUN/CREAT SERPL: 13 (ref 12–20)
BUN/CREAT SERPL: 14 (ref 12–20)
BUN/CREAT SERPL: 14 (ref 12–20)
BUN/CREAT SERPL: 16 (ref 12–20)
BUN/CREAT SERPL: 16 (ref 12–20)
BUN/CREAT SERPL: 17 (ref 12–20)
CALCIUM SERPL-MCNC: 7.4 MG/DL (ref 8.5–10.1)
CALCIUM SERPL-MCNC: 8.2 MG/DL (ref 8.5–10.1)
CALCIUM SERPL-MCNC: 8.3 MG/DL (ref 8.5–10.1)
CALCIUM SERPL-MCNC: 8.6 MG/DL (ref 8.5–10.1)
CALCIUM SERPL-MCNC: 8.6 MG/DL (ref 8.5–10.1)
CALCIUM SERPL-MCNC: 8.9 MG/DL (ref 8.5–10.1)
CHLORIDE SERPL-SCNC: 103 MMOL/L (ref 97–108)
CHLORIDE SERPL-SCNC: 119 MMOL/L (ref 97–108)
CHLORIDE SERPL-SCNC: 120 MMOL/L (ref 97–108)
CHLORIDE SERPL-SCNC: 127 MMOL/L (ref 97–108)
CHLORIDE SERPL-SCNC: 129 MMOL/L (ref 97–108)
CHLORIDE SERPL-SCNC: 97 MMOL/L (ref 97–108)
CO2 SERPL-SCNC: 18 MMOL/L (ref 21–32)
CO2 SERPL-SCNC: 18 MMOL/L (ref 21–32)
CO2 SERPL-SCNC: 20 MMOL/L (ref 21–32)
CO2 SERPL-SCNC: 22 MMOL/L (ref 21–32)
CO2 SERPL-SCNC: 23 MMOL/L (ref 21–32)
CO2 SERPL-SCNC: 24 MMOL/L (ref 21–32)
COLOR UR: ABNORMAL
CREAT SERPL-MCNC: 3.71 MG/DL (ref 0.7–1.3)
CREAT SERPL-MCNC: 3.74 MG/DL (ref 0.7–1.3)
CREAT SERPL-MCNC: 3.92 MG/DL (ref 0.7–1.3)
CREAT SERPL-MCNC: 4.21 MG/DL (ref 0.7–1.3)
CREAT SERPL-MCNC: 4.91 MG/DL (ref 0.7–1.3)
CREAT SERPL-MCNC: 5.19 MG/DL (ref 0.7–1.3)
D50 ADMINISTERED, D50ADM: 0 ML
D50 ORDER, D50ORD: 0 ML
EPITH CASTS URNS QL MICRO: ABNORMAL /LPF
EST. AVERAGE GLUCOSE BLD GHB EST-MCNC: 321 MG/DL
GLOBULIN SER CALC-MCNC: 4.2 G/DL (ref 2–4)
GLSCOM COMMENTS: NORMAL
GLUCOSE BLD STRIP.AUTO-MCNC: 212 MG/DL (ref 65–100)
GLUCOSE BLD STRIP.AUTO-MCNC: 216 MG/DL (ref 65–100)
GLUCOSE BLD STRIP.AUTO-MCNC: 228 MG/DL (ref 65–100)
GLUCOSE BLD STRIP.AUTO-MCNC: 237 MG/DL (ref 65–100)
GLUCOSE BLD STRIP.AUTO-MCNC: 260 MG/DL (ref 65–100)
GLUCOSE BLD STRIP.AUTO-MCNC: 267 MG/DL (ref 65–100)
GLUCOSE BLD STRIP.AUTO-MCNC: 285 MG/DL (ref 65–100)
GLUCOSE BLD STRIP.AUTO-MCNC: 385 MG/DL (ref 65–100)
GLUCOSE BLD STRIP.AUTO-MCNC: 500 MG/DL (ref 65–100)
GLUCOSE BLD STRIP.AUTO-MCNC: 522 MG/DL (ref 65–100)
GLUCOSE BLD STRIP.AUTO-MCNC: >600 MG/DL (ref 65–100)
GLUCOSE SERPL-MCNC: 1018 MG/DL (ref 65–100)
GLUCOSE SERPL-MCNC: 273 MG/DL (ref 65–100)
GLUCOSE SERPL-MCNC: 389 MG/DL (ref 65–100)
GLUCOSE SERPL-MCNC: 570 MG/DL (ref 65–100)
GLUCOSE SERPL-MCNC: >1500 MG/DL (ref 65–100)
GLUCOSE SERPL-MCNC: >1500 MG/DL (ref 65–100)
GLUCOSE UR STRIP.AUTO-MCNC: >1000 MG/DL
GLUCOSE, GLC: 212 MG/DL
GLUCOSE, GLC: 216 MG/DL
GLUCOSE, GLC: 228 MG/DL
GLUCOSE, GLC: 237 MG/DL
GLUCOSE, GLC: 260 MG/DL
GLUCOSE, GLC: 267 MG/DL
GLUCOSE, GLC: 285 MG/DL
GLUCOSE, GLC: 385 MG/DL
GLUCOSE, GLC: 500 MG/DL
GLUCOSE, GLC: 522 MG/DL
GLUCOSE, GLC: 601 MG/DL
HBA1C MFR BLD: 12.8 % (ref 4.2–6.3)
HGB UR QL STRIP: ABNORMAL
HIGH TARGET, HITG: 300 MG/DL
INSULIN ADMINSTERED, INSADM: 10.8 UNITS/HOUR
INSULIN ADMINSTERED, INSADM: 15.2 UNITS/HOUR
INSULIN ADMINSTERED, INSADM: 15.6 UNITS/HOUR
INSULIN ADMINSTERED, INSADM: 16.2 UNITS/HOUR
INSULIN ADMINSTERED, INSADM: 16.8 UNITS/HOUR
INSULIN ADMINSTERED, INSADM: 17.7 UNITS/HOUR
INSULIN ADMINSTERED, INSADM: 20 UNITS/HOUR
INSULIN ADMINSTERED, INSADM: 20.7 UNITS/HOUR
INSULIN ADMINSTERED, INSADM: 21.6 UNITS/HOUR
INSULIN ADMINSTERED, INSADM: 22.5 UNITS/HOUR
INSULIN ADMINSTERED, INSADM: 27.1 UNITS/HOUR
INSULIN ADMINSTERED, INSADM: 32.5 UNITS/HOUR
INSULIN ADMINSTERED, INSADM: 32.5 UNITS/HOUR
INSULIN ADMINSTERED, INSADM: 37.9 UNITS/HOUR
INSULIN ADMINSTERED, INSADM: 39.6 UNITS/HOUR
INSULIN ADMINSTERED, INSADM: 43.3 UNITS/HOUR
INSULIN ADMINSTERED, INSADM: 46.2 UNITS/HOUR
INSULIN ADMINSTERED, INSADM: 48.7 UNITS/HOUR
INSULIN ORDER, INSORD: 10.8 UNITS/HOUR
INSULIN ORDER, INSORD: 15.2 UNITS/HOUR
INSULIN ORDER, INSORD: 15.6 UNITS/HOUR
INSULIN ORDER, INSORD: 16.2 UNITS/HOUR
INSULIN ORDER, INSORD: 16.8 UNITS/HOUR
INSULIN ORDER, INSORD: 17.7 UNITS/HOUR
INSULIN ORDER, INSORD: 20 UNITS/HOUR
INSULIN ORDER, INSORD: 20.7 UNITS/HOUR
INSULIN ORDER, INSORD: 21.6 UNITS/HOUR
INSULIN ORDER, INSORD: 22.5 UNITS/HOUR
INSULIN ORDER, INSORD: 27.1 UNITS/HOUR
INSULIN ORDER, INSORD: 32.5 UNITS/HOUR
INSULIN ORDER, INSORD: 32.5 UNITS/HOUR
INSULIN ORDER, INSORD: 37.9 UNITS/HOUR
INSULIN ORDER, INSORD: 39.6 UNITS/HOUR
INSULIN ORDER, INSORD: 43.3 UNITS/HOUR
INSULIN ORDER, INSORD: 46.2 UNITS/HOUR
INSULIN ORDER, INSORD: 48.7 UNITS/HOUR
KETONES UR QL STRIP.AUTO: ABNORMAL MG/DL
LACTATE SERPL-SCNC: 3.4 MMOL/L (ref 0.4–2)
LACTATE SERPL-SCNC: 3.6 MMOL/L (ref 0.4–2)
LACTATE SERPL-SCNC: 4.4 MMOL/L (ref 0.4–2)
LEUKOCYTE ESTERASE UR QL STRIP.AUTO: NEGATIVE
LOW TARGET, LOT: 200 MG/DL
MAGNESIUM SERPL-MCNC: 3.5 MG/DL (ref 1.6–2.4)
MAGNESIUM SERPL-MCNC: 3.8 MG/DL (ref 1.6–2.4)
MAGNESIUM SERPL-MCNC: 4.2 MG/DL (ref 1.6–2.4)
MINUTES UNTIL NEXT BG, NBG: 120 MIN
MINUTES UNTIL NEXT BG, NBG: 60 MIN
MULTIPLIER, MUL: 0.02
MULTIPLIER, MUL: 0.03
MULTIPLIER, MUL: 0.04
MULTIPLIER, MUL: 0.05
MULTIPLIER, MUL: 0.06
MULTIPLIER, MUL: 0.07
MULTIPLIER, MUL: 0.08
MULTIPLIER, MUL: 0.09
MULTIPLIER, MUL: 0.09
MULTIPLIER, MUL: 0.1
NITRITE UR QL STRIP.AUTO: NEGATIVE
ORDER INITIALS, ORDINIT: NORMAL
PH UR STRIP: 5 [PH] (ref 5–8)
PHOSPHATE SERPL-MCNC: 0.6 MG/DL (ref 2.6–4.7)
PHOSPHATE SERPL-MCNC: 0.7 MG/DL (ref 2.6–4.7)
PHOSPHATE SERPL-MCNC: 2.9 MG/DL (ref 2.6–4.7)
POTASSIUM SERPL-SCNC: 3 MMOL/L (ref 3.5–5.1)
POTASSIUM SERPL-SCNC: 3.3 MMOL/L (ref 3.5–5.1)
POTASSIUM SERPL-SCNC: 3.5 MMOL/L (ref 3.5–5.1)
POTASSIUM SERPL-SCNC: 3.7 MMOL/L (ref 3.5–5.1)
POTASSIUM SERPL-SCNC: 3.9 MMOL/L (ref 3.5–5.1)
POTASSIUM SERPL-SCNC: 6.2 MMOL/L (ref 3.5–5.1)
PROT SERPL-MCNC: 7.7 G/DL (ref 6.4–8.2)
PROT UR STRIP-MCNC: NEGATIVE MG/DL
RBC #/AREA URNS HPF: ABNORMAL /HPF (ref 0–5)
SERVICE CMNT-IMP: ABNORMAL
SODIUM SERPL-SCNC: 137 MMOL/L (ref 136–145)
SODIUM SERPL-SCNC: 140 MMOL/L (ref 136–145)
SODIUM SERPL-SCNC: 150 MMOL/L (ref 136–145)
SODIUM SERPL-SCNC: 153 MMOL/L (ref 136–145)
SODIUM SERPL-SCNC: 159 MMOL/L (ref 136–145)
SODIUM SERPL-SCNC: 164 MMOL/L (ref 136–145)
SP GR UR REFRACTOMETRY: <1.005 (ref 1–1.03)
UA: UC IF INDICATED,UAUC: ABNORMAL
UROBILINOGEN UR QL STRIP.AUTO: 0.2 EU/DL (ref 0.2–1)
WBC URNS QL MICRO: ABNORMAL /HPF (ref 0–4)

## 2018-04-12 PROCEDURE — 36592 COLLECT BLOOD FROM PICC: CPT

## 2018-04-12 PROCEDURE — 74011636637 HC RX REV CODE- 636/637: Performed by: INTERNAL MEDICINE

## 2018-04-12 PROCEDURE — 81001 URINALYSIS AUTO W/SCOPE: CPT | Performed by: EMERGENCY MEDICINE

## 2018-04-12 PROCEDURE — 74011636637 HC RX REV CODE- 636/637: Performed by: EMERGENCY MEDICINE

## 2018-04-12 PROCEDURE — 80048 BASIC METABOLIC PNL TOTAL CA: CPT | Performed by: INTERNAL MEDICINE

## 2018-04-12 PROCEDURE — 65610000006 HC RM INTENSIVE CARE

## 2018-04-12 PROCEDURE — 83735 ASSAY OF MAGNESIUM: CPT | Performed by: INTERNAL MEDICINE

## 2018-04-12 PROCEDURE — 74011250636 HC RX REV CODE- 250/636: Performed by: INTERNAL MEDICINE

## 2018-04-12 PROCEDURE — 84100 ASSAY OF PHOSPHORUS: CPT | Performed by: INTERNAL MEDICINE

## 2018-04-12 PROCEDURE — 82962 GLUCOSE BLOOD TEST: CPT

## 2018-04-12 PROCEDURE — 74011250636 HC RX REV CODE- 250/636: Performed by: EMERGENCY MEDICINE

## 2018-04-12 PROCEDURE — 74011250637 HC RX REV CODE- 250/637: Performed by: INTERNAL MEDICINE

## 2018-04-12 PROCEDURE — 83605 ASSAY OF LACTIC ACID: CPT | Performed by: INTERNAL MEDICINE

## 2018-04-12 PROCEDURE — 83735 ASSAY OF MAGNESIUM: CPT | Performed by: EMERGENCY MEDICINE

## 2018-04-12 PROCEDURE — 83036 HEMOGLOBIN GLYCOSYLATED A1C: CPT | Performed by: EMERGENCY MEDICINE

## 2018-04-12 PROCEDURE — 77010033678 HC OXYGEN DAILY

## 2018-04-12 PROCEDURE — 74011000250 HC RX REV CODE- 250: Performed by: INTERNAL MEDICINE

## 2018-04-12 PROCEDURE — 80053 COMPREHEN METABOLIC PANEL: CPT | Performed by: EMERGENCY MEDICINE

## 2018-04-12 PROCEDURE — 74011000258 HC RX REV CODE- 258: Performed by: INTERNAL MEDICINE

## 2018-04-12 PROCEDURE — 84100 ASSAY OF PHOSPHORUS: CPT | Performed by: EMERGENCY MEDICINE

## 2018-04-12 PROCEDURE — 74011000258 HC RX REV CODE- 258: Performed by: EMERGENCY MEDICINE

## 2018-04-12 PROCEDURE — 36415 COLL VENOUS BLD VENIPUNCTURE: CPT | Performed by: INTERNAL MEDICINE

## 2018-04-12 RX ORDER — SODIUM CHLORIDE 450 MG/100ML
200 INJECTION, SOLUTION INTRAVENOUS CONTINUOUS
Status: DISCONTINUED | OUTPATIENT
Start: 2018-04-12 | End: 2018-04-12

## 2018-04-12 RX ORDER — METRONIDAZOLE 500 MG/100ML
500 INJECTION, SOLUTION INTRAVENOUS EVERY 8 HOURS
Status: DISCONTINUED | OUTPATIENT
Start: 2018-04-12 | End: 2018-04-12

## 2018-04-12 RX ORDER — MUPIROCIN 20 MG/G
OINTMENT TOPICAL EVERY 12 HOURS
Status: COMPLETED | OUTPATIENT
Start: 2018-04-12 | End: 2018-04-17

## 2018-04-12 RX ORDER — HEPARIN SODIUM 5000 [USP'U]/ML
5000 INJECTION, SOLUTION INTRAVENOUS; SUBCUTANEOUS EVERY 8 HOURS
Status: DISCONTINUED | OUTPATIENT
Start: 2018-04-12 | End: 2018-04-13

## 2018-04-12 RX ORDER — ONDANSETRON 2 MG/ML
4 INJECTION INTRAMUSCULAR; INTRAVENOUS
Status: DISCONTINUED | OUTPATIENT
Start: 2018-04-12 | End: 2018-04-30 | Stop reason: HOSPADM

## 2018-04-12 RX ORDER — LEVOFLOXACIN 5 MG/ML
750 INJECTION, SOLUTION INTRAVENOUS
Status: COMPLETED | OUTPATIENT
Start: 2018-04-12 | End: 2018-04-12

## 2018-04-12 RX ORDER — SODIUM CHLORIDE 0.9 % (FLUSH) 0.9 %
5-10 SYRINGE (ML) INJECTION EVERY 8 HOURS
Status: DISCONTINUED | OUTPATIENT
Start: 2018-04-12 | End: 2018-04-20

## 2018-04-12 RX ORDER — SODIUM CHLORIDE 0.9 % (FLUSH) 0.9 %
5-10 SYRINGE (ML) INJECTION AS NEEDED
Status: DISCONTINUED | OUTPATIENT
Start: 2018-04-12 | End: 2018-04-30 | Stop reason: HOSPADM

## 2018-04-12 RX ORDER — POTASSIUM CHLORIDE AND SODIUM CHLORIDE 450; 150 MG/100ML; MG/100ML
INJECTION, SOLUTION INTRAVENOUS CONTINUOUS
Status: DISCONTINUED | OUTPATIENT
Start: 2018-04-12 | End: 2018-04-12

## 2018-04-12 RX ORDER — MAGNESIUM SULFATE 100 %
4 CRYSTALS MISCELLANEOUS AS NEEDED
Status: DISCONTINUED | OUTPATIENT
Start: 2018-04-12 | End: 2018-04-14 | Stop reason: SDUPTHER

## 2018-04-12 RX ORDER — DEXTROSE 50 % IN WATER (D50W) INTRAVENOUS SYRINGE
12.5-25 AS NEEDED
Status: DISCONTINUED | OUTPATIENT
Start: 2018-04-12 | End: 2018-04-14 | Stop reason: SDUPTHER

## 2018-04-12 RX ORDER — ACETAMINOPHEN 325 MG/1
650 TABLET ORAL
Status: DISCONTINUED | OUTPATIENT
Start: 2018-04-12 | End: 2018-04-30 | Stop reason: HOSPADM

## 2018-04-12 RX ORDER — DEXTROSE MONOHYDRATE AND SODIUM CHLORIDE 5; .45 G/100ML; G/100ML
200 INJECTION, SOLUTION INTRAVENOUS CONTINUOUS
Status: DISCONTINUED | OUTPATIENT
Start: 2018-04-12 | End: 2018-04-13

## 2018-04-12 RX ORDER — SODIUM CHLORIDE 9 MG/ML
200 INJECTION, SOLUTION INTRAVENOUS CONTINUOUS
Status: DISCONTINUED | OUTPATIENT
Start: 2018-04-12 | End: 2018-04-12

## 2018-04-12 RX ORDER — LEVOFLOXACIN 5 MG/ML
750 INJECTION, SOLUTION INTRAVENOUS EVERY 24 HOURS
Status: DISCONTINUED | OUTPATIENT
Start: 2018-04-13 | End: 2018-04-12

## 2018-04-12 RX ORDER — NYSTATIN 100000 [USP'U]/G
POWDER TOPICAL 2 TIMES DAILY
Status: DISCONTINUED | OUTPATIENT
Start: 2018-04-12 | End: 2018-04-30 | Stop reason: HOSPADM

## 2018-04-12 RX ORDER — INSULIN LISPRO 100 [IU]/ML
INJECTION, SOLUTION INTRAVENOUS; SUBCUTANEOUS
Status: DISCONTINUED | OUTPATIENT
Start: 2018-04-12 | End: 2018-04-12

## 2018-04-12 RX ADMIN — Medication 10 ML: at 21:14

## 2018-04-12 RX ADMIN — SODIUM CHLORIDE 16.2 UNITS/HR: 900 INJECTION, SOLUTION INTRAVENOUS at 03:35

## 2018-04-12 RX ADMIN — Medication 10 ML: at 14:35

## 2018-04-12 RX ADMIN — MUPIROCIN: 20 OINTMENT TOPICAL at 16:57

## 2018-04-12 RX ADMIN — NYSTATIN: 100000 POWDER TOPICAL at 17:57

## 2018-04-12 RX ADMIN — SODIUM CHLORIDE 39.6 UNITS/HR: 900 INJECTION, SOLUTION INTRAVENOUS at 12:14

## 2018-04-12 RX ADMIN — SODIUM CHLORIDE 200 ML/HR: 900 INJECTION, SOLUTION INTRAVENOUS at 06:33

## 2018-04-12 RX ADMIN — SODIUM CHLORIDE 43.3 UNITS/HR: 900 INJECTION, SOLUTION INTRAVENOUS at 10:20

## 2018-04-12 RX ADMIN — SODIUM CHLORIDE 43.3 UNITS/HR: 900 INJECTION, SOLUTION INTRAVENOUS at 09:43

## 2018-04-12 RX ADMIN — DEXTROSE MONOHYDRATE AND SODIUM CHLORIDE 200 ML/HR: 5; .45 INJECTION, SOLUTION INTRAVENOUS at 17:55

## 2018-04-12 RX ADMIN — SODIUM CHLORIDE: 900 INJECTION, SOLUTION INTRAVENOUS at 13:26

## 2018-04-12 RX ADMIN — POTASSIUM CHLORIDE AND SODIUM CHLORIDE: 450; 150 INJECTION, SOLUTION INTRAVENOUS at 10:42

## 2018-04-12 RX ADMIN — Medication 10 ML: at 14:34

## 2018-04-12 RX ADMIN — HEPARIN SODIUM 5000 UNITS: 5000 INJECTION, SOLUTION INTRAVENOUS; SUBCUTANEOUS at 09:44

## 2018-04-12 RX ADMIN — SODIUM CHLORIDE 200 ML/HR: 900 INJECTION, SOLUTION INTRAVENOUS at 01:37

## 2018-04-12 RX ADMIN — SODIUM CHLORIDE: 900 INJECTION, SOLUTION INTRAVENOUS at 12:15

## 2018-04-12 RX ADMIN — POTASSIUM CHLORIDE AND SODIUM CHLORIDE: 450; 150 INJECTION, SOLUTION INTRAVENOUS at 05:41

## 2018-04-12 RX ADMIN — SODIUM CHLORIDE 200 ML/HR: 450 INJECTION, SOLUTION INTRAVENOUS at 16:40

## 2018-04-12 RX ADMIN — SODIUM CHLORIDE 21.6 UNITS/HR: 900 INJECTION, SOLUTION INTRAVENOUS at 04:51

## 2018-04-12 RX ADMIN — SODIUM CHLORIDE 1000 ML: 900 INJECTION, SOLUTION INTRAVENOUS at 02:53

## 2018-04-12 RX ADMIN — POTASSIUM PHOSPHATE, MONOBASIC AND POTASSIUM PHOSPHATE, DIBASIC: 224; 236 INJECTION, SOLUTION INTRAVENOUS at 16:53

## 2018-04-12 RX ADMIN — METRONIDAZOLE 500 MG: 500 INJECTION, SOLUTION INTRAVENOUS at 09:44

## 2018-04-12 RX ADMIN — SODIUM CHLORIDE 1000 ML: 900 INJECTION, SOLUTION INTRAVENOUS at 01:28

## 2018-04-12 RX ADMIN — POTASSIUM PHOSPHATE, MONOBASIC AND POTASSIUM PHOSPHATE, DIBASIC: 224; 236 INJECTION, SOLUTION INTRAVENOUS at 23:14

## 2018-04-12 RX ADMIN — PIPERACILLIN SODIUM,TAZOBACTAM SODIUM 3.38 G: 3; .375 INJECTION, POWDER, FOR SOLUTION INTRAVENOUS at 16:56

## 2018-04-12 RX ADMIN — SODIUM CHLORIDE 200 ML/HR: 900 INJECTION, SOLUTION INTRAVENOUS at 11:46

## 2018-04-12 RX ADMIN — METRONIDAZOLE 500 MG: 500 INJECTION, SOLUTION INTRAVENOUS at 03:03

## 2018-04-12 RX ADMIN — HEPARIN SODIUM 5000 UNITS: 5000 INJECTION, SOLUTION INTRAVENOUS; SUBCUTANEOUS at 16:57

## 2018-04-12 RX ADMIN — SODIUM CHLORIDE 10.8 UNITS/HR: 900 INJECTION, SOLUTION INTRAVENOUS at 02:04

## 2018-04-12 RX ADMIN — SODIUM CHLORIDE 32.5 UNITS/HR: 900 INJECTION, SOLUTION INTRAVENOUS at 07:29

## 2018-04-12 RX ADMIN — DEXTROSE MONOHYDRATE AND SODIUM CHLORIDE 200 ML/HR: 5; .45 INJECTION, SOLUTION INTRAVENOUS at 23:02

## 2018-04-12 RX ADMIN — SODIUM CHLORIDE 37.9 UNITS/HR: 900 INJECTION, SOLUTION INTRAVENOUS at 08:41

## 2018-04-12 RX ADMIN — LEVOFLOXACIN 750 MG: 5 INJECTION, SOLUTION INTRAVENOUS at 01:33

## 2018-04-12 RX ADMIN — SODIUM CHLORIDE 1000 ML: 900 INJECTION, SOLUTION INTRAVENOUS at 04:04

## 2018-04-12 RX ADMIN — INSULIN HUMAN 20 UNITS: 100 INJECTION, SOLUTION PARENTERAL at 01:26

## 2018-04-12 NOTE — ED NOTES
Pt placed on regular hosp bed. States better. Pt able to follow simple commands. CN in room. Agreed to remove restraints and monitor.

## 2018-04-12 NOTE — DIABETES MGMT
DTC Progress Note    Recommendations/ Comments: If appropriate, please continue with insulin drip until all criteria met per protocol. DTC to follow-up with pt for elevated A1c when pt more appropriate. Current hospital DM medication: insulin gtt    Chart reviewed on [address] for hyperglycemia. Patient is a 36 y.o. male with known history of Type 2 Diabetes on Glimepiride 4mg every morning, Lantus 25 units nightly, and Metformin ER 750mg - 2 tabs daily at home. A1c:   Lab Results   Component Value Date/Time    Hemoglobin A1c 12.8 (H) 04/12/2018 12:44 AM    Hemoglobin A1c 5.6 12/05/2017 10:15 AM       Recent Glucose Results:   Lab Results   Component Value Date/Time    GLU 1018 (HH) 04/12/2018 07:00 AM    GLU >1500 () 04/12/2018 03:05 AM    GLU >1500 () 04/12/2018 12:44 AM    GLUCPOC >600 () 04/12/2018 08:38 AM    GLUCPOC >600 () 04/12/2018 07:03 AM    GLUCPOC >600 () 04/12/2018 05:45 AM        Lab Results   Component Value Date/Time    Creatinine 4.21 (H) 04/12/2018 07:00 AM     Estimated Creatinine Clearance: 29.2 mL/min (based on Cr of 4.21). Active Orders   Diet    DIET NPO        PO intake: No data found. Will continue to follow as needed.     Thank you    Darrin Rodriguez, [address], [address]  Office: [phone]

## 2018-04-12 NOTE — PROGRESS NOTES
[facility] Consult Service Progress NOTE  Pulmonary, Critical Care, and Sleep Medicine    Name: Juvencio Irizarry MRN: [id number]   : 1975 Hospital: [facility]   Date: 2018  Admission Date: 2018     Chart and notes reviewed. Data reviewed. Pt seen on rounds earlier today. I have evaluated and examined the patient. Pt is unstable and acutely ill in the CCU. Hospital Day: 2      I was asked by Lyubov Page MD to see Juvencio Irizarry a 36 y.o.  male  in consultation for a chief complaint of DKA, encephalopathy    The patient is unable to give any meaningful history or review of systems due to patient factors. Excerpts from admission notes as follows:     Pau Beltran is a 36 y.o.  male who presents with altered mental status. As per pt's significant other she noted pt to be lethargic in the morning which gotten worse as the day progressed. In ED pt noted somnolent and hard to arouse. Pt noted to have blood sugar of > 600 on POC BS and upon sending lab, lab didn't run it as it was noted > 1500 and lab didn't believe the value. Per bedside RN, labs were sent 3 times already. Labs now sent again and lab is requested to run despite whatever the value is. History is limited due to patient's altered level of responsiveness. \"    Pt seen in CCU. Pts mother also in CCU and he has been visiting. Low grade temp. IMPRESSION:   1. DKA  2. Hypoxia- borderline 89% room air  3. Sepsis with leukocytosis and tachycardia with lactic acidosis   4. Right middle lobe PNA possible aspiration? Has also been visiting his mother here in ED [facility] CCU  5. Hypernatremia from fluid resuscitation  6. hypocalcemia  7. Acute Encephalopathy from hyperosmolar state  8. HTN  9. Morbid obesity  10. Severe hypophosphatemia  11. erytrhocytosis- wonder about chronic hypoxemia or hemoconcentration  12. Multiorgan dysfunction as outlined above  13.  Additional workup outlined below  14. Pt is critically ill and at high risk of sudden decline and decompensation with life threatening consequenses and continued end organ dysfunction and failure      RECOMMENDATIONS/PLAN:   1. CCU monitoring  2. Empiric abx- agree with IV zosyn for now  3. Supplemental oxygen to keep sats > 93%  4. repalce electrolytes but need to watch creatinine and K closely  5. adjsut IVF  6. Labs to follow electrolytes, renal function and and blood counts  7. Patient requiring restraints due to threat of injury to self, interference with medical devices. 8. Pt needs IV fluids with additives and Drug therapy requiring intensive monitoring for toxicity  9. Prescription drug management with home med reconciliation reviewed  10. DVT, SUP prophylaxis  11. Will be available to assist in medical management while in the CCU pending disposition     My assessment/management discussed with: Nursing for coordination of care    I have provided   35    minutes of critical care time rendering care exclusive of any procedures. During this entire length of time I was immediately available to the patient.      The reason for providing this level of medical care was due to a critical illness that impaired one or more vital organ systems, such that there was a high probability of imminent or life threatening deterioration in the patient's condition. Pt's condition is unstable and unpredictable. Risk of deterioration: high   [x] High complexity decision making was performed  [x] See my orders for details  ICU disposition :Unchanged.     MAR reviewed and pertinent medications noted or modified as needed   Current Facility-Administered Medications   Medication    insulin lispro (HUMALOG) injection    glucose chewable tablet 16 g    dextrose (D50W) injection syrg 12.5-25 g    glucagon (GLUCAGEN) injection 1 mg    nitroglycerin (NITROBID) 2 % ointment 1 Inch    sodium chloride (NS) flush 5-10 mL    sodium chloride (NS) flush 5-10 mL    acetaminophen (TYLENOL) tablet 650 mg    ondansetron (ZOFRAN) injection 4 mg    heparin (porcine) injection 5,000 Units    0.45% sodium chloride with KCl 20 mEq/L infusion    insulin regular 500 Units in 0.9% sodium chloride 500 mL IVPB    piperacillin-tazobactam (ZOSYN) 3.375 g in 0.9% sodium chloride (MBP/ADV) 100 mL    Followed by   Jonny Golden ON 2018] piperacillin-tazobactam (ZOSYN) 3.375 g in 0.9% sodium chloride (MBP/ADV) 100 mL    potassium phosphate 20 mmol in 0.9% sodium chloride 250 mL infusion      PMH:  has a past medical history of Hypertension (10/31/2014); Insomnia (2013); Morbid obesity ([facility] [address] (2013); and Type II diabetes mellitus, uncontrolled ([facility] [address] (2016). PSH:   has a past surgical history that includes hx heent. FHX: family history includes Asthma in his mother; Diabetes in his father and mother. SHX:  reports that he has never smoked. He has never used smokeless tobacco. He reports that he drinks alcohol. He reports that he uses illicit drugs, including Marijuana. ROS:Review of systems not obtained due to patient factors. Hemodynamics:    CO:    CI:    CVP:    SVR:   PAP Systolic:    PAP Diastolic:    PVR:    NR31:        Ventilator Settings:      Mode Rate TV Press PEEP FiO2 PIP Min.  Vent                            Vital Signs: Intake/Output: Intake/Output:   Visit Vitals    /65 (BP 1 Location: Left arm, BP Patient Position: At rest)    Pulse (!) 112    Temp 100 °F (37.8 °C)    Resp (!) 32    Ht 5' 9\" (1.753 m)    Wt 122.5 kg (270 lb)    SpO2 95%    BMI 39.87 kg/m2    Temp (24hrs), Av.2 °F (37.3 °C), Min:97.5 °F (36.4 °C), Max:100.1 °F (37.8 °C)        Telemetry:    normal sinus rhythm   O2 Device: Nasal cannula  O2 Flow Rate (L/min): 4 l/min  Wt Readings from Last 4 Encounters:   18 122.5 kg (270 lb)   17 112 kg (247 lb)   17 151 kg (333 lb)   17 144.8 kg (319 lb 3.6 oz)          Intake/Output Summary (Last 24 hours) at 04/12/18 1621  Last data filed at 04/12/18 1600   Gross per 24 hour   Intake          4673.87 ml   Output             2230 ml   Net          2443.87 ml     Last shift:      04/12 0701 - 04/12 1900  In: 4673.9 [I.V.:4673.9]  Out: 480 [Urine:480]  Last 3 shifts: 04/10 1901 - 04/12 0700  In: -   Out: 0235 [Urine:1750]     Physical Exam:    Allan Connolly American male; in no respiratory distress and acyanotic, severely ill, uncooperative, combative and disoriented; ;   HEAD: Normocephalic, without obvious abnormality, atraumatic   EYES: conjunctivae clear. PERRL,  AN Icteric sclerae   NOSE: nares normal, no drainage, no nasal flaring,    THROAT: mucous membranes dry; Lips, mucosa dry; No Thrush; tongue midline   Neck: Supple, symmetrical, trachea midline,  No accessory mm use; No Stridor/ cuff leak, No goiter or thyroid tenderness   LYMPH: No abnormally enlarged lymph nodes. in neck or groin   Chest: normal   Lungs: decreased air exchange bibasilar   Heart: Regular rate and rhythm ; ; NO edema   Abdomen: soft, protuberant, distended, nontender   : normal;  Grigsby, clear urine; NO gross hematuria   Extremity: negative, clubbing; no joint swelling or erythema   Neuro: somnolent; lethargic;  non verbal;  withdraws to pain; unable to check gait and station; does NOT follow simple commands   Psych: lethargic, not oriented to situation; Unable to assess; 2-3+ agitation   Skin: Skin unremarkable;    Pulses:Bilateral, Radial, 2+   Capillary refill: normal ;warm,      Tubes:   Grigsby    DATA:    Interval lab and diagnostic data was reviewed. Interval radiology images were independently viewed and available reports were reviewed. Lab results reviewed. For significant abnormal values and values requiring intervention, see assessment and plan.     MAR reviewed and pertinent medications noted or modified as needed  MEDS:   Current Facility-Administered Medications   Medication    insulin lispro (HUMALOG) injection    glucose chewable tablet 16 g    dextrose (D50W) injection syrg 12.5-25 g    glucagon (GLUCAGEN) injection 1 mg    nitroglycerin (NITROBID) 2 % ointment 1 Inch    sodium chloride (NS) flush 5-10 mL    sodium chloride (NS) flush 5-10 mL    acetaminophen (TYLENOL) tablet 650 mg    ondansetron (ZOFRAN) injection 4 mg    heparin (porcine) injection 5,000 Units    0.45% sodium chloride with KCl 20 mEq/L infusion    insulin regular 500 Units in 0.9% sodium chloride 500 mL IVPB    piperacillin-tazobactam (ZOSYN) 3.375 g in 0.9% sodium chloride (MBP/ADV) 100 mL    Followed by   Kyara Marcus ON 4/13/2018] piperacillin-tazobactam (ZOSYN) 3.375 g in 0.9% sodium chloride (MBP/ADV) 100 mL    potassium phosphate 20 mmol in 0.9% sodium chloride 250 mL infusion        Labs:    Recent Labs      04/11/18   2215   WBC  14.3*   HGB  17.8*   PLT  231     Recent Labs      04/12/18   1450  04/12/18   1334  04/12/18   1123  04/12/18   0700  04/12/18   0305  04/12/18   0044   NA  159*   --   150*  153*  140  137   K  3.7   --   6.2*  3.0*  3.3*  3.9   CL  127*   --   120*  119*  103  97   CO2  23   --   18*  22  20*  18*   GLU  389*   --   570*  1018*  >1500*  >1500*   BUN  60*   --   59*  61*  70*  69*   CREA  3.71*   --   3.74*  4.21*  5.19*  4.91*   CA  8.2*   --   7.4*  8.3*  8.6  8.9   MG  3.8*   --   3.5*   --    --   4.2*   PHOS  0.7*   --   0.6*   --    --   2.9   LAC   --   3.4*   --   4.4*  3.6*   --    ALB   --    --    --    --    --   3.5   SGOT   --    --    --    --    --   14*   ALT   --    --    --    --    --   32     No results for input(s): PH, PCO2, PO2, HCO3, FIO2 in the last 72 hours. No results for input(s): CPK, CKNDX, TROIQ in the last 72 hours.     No lab exists for component: CPKMB  No results found for: BNPP, BNP   Lab Results   Component Value Date/Time    Culture result: NO GROWTH AFTER 8 HOURS 04/11/2018 10:26 PM     Lab Results   Component Value Date/Time     04/16/2013 03:30 PM     No results found for: IRON, FE, TIBC, IBCT, PSAT, FERR  Lab Results   Component Value Date/Time    RPR Non Reactive 09/06/2017 09:19 AM    TSH 1.530 12/29/2014 11:32 AM      Lab Results   Component Value Date/Time    RPR Non Reactive 09/06/2017 09:19 AM       Imaging:      Results from Hospital Encounter encounter on 04/11/18   XR CHEST PORT   Narrative Indication: Hypoxia    Comparison: 1/6/2016    Portable exam of the chest obtained at 2251 demonstrates normal heart size. Minimal right middle lobe infiltrate is noted. The osseous structures are  unremarkable. Impression Impression: Minimal right middle lobe infiltrate. Results from [place] encounter on 12/14/15   CT ABD PELV WO CONT   Narrative **Final Report**      ICD Codes / Adm. Diagnosis: 327.20  G47.30 / Abdominal pain, other specifie    Unspecified abdominal pain  Examination:  CT ABD PELVIS WO CON  - [id number] - Dec 14 2015  6:30PM  Accession No:  [id number]  Reason:  RUQ abdom pain; rule out cholelithiasis. L flank pain; rule out   kidney stone      REPORT:  INDICATION:  Right upper quadrant abdominal pain x2 weeks. Flank and back   pain    COMPARISON:  No old study. TECHNIQUE:    Contiguous CT images were obtained of the abdomen and pelvis without   contrast.    Coronal and sagittal reformatted images were then obtained. CT SCAN ABDOMEN/PELVIS:    The kidneys show a tiny left mid pole intrarenal nonobstructing calculus in   the hydronephrosis. The liver shows hepatic steatosis  There are no focal abnormalities detected within the Liver, spleen, adrenal   glands, and pancreas. The abdominal aorta is normal in caliber. The gallbladder is minimally distended. The biliary ducts are not dilated. The vertebral bodies show satisfactory height and alignment. The small bowel is normal in caliber. The appendix is normal in configuration.     There is no inflammatory infiltration    The prostate gland is normal in size. The urinary bladder shows a satisfactory configuration. There is no ascites. IMPRESSION:   Tiny left mid pole intrarenal nonobstructing calculus; no hydronephrosis. Hepatic steatosis. Minimally distended gallbladder. No ascites or inflammatory infiltration. Signing/Reading Doctor: Adriana Foss ([id number])    Approved: Adriana Foss ([id number])  Dec 14 2015  8:52PM                                This care involved high complexity decision making which includes independently reviewing the patient's past medical records, current laboratory results, medication profiles that were immediately available to me and actual Xray images at the bedside in order to assess, support vital system function, and to treat this degree of vital organ system failure, and to prevent further life threatening deterioration of the patients condition. I was in direct communication with the nursing staff throughout this time. I have personally and independently reviewed the patients interval lab, diagnostic data, radiographs and the reports. I have ordered additional labs to follow the current medical conditions and to monitor treatment responses over the next 24 hours or sooner if needed. I will order additional imaging to follow longitudinal changes found on the most current imaging.      Katherine River MD

## 2018-04-12 NOTE — ED PROVIDER NOTES
EMERGENCY DEPARTMENT HISTORY AND PHYSICAL EXAM      Date: 4/11/2018  Patient Name: Jake Duran    History of Presenting Illness     Chief Complaint   Patient presents with    High Blood Sugar     Arrived via EMS with HI on BG check and unresponsive. Patient now responds to voice. History Provided By: EMS    HPI: Jake Duran, 36 y.o. male with PMHx significant for DM, HTN, presents via EMS to the ED with cc of unresponsiveness due to high blood sugar. EMS reports they were called by pt's family who found him unresponsive PTA. Per EMS, pt has been noncompliant with his medication for several days. Pt currently on 6 L O2 in the room. HPI is limited due to pt unresponsive. PCP: Crystal Garcia MD    There are no other complaints, changes, or physical findings at this time.     Current Facility-Administered Medications   Medication Dose Route Frequency Provider Last Rate Last Dose    insulin regular (NOVOLIN R, HUMULIN R) 100 Units in 0.9% sodium chloride 100 mL infusion  0-50 Units/hr IntraVENous TITRATE Soledad Islas MD 16.2 mL/hr at 04/12/18 0335 16.2 Units/hr at 04/12/18 0335    insulin lispro (HUMALOG) injection   SubCUTAneous TIDAC Soledad Islas MD        glucose chewable tablet 16 g  4 Tab Oral PRN Soledad Islas MD        dextrose (D50W) injection syrg 12.5-25 g  12.5-25 g IntraVENous PRN Soledad Islas MD        glucagon (GLUCAGEN) injection 1 mg  1 mg IntraMUSCular PRN Soledad Islas MD        metroNIDAZOLE (FLAGYL) IVPB premix 500 mg  500 mg IntraVENous Malgorzata Kirkland  mL/hr at 04/12/18 0303 500 mg at 04/12/18 0303    [START ON 4/13/2018] levoFLOXacin (LEVAQUIN) 750 mg in D5W IVPB  750 mg IntraVENous Q24H Daquan Fritz MD        sodium chloride 0.9 % bolus infusion 1,000 mL  1,000 mL IntraVENous Q1H Amparo Dean MD 1,000 mL/hr at 04/12/18 0404 1,000 mL at 04/12/18 0404    0.9% sodium chloride infusion  200 mL/hr IntraVENous CONTINUOUS Amparo Dean  mL/hr at 04/12/18 448 0386 200 mL/hr at 04/12/18 0137     Current Outpatient Prescriptions   Medication Sig Dispense Refill    losartan-hydroCHLOROthiazide (HYZAAR) 100-25 mg per tablet Take 1 Tab by mouth daily.  glimepiride (AMARYL) 4 mg tablet TAKE 1 TABLET BY MOUTH EVERY MORNING 30 Tab 5    pravastatin (PRAVACHOL) 20 mg tablet Take 1 Tab by mouth nightly. 30 Tab 11    insulin glargine (LANTUS) 100 unit/mL injection 25 Units by SubCUTAneous route nightly. 1 Vial 11    metFORMIN ER (GLUCOPHAGE XR) 750 mg tablet Take 2 Tabs by mouth daily. 60 Tab 11    Lancets misc Use as directed to test blood sugar three times daily -DX-DM-E11.9 90 Each 11    glucose blood VI test strips (BLOOD GLUCOSE TEST) strip Use as directed to test blood sugar three times daily -DX-DM-E11.9 90 Strip 11    Blood-Glucose Meter monitoring kit Use as directed to test blood sugar three times daily -DX-DM-E11.9 1 Kit 0    DOCOSAHEXANOIC ACID/EPA (FISH OIL PO) Take  by mouth. Past History     Past Medical History:  Past Medical History:   Diagnosis Date    Hypertension 10/31/2014    Insomnia 5/6/2013    Morbid obesity ([facility] [address] 5/6/2013    Type II diabetes mellitus, uncontrolled ([facility] [address] 6/30/2016       Past Surgical History:  Past Surgical History:   Procedure Laterality Date    HX HEENT      wisdom teeth x4       Family History:  Family History   Problem Relation Age of Onset    Asthma Mother     Diabetes Mother     Diabetes Father        Social History:  Social History   Substance Use Topics    Smoking status: Never Smoker    Smokeless tobacco: Never Used    Alcohol use Yes      Comment: socially       Allergies:  No Known Allergies      Review of Systems   Review of Systems   Unable to perform ROS: Patient unresponsive       Physical Exam   Physical Exam   HENT:   Head: Atraumatic. Eyes: Pupils are equal, round, and reactive to light. Cardiovascular: Regular rhythm, normal heart sounds and intact distal pulses. Tachycardia present.   Exam reveals no gallop and no friction rub. No murmur heard. Hypotensive. Pulmonary/Chest: Breath sounds normal. Tachypnea noted. No respiratory distress. He has no wheezes. He has no rales. He exhibits no tenderness. 6 L nasal cannula secondary to poor inspiratory effort likely due to glycemic episode. Abdominal: Soft. Bowel sounds are normal. He exhibits no distension and no mass. There is no tenderness. There is no rebound and no guarding. No obvious tenderness however patient unresponsive    Musculoskeletal: Normal range of motion. He exhibits no edema or tenderness. Neurological:   Obtunded. Arousable to pain stimuli. Occ nodded to simple questions. Not following commands. Moving all extremities. No obvious focal findings   Psychiatric: He has a normal mood and affect. Nursing note and vitals reviewed.         Diagnostic Study Results     Labs -     Recent Results (from the past 12 hour(s))   GLUCOSE, POC    Collection Time: 04/11/18  9:56 PM   Result Value Ref Range    Glucose (POC) >600 (HH) 65 - 100 mg/dL    Performed by Keenan Garcia    GLUCOSE, POC    Collection Time: 04/11/18  9:57 PM   Result Value Ref Range    Glucose (POC) >600 (HH) 65 - 100 mg/dL    Performed by Keenan Garcia    CBC W/O DIFF    Collection Time: 04/11/18 10:15 PM   Result Value Ref Range    WBC 14.3 (H) 4.1 - 11.1 K/uL    RBC 5.35 4.10 - 5.70 M/uL    HGB 17.8 (H) 12.1 - 17.0 g/dL    HCT 64.1 (H) 36.6 - 50.3 %    .8 (H) 80.0 - 99.0 FL    MCH 33.3 26.0 - 34.0 PG    MCHC 27.8 (L) 30.0 - 36.5 g/dL    RDW 12.5 11.5 - 14.5 %    PLATELET 760 597 - 623 K/uL    NRBC 0.0 0  WBC    ABSOLUTE NRBC 0.00 0.00 - 0.01 K/uL   VENOUS BLOOD GAS    Collection Time: 04/11/18 10:15 PM   Result Value Ref Range    VENOUS PH 7.24 (L) 7.32 - 7.42      VENOUS PCO2 48 41 - 51 mmHg    VENOUS PO2 46 (H) 25 - 40 mmHg    VENOUS O2 SATURATION 74 65 - 88 %    VENOUS BICARBONATE 20 (L) 23 - 28 mmol/L    VENOUS BASE DEFICIT 7.3 mmol/L    O2 METHOD ROOM AIR      Sample source VENOUS      SITE OTHER     LACTIC ACID    Collection Time: 04/11/18 10:26 PM   Result Value Ref Range    Lactic acid 3.7 (HH) 0.4 - 2.0 MMOL/L   GLUCOSE, POC    Collection Time: 04/12/18 12:42 AM   Result Value Ref Range    Glucose (POC) >600 (HH) 65 - 100 mg/dL    Performed by Lucinda MODI/ WYATT CULTURE    Collection Time: 04/12/18 12:44 AM   Result Value Ref Range    Color YELLOW/STRAW      Appearance CLEAR CLEAR      Specific gravity <1.005 1.003 - 1.030    pH (UA) 5.0 5.0 - 8.0      Protein NEGATIVE  NEG mg/dL    Glucose >1000 (A) NEG mg/dL    Ketone TRACE (A) NEG mg/dL    Bilirubin NEGATIVE  NEG      Blood TRACE (A) NEG      Urobilinogen 0.2 0.2 - 1.0 EU/dL    Nitrites NEGATIVE  NEG      Leukocyte Esterase NEGATIVE  NEG      WBC 0-4 0 - 4 /hpf    RBC 0-5 0 - 5 /hpf    Epithelial cells FEW FEW /lpf    Bacteria NEGATIVE  NEG /hpf    UA:UC IF INDICATED CULTURE NOT INDICATED BY UA RESULT CNI     METABOLIC PANEL, COMPREHENSIVE    Collection Time: 04/12/18 12:44 AM   Result Value Ref Range    Sodium 137 136 - 145 mmol/L    Potassium 3.9 3.5 - 5.1 mmol/L    Chloride 97 97 - 108 mmol/L    CO2 18 (L) 21 - 32 mmol/L    Anion gap 22 (H) 5 - 15 mmol/L    Glucose >1500 (HH) 65 - 100 mg/dL    BUN 69 (H) 6 - 20 MG/DL    Creatinine 4.91 (H) 0.70 - 1.30 MG/DL    BUN/Creatinine ratio 14 12 - 20      GFR est AA 16 (L) >60 ml/min/1.73m2    GFR est non-AA 13 (L) >60 ml/min/1.73m2    Calcium 8.9 8.5 - 10.1 MG/DL    Bilirubin, total 0.7 0.2 - 1.0 MG/DL    ALT (SGPT) 32 12 - 78 U/L    AST (SGOT) 14 (L) 15 - 37 U/L    Alk.  phosphatase 105 45 - 117 U/L    Protein, total 7.7 6.4 - 8.2 g/dL    Albumin 3.5 3.5 - 5.0 g/dL    Globulin 4.2 (H) 2.0 - 4.0 g/dL    A-G Ratio 0.8 (L) 1.1 - 2.2     MAGNESIUM    Collection Time: 04/12/18 12:44 AM   Result Value Ref Range    Magnesium 4.2 (H) 1.6 - 2.4 mg/dL   PHOSPHORUS    Collection Time: 04/12/18 12:44 AM   Result Value Ref Range    Phosphorus 2.9 2.6 - 4.7 MG/DL   GLUCOSE, POC    Collection Time: 04/12/18  1:54 AM   Result Value Ref Range    Glucose (POC) >600 (HH) 65 - 100 mg/dL    Performed by Lucy MCNALLY    GLUCOSE, POC    Collection Time: 04/12/18  1:56 AM   Result Value Ref Range    Glucose (POC) >600 (HH) 65 - 100 mg/dL    Performed by Anders Altman    Collection Time: 04/12/18  2:06 AM   Result Value Ref Range    Glucose 601 mg/dL    Insulin order 10.8 units/hour    Insulin adminstered 10.8 units/hour    Multiplier 0.020     Low target 200 mg/dL    High target 300 mg/dL    D50 order 0.0 ml    D50 administered 0.00 ml    Minutes until next BG 60 min    Order initials ded     Administered initials ded     GLSCOM Comments     METABOLIC PANEL, BASIC    Collection Time: 04/12/18  3:05 AM   Result Value Ref Range    Sodium 140 136 - 145 mmol/L    Potassium 3.3 (L) 3.5 - 5.1 mmol/L    Chloride 103 97 - 108 mmol/L    CO2 20 (L) 21 - 32 mmol/L    Anion gap 17 (H) 5 - 15 mmol/L    Glucose >1500 (HH) 65 - 100 mg/dL    BUN 70 (H) 6 - 20 MG/DL    Creatinine 5.19 (H) 0.70 - 1.30 MG/DL    BUN/Creatinine ratio 13 12 - 20      GFR est AA 15 (L) >60 ml/min/1.73m2    GFR est non-AA 12 (L) >60 ml/min/1.73m2    Calcium 8.6 8.5 - 10.1 MG/DL   LACTIC ACID    Collection Time: 04/12/18  3:05 AM   Result Value Ref Range    Lactic acid 3.6 (HH) 0.4 - 2.0 MMOL/L   GLUCOSE, POC    Collection Time: 04/12/18  3:21 AM   Result Value Ref Range    Glucose (POC) >600 (HH) 65 - 100 mg/dL    Performed by Anders Altman    Collection Time: 04/12/18  3:34 AM   Result Value Ref Range    Glucose 601 mg/dL    Insulin order 16.2 units/hour    Insulin adminstered 16.2 units/hour    Multiplier 0.030     Low target 200 mg/dL    High target 300 mg/dL    D50 order 0.0 ml    D50 administered 0.00 ml    Minutes until next BG 60 min    Order initials KIRSTEN     Administered initials TN     GLSCOM Comments         Radiologic Studies -   CXR Results  (Last 48 hours) 04/11/18 2253  XR CHEST PORT Final result    Impression:  Impression: Minimal right middle lobe infiltrate. Narrative: Indication: Hypoxia       Comparison: 1/6/2016       Portable exam of the chest obtained at 2251 demonstrates normal heart size. Minimal right middle lobe infiltrate is noted. The osseous structures are   unremarkable. Medical Decision Making   I am the first provider for this patient. I reviewed the vital signs, available nursing notes, past medical history, past surgical history, family history and social history. Vital Signs-Reviewed the patient's vital signs. Patient Vitals for the past 12 hrs:   Temp Pulse Resp BP SpO2   04/12/18 0345 - (!) 117 - 149/82 93 %   04/12/18 0330 - (!) 117 (!) 31 97/72 92 %   04/12/18 0219 - (!) 120 (!) 36 - 90 %   04/12/18 0218 - - - (!) 89/62 -   04/12/18 0215 - (!) 119 (!) 35 - 91 %   04/12/18 0115 - (!) 122 - - 92 %   04/12/18 0015 - (!) 120 (!) 34 - 93 %   04/11/18 2345 - (!) 122 (!) 33 107/60 94 %   04/11/18 2337 - (!) 123 16 111/84 91 %   04/11/18 2315 - (!) 124 29 108/71 (!) 89 %   04/11/18 2245 - (!) 128 30 (!) 104/35 91 %   04/11/18 2202 97.5 °F (36.4 °C) (!) 122 30 115/55 92 %       Pulse Oximetry Analysis - 92% on 6 L via nasal cannula    Cardiac Monitor:   Rate: 123 bpm  Rhythm: Sinus Tachycardia        Records Reviewed: Old Medical Records    Provider Notes (Medical Decision Making):   Pt with hx hyperosmolar nonketotic state due to uncontrolled DM presents likely here with hyperglycemic episode. Report of medication non-compliance recently. SIRS criteria so will send cultures/lactate. Will evaluate for infectious etiology versus noncompliance. Planned admission. ED Course:   Initial assessment performed. The patients presenting problems have been discussed, and they are in agreement with the care plan formulated and outlined with them.   I have encouraged them to ask questions as they arise throughout their visit. Consult Note:  12:42 AM  Gonzalez Brooks MD spoke with Nereida Arnold MD  Specialty: Hospitalist  Discussed pt's hx, disposition, and available diagnostic and imaging results. Dr. Dl Kirkpatrick will evaluate and admit pt. Critical Care Time:   10:39 PM    IMPENDING DETERIORATION -Respiratory, Cardiovascular, CNS, Metabolic and Renal  ASSOCIATED RISK FACTORS - Hypoxia, Dysrhythmia, Metabolic changes, Dehydration and CNS Decompensation  MANAGEMENT- Bedside Assessment and Supervision of Care  INTERPRETATION -  Xrays and Blood Gases, blood pressure  INTERVENTIONS - hemodynamic mngmt, Neurologic interventions  and Metobolic interventions  CASE REVIEW - Hospitalist, Nursing and Family  TREATMENT RESPONSE -Unchanged   PERFORMED BY - Self    NOTES   :  I have spent 102 minutes of critical care time involved in lab review, consultations with specialist, family decision- making, bedside attention and documentation. During this entire length of time I was immediately available to the patient . Gonzalez Brooks MD    Disposition:  Admit Note:  1:00 AM  Pt is being admitted by Dr. Dl Kirkpatrick. The results of their tests and reason(s) for their admission have been discussed with pt and/or available family. They convey agreement and understanding for the need to be admitted and for admission diagnosis. Diagnosis     Clinical Impression:   1. Hyperosmolar coma due to secondary diabetes ([facility] [address]    2. LITTLE (acute kidney injury) ([facility] [address]    3. Encephalopathy        Attestations: This note is prepared by Dia Benavidez, acting as a Scribe for MD Gonzalez Roman MD: The scribe's documentation has been prepared under my direction and personally reviewed by me in its entirety. I confirm that the notes above accurately reflects all work, treatment, procedures, and medical decision making performed by me.

## 2018-04-12 NOTE — CDMP QUERY
Account Number: [de-identified]  MRN: [id number]  Patient: Licha Johnson  Created: [id number]  Clinician Name: Sherin Minaya M.D. :  Please clarify if this patient is (was) being treated/managed for:     => hypernatremia as evidenced by Na 153 req bmp q 4hr  => Other explanation of clinical findings  => Clinically Undetermined (no explanation for clinical findings)    The medical record reflects the following clinical findings, treatment, and risk factors. Risk Factors:  42m adm w/ hyperosmolar coma  Clinical Indicators:    Treatment: bmp q 4 hr    Please clarify and document your clinical opinion in the progress notes and discharge summary including the definitive and/or presumptive diagnosis, (suspected or probable), related to the above clinical findings. Please include clinical findings supporting your diagnosis.   Thank Chelsey Torres

## 2018-04-12 NOTE — PROGRESS NOTES
1415 - Received from ED to CCU 2530. Eyes open and follows commands intermittently, Oriented to name only. Moans loudly spontaneously but does not verbalize C/O pain. Nods head somewhat appropriately. Bilateral wrist restraints off pending further assessment. Primary Nurse Bhavik Gregory, LEIGHA and Adwoa Katz RN performed a dual skin assessment on this patient Impairment noted- see wound doc flow sheet (excoriation in groin area, Dr. Liv Herrera witnessed)Jose score is 15.  1445 - Dr. Liv Herrera at bedside, updated on condition, orders noted. 1515 - Restless and agitated, continues to pull off gown and pull off 02 despite re-directing and increasing supervision. Bilateral wrist restraints resumed for patient safety. Labs pending (BMP). 80 - Dr. Jose Boucher and Dr. Liv Herrera updated on current lab values, orders pending. 1800 - Resting quietly at times, moaning other times. Nods head yes/no appropriately but remains essentially non-verbal.  Re-positioned for comfort. Bilateral wrist restraints intact. Will continue to closely monitor. 1900 - Bedside verbal report given to oncoming shift.

## 2018-04-12 NOTE — CDMP QUERY
After further study, do you concur with the findings of aspiration pna noted in the H&P?    ? Aspiration pna poa as stated in H&P, cxr findings req Lvq, Flagyl, ivfs  ? Aspiration pna ruled out  ? Other Explanation of the clinical findings  ? Unable to Determine (no explanation for clinical findings)    The medical record reflects the following clinical findings, risk factors and treatment:     Risk Factors:  42m adm w/ hyperosmolar coma  Clinical Indicators:  H&P--Sepsis with leukocytosis and tachycardia with lactic acidosis  In settings of Aspiration PNA, cxr-- Minimal right middle lobe infiltrate  Treatment: Lvq, Flagyl, ivfs    Please clarify and document your clinical opinion in the progress notes and discharge summary including the definitive and/or presumptive diagnosis, (suspected or probable), related to the above clinical findings. Please include clinical findings supporting your diagnosis.   Thanks,  Alf Mistry Rn/CDMP

## 2018-04-12 NOTE — ED NOTES
Report rec'd and care assumed at this time. Pt remains with soft restraints to both wrists. Restless in bed and confused. Wearing 02 in mouth due to mouth breathing. Gtt rates verified. IV x 2 patent. Lab called and new results noted.   Will notify hospitalist.

## 2018-04-12 NOTE — ED NOTES
Spoke with Dr. Rosas Goddard. V/O rec'ed to stop potassium fluids. Will recheck bmp in an hour. Maged Deutsch, pharmacist notified.

## 2018-04-12 NOTE — ED NOTES
Pt pulling at fc. Pt pulling clothes off. Pt pulling at telemetry leads and IV tubing. Restraints replaced, after explaining to pt the reason. Verb \"ok\". Will monitor.

## 2018-04-12 NOTE — ED NOTES
Multiple attempts made to contact on call md.  Unable to contact a physician   Cn notified. MD cell phone number obtained and md notified. To come and see pt.

## 2018-04-12 NOTE — CDMP QUERY
After further study, do you concur with the findings of Sepsis noted in the H&P?    ? Sepsis poa as stated in H&P, aspiration pna, wbc 14.3, hr 119--128  rr  30--36  req 3L NS, ivfs @ 200/hr, Flagyl, Lvq, cx pending  ? Sepsis ruled out  ? Other Explanation of the clinical findings  ? Unable to Determine (no explanation for clinical findings)    The medical record reflects the following clinical findings, risk factors and treatment:     Risk Factors:  42m adm w/ hyperosmolar coma  Clinical Indicators:  H&P--sepsis, asp pna, hr 119--128, rr 30-36, wbc 14.3   Treatment:3L NS, ivfs @ 200/hr, Flagyl, Lvq      Please clarify and document your clinical opinion in the progress notes and discharge summary including the definitive and/or presumptive diagnosis, (suspected or probable), related to the above clinical findings. Please include clinical findings supporting your diagnosis.   Thanks,  Mera Oneill RN/DANNAMP

## 2018-04-12 NOTE — H&P
Hospitalist Admission Note    NAME: Armani Mendoza   :  1975   MRN:  [id number]     Date/Time:  2018 1:01 AM    Patient PCP: Eduin Rascon MD  ______________________________________________________________________  Given the patient's current clinical presentation, I have a high level of concern for decompensation if discharged from the emergency department. Complex decision making was performed, which includes reviewing the patient's available past medical records, laboratory results, and x-ray films. My assessment of this patient's clinical condition and my plan of care is as follows.     Assessment / Plan:  Hyperosmolar coma due to secondary diabetes  Admit to ICU  Labs sent 3+ time but could not be resulted as labs didn't believe they were real. We have requested to run it as they must be real.  In setting of non compliance  NPO till awake enough  BS > 1500 per lab report, He could be in renal failure and hyperkalemic along with hyperosmolarity induced DKA but can't confirm as labs still pending  S/p 2L NS boluses and 10 units of IV insulin  Will given 3L NS boluses in addition to above and 20 units additional IV insulin  Insulin ggt  After boluses, NS 200ml/hr  Serial BMPs, Mg, Phos  Prognosis guarded    Sepsis with leukocytosis and tachycardia with lactic acidosis  In settings of Aspiration PNA (R side PNA on CXR in settings of altered level of responsiveness)  IV Levaquin and Flagyl  Check UA  F/u blood cultures    Acute Encephalopathy with altered level of responsiveness  Due to above    Hyperlipidemia   Hold statins until awake    HTN  Holding meds until awake  PRN nitropaste    Morbid obesity    Medical Non Compliance    Code Status: Full as unable to discuss  Surrogate Decision Maker: Unable to discuss, significant other Chaveswill Mabry who is mother of his children is at bedside    DVT Prophylaxis: SQ Heparin      Subjective:   CHIEF COMPLAINT: altered mental status    HISTORY OF PRESENT ILLNESS:     Mary Jo Patrick is a 36 y.o.  male who presents with altered mental status. As per pt's significant other she noted pt to be lethargic in the morning which gotten worse as the day progressed. In ED pt noted somnolent and hard to arouse. Pt noted to have blood sugar of > 600 on POC BS and upon sending lab, lab didn't run it as it was noted > 1500 and lab didn't believe the value. Per bedside RN, labs were sent 3 times already. Labs now sent again and lab is requested to run despite whatever the value is. History is limited due to patient's altered level of responsiveness. We were asked to admit for work up and evaluation of the above problems. Past Medical History:   Diagnosis Date    Hypertension 10/31/2014    Insomnia 5/6/2013    Morbid obesity ([facility] 5/6/2013    Type II diabetes mellitus, uncontrolled ([facility] 6/30/2016        Past Surgical History:   Procedure Laterality Date    HX HEENT      wisdom teeth x4       Social History   Substance Use Topics    Smoking status: Never Smoker    Smokeless tobacco: Never Used    Alcohol use Yes      Comment: socially        Family History   Problem Relation Age of Onset    Asthma Mother     Diabetes Mother     Diabetes Father      No Known Allergies     Prior to Admission medications    Medication Sig Start Date End Date Taking? Authorizing Provider   losartan-hydroCHLOROthiazide (HYZAAR) 100-25 mg per tablet Take 1 Tab by mouth daily. Yes Louise Guthrie MD   glimepiride (AMARYL) 4 mg tablet TAKE 1 TABLET BY MOUTH EVERY MORNING 4/9/18  Yes Selena Mirza MD   pravastatin (PRAVACHOL) 20 mg tablet Take 1 Tab by mouth nightly. 7/25/17  Yes Selena Mirza MD   insulin glargine (LANTUS) 100 unit/mL injection 25 Units by SubCUTAneous route nightly. 7/11/17  Yes Selena Mirza MD   metFORMIN ER (GLUCOPHAGE XR) 750 mg tablet Take 2 Tabs by mouth daily.  1/17/17  Yes Selena Mirza MD   Lancets misc Use as directed to test blood sugar three times daily -DX-DM-E11.9 1/17/17  Yes Roberto Benitez MD   glucose blood VI test strips (BLOOD GLUCOSE TEST) strip Use as directed to test blood sugar three times daily -DX-DM-E11.9 1/17/17  Yes Roberto Benitez MD   Blood-Glucose Meter monitoring kit Use as directed to test blood sugar three times daily -DX-DM-E11.9 1/29/16  Yes Roberto Benitez MD   [facility] ACID/EPA (FISH OIL PO) Take  by mouth. Yes Historical Provider       REVIEW OF SYSTEMS:     I am not able to complete the review of systems because: The patient is intubated and sedated   y The patient has altered mental status due to his acute medical problems    The patient has baseline aphasia from prior stroke(s)    The patient has baseline dementia and is not reliable historian    The patient is in acute medical distress and unable to provide information           Objective:   VITALS:    Visit Vitals    /84    Pulse (!) 123    Temp 97.5 °F (36.4 °C)    Resp 16    Ht 5' 9\" (1.753 m)    Wt 122.5 kg (270 lb)    SpO2 91%    BMI 39.87 kg/m2       PHYSICAL EXAM:    General:          Lethargic looks, wakes up on painful stimuli but doesn't responds answers, no distress, appears stated age. HEENT: Atraumatic, anicteric sclerae, pink conjunctivae     No oral ulcers, mucosa dry, throat clear, dentition fair  Neck:  Supple, symmetrical,  thyroid: non tender  Lungs:   Clear to auscultation bilaterally. No Wheezing or Rhonchi. No rales. Chest wall:  No tenderness  No Accessory muscle use. Heart:   Regular  rhythm,  No  murmur   No edema  Abdomen:   Soft, non-tender. Not distended. Bowel sounds normal  Extremities: No cyanosis. No clubbing,      Skin turgor normal, Capillary refill normal, Radial dial pulse 2+  Skin:     Not pale.   Not Jaundiced  No rashes   Psych:  Exam limited due to altered level of responsiveness  Neurologic: Exam limited due to altered level of responsiveness, normal tone    _______________________________________________________________________  Care Plan discussed with:    Comments   Patient  Altered level of responsiveness   Family  y    RN y    Care Manager                    Consultant:  fatoumata ED physician   _______________________________________________________________________  Expected  Disposition:   Home with Family TBD   HH/PT/OT/RN    SNF/LTC    DOMI    ________________________________________________________________________  TOTAL TIME: 61 Minutes    Critical Care Provided  61  Minutes non procedure based      Comments    y Reviewed previous records   >50% of visit spent in counseling and coordination of care y Discussion with family and questions answered       ________________________________________________________________________  Signed: Maury Wise MD    Procedures: see electronic medical records for all procedures/Xrays and details which were not copied into this note but were reviewed prior to creation of Plan.     LAB DATA REVIEWED:    Recent Results (from the past 24 hour(s))   GLUCOSE, POC    Collection Time: 04/11/18  9:56 PM   Result Value Ref Range    Glucose (POC) >600 (HH) 65 - 100 mg/dL    Performed by Daxa Lamar    GLUCOSE, POC    Collection Time: 04/11/18  9:57 PM   Result Value Ref Range    Glucose (POC) >600 (HH) 65 - 100 mg/dL    Performed by Daxa Lamar    CBC W/O DIFF    Collection Time: 04/11/18 10:15 PM   Result Value Ref Range    WBC 14.3 (H) 4.1 - 11.1 K/uL    RBC 5.35 4.10 - 5.70 M/uL    HGB 17.8 (H) 12.1 - 17.0 g/dL    HCT 64.1 (H) 36.6 - 50.3 %    .8 (H) 80.0 - 99.0 FL    MCH 33.3 26.0 - 34.0 PG    MCHC 27.8 (L) 30.0 - 36.5 g/dL    RDW 12.5 11.5 - 14.5 %    PLATELET 008 047 - 415 K/uL    NRBC 0.0 0  WBC    ABSOLUTE NRBC 0.00 0.00 - 0.01 K/uL   VENOUS BLOOD GAS    Collection Time: 04/11/18 10:15 PM   Result Value Ref Range    VENOUS PH 7.24 (L) 7.32 - 7.42      VENOUS PCO2 48 41 - 51 mmHg    VENOUS PO2 46 (H) 25 - 40 mmHg VENOUS O2 SATURATION 74 65 - 88 %    VENOUS BICARBONATE 20 (L) 23 - 28 mmol/L    VENOUS BASE DEFICIT 7.3 mmol/L    O2 METHOD ROOM AIR      Sample source VENOUS      SITE OTHER     LACTIC ACID    Collection Time: 04/11/18 10:26 PM   Result Value Ref Range    Lactic acid 3.7 (HH) 0.4 - 2.0 MMOL/L   GLUCOSE, POC    Collection Time: 04/12/18 12:42 AM   Result Value Ref Range    Glucose (POC) >600 (HH) 65 - 100 mg/dL    Performed by Josie Rizo

## 2018-04-12 NOTE — ED NOTES
Pt moaning. States \"water\" when asked what is wrong. Mouth swabbed with moistened swabs per tech. In pt bed ordered for pt per cn. Will monitor.

## 2018-04-12 NOTE — ED NOTES
Tried to notify family about patient pulling at lines and starting soft wrist restraints. Will try again in morning.

## 2018-04-12 NOTE — PROGRESS NOTES
INTERNAL MEDICINE ATTENDING NOTE    Patient of mine admitted by hospitalist team earlier today--technically still under ER / hospitalist care, but I will be assuming care. Case reviewed.      Recent Results (from the past 12 hour(s))   GLUCOSE, POC    Collection Time: 04/11/18  9:56 PM   Result Value Ref Range    Glucose (POC) >600 (HH) 65 - 100 mg/dL    Performed by Ronel Pierre    GLUCOSE, POC    Collection Time: 04/11/18  9:57 PM   Result Value Ref Range    Glucose (POC) >600 (HH) 65 - 100 mg/dL    Performed by Ronel Pierre    CBC W/O DIFF    Collection Time: 04/11/18 10:15 PM   Result Value Ref Range    WBC 14.3 (H) 4.1 - 11.1 K/uL    RBC 5.35 4.10 - 5.70 M/uL    HGB 17.8 (H) 12.1 - 17.0 g/dL    HCT 64.1 (H) 36.6 - 50.3 %    .8 (H) 80.0 - 99.0 FL    MCH 33.3 26.0 - 34.0 PG    MCHC 27.8 (L) 30.0 - 36.5 g/dL    RDW 12.5 11.5 - 14.5 %    PLATELET 016 731 - 408 K/uL    NRBC 0.0 0  WBC    ABSOLUTE NRBC 0.00 0.00 - 0.01 K/uL   VENOUS BLOOD GAS    Collection Time: 04/11/18 10:15 PM   Result Value Ref Range    VENOUS PH 7.24 (L) 7.32 - 7.42      VENOUS PCO2 48 41 - 51 mmHg    VENOUS PO2 46 (H) 25 - 40 mmHg    VENOUS O2 SATURATION 74 65 - 88 %    VENOUS BICARBONATE 20 (L) 23 - 28 mmol/L    VENOUS BASE DEFICIT 7.3 mmol/L    O2 METHOD ROOM AIR      Sample source VENOUS      SITE OTHER     CULTURE, BLOOD, PAIRED    Collection Time: 04/11/18 10:26 PM   Result Value Ref Range    Special Requests: NO SPECIAL REQUESTS      Culture result: NO GROWTH AFTER 8 HOURS     LACTIC ACID    Collection Time: 04/11/18 10:26 PM   Result Value Ref Range    Lactic acid 3.7 (HH) 0.4 - 2.0 MMOL/L   GLUCOSE, POC    Collection Time: 04/12/18 12:42 AM   Result Value Ref Range    Glucose (POC) >600 (HH) 65 - 100 mg/dL    Performed by Randal Cardenas W/ REFLEX CULTURE    Collection Time: 04/12/18 12:44 AM   Result Value Ref Range    Color YELLOW/STRAW      Appearance CLEAR CLEAR      Specific gravity <1.005 1.003 - 1.030    pH (UA) 5.0 5.0 - 8.0      Protein NEGATIVE  NEG mg/dL    Glucose >1000 (A) NEG mg/dL    Ketone TRACE (A) NEG mg/dL    Bilirubin NEGATIVE  NEG      Blood TRACE (A) NEG      Urobilinogen 0.2 0.2 - 1.0 EU/dL    Nitrites NEGATIVE  NEG      Leukocyte Esterase NEGATIVE  NEG      WBC 0-4 0 - 4 /hpf    RBC 0-5 0 - 5 /hpf    Epithelial cells FEW FEW /lpf    Bacteria NEGATIVE  NEG /hpf    UA:UC IF INDICATED CULTURE NOT INDICATED BY UA RESULT CNI     HEMOGLOBIN A1C WITH EAG    Collection Time: 04/12/18 12:44 AM   Result Value Ref Range    Hemoglobin A1c 12.8 (H) 4.2 - 6.3 %    Est. average glucose 600 mg/dL   METABOLIC PANEL, COMPREHENSIVE    Collection Time: 04/12/18 12:44 AM   Result Value Ref Range    Sodium 137 136 - 145 mmol/L    Potassium 3.9 3.5 - 5.1 mmol/L    Chloride 97 97 - 108 mmol/L    CO2 18 (L) 21 - 32 mmol/L    Anion gap 22 (H) 5 - 15 mmol/L    Glucose >1500 (HH) 65 - 100 mg/dL    BUN 69 (H) 6 - 20 MG/DL    Creatinine 4.91 (H) 0.70 - 1.30 MG/DL    BUN/Creatinine ratio 14 12 - 20      GFR est AA 16 (L) >60 ml/min/1.73m2    GFR est non-AA 13 (L) >60 ml/min/1.73m2    Calcium 8.9 8.5 - 10.1 MG/DL    Bilirubin, total 0.7 0.2 - 1.0 MG/DL    ALT (SGPT) 32 12 - 78 U/L    AST (SGOT) 14 (L) 15 - 37 U/L    Alk.  phosphatase 105 45 - 117 U/L    Protein, total 7.7 6.4 - 8.2 g/dL    Albumin 3.5 3.5 - 5.0 g/dL    Globulin 4.2 (H) 2.0 - 4.0 g/dL    A-G Ratio 0.8 (L) 1.1 - 2.2     MAGNESIUM    Collection Time: 04/12/18 12:44 AM   Result Value Ref Range    Magnesium 4.2 (H) 1.6 - 2.4 mg/dL   PHOSPHORUS    Collection Time: 04/12/18 12:44 AM   Result Value Ref Range    Phosphorus 2.9 2.6 - 4.7 MG/DL   GLUCOSE, POC    Collection Time: 04/12/18  1:54 AM   Result Value Ref Range    Glucose (POC) >600 (HH) 65 - 100 mg/dL    Performed by Larissa MCNALLY    GLUCOSE, POC    Collection Time: 04/12/18  1:56 AM   Result Value Ref Range    Glucose (POC) >600 (HH) 65 - 100 mg/dL    Performed by [address] [place] [address]    Collection Time: 04/12/18  2:06 AM   Result Value Ref Range    Glucose 601 mg/dL    Insulin order 10.8 units/hour    Insulin adminstered 10.8 units/hour    Multiplier 0.020     Low target 200 mg/dL    High target 300 mg/dL    D50 order 0.0 ml    D50 administered 0.00 ml    Minutes until next BG 60 min    Order initials ded     Administered initials ded     GLSCOM Comments     METABOLIC PANEL, BASIC    Collection Time: 04/12/18  3:05 AM   Result Value Ref Range    Sodium 140 136 - 145 mmol/L    Potassium 3.3 (L) 3.5 - 5.1 mmol/L    Chloride 103 97 - 108 mmol/L    CO2 20 (L) 21 - 32 mmol/L    Anion gap 17 (H) 5 - 15 mmol/L    Glucose >1500 (HH) 65 - 100 mg/dL    BUN 70 (H) 6 - 20 MG/DL    Creatinine 5.19 (H) 0.70 - 1.30 MG/DL    BUN/Creatinine ratio 13 12 - 20      GFR est AA 15 (L) >60 ml/min/1.73m2    GFR est non-AA 12 (L) >60 ml/min/1.73m2    Calcium 8.6 8.5 - 10.1 MG/DL   LACTIC ACID    Collection Time: 04/12/18  3:05 AM   Result Value Ref Range    Lactic acid 3.6 (HH) 0.4 - 2.0 MMOL/L   GLUCOSE, POC    Collection Time: 04/12/18  3:21 AM   Result Value Ref Range    Glucose (POC) >600 (HH) 65 - 100 mg/dL    Performed by [employer]    Collection Time: 04/12/18  3:34 AM   Result Value Ref Range    Glucose 601 mg/dL    Insulin order 16.2 units/hour    Insulin adminstered 16.2 units/hour    Multiplier 0.030     Low target 200 mg/dL    High target 300 mg/dL    D50 order 0.0 ml    D50 administered 0.00 ml    Minutes until next BG 60 min    Order initials KIRSTEN     Administered initials TN     GLSCOM Comments     GLUCOSE, POC    Collection Time: 04/12/18  4:44 AM   Result Value Ref Range    Glucose (POC) >600 (HH) 65 - 100 mg/dL    Performed by [employer]    Collection Time: 04/12/18  4:50 AM   Result Value Ref Range    Glucose 601 mg/dL    Insulin order 21.6 units/hour    Insulin adminstered 21.6 units/hour    Multiplier 0.040     Low target 200 mg/dL    High target 300 mg/dL    D50 order 0.0 ml D50 administered 0.00 ml    Minutes until next BG 60 min    Order initials KIRSTEN     Administered initials TN     GLSCOM Comments     GLUCOSE, POC    Collection Time: 04/12/18  5:45 AM   Result Value Ref Range    Glucose (POC) >600 (HH) 65 - 100 mg/dL    Performed by Carri Varela    Collection Time: 04/12/18  5:50 AM   Result Value Ref Range    Glucose 601 mg/dL    Insulin order 27.1 units/hour    Insulin adminstered 27.1 units/hour    Multiplier 0.050     Low target 200 mg/dL    High target 300 mg/dL    D50 order 0.0 ml    D50 administered 0.00 ml    Minutes until next BG 60 min    Order initials KIRSTEN     Administered initials TN     GLSCOM Comments     METABOLIC PANEL, BASIC    Collection Time: 04/12/18  7:00 AM   Result Value Ref Range    Sodium 153 (H) 136 - 145 mmol/L    Potassium 3.0 (L) 3.5 - 5.1 mmol/L    Chloride 119 (H) 97 - 108 mmol/L    CO2 22 21 - 32 mmol/L    Anion gap 12 5 - 15 mmol/L    Glucose 1018 (HH) 65 - 100 mg/dL    BUN 61 (H) 6 - 20 MG/DL    Creatinine 4.21 (H) 0.70 - 1.30 MG/DL    BUN/Creatinine ratio 14 12 - 20      GFR est AA 19 (L) >60 ml/min/1.73m2    GFR est non-AA 16 (L) >60 ml/min/1.73m2    Calcium 8.3 (L) 8.5 - 10.1 MG/DL   LACTIC ACID    Collection Time: 04/12/18  7:00 AM   Result Value Ref Range    Lactic acid 4.4 (HH) 0.4 - 2.0 MMOL/L   GLUCOSE, POC    Collection Time: 04/12/18  7:03 AM   Result Value Ref Range    Glucose (POC) >600 (HH) 65 - 100 mg/dL    Performed by Carri Varela    Collection Time: 04/12/18  7:28 AM   Result Value Ref Range    Glucose 601 mg/dL    Insulin order 32.5 units/hour    Insulin adminstered 32.5 units/hour    Multiplier 0.060     Low target 200 mg/dL    High target 300 mg/dL    D50 order 0.0 ml    D50 administered 0.00 ml    Minutes until next BG 60 min    Order initials LB     Administered initials LB     GLSCOM Comments         A/P:   1. Hyperosmolar coma due to secondary diabetes ([facility] (4/12/2018): IVF and insulin.   Glc is coming down. Continue protocol. 2. Acute encephalopathy (4/12/2018): Improving. He is now awake. 3. Hyperlipidemia (5/1/2012)  4. Morbid obesity ([facility] (5/6/2013)  5. Hypertension (10/31/2014):       Agata Yadav MD, FACP  Best contact is via Pager: [phone], or via hospital  at [phone]

## 2018-04-13 ENCOUNTER — APPOINTMENT (OUTPATIENT)
Dept: GENERAL RADIOLOGY | Age: 43
DRG: 871 | End: 2018-04-13
Attending: INTERNAL MEDICINE
Payer: COMMERCIAL

## 2018-04-13 LAB
ADMINISTERED INITIALS, ADMINIT: NORMAL
ALBUMIN SERPL-MCNC: 2.6 G/DL (ref 3.5–5)
ALBUMIN/GLOB SERPL: 0.6 {RATIO} (ref 1.1–2.2)
ALP SERPL-CCNC: 86 U/L (ref 45–117)
ALT SERPL-CCNC: 165 U/L (ref 12–78)
ANION GAP SERPL CALC-SCNC: 10 MMOL/L (ref 5–15)
ANION GAP SERPL CALC-SCNC: 11 MMOL/L (ref 5–15)
ANION GAP SERPL CALC-SCNC: 9 MMOL/L (ref 5–15)
ANION GAP SERPL CALC-SCNC: 9 MMOL/L (ref 5–15)
AST SERPL-CCNC: 715 U/L (ref 15–37)
BASOPHILS # BLD: 0 K/UL (ref 0–0.1)
BASOPHILS NFR BLD: 0 % (ref 0–1)
BILIRUB SERPL-MCNC: 0.5 MG/DL (ref 0.2–1)
BUN SERPL-MCNC: 61 MG/DL (ref 6–20)
BUN SERPL-MCNC: 63 MG/DL (ref 6–20)
BUN SERPL-MCNC: 65 MG/DL (ref 6–20)
BUN SERPL-MCNC: 67 MG/DL (ref 6–20)
BUN/CREAT SERPL: 13 (ref 12–20)
BUN/CREAT SERPL: 14 (ref 12–20)
BUN/CREAT SERPL: 16 (ref 12–20)
BUN/CREAT SERPL: 17 (ref 12–20)
CALCIUM SERPL-MCNC: 7.7 MG/DL (ref 8.5–10.1)
CALCIUM SERPL-MCNC: 8 MG/DL (ref 8.5–10.1)
CALCIUM SERPL-MCNC: 8.3 MG/DL (ref 8.5–10.1)
CALCIUM SERPL-MCNC: 8.4 MG/DL (ref 8.5–10.1)
CHLORIDE SERPL-SCNC: 128 MMOL/L (ref 97–108)
CHLORIDE SERPL-SCNC: 130 MMOL/L (ref 97–108)
CHLORIDE SERPL-SCNC: 130 MMOL/L (ref 97–108)
CHLORIDE SERPL-SCNC: 131 MMOL/L (ref 97–108)
CO2 SERPL-SCNC: 20 MMOL/L (ref 21–32)
CO2 SERPL-SCNC: 23 MMOL/L (ref 21–32)
CO2 SERPL-SCNC: 23 MMOL/L (ref 21–32)
CO2 SERPL-SCNC: 26 MMOL/L (ref 21–32)
CREAT SERPL-MCNC: 3.92 MG/DL (ref 0.7–1.3)
CREAT SERPL-MCNC: 4.02 MG/DL (ref 0.7–1.3)
CREAT SERPL-MCNC: 4.64 MG/DL (ref 0.7–1.3)
CREAT SERPL-MCNC: 5.06 MG/DL (ref 0.7–1.3)
CREAT UR-MCNC: 482 MG/DL
D50 ADMINISTERED, D50ADM: 0 ML
D50 ORDER, D50ORD: 0 ML
DIFFERENTIAL METHOD BLD: ABNORMAL
EOSINOPHIL # BLD: 0 K/UL (ref 0–0.4)
EOSINOPHIL NFR BLD: 0 % (ref 0–7)
ERYTHROCYTE [DISTWIDTH] IN BLOOD BY AUTOMATED COUNT: 11.8 % (ref 11.5–14.5)
GLOBULIN SER CALC-MCNC: 4.2 G/DL (ref 2–4)
GLSCOM COMMENTS: NORMAL
GLUCOSE BLD STRIP.AUTO-MCNC: 193 MG/DL (ref 65–100)
GLUCOSE BLD STRIP.AUTO-MCNC: 194 MG/DL (ref 65–100)
GLUCOSE BLD STRIP.AUTO-MCNC: 204 MG/DL (ref 65–100)
GLUCOSE BLD STRIP.AUTO-MCNC: 214 MG/DL (ref 65–100)
GLUCOSE BLD STRIP.AUTO-MCNC: 216 MG/DL (ref 65–100)
GLUCOSE BLD STRIP.AUTO-MCNC: 218 MG/DL (ref 65–100)
GLUCOSE BLD STRIP.AUTO-MCNC: 223 MG/DL (ref 65–100)
GLUCOSE BLD STRIP.AUTO-MCNC: 224 MG/DL (ref 65–100)
GLUCOSE BLD STRIP.AUTO-MCNC: 226 MG/DL (ref 65–100)
GLUCOSE BLD STRIP.AUTO-MCNC: 226 MG/DL (ref 65–100)
GLUCOSE BLD STRIP.AUTO-MCNC: 227 MG/DL (ref 65–100)
GLUCOSE BLD STRIP.AUTO-MCNC: 228 MG/DL (ref 65–100)
GLUCOSE BLD STRIP.AUTO-MCNC: 235 MG/DL (ref 65–100)
GLUCOSE BLD STRIP.AUTO-MCNC: 244 MG/DL (ref 65–100)
GLUCOSE BLD STRIP.AUTO-MCNC: 285 MG/DL (ref 65–100)
GLUCOSE SERPL-MCNC: 242 MG/DL (ref 65–100)
GLUCOSE SERPL-MCNC: 251 MG/DL (ref 65–100)
GLUCOSE SERPL-MCNC: 280 MG/DL (ref 65–100)
GLUCOSE SERPL-MCNC: 287 MG/DL (ref 65–100)
GLUCOSE, GLC: 193 MG/DL
GLUCOSE, GLC: 194 MG/DL
GLUCOSE, GLC: 204 MG/DL
GLUCOSE, GLC: 214 MG/DL
GLUCOSE, GLC: 216 MG/DL
GLUCOSE, GLC: 218 MG/DL
GLUCOSE, GLC: 223 MG/DL
GLUCOSE, GLC: 224 MG/DL
GLUCOSE, GLC: 226 MG/DL
GLUCOSE, GLC: 226 MG/DL
GLUCOSE, GLC: 227 MG/DL
GLUCOSE, GLC: 228 MG/DL
GLUCOSE, GLC: 235 MG/DL
GLUCOSE, GLC: 244 MG/DL
GLUCOSE, GLC: 285 MG/DL
HCT VFR BLD AUTO: 47.2 % (ref 36.6–50.3)
HGB BLD-MCNC: 15.5 G/DL (ref 12.1–17)
HIGH TARGET, HITG: 300 MG/DL
IMM GRANULOCYTES # BLD: 0.2 K/UL (ref 0–0.04)
IMM GRANULOCYTES NFR BLD AUTO: 1 % (ref 0–0.5)
INSULIN ADMINSTERED, INSADM: 10.1 UNITS/HOUR
INSULIN ADMINSTERED, INSADM: 10.7 UNITS/HOUR
INSULIN ADMINSTERED, INSADM: 11.2 UNITS/HOUR
INSULIN ADMINSTERED, INSADM: 11.5 UNITS/HOUR
INSULIN ADMINSTERED, INSADM: 13 UNITS/HOUR
INSULIN ADMINSTERED, INSADM: 13.4 UNITS/HOUR
INSULIN ADMINSTERED, INSADM: 15.4 UNITS/HOUR
INSULIN ADMINSTERED, INSADM: 15.6 UNITS/HOUR
INSULIN ADMINSTERED, INSADM: 16.4 UNITS/HOUR
INSULIN ADMINSTERED, INSADM: 16.6 UNITS/HOUR
INSULIN ADMINSTERED, INSADM: 16.6 UNITS/HOUR
INSULIN ADMINSTERED, INSADM: 16.7 UNITS/HOUR
INSULIN ADMINSTERED, INSADM: 18.4 UNITS/HOUR
INSULIN ADMINSTERED, INSADM: 22.5 UNITS/HOUR
INSULIN ADMINSTERED, INSADM: 8.5 UNITS/HOUR
INSULIN ORDER, INSORD: 10.1 UNITS/HOUR
INSULIN ORDER, INSORD: 10.7 UNITS/HOUR
INSULIN ORDER, INSORD: 11.2 UNITS/HOUR
INSULIN ORDER, INSORD: 11.5 UNITS/HOUR
INSULIN ORDER, INSORD: 13 UNITS/HOUR
INSULIN ORDER, INSORD: 13.4 UNITS/HOUR
INSULIN ORDER, INSORD: 15.4 UNITS/HOUR
INSULIN ORDER, INSORD: 15.6 UNITS/HOUR
INSULIN ORDER, INSORD: 16.4 UNITS/HOUR
INSULIN ORDER, INSORD: 16.6 UNITS/HOUR
INSULIN ORDER, INSORD: 16.6 UNITS/HOUR
INSULIN ORDER, INSORD: 16.7 UNITS/HOUR
INSULIN ORDER, INSORD: 18.4 UNITS/HOUR
INSULIN ORDER, INSORD: 22.5 UNITS/HOUR
INSULIN ORDER, INSORD: 8.5 UNITS/HOUR
LOW TARGET, LOT: 200 MG/DL
LYMPHOCYTES # BLD: 1.8 K/UL (ref 0.8–3.5)
LYMPHOCYTES NFR BLD: 10 % (ref 12–49)
MAGNESIUM SERPL-MCNC: 3 MG/DL (ref 1.6–2.4)
MAGNESIUM SERPL-MCNC: 3.5 MG/DL (ref 1.6–2.4)
MCH RBC QN AUTO: 32.6 PG (ref 26–34)
MCHC RBC AUTO-ENTMCNC: 32.8 G/DL (ref 30–36.5)
MCV RBC AUTO: 99.4 FL (ref 80–99)
MINUTES UNTIL NEXT BG, NBG: 120 MIN
MINUTES UNTIL NEXT BG, NBG: 60 MIN
MONOCYTES # BLD: 1.1 K/UL (ref 0–1)
MONOCYTES NFR BLD: 6 % (ref 5–13)
MULTIPLIER, MUL: 0.06
MULTIPLIER, MUL: 0.08
MULTIPLIER, MUL: 0.1
NEUTS SEG # BLD: 15.5 K/UL (ref 1.8–8)
NEUTS SEG NFR BLD: 83 % (ref 32–75)
NRBC # BLD: 0 K/UL (ref 0–0.01)
NRBC BLD-RTO: 0 PER 100 WBC
ORDER INITIALS, ORDINIT: NORMAL
PHOSPHATE SERPL-MCNC: 2 MG/DL (ref 2.6–4.7)
PHOSPHATE SERPL-MCNC: 2.2 MG/DL (ref 2.6–4.7)
PHOSPHATE SERPL-MCNC: 5.5 MG/DL (ref 2.6–4.7)
PLATELET # BLD AUTO: 156 K/UL (ref 150–400)
PMV BLD AUTO: 12.3 FL (ref 8.9–12.9)
POTASSIUM SERPL-SCNC: 3.5 MMOL/L (ref 3.5–5.1)
POTASSIUM SERPL-SCNC: 3.5 MMOL/L (ref 3.5–5.1)
POTASSIUM SERPL-SCNC: 4.3 MMOL/L (ref 3.5–5.1)
POTASSIUM SERPL-SCNC: 5.3 MMOL/L (ref 3.5–5.1)
PROT SERPL-MCNC: 6.8 G/DL (ref 6.4–8.2)
PROT UR-MCNC: 629 MG/DL (ref 0–11.9)
PROT/CREAT UR-RTO: 1.3
RBC # BLD AUTO: 4.75 M/UL (ref 4.1–5.7)
SERVICE CMNT-IMP: ABNORMAL
SODIUM SERPL-SCNC: 160 MMOL/L (ref 136–145)
SODIUM SERPL-SCNC: 162 MMOL/L (ref 136–145)
SODIUM SERPL-SCNC: 163 MMOL/L (ref 136–145)
SODIUM SERPL-SCNC: 165 MMOL/L (ref 136–145)
WBC # BLD AUTO: 18.6 K/UL (ref 4.1–11.1)

## 2018-04-13 PROCEDURE — 85025 COMPLETE CBC W/AUTO DIFF WBC: CPT | Performed by: INTERNAL MEDICINE

## 2018-04-13 PROCEDURE — 36592 COLLECT BLOOD FROM PICC: CPT

## 2018-04-13 PROCEDURE — 74011250636 HC RX REV CODE- 250/636: Performed by: INTERNAL MEDICINE

## 2018-04-13 PROCEDURE — 71045 X-RAY EXAM CHEST 1 VIEW: CPT

## 2018-04-13 PROCEDURE — 36415 COLL VENOUS BLD VENIPUNCTURE: CPT | Performed by: INTERNAL MEDICINE

## 2018-04-13 PROCEDURE — 74011000258 HC RX REV CODE- 258: Performed by: INTERNAL MEDICINE

## 2018-04-13 PROCEDURE — 77030018786 HC NDL GD F/USND BARD -B

## 2018-04-13 PROCEDURE — 74011250637 HC RX REV CODE- 250/637: Performed by: INTERNAL MEDICINE

## 2018-04-13 PROCEDURE — 76937 US GUIDE VASCULAR ACCESS: CPT

## 2018-04-13 PROCEDURE — 74011636637 HC RX REV CODE- 636/637: Performed by: INTERNAL MEDICINE

## 2018-04-13 PROCEDURE — 83735 ASSAY OF MAGNESIUM: CPT | Performed by: INTERNAL MEDICINE

## 2018-04-13 PROCEDURE — 80048 BASIC METABOLIC PNL TOTAL CA: CPT | Performed by: INTERNAL MEDICINE

## 2018-04-13 PROCEDURE — 77030018719 HC DRSG PTCH ANTIMIC J&J -A

## 2018-04-13 PROCEDURE — 65610000006 HC RM INTENSIVE CARE

## 2018-04-13 PROCEDURE — 84156 ASSAY OF PROTEIN URINE: CPT | Performed by: INTERNAL MEDICINE

## 2018-04-13 PROCEDURE — 80053 COMPREHEN METABOLIC PANEL: CPT | Performed by: INTERNAL MEDICINE

## 2018-04-13 PROCEDURE — C1751 CATH, INF, PER/CENT/MIDLINE: HCPCS

## 2018-04-13 PROCEDURE — 84100 ASSAY OF PHOSPHORUS: CPT | Performed by: INTERNAL MEDICINE

## 2018-04-13 PROCEDURE — 82962 GLUCOSE BLOOD TEST: CPT

## 2018-04-13 RX ORDER — DEXTROSE MONOHYDRATE 50 MG/ML
250 INJECTION, SOLUTION INTRAVENOUS
Status: COMPLETED | OUTPATIENT
Start: 2018-04-13 | End: 2018-04-13

## 2018-04-13 RX ORDER — DEXTROSE MONOHYDRATE 50 MG/ML
50 INJECTION, SOLUTION INTRAVENOUS CONTINUOUS
Status: DISCONTINUED | OUTPATIENT
Start: 2018-04-13 | End: 2018-04-14

## 2018-04-13 RX ORDER — SODIUM,POTASSIUM PHOSPHATES 280-250MG
2 POWDER IN PACKET (EA) ORAL ONCE
Status: COMPLETED | OUTPATIENT
Start: 2018-04-13 | End: 2018-04-13

## 2018-04-13 RX ORDER — SODIUM CHLORIDE 0.9 % (FLUSH) 0.9 %
10 SYRINGE (ML) INJECTION EVERY 8 HOURS
Status: DISCONTINUED | OUTPATIENT
Start: 2018-04-13 | End: 2018-04-30 | Stop reason: HOSPADM

## 2018-04-13 RX ORDER — HEPARIN SODIUM 5000 [USP'U]/ML
5000 INJECTION, SOLUTION INTRAVENOUS; SUBCUTANEOUS EVERY 12 HOURS
Status: DISPENSED | OUTPATIENT
Start: 2018-04-13 | End: 2018-04-21

## 2018-04-13 RX ORDER — SODIUM CHLORIDE 0.9 % (FLUSH) 0.9 %
10-40 SYRINGE (ML) INJECTION AS NEEDED
Status: DISCONTINUED | OUTPATIENT
Start: 2018-04-13 | End: 2018-04-30 | Stop reason: HOSPADM

## 2018-04-13 RX ADMIN — CEFEPIME HYDROCHLORIDE 1 G: 1 INJECTION, POWDER, FOR SOLUTION INTRAMUSCULAR; INTRAVENOUS at 11:23

## 2018-04-13 RX ADMIN — HEPARIN SODIUM 5000 UNITS: 5000 INJECTION, SOLUTION INTRAVENOUS; SUBCUTANEOUS at 01:20

## 2018-04-13 RX ADMIN — Medication 10 ML: at 21:55

## 2018-04-13 RX ADMIN — Medication 10 ML: at 06:12

## 2018-04-13 RX ADMIN — Medication 10 ML: at 14:16

## 2018-04-13 RX ADMIN — NYSTATIN: 100000 POWDER TOPICAL at 18:28

## 2018-04-13 RX ADMIN — PIPERACILLIN SODIUM,TAZOBACTAM SODIUM 3.38 G: 3; .375 INJECTION, POWDER, FOR SOLUTION INTRAVENOUS at 08:45

## 2018-04-13 RX ADMIN — DEXTROSE MONOHYDRATE AND SODIUM CHLORIDE 200 ML/HR: 5; .45 INJECTION, SOLUTION INTRAVENOUS at 04:07

## 2018-04-13 RX ADMIN — POTASSIUM & SODIUM PHOSPHATES POWDER PACK 280-160-250 MG 2 PACKET: 280-160-250 PACK at 11:55

## 2018-04-13 RX ADMIN — MUPIROCIN: 20 OINTMENT TOPICAL at 08:27

## 2018-04-13 RX ADMIN — DEXTROSE MONOHYDRATE 100 ML/HR: 5 INJECTION, SOLUTION INTRAVENOUS at 08:59

## 2018-04-13 RX ADMIN — DEXTROSE MONOHYDRATE 250 ML/HR: 5 INJECTION, SOLUTION INTRAVENOUS at 12:14

## 2018-04-13 RX ADMIN — HEPARIN SODIUM 5000 UNITS: 5000 INJECTION, SOLUTION INTRAVENOUS; SUBCUTANEOUS at 21:07

## 2018-04-13 RX ADMIN — HEPARIN SODIUM 5000 UNITS: 5000 INJECTION, SOLUTION INTRAVENOUS; SUBCUTANEOUS at 08:38

## 2018-04-13 RX ADMIN — DEXTROSE MONOHYDRATE 100 ML/HR: 5 INJECTION, SOLUTION INTRAVENOUS at 15:28

## 2018-04-13 RX ADMIN — Medication 10 ML: at 15:07

## 2018-04-13 RX ADMIN — PIPERACILLIN SODIUM,TAZOBACTAM SODIUM 3.38 G: 3; .375 INJECTION, POWDER, FOR SOLUTION INTRAVENOUS at 01:14

## 2018-04-13 RX ADMIN — MUPIROCIN: 20 OINTMENT TOPICAL at 21:06

## 2018-04-13 RX ADMIN — SODIUM CHLORIDE: 900 INJECTION, SOLUTION INTRAVENOUS at 15:02

## 2018-04-13 RX ADMIN — CEFEPIME HYDROCHLORIDE 1 G: 1 INJECTION, POWDER, FOR SOLUTION INTRAMUSCULAR; INTRAVENOUS at 23:34

## 2018-04-13 NOTE — PROGRESS NOTES
Called re: high NA. Chart reviewed. Status reviewed w/nursing. His corrected sodium was over 162 on admit. Now that his BS is coming down, his true sodium level is demonstrated on his BMP. He is receiving significant free water in his IV now. His creat is improving. Extremely low phosphate is being replenished. His BS is being corrected by protocol. He remains critically ill. Discussed by phone w/Dr. Jakub Aldana.

## 2018-04-13 NOTE — CONSULTS
[facility] [facility] Consult Note        NAME: Karolyn Hatchet       :  1975       MRN:  [id number]     Date/Time: 2018    Risk of deterioration: medium       Assessment:    Plan: LITTLE due to  Hyperosmolar coma/uncontrolled DMII  Obesity  HTN  Sepsis Creatinine was 1.1 in -now up to 4.6 --intially had some improvement in his renal function but has developed a sig free water deficit-have asked pharmacy to place insulin gtt in d5w if able, encourage free water intake and changed base ivf to d5w  Consider imaging study if renal function not improving with improvement in free water deficit-he remains hyperosmolar, gap closed  a1c 12.8  Will check urine pro/creat ratio  Likely has underlying diabetic nephropathy  OK with PICC--needs access for labs/meds/etc   Asked to see for little-pt admitted earlier in week with a sugar > 1500-a1c 12.8 and noted to be in little-with previous preserved renal function. Of note, mother is in icu as a patient as well    Subjective:     Chief Complaint:  Unable to give,difficult to understand    Review of Systems: Patient was not able to provide review of systems due to mental status change/acute illness    Objective:     VITALS:   Last 24hrs VS reviewed since prior progress note.  Most recent are:  Visit Vitals    /72    Pulse (!) 108    Temp 98.5 °F (36.9 °C)    Resp 28    Ht 5' 9\" (1.753 m)    Wt 122.5 kg (270 lb)    SpO2 100%    BMI 39.87 kg/m2     SpO2 Readings from Last 6 Encounters:   18 100%   17 95%   17 96%   17 98%   17 96%   17 96%    O2 Flow Rate (L/min): 4 l/min     Intake/Output Summary (Last 24 hours) at 18 1137  Last data filed at 18 1100   Gross per 24 hour   Intake          9272.18 ml   Output              801 ml   Net          8471.18 ml        Telemetry Reviewed      PHYSICAL EXAM:    General   well developed, well nourished, appears stated age, in no acute distress  EENT  Ncat, eomi, no nystagmus  Respiratory   Clear anteriorly  Cardiology  RRR  Extremities , trace edema  Skin  Normal skin turgor.   No rashes or skin ulcers noted and (+) tattoos              Lab Data Reviewed: (see below)    Medications Reviewed: (see below)    PMH/SH reviewed - no change compared to H&P  ________________________________________  ___________________________________________________    Attending Physician: Arcenio Schwarz MD     ____________________________________________________  MEDICATIONS:  Current Facility-Administered Medications   Medication Dose Route Frequency    heparin (porcine) injection 5,000 Units  5,000 Units SubCUTAneous Q12H    dextrose 5% infusion  100 mL/hr IntraVENous CONTINUOUS    cefepime (MAXIPIME) 1 g in 0.9% sodium chloride (MBP/ADV) 50 mL  1 g IntraVENous Q12H    sodium chloride (NS) flush 10-40 mL  10-40 mL IntraVENous PRN    sodium chloride (NS) flush 10 mL  10 mL IntraVENous Q8H    [START ON 4/14/2018] insulin regular (NOVOLIN R, HUMULIN R) 100 Units in 0.9% sodium chloride 100 mL infusion  0-30 Units/hr IntraVENous TITRATE    glucose chewable tablet 16 g  4 Tab Oral PRN    dextrose (D50W) injection syrg 12.5-25 g  12.5-25 g IntraVENous PRN    glucagon (GLUCAGEN) injection 1 mg  1 mg IntraMUSCular PRN    nitroglycerin (NITROBID) 2 % ointment 1 Inch  1 Inch Topical Q6H PRN    sodium chloride (NS) flush 5-10 mL  5-10 mL IntraVENous Q8H    sodium chloride (NS) flush 5-10 mL  5-10 mL IntraVENous PRN    acetaminophen (TYLENOL) tablet 650 mg  650 mg Oral Q6H PRN    ondansetron (ZOFRAN) injection 4 mg  4 mg IntraVENous Q4H PRN    insulin regular 500 Units in 0.9% sodium chloride 500 mL IVPB   IntraVENous TITRATE    nystatin (MYCOSTATIN) 100,000 unit/gram powder   Topical BID    mupirocin (BACTROBAN) 2 % ointment   Both Nostrils Q12H        LABS:  Recent Labs      04/13/18   0358  04/11/18   2215   WBC  18.6*  14.3*   HGB  15.5  17.8*   HCT  47.2  64.1*   PLT  156  231     Recent Labs      04/13/18   1035 04/13/18   0358  04/12/18   2309   04/12/18   1450   NA  163*  162*  165*   < >  159*   K  3.5  5.3*  3.5   < >  3.7   CL  130*  131*  130*   < >  127*   CO2  23  20*  26   < >  23   BUN  63*  61*  67*   < >  60*   CREA  4.64*  3.92*  4.02*   < >  3.71*   GLU  280*  242*  251*   < >  389*   CA  8.0*  8.3*  8.4*   < >  8.2*   MG  3.0*   --   3.5*   --   3.8*   PHOS  2.2*  5.5*  2.0*   --   0.7*    < > = values in this interval not displayed. Recent Labs      04/13/18 0358 04/12/18   0044   SGOT  715*  14*   ALT  165*  32   AP  86  105   TBILI  0.5  0.7   TP  6.8  7.7   ALB  2.6*  3.5   GLOB  4.2*  4.2*     No results for input(s): INR, PTP, APTT in the last 72 hours. No lab exists for component: INREXT   No results for input(s): FE, TIBC, PSAT, FERR in the last 72 hours. No results for input(s): PH, PCO2, PO2 in the last 72 hours. No results for input(s): CPK, CKNDX, TROIQ in the last 72 hours.     No lab exists for component: CPKMB  Lab Results   Component Value Date/Time    Glucose (POC) 244 (H) 04/13/2018 09:17 AM    Glucose (POC) 216 (H) 04/13/2018 07:03 AM    Glucose (POC) 224 (H) 04/13/2018 05:05 AM    Glucose (POC) 226 (H) 04/13/2018 03:08 AM    Glucose (POC) 214 (H) 04/13/2018 01:12 AM     c

## 2018-04-13 NOTE — PROGRESS NOTES
[facility] Consult Service Progress NOTE  Pulmonary, Critical Care, and Sleep Medicine    Name: Opal Sanders MRN: [id number]   : 1975 Hospital: [facility]   Date: 2018  Admission Date: 2018       IMPRESSION:   1. Hospital day 2  2. Bita Allen was called this am, Seemed to have acute delirium, Possible Encephalopathy due to his acute Metabolic Encephalopathy. 3. DKA  4. Hypoxia- borderline 89% room air, seems better today. 5. Sepsis with leukocytosis and tachycardia with lactic acidosis   6. Right middle lobe PNA possible aspiration? Has also been visiting his mother here in [facility] CCU, so exposed to her infections, will monitor Cx and Will follow his CXR  7. Hypernatremia from fluid resuscitation, Still hypernatremic. 8. hypocalcemia  9. Acute Encephalopathy from metabolic derangements. 10. HTN  11. Morbid obesity  12. Severe hypophosphatemia  13. erytrhocytosis- wonder about chronic hypoxemia or hemoconcentration  14. Multiorgan dysfunction as outlined above  15. Additional workup outlined below  16. Occasional Etoh on the Weekends. 17. Nonsmoker  18. Discussed with his Duglas Hoop: [phone]  91. Remains critically ill and at high risk of sudden decline and decompensation with life threatening consequenses and continued end organ dysfunction and failure. Due to DKA, Hypernatremia, Severe Delirium. 35 min CC, EOPI have provided   critical care time rendering care exclusive of any procedures. During this entire length of time I was immediately available to the patient. RECOMMENDATIONS/PLAN:   1. CCU monitoring  2. Empiric abx- agree with IV zosyn for now, will   3. Supplemental oxygen to keep sats > 93%  4. repalce electrolytes but need to watch creatinine and K closely  5. Follow serial sodium levels, correcting with free water, Dextrose containing infusions.  adjust IVF  6. Labs to follow electrolytes, renal function and and blood counts  7. Patient requiring restraints due to threat of injury to self, interference with medical devices. 8. Pt needs IV fluids with additives and Drug therapy requiring intensive monitoring for toxicity  9. Prescription drug management with home med reconciliation reviewed  10. DVT, SUP prophylaxis  11. Will be available to assist in medical management while in the CCU pending disposition       My assessment/management discussed with: Nursing for coordination of care         The reason for providing this level of medical care was due to a critical illness that impaired one or more vital organ systems, such that there was a high probability of imminent or life threatening deterioration in the patient's condition. Pt's condition is unstable and unpredictable. Risk of deterioration: high   [x] High complexity decision making was performed  [x] See my orders for details  ICU disposition :Unchanged. Chart and notes reviewed. Data reviewed. Pt seen on rounds earlier today. I have evaluated and examined the patient. Pt is unstable and acutely ill in the CCU. Hospital Day: 3  Last 24 hrs:Remains in a somewhat delirious and agitated state. He demands a room next to his mother. Wants a bigger TV in his room. Code Hunter was called this am.  He was able to get back into his room  He denies chest pain, back pain, abdominal pain or leg pain. Events from last night discussed with his nurse. I was asked by Kiet Gann MD to see Berneta Eisenmenger a 36 y.o.  male  in consultation for a chief complaint of DKA, encephalopathy    The patient is unable to give any meaningful history or review of systems due to patient factors. Excerpts from admission notes as follows:     Bravo Oconnor is a 36 y.o.  male who presents with altered mental status. As per pt's significant other she noted pt to be lethargic in the morning which gotten worse as the day progressed.  In ED pt noted somnolent and hard to arouse. Pt noted to have blood sugar of > 600 on POC BS and upon sending lab, lab didn't run it as it was noted > 1500 and lab didn't believe the value. Per bedside RN, labs were sent 3 times already. Labs now sent again and lab is requested to run despite whatever the value is. History is limited due to patient's altered level of responsiveness. \"    Pt seen in CCU. Pts mother also in CCU and he has been visiting. Low grade temp. MAR reviewed and pertinent medications noted or modified as needed   Current Facility-Administered Medications   Medication    heparin (porcine) injection 5,000 Units    dextrose 5% infusion    cefepime (MAXIPIME) 1 g in 0.9% sodium chloride (MBP/ADV) 50 mL    sodium chloride (NS) flush 10-40 mL    sodium chloride (NS) flush 10 mL    [START ON 4/14/2018] insulin regular (NOVOLIN R, HUMULIN R) 100 Units in 0.9% sodium chloride 100 mL infusion    glucose chewable tablet 16 g    dextrose (D50W) injection syrg 12.5-25 g    glucagon (GLUCAGEN) injection 1 mg    nitroglycerin (NITROBID) 2 % ointment 1 Inch    sodium chloride (NS) flush 5-10 mL    sodium chloride (NS) flush 5-10 mL    acetaminophen (TYLENOL) tablet 650 mg    ondansetron (ZOFRAN) injection 4 mg    insulin regular 500 Units in 0.9% sodium chloride 500 mL IVPB    nystatin (MYCOSTATIN) 100,000 unit/gram powder    mupirocin (BACTROBAN) 2 % ointment      PMH:  has a past medical history of Hypertension (10/31/2014); Insomnia (5/6/2013); Morbid obesity ([facility] (5/6/2013); and Type II diabetes mellitus, uncontrolled ([facility] [address] (6/30/2016). PSH:   has a past surgical history that includes hx heent. FHX: family history includes Asthma in his mother; Diabetes in his father and mother. SHX:  reports that he has never smoked. He has never used smokeless tobacco. He reports that he drinks alcohol. He reports that he uses illicit drugs, including Marijuana.      ROS:Review of systems not obtained due to patient factors. Hemodynamics:    CO:    CI:    CVP:    SVR:   PAP Systolic:    PAP Diastolic:    PVR:    HE12:        Ventilator Settings:      Mode Rate TV Press PEEP FiO2 PIP Min. Vent                            Vital Signs: Intake/Output: Intake/Output:   Visit Vitals    /75    Pulse (!) 103    Temp 99.7 °F (37.6 °C)    Resp (!) 31    Ht 5' 9\" (1.753 m)    Wt 122.5 kg (270 lb)    SpO2 96%    BMI 39.87 kg/m2    Temp (24hrs), Av.5 °F (36.9 °C), Min:98 °F (36.7 °C), Max:99.7 °F (37.6 °C)        Telemetry:    normal sinus rhythm   O2 Device: Nasal cannula  O2 Flow Rate (L/min): 4 l/min  Wt Readings from Last 4 Encounters:   18 122.5 kg (270 lb)   17 112 kg (247 lb)   17 151 kg (333 lb)   17 144.8 kg (319 lb 3.6 oz)          Intake/Output Summary (Last 24 hours) at 18 1602  Last data filed at 18 1500   Gross per 24 hour   Intake          5913.17 ml   Output              369 ml   Net          5544.17 ml     Last shift:       07 -  1900  In: 2081.2 [P.O.:440; I.V.:1641.2]  Out: 94 [Urine:94]  Last 3 shifts: 1901 -  0700  In: 8505.8 [I.V.:8505.8]  Out: 0093 [Urine:2505]     Physical Exam:    89 [facility] [employer] male; in no respiratory distress and acyanotic, acutely ill, uncooperative, combative and disoriented, A \"code atlas\" was called this am.  I saw pt at time of the code atlas and then later in his room. He was able to be calmed down. HEAD: Normocephalic, without obvious abnormality, atraumatic   EYES: conjunctivae clear. PERRL,  AN Icteric sclerae   NOSE: nares normal, no drainage, no nasal flaring,    THROAT: mucous membranes dry; Lips, mucosa dry; No Thrush; tongue midline   Neck: Supple, symmetrical, trachea midline,  No accessory mm use; No Stridor/ cuff leak, No goiter or thyroid tenderness   LYMPH: No abnormally enlarged lymph nodes. in neck or groin   Chest: normal   Lungs: Had good air movement anteriorly.     Heart: Regular rate and rhythm ; NO edema nl s1, 2; No MRG. Abdomen: soft, protuberant, distended, nontender, obese   : normal;  Grigsby, clear urine; NO gross hematuria   Extremity: negative, clubbing; no joint swelling or erythema   Neuro: somnolent; lethargic;  non verbal;  withdraws to pain; unable to check gait and station; does NOT follow simple commands   Psych: oriented to himself, he know he is in hospital,  Unable to assess; 2-3+ agitation   Skin: Skin unremarkable;    Pulses:Bilateral, Radial, 2+   Capillary refill: normal ;warm,      Tubes:   Grigsby    DATA:    Interval lab and diagnostic data was reviewed. Interval radiology images were independently viewed and available reports were reviewed. Lab results reviewed. For significant abnormal values and values requiring intervention, see assessment and plan.     MAR reviewed and pertinent medications noted or modified as needed  MEDS:   Current Facility-Administered Medications   Medication    heparin (porcine) injection 5,000 Units    dextrose 5% infusion    cefepime (MAXIPIME) 1 g in 0.9% sodium chloride (MBP/ADV) 50 mL    sodium chloride (NS) flush 10-40 mL    sodium chloride (NS) flush 10 mL    [START ON 4/14/2018] insulin regular (NOVOLIN R, HUMULIN R) 100 Units in 0.9% sodium chloride 100 mL infusion    glucose chewable tablet 16 g    dextrose (D50W) injection syrg 12.5-25 g    glucagon (GLUCAGEN) injection 1 mg    nitroglycerin (NITROBID) 2 % ointment 1 Inch    sodium chloride (NS) flush 5-10 mL    sodium chloride (NS) flush 5-10 mL    acetaminophen (TYLENOL) tablet 650 mg    ondansetron (ZOFRAN) injection 4 mg    insulin regular 500 Units in 0.9% sodium chloride 500 mL IVPB    nystatin (MYCOSTATIN) 100,000 unit/gram powder    mupirocin (BACTROBAN) 2 % ointment        Labs:    Recent Labs      04/13/18   0358  04/11/18   2215   WBC  18.6*  14.3*   HGB  15.5  17.8*   PLT  156  231     Recent Labs      04/13/18   1457  04/13/18 1035  18   0358  18   2309   18   1450  18   1334   18   0700  18   0305  18   0044   NA  160*  163*  162*  165*   < >  159*   --    < >  153*  140  137   K  4.3  3.5  5.3*  3.5   < >  3.7   --    < >  3.0*  3.3*  3.9   CL  128*  130*  131*  130*   < >  127*   --    < >  119*  103  97   CO2  23  23  20*  26   < >  23   --    < >  22  20*  18*   GLU  287*  280*  242*  251*   < >  389*   --    < >  1018*  >1500*  >1500*   BUN  65*  63*  61*  67*   < >  60*   --    < >  61*  70*  69*   CREA  5.06*  4.64*  3.92*  4.02*   < >  3.71*   --    < >  4.21*  5.19*  4.91*   CA  7.7*  8.0*  8.3*  8.4*   < >  8.2*   --    < >  8.3*  8.6  8.9   MG   --   3.0*   --   3.5*   --   3.8*   --    < >   --    --   4.2*   PHOS   --   2.2*  5.5*  2.0*   --   0.7*   --    < >   --    --   2.9   LAC   --    --    --    --    --    --   3.4*   --   4.4*  3.6*   --    ALB   --    --   2.6*   --    --    --    --    --    --    --   3.5   SGOT   --    --   715*   --    --    --    --    --    --    --   14*   ALT   --    --   165*   --    --    --    --    --    --    --   32    < > = values in this interval not displayed. No results for input(s): PH, PCO2, PO2, HCO3, FIO2 in the last 72 hours. No results for input(s): CPK, CKNDX, TROIQ in the last 72 hours. No lab exists for component: CPKMB  No results found for: BNPP, BNP   Lab Results   Component Value Date/Time    Culture result: NO GROWTH 2 DAYS 2018 10:26 PM     Lab Results   Component Value Date/Time     2013 03:30 PM     No results found for: IRON, FE, TIBC, IBCT, PSAT, FERR  Lab Results   Component Value Date/Time    RPR Non Reactive 2017 09:19 AM    TSH 1.530 2014 11:32 AM      Lab Results   Component Value Date/Time    RPR Non Reactive 2017 09:19 AM       Imagin-13-18: CXR: images and report noted. FINDINGS:   A portable AP radiograph of the chest was obtained at 0520 hours.   Lines and tubes: The patient is on a cardiac monitor and nasal oxygen. Lungs: The lungs are clear. Pleura: There is no pneumothorax or pleural effusion. Mediastinum: The cardiac and mediastinal contours and pulmonary vascularity are  normal.  Bones and soft tissues: The bones and soft tissues are grossly within normal  limits.     IMPRESSION: No acute abnormality. Results from [place] encounter on 04/11/18   XR CHEST PORT   Narrative EXAM:  XR CHEST PORT. INDICATION: Tube and line placement. COMPARISON: 4/11/2018. FINDINGS:   A portable AP radiograph of the chest was obtained at 0520 hours. Lines and tubes: The patient is on a cardiac monitor and nasal oxygen. Lungs: The lungs are clear. Pleura: There is no pneumothorax or pleural effusion. Mediastinum: The cardiac and mediastinal contours and pulmonary vascularity are  normal.  Bones and soft tissues: The bones and soft tissues are grossly within normal  limits. Impression IMPRESSION: No acute abnormality. Results from [place] encounter on 12/14/15   CT ABD PELV WO CONT   Narrative **Final Report**      ICD Codes / Adm. Diagnosis: 327.20  G47.30 / Abdominal pain, other specifie    Unspecified abdominal pain  Examination:  CT ABD PELVIS WO CON  - [id number] - Dec 14 2015  6:30PM  Accession No:  [id number]  Reason:  RUQ abdom pain; rule out cholelithiasis. L flank pain; rule out   kidney stone      REPORT:  INDICATION:  Right upper quadrant abdominal pain x2 weeks. Flank and back   pain    COMPARISON:  No old study. TECHNIQUE:    Contiguous CT images were obtained of the abdomen and pelvis without   contrast.    Coronal and sagittal reformatted images were then obtained. CT SCAN ABDOMEN/PELVIS:    The kidneys show a tiny left mid pole intrarenal nonobstructing calculus in   the hydronephrosis.     The liver shows hepatic steatosis  There are no focal abnormalities detected within the Liver, spleen, adrenal   glands, and pancreas. The abdominal aorta is normal in caliber. The gallbladder is minimally distended. The biliary ducts are not dilated. The vertebral bodies show satisfactory height and alignment. The small bowel is normal in caliber. The appendix is normal in configuration. There is no inflammatory infiltration    The prostate gland is normal in size. The urinary bladder shows a satisfactory configuration. There is no ascites. IMPRESSION:   Tiny left mid pole intrarenal nonobstructing calculus; no hydronephrosis. Hepatic steatosis. Minimally distended gallbladder. No ascites or inflammatory infiltration. Signing/Reading Doctor: Trena Fatima ([id number])    Approved: Trena Fatima ([id number])  Dec 14 2015  8:52PM                                This care involved high complexity decision making which includes independently reviewing the patient's past medical records, current laboratory results, medication profiles that were immediately available to me and actual Xray images at the bedside in order to assess, support vital system function, and to treat this degree of vital organ system failure, and to prevent further life threatening deterioration of the patients condition. I was in direct communication with the nursing staff throughout this time. I have personally and independently reviewed the patients interval lab, diagnostic data, radiographs and the reports. I have ordered additional labs to follow the current medical conditions and to monitor treatment responses over the next 24 hours or sooner if needed. I will order additional imaging to follow longitudinal changes found on the most current imaging.      Malika Schultz MD

## 2018-04-13 NOTE — PROGRESS NOTES
MIDLINE Insertion Procedure  Note:   Primary nurse requested Midline placement. Creatinine elevated. Nurse states she rceived okay  from Dr Darrin Serrato to place midline. Procedure explained to  pt  along with risks and benefits. Procedure teaching completed. Pre procedure assessment done. Maximum sterile barrier precautions observed throughout procedure. Lidocaine 1 %  3.0   ml sc injected to site prior to access the vein . Cannulated   basilic    vein using ultrasound guidance. Inserted  4   Fr. Single   lumen midline to   right    arm. Blood return verified and  flushed with 20ml normal saline  to  port. Sterile dressing applied with biopatch, statLock and occlusive dressing as per protocol. Curos cap applied to port. Patient tolerated procedure well with minimal blood loss. Reason for access : Reliable IV Access / limited vascular access     Complications related to insertion : None   This midline to be removed on or before   5/13/2018  See nursing message  for midline reminders  Inserted by : Darlene Perdomo RN. LEON. CHASE. PICC Nurse  Assisted by : Charmayne Bossier RN PICC Nurse  Total Length :  14  cm   External Length :  0     cm   Arm circumference :    43    cm  Catheter occupies   13   % of vein. Type of Midline:  B[employer] Midline  Ref#:  [id number]  Lot#:    [id number]  Expiration Date:    2019-07-31    Darlene Perdomo RN. LEON. CHASE. PICC nurse.  Vascular Access Team

## 2018-04-13 NOTE — PROGRESS NOTES
Report received from [address] [place] [address] at bedside, lab values noted. Patient oriented to place, but confused to time and events. Sometimes slurs words. Compliant with treatment at present. 0800  Patient got out of bed, disconnected IV's and monitor and began walking across unit while with other patient. Angrily stating that he wants to leave, stopped by several staff members and refused to sit in wheelchair. Demanded to go see parent. Mother critically ill in room 2510. Called brother, to come see patient, but unable to come at present. Returned to room via wheelchair after several conversations with staff and security, attempting to convince patient to allow us to care for him. Returned to room and monitoring resumed via wheelchair. After another 20 minutes, returned to bed with help of nurses. Unable to obtain blood for stat labs, Dr. Derrick Adame in rounds now and will discuss. See VS.  Insulin drip resumed while in wheelchair outside of Mother's room. 0900  Dr. Odalys Carson in, discussed with patient need for treatment. Remains confused to events. Fluids changed per verbal order initially to D5W.  1010  Dr. Odalys Carson called, to have Midline placed due to difficulty obtaining labs. PICC team called and aware. Ex-wife called and spoke to patient at 0900, now visiting at bedside. 1145  High sodium called to Dr. Derrick Adame, to call to Dr. Odalys Carson. Dr. Odalys Carson paged   To give 250 cc bolus. Urine obtained for Creatinine and sodium and sent to lab. 12  Brother at bedside, trying to help patient understand events. Remains difficult to care for as argues about where his mother is, where he is, and what we are doing to help him. Remains oriented to being in hospital, but doesn't remember being in ER or events leading to today. \"They told me my Mom was right next door. \"    1420  IV fluids changed to right hand as left hand slightly swollen. Will continue to monitor IV sites closely.   Ex wife at bedside, patient continuously talking. 1605  More friends at bedside now, no change in delirium. Dr. GERONIMO [facility] called with recent results of BMP. Dr. Liliya Lucas called with BMP results, to continue with current orders. 1720  No changes noted, appears to be sleeping now. Ex wife now leaving for the day. Metsa 35  Still sleeping, will allow to sleep for relief of delirium. 1914  Report given to [address] [place] [address] at bedside. Aware of labs due at 2300.

## 2018-04-13 NOTE — PROGRESS NOTES
INTERNAL MEDICINE ATTENDING NOTE    S: Mr. Juvencio Irizarry was seen by me today during rounds. At this time, he is in ICU, confused and somewhat belligerent--off his baseline. \"[employer]\" was called earlier because he was wandering the ICU looking for his mom. ROS otherwise unremarkable except as noted elsewhere. O: Blood pressure 99/79, pulse (!) 118, temperature 98.5 °F (36.9 °C), resp. rate 21, height 5' 9\" (1.753 m), weight 270 lb (122.5 kg), SpO2 98 %. Gen: Patient is in no acute distress. Lungs: CTAB. Heart: RRR. Abd: S, NT, ND, BS present. Extremities: Warm.     Recent Results (from the past 12 hour(s))   GLUCOSE, POC    Collection Time: 04/12/18  9:11 PM   Result Value Ref Range    Glucose (POC) 237 (H) 65 - 100 mg/dL    Performed by Roderick Renteria    Collection Time: 04/12/18  9:12 PM   Result Value Ref Range    Glucose 237 mg/dL    Insulin order 17.7 units/hour    Insulin adminstered 17.7 units/hour    Multiplier 0.100     Low target 200 mg/dL    High target 300 mg/dL    D50 order 0.0 ml    D50 administered 0.00 ml    Minutes until next  min    Order initials umang     Administered initials umang     GLSCOM Comments     METABOLIC PANEL, BASIC    Collection Time: 04/12/18 11:09 PM   Result Value Ref Range    Sodium 165 (HH) 136 - 145 mmol/L    Potassium 3.5 3.5 - 5.1 mmol/L    Chloride 130 (H) 97 - 108 mmol/L    CO2 26 21 - 32 mmol/L    Anion gap 9 5 - 15 mmol/L    Glucose 251 (H) 65 - 100 mg/dL    BUN 67 (H) 6 - 20 MG/DL    Creatinine 4.02 (H) 0.70 - 1.30 MG/DL    BUN/Creatinine ratio 17 12 - 20      GFR est AA 20 (L) >60 ml/min/1.73m2    GFR est non-AA 16 (L) >60 ml/min/1.73m2    Calcium 8.4 (L) 8.5 - 10.1 MG/DL   MAGNESIUM    Collection Time: 04/12/18 11:09 PM   Result Value Ref Range    Magnesium 3.5 (H) 1.6 - 2.4 mg/dL   PHOSPHORUS    Collection Time: 04/12/18 11:09 PM   Result Value Ref Range    Phosphorus 2.0 (L) 2.6 - 4.7 MG/DL   GLUCOSE, POC    Collection Time: 04/12/18 11:12 PM   Result Value Ref Range    Glucose (POC) 212 (H) 65 - 100 mg/dL    Performed by Norval [place]    Collection Time: 04/12/18 11:13 PM   Result Value Ref Range    Glucose 212 mg/dL    Insulin order 15.2 units/hour    Insulin adminstered 15.2 units/hour    Multiplier 0.100     Low target 200 mg/dL    High target 300 mg/dL    D50 order 0.0 ml    D50 administered 0.00 ml    Minutes until next  min    Order initials umang     Administered initials umang     GLSCOM Comments     GLUCOSE, POC    Collection Time: 04/13/18  1:12 AM   Result Value Ref Range    Glucose (POC) 214 (H) 65 - 100 mg/dL    Performed by Norval [place]    Collection Time: 04/13/18  1:12 AM   Result Value Ref Range    Glucose 214 mg/dL    Insulin order 15.4 units/hour    Insulin adminstered 15.4 units/hour    Multiplier 0.100     Low target 200 mg/dL    High target 300 mg/dL    D50 order 0.0 ml    D50 administered 0.00 ml    Minutes until next  min    Order initials umang     Administered initials umang     GLSCOM Comments     GLUCOSE, POC    Collection Time: 04/13/18  3:08 AM   Result Value Ref Range    Glucose (POC) 226 (H) 65 - 100 mg/dL    Performed by Norval Raul    Collection Time: 04/13/18  3:09 AM   Result Value Ref Range    Glucose 226 mg/dL    Insulin order 16.6 units/hour    Insulin adminstered 16.6 units/hour    Multiplier 0.100     Low target 200 mg/dL    High target 300 mg/dL    D50 order 0.0 ml    D50 administered 0.00 ml    Minutes until next  min    Order initials umang     Administered initials umang     GLSCOM Comments     METABOLIC PANEL, COMPREHENSIVE    Collection Time: 04/13/18  3:58 AM   Result Value Ref Range    Sodium 162 (HH) 136 - 145 mmol/L    Potassium 5.3 (H) 3.5 - 5.1 mmol/L    Chloride 131 (H) 97 - 108 mmol/L    CO2 20 (L) 21 - 32 mmol/L    Anion gap 11 5 - 15 mmol/L    Glucose 242 (H) 65 - 100 mg/dL    BUN 61 (H) 6 - 20 MG/DL    Creatinine 3.92 (H) 0.70 - 1.30 MG/DL    BUN/Creatinine ratio 16 12 - 20      GFR est AA 20 (L) >60 ml/min/1.73m2    GFR est non-AA 17 (L) >60 ml/min/1.73m2    Calcium 8.3 (L) 8.5 - 10.1 MG/DL    Bilirubin, total 0.5 0.2 - 1.0 MG/DL    ALT (SGPT) 165 (H) 12 - 78 U/L    AST (SGOT) 715 (H) 15 - 37 U/L    Alk. phosphatase 86 45 - 117 U/L    Protein, total 6.8 6.4 - 8.2 g/dL    Albumin 2.6 (L) 3.5 - 5.0 g/dL    Globulin 4.2 (H) 2.0 - 4.0 g/dL    A-G Ratio 0.6 (L) 1.1 - 2.2     CBC WITH AUTOMATED DIFF    Collection Time: 04/13/18  3:58 AM   Result Value Ref Range    WBC 18.6 (H) 4.1 - 11.1 K/uL    RBC 4.75 4.10 - 5.70 M/uL    HGB 15.5 12.1 - 17.0 g/dL    HCT 47.2 36.6 - 50.3 %    MCV 99.4 (H) 80.0 - 99.0 FL    MCH 32.6 26.0 - 34.0 PG    MCHC 32.8 30.0 - 36.5 g/dL    RDW 11.8 11.5 - 14.5 %    PLATELET 544 957 - 226 K/uL    MPV 12.3 8.9 - 12.9 FL    NRBC 0.0 0  WBC    ABSOLUTE NRBC 0.00 0.00 - 0.01 K/uL    NEUTROPHILS 83 (H) 32 - 75 %    LYMPHOCYTES 10 (L) 12 - 49 %    MONOCYTES 6 5 - 13 %    EOSINOPHILS 0 0 - 7 %    BASOPHILS 0 0 - 1 %    IMMATURE GRANULOCYTES 1 (H) 0.0 - 0.5 %    ABS. NEUTROPHILS 15.5 (H) 1.8 - 8.0 K/UL    ABS. LYMPHOCYTES 1.8 0.8 - 3.5 K/UL    ABS. MONOCYTES 1.1 (H) 0.0 - 1.0 K/UL    ABS. EOSINOPHILS 0.0 0.0 - 0.4 K/UL    ABS. BASOPHILS 0.0 0.0 - 0.1 K/UL    ABS. IMM.  GRANS. 0.2 (H) 0.00 - 0.04 K/UL    DF AUTOMATED     PHOSPHORUS    Collection Time: 04/13/18  3:58 AM   Result Value Ref Range    Phosphorus 5.5 (H) 2.6 - 4.7 MG/DL   GLUCOSE, POC    Collection Time: 04/13/18  5:05 AM   Result Value Ref Range    Glucose (POC) 224 (H) 65 - 100 mg/dL    Performed by Pina Guerrero    Collection Time: 04/13/18  5:05 AM   Result Value Ref Range    Glucose 224 mg/dL    Insulin order 16.4 units/hour    Insulin adminstered 16.4 units/hour    Multiplier 0.100     Low target 200 mg/dL    High target 300 mg/dL    D50 order 0.0 ml    D50 administered 0.00 ml    Minutes until next  min    Order initials umang Administered initials umang     GLSCOM Comments     GLUCOSE, POC    Collection Time: 04/13/18  7:03 AM   Result Value Ref Range    Glucose (POC) 216 (H) 65 - 100 mg/dL    Performed by Fab Chapin    Collection Time: 04/13/18  7:04 AM   Result Value Ref Range    Glucose 216 mg/dL    Insulin order 15.6 units/hour    Insulin adminstered 15.6 units/hour    Multiplier 0.100     Low target 200 mg/dL    High target 300 mg/dL    D50 order 0.0 ml    D50 administered 0.00 ml    Minutes until next  min    Order initials umang     Administered initials umang     GLSCOM Comments          A/P:   1. Hyperosmolar coma due to secondary diabetes ([facility] [address] (4/12/2018): IVF and insulin. Glc is coming down. Continue protocol. 2. Hypernatremia: hydration; watch labs. 3. Sepsis due to aspiration pneumonia, POA:   4. Acute encephalopathy (4/12/2018): Improving. He is now awake, but paranoid and belligerent. 5. Hyperlipidemia (5/1/2012)  6. Morbid obesity ([facility] [address] (5/6/2013)  7. Hypertension (10/31/2014):       Ranjan Fung MD, FACP  Best contact is via Pager: [phone], or via hospital  at [phone]

## 2018-04-13 NOTE — DIABETES MGMT
DTC Progress Note    Recommendations/ Comments:Pt reviewed in CCU rounds with rounding team and Dr. Liliya Lucas. Plan to continue insulin gtt at this time. Pt continues to require high doses of insulin- currently 15-16units/hr avg. D5 IVF decreased from 200ml/hr to 100ml/hr. Na 162. BG stable mid 200s. Target range decreased to 140-180 to gain better BG control. Pt remains confused and inappropriate to see for elevated a1c. Current hospital DM medication: insulin gtt    Chart reviewed on [address] for hyperglycemia. Patient is a 36 y.o. male with known history of Type 2 Diabetes on Glimepiride 4mg every morning, Lantus 25 units nightly, and Metformin ER 750mg - 2 tabs daily at home. A1c:   Lab Results   Component Value Date/Time    Hemoglobin A1c 12.8 (H) 04/12/2018 12:44 AM    Hemoglobin A1c 5.6 12/05/2017 10:15 AM       Recent Glucose Results:   Lab Results   Component Value Date/Time     (H) 04/13/2018 03:58 AM     (H) 04/12/2018 11:09 PM     (H) 04/12/2018 06:44 PM    GLUCPOC 244 (H) 04/13/2018 09:17 AM    GLUCPOC 216 (H) 04/13/2018 07:03 AM    GLUCPOC 224 (H) 04/13/2018 05:05 AM        Lab Results   Component Value Date/Time    Creatinine 3.92 (H) 04/13/2018 03:58 AM     Estimated Creatinine Clearance: 31.4 mL/min (based on Cr of 3.92). Active Orders   Diet    DIET NPO        PO intake: No data found. Will continue to follow as needed.     Thank you  Evelyn Patel, NICON, RN, [address]

## 2018-04-13 NOTE — PROGRESS NOTES
2025 Critical Sodium level of 164 called to Dr. Stephanie Crum. 2057 Dr. Brown Part consulted Dr. Maricruz Church regarding elevated Na level. No new orders received at this time. Continue Dextrose 5% - 0.45% NaCl @ 200 ml/hr. 5046 Critical Sodium level of 165. Expected result. No new orders. 0615 Uneventful night. Remains on insulin gtt and D 5%-0.45% NaCl at 200 ml/hr. Urinary output 235 ml over last 12 hrs and is still brown in color. Oriented x 4 at times.  Forgets why he is in the hospital.

## 2018-04-13 NOTE — INTERDISCIPLINARY ROUNDS
Interdisciplinary team rounds were held 4/13/2018 with the following team members:Care Management, Diabetes Treatment Specialist, Nursing, Nutrition, Pharmacy and Physician. Plan of care discussed. See clinical pathway and/or care plan for interventions and desired outcomes.

## 2018-04-13 NOTE — PROGRESS NOTES
Pharmacy Automatic Renal Dosing Protocol - Antimicrobials    Indication for Antimicrobials: aspiration pneumonia     Current Regimen of Each Antimicrobial:  Cefepime 1g IV q 12hr (start date: , Day #1/5)    Previous Antimicrobial Therapy:  Levofloxacin 750 mg IV q 24 hours : lot: 1 day  Metronidazole 500 mg IV q 8 hours : lot: 1 day  Pip-tazo IV 3.375 g IV q 8 hours (Start Date ; Day # 2)    Significant Cultures:   : blood: no growth x 2 days-PRELIM    Radiology / Imaging results: (X-ray, CT scan or MRI):   : XR Chest Port: Impression: Minimal right middle lobe infiltrate. Paralysis, amputations, malnutrition: N/A    Labs:  Recent Labs      18   0358  18   2309  18   1844   18   2215   CREA  3.92*  4.02*  3.92*   < >   --    BUN  61*  67*  65*   < >   --    WBC  18.6*   --    --    --   14.3*    < > = values in this interval not displayed. Temp (24hrs), Av.7 °F (37.1 °C), Min:98 °F (36.7 °C), Max:100.1 °F (37.8 °C)      Creatinine Clearance (mL/min) or Dialysis: 26 mL/min      Impression/Plan:   · Pt in [facility] ([facility])  · Zosyn changed to cefepime 1g IV q 12hr based on indication and renal function. Also to decrease the amount of normal saline that the patient is getting. · Antimicrobial stop date 5 d ays  · BMP currently ordered q 4 hour due to hyperosmolar hyperglycemia     Pharmacy will follow daily and adjust medications as appropriate for renal function and/or serum levels. Thank you,  ELLIOTT Bear          Recommended duration of therapy  [email or web address]    Renal Dosing  [email or web address]

## 2018-04-14 ENCOUNTER — APPOINTMENT (OUTPATIENT)
Dept: ULTRASOUND IMAGING | Age: 43
DRG: 871 | End: 2018-04-14
Attending: INTERNAL MEDICINE
Payer: COMMERCIAL

## 2018-04-14 LAB
ADMINISTERED INITIALS, ADMINIT: NORMAL
ANION GAP SERPL CALC-SCNC: 11 MMOL/L (ref 5–15)
ANION GAP SERPL CALC-SCNC: 14 MMOL/L (ref 5–15)
BUN SERPL-MCNC: 73 MG/DL (ref 6–20)
BUN SERPL-MCNC: 74 MG/DL (ref 6–20)
BUN/CREAT SERPL: 12 (ref 12–20)
BUN/CREAT SERPL: 13 (ref 12–20)
CALCIUM SERPL-MCNC: 7.4 MG/DL (ref 8.5–10.1)
CALCIUM SERPL-MCNC: 7.6 MG/DL (ref 8.5–10.1)
CHLORIDE SERPL-SCNC: 123 MMOL/L (ref 97–108)
CHLORIDE SERPL-SCNC: 123 MMOL/L (ref 97–108)
CK SERPL-CCNC: ABNORMAL U/L (ref 39–308)
CO2 SERPL-SCNC: 20 MMOL/L (ref 21–32)
CO2 SERPL-SCNC: 21 MMOL/L (ref 21–32)
CREAT SERPL-MCNC: 5.7 MG/DL (ref 0.7–1.3)
CREAT SERPL-MCNC: 6.23 MG/DL (ref 0.7–1.3)
D50 ADMINISTERED, D50ADM: 0 ML
D50 ORDER, D50ORD: 0 ML
ERYTHROCYTE [DISTWIDTH] IN BLOOD BY AUTOMATED COUNT: 12 % (ref 11.5–14.5)
GLSCOM COMMENTS: NORMAL
GLUCOSE BLD STRIP.AUTO-MCNC: 209 MG/DL (ref 65–100)
GLUCOSE BLD STRIP.AUTO-MCNC: 236 MG/DL (ref 65–100)
GLUCOSE BLD STRIP.AUTO-MCNC: 247 MG/DL (ref 65–100)
GLUCOSE BLD STRIP.AUTO-MCNC: 249 MG/DL (ref 65–100)
GLUCOSE BLD STRIP.AUTO-MCNC: 264 MG/DL (ref 65–100)
GLUCOSE BLD STRIP.AUTO-MCNC: 265 MG/DL (ref 65–100)
GLUCOSE BLD STRIP.AUTO-MCNC: 265 MG/DL (ref 65–100)
GLUCOSE BLD STRIP.AUTO-MCNC: 268 MG/DL (ref 65–100)
GLUCOSE BLD STRIP.AUTO-MCNC: 399 MG/DL (ref 65–100)
GLUCOSE SERPL-MCNC: 243 MG/DL (ref 65–100)
GLUCOSE SERPL-MCNC: 290 MG/DL (ref 65–100)
GLUCOSE, GLC: 209 MG/DL
GLUCOSE, GLC: 236 MG/DL
GLUCOSE, GLC: 247 MG/DL
GLUCOSE, GLC: 249 MG/DL
GLUCOSE, GLC: 264 MG/DL
GLUCOSE, GLC: 265 MG/DL
GLUCOSE, GLC: 268 MG/DL
HCT VFR BLD AUTO: 42.2 % (ref 36.6–50.3)
HGB BLD-MCNC: 13.4 G/DL (ref 12.1–17)
HIGH TARGET, HITG: 300 MG/DL
INSULIN ADMINSTERED, INSADM: 11.3 UNITS/HOUR
INSULIN ADMINSTERED, INSADM: 12 UNITS/HOUR
INSULIN ADMINSTERED, INSADM: 12.1 UNITS/HOUR
INSULIN ADMINSTERED, INSADM: 13.1 UNITS/HOUR
INSULIN ADMINSTERED, INSADM: 13.1 UNITS/HOUR
INSULIN ADMINSTERED, INSADM: 13.3 UNITS/HOUR
INSULIN ADMINSTERED, INSADM: 9.5 UNITS/HOUR
INSULIN ORDER, INSORD: 11.3 UNITS/HOUR
INSULIN ORDER, INSORD: 12 UNITS/HOUR
INSULIN ORDER, INSORD: 12.1 UNITS/HOUR
INSULIN ORDER, INSORD: 13.1 UNITS/HOUR
INSULIN ORDER, INSORD: 13.1 UNITS/HOUR
INSULIN ORDER, INSORD: 13.3 UNITS/HOUR
INSULIN ORDER, INSORD: 9.5 UNITS/HOUR
LOW TARGET, LOT: 200 MG/DL
MAGNESIUM SERPL-MCNC: 2.2 MG/DL (ref 1.6–2.4)
MAGNESIUM SERPL-MCNC: 2.5 MG/DL (ref 1.6–2.4)
MCH RBC QN AUTO: 32 PG (ref 26–34)
MCHC RBC AUTO-ENTMCNC: 31.8 G/DL (ref 30–36.5)
MCV RBC AUTO: 100.7 FL (ref 80–99)
MINUTES UNTIL NEXT BG, NBG: 120 MIN
MINUTES UNTIL NEXT BG, NBG: 60 MIN
MULTIPLIER, MUL: 0.06
NRBC # BLD: 0.03 K/UL (ref 0–0.01)
NRBC BLD-RTO: 0.2 PER 100 WBC
ORDER INITIALS, ORDINIT: NORMAL
PHOSPHATE SERPL-MCNC: 3.9 MG/DL (ref 2.6–4.7)
PHOSPHATE SERPL-MCNC: 4.6 MG/DL (ref 2.6–4.7)
PLATELET # BLD AUTO: 115 K/UL (ref 150–400)
PMV BLD AUTO: 12.6 FL (ref 8.9–12.9)
POTASSIUM SERPL-SCNC: 3.5 MMOL/L (ref 3.5–5.1)
POTASSIUM SERPL-SCNC: 3.7 MMOL/L (ref 3.5–5.1)
RBC # BLD AUTO: 4.19 M/UL (ref 4.1–5.7)
SERVICE CMNT-IMP: ABNORMAL
SODIUM SERPL-SCNC: 155 MMOL/L (ref 136–145)
SODIUM SERPL-SCNC: 157 MMOL/L (ref 136–145)
URATE SERPL-MCNC: 15.6 MG/DL (ref 3.5–7.2)
WBC # BLD AUTO: 15.9 K/UL (ref 4.1–11.1)

## 2018-04-14 PROCEDURE — 85027 COMPLETE CBC AUTOMATED: CPT | Performed by: INTERNAL MEDICINE

## 2018-04-14 PROCEDURE — 36415 COLL VENOUS BLD VENIPUNCTURE: CPT | Performed by: INTERNAL MEDICINE

## 2018-04-14 PROCEDURE — 84100 ASSAY OF PHOSPHORUS: CPT | Performed by: INTERNAL MEDICINE

## 2018-04-14 PROCEDURE — 74011636637 HC RX REV CODE- 636/637: Performed by: INTERNAL MEDICINE

## 2018-04-14 PROCEDURE — 74011000250 HC RX REV CODE- 250: Performed by: INTERNAL MEDICINE

## 2018-04-14 PROCEDURE — 76770 US EXAM ABDO BACK WALL COMP: CPT

## 2018-04-14 PROCEDURE — 65660000000 HC RM CCU STEPDOWN

## 2018-04-14 PROCEDURE — 74011250636 HC RX REV CODE- 250/636: Performed by: INTERNAL MEDICINE

## 2018-04-14 PROCEDURE — 77010033678 HC OXYGEN DAILY

## 2018-04-14 PROCEDURE — 82550 ASSAY OF CK (CPK): CPT | Performed by: INTERNAL MEDICINE

## 2018-04-14 PROCEDURE — 80048 BASIC METABOLIC PNL TOTAL CA: CPT | Performed by: INTERNAL MEDICINE

## 2018-04-14 PROCEDURE — 84550 ASSAY OF BLOOD/URIC ACID: CPT | Performed by: INTERNAL MEDICINE

## 2018-04-14 PROCEDURE — 83735 ASSAY OF MAGNESIUM: CPT | Performed by: INTERNAL MEDICINE

## 2018-04-14 PROCEDURE — 82962 GLUCOSE BLOOD TEST: CPT

## 2018-04-14 RX ORDER — DEXTROSE 50 % IN WATER (D50W) INTRAVENOUS SYRINGE
12.5-25 AS NEEDED
Status: DISCONTINUED | OUTPATIENT
Start: 2018-04-14 | End: 2018-04-16 | Stop reason: SDUPTHER

## 2018-04-14 RX ORDER — INSULIN LISPRO 100 [IU]/ML
INJECTION, SOLUTION INTRAVENOUS; SUBCUTANEOUS
Status: DISCONTINUED | OUTPATIENT
Start: 2018-04-14 | End: 2018-04-15

## 2018-04-14 RX ORDER — INSULIN GLARGINE 100 [IU]/ML
20 INJECTION, SOLUTION SUBCUTANEOUS DAILY
Status: DISCONTINUED | OUTPATIENT
Start: 2018-04-14 | End: 2018-04-15

## 2018-04-14 RX ORDER — MAGNESIUM SULFATE 100 %
4 CRYSTALS MISCELLANEOUS AS NEEDED
Status: DISCONTINUED | OUTPATIENT
Start: 2018-04-14 | End: 2018-04-16 | Stop reason: SDUPTHER

## 2018-04-14 RX ADMIN — HEPARIN SODIUM 5000 UNITS: 5000 INJECTION, SOLUTION INTRAVENOUS; SUBCUTANEOUS at 21:05

## 2018-04-14 RX ADMIN — SODIUM BICARBONATE: 84 INJECTION, SOLUTION INTRAVENOUS at 17:07

## 2018-04-14 RX ADMIN — INSULIN LISPRO 5 UNITS: 100 INJECTION, SOLUTION INTRAVENOUS; SUBCUTANEOUS at 12:29

## 2018-04-14 RX ADMIN — MUPIROCIN: 20 OINTMENT TOPICAL at 09:17

## 2018-04-14 RX ADMIN — NYSTATIN: 100000 POWDER TOPICAL at 09:16

## 2018-04-14 RX ADMIN — INSULIN LISPRO 7 UNITS: 100 INJECTION, SOLUTION INTRAVENOUS; SUBCUTANEOUS at 17:03

## 2018-04-14 RX ADMIN — Medication 10 ML: at 12:30

## 2018-04-14 RX ADMIN — MUPIROCIN: 20 OINTMENT TOPICAL at 21:07

## 2018-04-14 RX ADMIN — Medication 10 ML: at 05:37

## 2018-04-14 RX ADMIN — NYSTATIN: 100000 POWDER TOPICAL at 18:00

## 2018-04-14 RX ADMIN — DEXTROSE MONOHYDRATE 50 ML/HR: 5 INJECTION, SOLUTION INTRAVENOUS at 11:23

## 2018-04-14 RX ADMIN — HEPARIN SODIUM 5000 UNITS: 5000 INJECTION, SOLUTION INTRAVENOUS; SUBCUTANEOUS at 09:27

## 2018-04-14 RX ADMIN — DEXTROSE MONOHYDRATE 100 ML/HR: 5 INJECTION, SOLUTION INTRAVENOUS at 01:38

## 2018-04-14 RX ADMIN — INSULIN GLARGINE 20 UNITS: 100 INJECTION, SOLUTION SUBCUTANEOUS at 10:03

## 2018-04-14 NOTE — PROGRESS NOTES
General Daily Progress Note    Admit Date: 4/11/2018  Hospital day 4    Subjective:     Patient has abdominal discomfort. .   Medication side effects: none    Current Facility-Administered Medications   Medication Dose Route Frequency    heparin (porcine) injection 5,000 Units  5,000 Units SubCUTAneous Q12H    dextrose 5% infusion  100 mL/hr IntraVENous CONTINUOUS    cefepime (MAXIPIME) 1 g in 0.9% sodium chloride (MBP/ADV) 50 mL  1 g IntraVENous Q12H    sodium chloride (NS) flush 10-40 mL  10-40 mL IntraVENous PRN    sodium chloride (NS) flush 10 mL  10 mL IntraVENous Q8H    insulin regular (NOVOLIN R, HUMULIN R) 100 Units in 0.9% sodium chloride 100 mL infusion  0-30 Units/hr IntraVENous TITRATE    glucose chewable tablet 16 g  4 Tab Oral PRN    dextrose (D50W) injection syrg 12.5-25 g  12.5-25 g IntraVENous PRN    glucagon (GLUCAGEN) injection 1 mg  1 mg IntraMUSCular PRN    nitroglycerin (NITROBID) 2 % ointment 1 Inch  1 Inch Topical Q6H PRN    sodium chloride (NS) flush 5-10 mL  5-10 mL IntraVENous Q8H    sodium chloride (NS) flush 5-10 mL  5-10 mL IntraVENous PRN    acetaminophen (TYLENOL) tablet 650 mg  650 mg Oral Q6H PRN    ondansetron (ZOFRAN) injection 4 mg  4 mg IntraVENous Q4H PRN    nystatin (MYCOSTATIN) 100,000 unit/gram powder   Topical BID    mupirocin (BACTROBAN) 2 % ointment   Both Nostrils Q12H        Review of Systems  Constitutional: positive for fatigue, malaise and anorexia  Respiratory: negative  Cardiovascular: negative  Gastrointestinal: positive for constipation and abdominal pain  Neurological: negative    Objective:     Patient Vitals for the past 8 hrs:   BP Temp Pulse Resp SpO2   04/14/18 0600 127/77 - 100 20 99 %   04/14/18 0500 123/77 - 98 23 98 %   04/14/18 0400 108/67 97.8 °F (36.6 °C) (!) 102 17 99 %   04/14/18 0300 123/68 - (!) 104 21 99 %   04/14/18 0200 119/80 - (!) 105 (!) 31 97 %   04/14/18 0100 95/77 - (!) 107 23 96 %   04/14/18 0000 134/80 98.4 °F (36.9 °C) (!) 104 26 99 %   04/13/18 2348 - 98.4 °F (36.9 °C) - - -   04/13/18 2300 124/76 - (!) 105 21 99 %     04/13 1901 - 04/14 0700  In: 2103 [P.O.:840; I.V.:1263]  Out: 165 [Urine:165]  04/12 0701 - 04/13 1900  In: 54351.1 [P.O.:640; I.V.:41606.1]  Out: 897 [Urine:897]    Physical Exam:   Visit Vitals    /77    Pulse 100    Temp 97.8 °F (36.6 °C)    Resp 20    Ht 5' 9\" (1.753 m)    Wt 270 lb (122.5 kg)    SpO2 99%    BMI 39.87 kg/m2     General appearance: alert, fatigued, cooperative, no distress, appears stated age  Lungs: clear to auscultation bilaterally  Heart: regular rate and rhythm  Abdomen: soft, non-tender.  Bowel sounds normal. No masses,  no organomegaly  Extremities: extremities normal, atraumatic, no cyanosis or edema      ECG: sinus tachycardia     Data Review   Recent Results (from the past 24 hour(s))   GLUCOSE, POC    Collection Time: 04/13/18  7:03 AM   Result Value Ref Range    Glucose (POC) 216 (H) 65 - 100 mg/dL    Performed by Beth Tirado    Collection Time: 04/13/18  7:04 AM   Result Value Ref Range    Glucose 216 mg/dL    Insulin order 15.6 units/hour    Insulin adminstered 15.6 units/hour    Multiplier 0.100     Low target 200 mg/dL    High target 300 mg/dL    D50 order 0.0 ml    D50 administered 0.00 ml    Minutes until next  min    Order initials umang     Administered initials umang     GLSCOM Comments     GLUCOSE, POC    Collection Time: 04/13/18  9:17 AM   Result Value Ref Range    Glucose (POC) 244 (H) 65 - 100 mg/dL    Performed by Leander Swenson    Collection Time: 04/13/18  9:18 AM   Result Value Ref Range    Glucose 244 mg/dL    Insulin order 18.4 units/hour    Insulin adminstered 18.4 units/hour    Multiplier 0.100     Low target 200 mg/dL    High target 300 mg/dL    D50 order 0.0 ml    D50 administered 0.00 ml    Minutes until next  min    Order initials EK     Administered initials EK     GLSCOM Comments     METABOLIC PANEL, BASIC    Collection Time: 04/13/18 10:35 AM   Result Value Ref Range    Sodium 163 (HH) 136 - 145 mmol/L    Potassium 3.5 3.5 - 5.1 mmol/L    Chloride 130 (H) 97 - 108 mmol/L    CO2 23 21 - 32 mmol/L    Anion gap 10 5 - 15 mmol/L    Glucose 280 (H) 65 - 100 mg/dL    BUN 63 (H) 6 - 20 MG/DL    Creatinine 4.64 (H) 0.70 - 1.30 MG/DL    BUN/Creatinine ratio 14 12 - 20      GFR est AA 17 (L) >60 ml/min/1.73m2    GFR est non-AA 14 (L) >60 ml/min/1.73m2    Calcium 8.0 (L) 8.5 - 10.1 MG/DL   MAGNESIUM    Collection Time: 04/13/18 10:35 AM   Result Value Ref Range    Magnesium 3.0 (H) 1.6 - 2.4 mg/dL   PHOSPHORUS    Collection Time: 04/13/18 10:35 AM   Result Value Ref Range    Phosphorus 2.2 (L) 2.6 - 4.7 MG/DL   GLUCOSE, POC    Collection Time: 04/13/18 11:20 AM   Result Value Ref Range    Glucose (POC) 226 (H) 65 - 100 mg/dL    Performed by Fidelia Carson    Collection Time: 04/13/18 11:20 AM   Result Value Ref Range    Glucose 226 mg/dL    Insulin order 16.6 units/hour    Insulin adminstered 16.6 units/hour    Multiplier 0.100     Low target 200 mg/dL    High target 300 mg/dL    D50 order 0.0 ml    D50 administered 0.00 ml    Minutes until next  min    Order initials ph     Administered initials ph     GLSCOM Comments     PROTEIN/CREATININE RATIO, URINE    Collection Time: 04/13/18 12:21 PM   Result Value Ref Range    Protein, urine random 629 (H) 0.0 - 11.9 mg/dL    Creatinine, urine 482.00 mg/dL    Protein/Creat.  urine Ratio 1.3     GLUCOSE, POC    Collection Time: 04/13/18  1:22 PM   Result Value Ref Range    Glucose (POC) 285 (H) 65 - 100 mg/dL    Performed by Ole Luther    Collection Time: 04/13/18  1:23 PM   Result Value Ref Range    Glucose 285 mg/dL    Insulin order 22.5 units/hour    Insulin adminstered 22.5 units/hour    Multiplier 0.100     Low target 200 mg/dL    High target 300 mg/dL    D50 order 0.0 ml    D50 administered 0.00 ml    Minutes until next  min    Order initials pah     Administered initials pah     GLSCOM Comments     METABOLIC PANEL, BASIC    Collection Time: 04/13/18  2:57 PM   Result Value Ref Range    Sodium 160 (H) 136 - 145 mmol/L    Potassium 4.3 3.5 - 5.1 mmol/L    Chloride 128 (H) 97 - 108 mmol/L    CO2 23 21 - 32 mmol/L    Anion gap 9 5 - 15 mmol/L    Glucose 287 (H) 65 - 100 mg/dL    BUN 65 (H) 6 - 20 MG/DL    Creatinine 5.06 (H) 0.70 - 1.30 MG/DL    BUN/Creatinine ratio 13 12 - 20      GFR est AA 15 (L) >60 ml/min/1.73m2    GFR est non-AA 13 (L) >60 ml/min/1.73m2    Calcium 7.7 (L) 8.5 - 10.1 MG/DL   GLUCOSE, POC    Collection Time: 04/13/18  3:27 PM   Result Value Ref Range    Glucose (POC) 227 (H) 65 - 100 mg/dL    Performed by [employer]    Collection Time: 04/13/18  3:27 PM   Result Value Ref Range    Glucose 227 mg/dL    Insulin order 16.7 units/hour    Insulin adminstered 16.7 units/hour    Multiplier 0.100     Low target 200 mg/dL    High target 300 mg/dL    D50 order 0.0 ml    D50 administered 0.00 ml    Minutes until next  min    Order initials pah     Administered initials pah     GLSCOM Comments     GLUCOSE, POC    Collection Time: 04/13/18  5:28 PM   Result Value Ref Range    Glucose (POC) 194 (H) 65 - 100 mg/dL    Performed by [employer]    Collection Time: 04/13/18  5:28 PM   Result Value Ref Range    Glucose 194 mg/dL    Insulin order 10.7 units/hour    Insulin adminstered 10.7 units/hour    Multiplier 0.080     Low target 200 mg/dL    High target 300 mg/dL    D50 order 0.0 ml    D50 administered 0.00 ml    Minutes until next BG 60 min    Order initials pah     Administered initials pah     GLSCOM Comments     GLUCOSE, POC    Collection Time: 04/13/18  6:25 PM   Result Value Ref Range    Glucose (POC) 223 (H) 65 - 100 mg/dL    Performed by Tully Pinon    Collection Time: 04/13/18  6:26 PM   Result Value Ref Range    Glucose 223 mg/dL Insulin order 13.0 units/hour    Insulin adminstered 13.0 units/hour    Multiplier 0.080     Low target 200 mg/dL    High target 300 mg/dL    D50 order 0.0 ml    D50 administered 0.00 ml    Minutes until next BG 60 min    Order initials pah     Administered initials pah     GLSCOM Comments     GLUCOSE, POC    Collection Time: 04/13/18  7:27 PM   Result Value Ref Range    Glucose (POC) 228 (H) 65 - 100 mg/dL    Performed by Shawanda Moore    Collection Time: 04/13/18  7:27 PM   Result Value Ref Range    Glucose 228 mg/dL    Insulin order 13.4 units/hour    Insulin adminstered 13.4 units/hour    Multiplier 0.080     Low target 200 mg/dL    High target 300 mg/dL    D50 order 0.0 ml    D50 administered 0.00 ml    Minutes until next BG 60 min    Order initials pah     Administered initials pah     GLSCOM Comments     GLUCOSE, POC    Collection Time: 04/13/18  8:28 PM   Result Value Ref Range    Glucose (POC) 204 (H) 65 - 100 mg/dL    Performed by Apoorva Powell    Collection Time: 04/13/18  8:29 PM   Result Value Ref Range    Glucose 204 mg/dL    Insulin order 11.5 units/hour    Insulin adminstered 11.5 units/hour    Multiplier 0.080     Low target 200 mg/dL    High target 300 mg/dL    D50 order 0.0 ml    D50 administered 0.00 ml    Minutes until next BG 60 min    Order initials umang     Administered initials umang     GLSCOM Comments     GLUCOSE, POC    Collection Time: 04/13/18  9:28 PM   Result Value Ref Range    Glucose (POC) 193 (H) 65 - 100 mg/dL    Performed by Apoorva Powell    Collection Time: 04/13/18  9:29 PM   Result Value Ref Range    Glucose 193 mg/dL    Insulin order 8.5 units/hour    Insulin adminstered 8.5 units/hour    Multiplier 0.064     Low target 200 mg/dL    High target 300 mg/dL    D50 order 0.0 ml    D50 administered 0.00 ml    Minutes until next BG 60 min    Order initials umang     Administered initials umang     GLSCOM Comments     GLUCOSE, POC Collection Time: 04/13/18 10:25 PM   Result Value Ref Range    Glucose (POC) 235 (H) 65 - 100 mg/dL    Performed by Pina Guerrero    Collection Time: 04/13/18 10:31 PM   Result Value Ref Range    Glucose 235 mg/dL    Insulin order 11.2 units/hour    Insulin adminstered 11.2 units/hour    Multiplier 0.064     Low target 200 mg/dL    High target 300 mg/dL    D50 order 0.0 ml    D50 administered 0.00 ml    Minutes until next BG 60 min    Order initials umang     Administered initials umang     GLSCOM Comments     METABOLIC PANEL, BASIC    Collection Time: 04/13/18 11:08 PM   Result Value Ref Range    Sodium 157 (H) 136 - 145 mmol/L    Potassium 3.7 3.5 - 5.1 mmol/L    Chloride 123 (H) 97 - 108 mmol/L    CO2 20 (L) 21 - 32 mmol/L    Anion gap 14 5 - 15 mmol/L    Glucose 243 (H) 65 - 100 mg/dL    BUN 73 (H) 6 - 20 MG/DL    Creatinine 5.70 (H) 0.70 - 1.30 MG/DL    BUN/Creatinine ratio 13 12 - 20      GFR est AA 13 (L) >60 ml/min/1.73m2    GFR est non-AA 11 (L) >60 ml/min/1.73m2    Calcium 7.6 (L) 8.5 - 10.1 MG/DL   MAGNESIUM    Collection Time: 04/13/18 11:08 PM   Result Value Ref Range    Magnesium 2.5 (H) 1.6 - 2.4 mg/dL   PHOSPHORUS    Collection Time: 04/13/18 11:08 PM   Result Value Ref Range    Phosphorus 3.9 2.6 - 4.7 MG/DL   GLUCOSE, POC    Collection Time: 04/13/18 11:32 PM   Result Value Ref Range    Glucose (POC) 218 (H) 65 - 100 mg/dL    Performed by Pina Guerrero    Collection Time: 04/13/18 11:32 PM   Result Value Ref Range    Glucose 218 mg/dL    Insulin order 10.1 units/hour    Insulin adminstered 10.1 units/hour    Multiplier 0.064     Low target 200 mg/dL    High target 300 mg/dL    D50 order 0.0 ml    D50 administered 0.00 ml    Minutes until next BG 60 min    Order initials umang     Administered initials umang     GLSCOM Comments     GLUCOSE, POC    Collection Time: 04/14/18 12:33 AM   Result Value Ref Range    Glucose (POC) 209 (H) 65 - 100 mg/dL    Performed by Edna Ferrari Birdie Rico    Collection Time: 04/14/18 12:34 AM   Result Value Ref Range    Glucose 209 mg/dL    Insulin order 9.5 units/hour    Insulin adminstered 9.5 units/hour    Multiplier 0.064     Low target 200 mg/dL    High target 300 mg/dL    D50 order 0.0 ml    D50 administered 0.00 ml    Minutes until next BG 60 min    Order initials umang     Administered initials umang     GLSCOM Comments     GLUCOSE, POC    Collection Time: 04/14/18  1:36 AM   Result Value Ref Range    Glucose (POC) 247 (H) 65 - 100 mg/dL    Performed by Marti Trevino    Collection Time: 04/14/18  1:37 AM   Result Value Ref Range    Glucose 247 mg/dL    Insulin order 12.0 units/hour    Insulin adminstered 12.0 units/hour    Multiplier 0.064     Low target 200 mg/dL    High target 300 mg/dL    D50 order 0.0 ml    D50 administered 0.00 ml    Minutes until next BG 60 min    Order initials umang     Administered initials umang     GLSCOM Comments     GLUCOSE, POC    Collection Time: 04/14/18  2:40 AM   Result Value Ref Range    Glucose (POC) 268 (H) 65 - 100 mg/dL    Performed by Bethany Ruby    Collection Time: 04/14/18  2:41 AM   Result Value Ref Range    Glucose 268 mg/dL    Insulin order 13.3 units/hour    Insulin adminstered 13.3 units/hour    Multiplier 0.064     Low target 200 mg/dL    High target 300 mg/dL    D50 order 0.0 ml    D50 administered 0.00 ml    Minutes until next BG 60 min    Order initials lfs     Administered initials lfs     GLSCOM Comments     GLUCOSE, POC    Collection Time: 04/14/18  3:41 AM   Result Value Ref Range    Glucose (POC) 249 (H) 65 - 100 mg/dL    Performed by Marti Trevino    Collection Time: 04/14/18  3:41 AM   Result Value Ref Range    Glucose 249 mg/dL    Insulin order 12.1 units/hour    Insulin adminstered 12.1 units/hour    Multiplier 0.064     Low target 200 mg/dL    High target 300 mg/dL    D50 order 0.0 ml    D50 administered 0.00 ml Minutes until next  min    Order initials umang     Administered initials umang     GLSCOM Comments     METABOLIC PANEL, BASIC    Collection Time: 04/14/18  4:04 AM   Result Value Ref Range    Sodium 155 (H) 136 - 145 mmol/L    Potassium 3.5 3.5 - 5.1 mmol/L    Chloride 123 (H) 97 - 108 mmol/L    CO2 21 21 - 32 mmol/L    Anion gap 11 5 - 15 mmol/L    Glucose 290 (H) 65 - 100 mg/dL    BUN 74 (H) 6 - 20 MG/DL    Creatinine 6.23 (H) 0.70 - 1.30 MG/DL    BUN/Creatinine ratio 12 12 - 20      GFR est AA 12 (L) >60 ml/min/1.73m2    GFR est non-AA 10 (L) >60 ml/min/1.73m2    Calcium 7.4 (L) 8.5 - 10.1 MG/DL   MAGNESIUM    Collection Time: 04/14/18  4:04 AM   Result Value Ref Range    Magnesium 2.2 1.6 - 2.4 mg/dL   PHOSPHORUS    Collection Time: 04/14/18  4:04 AM   Result Value Ref Range    Phosphorus 4.6 2.6 - 4.7 MG/DL   CBC W/O DIFF    Collection Time: 04/14/18  4:04 AM   Result Value Ref Range    WBC 15.9 (H) 4.1 - 11.1 K/uL    RBC 4.19 4. 10 - 5.70 M/uL    HGB 13.4 12.1 - 17.0 g/dL    HCT 42.2 36.6 - 50.3 %    .7 (H) 80.0 - 99.0 FL    MCH 32.0 26.0 - 34.0 PG    MCHC 31.8 30.0 - 36.5 g/dL    RDW 12.0 11.5 - 14.5 %    PLATELET 971 (L) 949 - 400 K/uL    MPV 12.6 8.9 - 12.9 FL    NRBC 0.2 (H) 0  WBC    ABSOLUTE NRBC 0.03 (H) 0.00 - 0.01 K/uL   URIC ACID    Collection Time: 04/14/18  4:04 AM   Result Value Ref Range    Uric acid 15.6 (H) 3.5 - 7.2 MG/DL   GLUCOSE, POC    Collection Time: 04/14/18  5:35 AM   Result Value Ref Range    Glucose (POC) 265 (H) 65 - 100 mg/dL    Performed by Apoorva Powell    Collection Time: 04/14/18  5:35 AM   Result Value Ref Range    Glucose 265 mg/dL    Insulin order 13.1 units/hour    Insulin adminstered 13.1 units/hour    Multiplier 0.064     Low target 200 mg/dL    High target 300 mg/dL    D50 order 0.0 ml    D50 administered 0.00 ml    Minutes until next  min    Order initials umang     Administered initials umang     GLSCOM Comments Assessment:     Active Problems:    Hyperlipidemia (5/1/2012)      Morbid obesity ([facility] [address] (5/6/2013)      Hypertension (10/31/2014)      [facility] (4/12/2018)      Acute encephalopathy (4/12/2018)        Plan:     Mentating normally this am,c/o abdominal discomfort/constipation. Sugars acceptable on insulin drip per protocol. Na+ improving,though remains quite high. Cr rising rapidly ,Fluid balance positive by 4 l. LITTLE/CKD worsening. Continue current meds and treatments.

## 2018-04-14 NOTE — PROGRESS NOTES
NAME: Linda Villarreal        :  1975        MRN:  [id number]        Assessment :    Plan:  --LITTLE due to  Hyperosmolar coma/uncontrolled DMII  Obesity  HTN  Sepsis --Urine sediment fairly bland on admit. Creatinine continues to worsen despite IVF. Poor UO. No acute need for Hd but he appears to be headed that way. BP stable. Will check CK. ?rhabdo. I've ordered renal U/S as well. Addendum:  CK>26079. Rhabdo. Agree with IV HCO3 although I suspect he will end up needing HD. Subjective:     Chief Complaint:  \" My side is killing me. \"  C/o RLQ pain. 4/10. No N/V. Describes pain as \"hunger pain. \"  No BM recently. No dyspnea. No HA. No edema. Has Grigsby. No rashes. Review of Systems:    Symptom Y/N Comments  Symptom Y/N Comments   Fever/Chills    Chest Pain     Poor Appetite    Edema     Cough    Abdominal Pain     Sputum    Joint Pain     SOB/WELSH    Pruritis/Rash     Nausea/vomit    Tolerating PT/OT     Diarrhea    Tolerating Diet     Constipation    Other       Could not obtain due to:      Objective:     VITALS:   Last 24hrs VS reviewed since prior progress note.  Most recent are:  Visit Vitals    /75    Pulse 93    Temp 97.8 °F (36.6 °C)    Resp 27    Ht 5' 9\" (1.753 m)    Wt 122.5 kg (270 lb)    SpO2 98%    BMI 39.87 kg/m2       Intake/Output Summary (Last 24 hours) at 18 7761  Last data filed at 18 0700   Gross per 24 hour   Intake          4539.63 ml   Output              287 ml   Net          4252.63 ml      Telemetry Reviewed:     PHYSICAL EXAM:  General: Ill appearing  Few rhonchi  abd soft  No edema      Lab Data Reviewed: (see below)    Medications Reviewed: (see below)    PMH/SH reviewed - no change compared to H&P  ________________________________________________________________________  Care Plan discussed with:  Patient     Family      RN     Care Manager Consultant:          Comments   >50% of visit spent in counseling and coordination of care       ________________________________________________________________________  Patrica Hammans, MD     Procedures: see electronic medical records for all procedures/Xrays and details which  were not copied into this note but were reviewed prior to creation of Plan. LABS:  Recent Labs      04/14/18   0404  04/13/18   0358   WBC  15.9*  18.6*   HGB  13.4  15.5   HCT  42.2  47.2   PLT  115*  156     Recent Labs      04/14/18   0404  04/13/18   2308  04/13/18   1457  04/13/18   1035   NA  155*  157*  160*  163*   K  3.5  3.7  4.3  3.5   CL  123*  123*  128*  130*   CO2  21  20*  23  23   BUN  74*  73*  65*  63*   CREA  6.23*  5.70*  5.06*  4.64*   GLU  290*  243*  287*  280*   CA  7.4*  7.6*  7.7*  8.0*   MG  2.2  2.5*   --   3.0*   PHOS  4.6  3.9   --   2.2*   URICA  15.6*   --    --    --      Recent Labs      04/13/18   0358  04/12/18   0044   SGOT  715*  14*   AP  86  105   TP  6.8  7.7   ALB  2.6*  3.5   GLOB  4.2*  4.2*     No results for input(s): INR, PTP, APTT in the last 72 hours. No lab exists for component: INREXT   No results for input(s): FE, TIBC, PSAT, FERR in the last 72 hours. No results found for: FOL, RBCF   No results for input(s): PH, PCO2, PO2 in the last 72 hours. No results for input(s): CPK, CKMB in the last 72 hours.     No lab exists for component: TROPONINI  No components found for: [place]  Lab Results   Component Value Date/Time    Color YELLOW/STRAW 04/12/2018 12:44 AM    Appearance CLEAR 04/12/2018 12:44 AM    Specific gravity <1.005 04/12/2018 12:44 AM    Specific gravity 1.021 04/16/2013 05:05 PM    pH (UA) 5.0 04/12/2018 12:44 AM    Protein NEGATIVE  04/12/2018 12:44 AM    Glucose >1000 (A) 04/12/2018 12:44 AM    Ketone TRACE (A) 04/12/2018 12:44 AM    Bilirubin NEGATIVE  04/12/2018 12:44 AM    Urobilinogen 0.2 04/12/2018 12:44 AM    Nitrites NEGATIVE  04/12/2018 12:44 AM Leukocyte Esterase NEGATIVE  04/12/2018 12:44 AM    Epithelial cells FEW 04/12/2018 12:44 AM    Bacteria NEGATIVE  04/12/2018 12:44 AM    WBC 0-4 04/12/2018 12:44 AM    RBC 0-5 04/12/2018 12:44 AM       MEDICATIONS:  Current Facility-Administered Medications   Medication Dose Route Frequency    heparin (porcine) injection 5,000 Units  5,000 Units SubCUTAneous Q12H    dextrose 5% infusion  100 mL/hr IntraVENous CONTINUOUS    cefepime (MAXIPIME) 1 g in 0.9% sodium chloride (MBP/ADV) 50 mL  1 g IntraVENous Q12H    sodium chloride (NS) flush 10-40 mL  10-40 mL IntraVENous PRN    sodium chloride (NS) flush 10 mL  10 mL IntraVENous Q8H    insulin regular (NOVOLIN R, HUMULIN R) 100 Units in 0.9% sodium chloride 100 mL infusion  0-30 Units/hr IntraVENous TITRATE    glucose chewable tablet 16 g  4 Tab Oral PRN    dextrose (D50W) injection syrg 12.5-25 g  12.5-25 g IntraVENous PRN    glucagon (GLUCAGEN) injection 1 mg  1 mg IntraMUSCular PRN    nitroglycerin (NITROBID) 2 % ointment 1 Inch  1 Inch Topical Q6H PRN    sodium chloride (NS) flush 5-10 mL  5-10 mL IntraVENous Q8H    sodium chloride (NS) flush 5-10 mL  5-10 mL IntraVENous PRN    acetaminophen (TYLENOL) tablet 650 mg  650 mg Oral Q6H PRN    ondansetron (ZOFRAN) injection 4 mg  4 mg IntraVENous Q4H PRN    nystatin (MYCOSTATIN) 100,000 unit/gram powder   Topical BID    mupirocin (BACTROBAN) 2 % ointment   Both Nostrils Q12H

## 2018-04-14 NOTE — PROGRESS NOTES
Spiritual Care Assessment/Progress Note  [facility]      NAME: Maris Hwang      MRN: [id number]  AGE: 36 y.o. SEX: male  Holiness Affiliation: Che[employer]ism   Language: English     4/14/2018     Total Time (in minutes): 6     Spiritual Assessment begun in [facility] CRITICAL CARE 2 through conversation with:         [x]Patient        [x] Family    [] Friend(s)        Reason for Consult: Initial/Spiritual assessment, critical care     Spiritual beliefs: (Please include comment if needed)     [] Involved in a perfecto tradition/spiritual practice:     [] Supported by a perfecto community:      [] Claims no spiritual orientation:      [] Seeking spiritual identity:           [] Adheres to an individual form of spirituality:      [x] Not able to assess:                     Identified resources for coping:      [] Prayer                  [] Devotional reading               [] Music                  [] Guided Imagery     [x] Family/friends                 [] Pet visits     [] Other:        Interventions offered during this visit: (See comments for more details)    Patient Interventions: Affirmation of emotions/emotional suffering, Initial/Spiritual assessment, Critical care     Family/Friend(s): Initial Assessment     Plan of Care:     [] Discuss Spiritual/Cultural needs    [] Support AMD and/or advance care planning process      [] Support grieving process   [] Coordinate Rites/Rituals    [] Coordination with community clergy   [] No spiritual needs identified at this time   [] Detailed Plan of Care below (See Comments)  [] Make referral to Music Therapy  [] Make referral to Pet Therapy     [] Make referral to Addiction services  [] Make referral to [facility]  [] Make referral to Spiritual Care Partner  [] No future visits requested             Comments: Initial visit with patient and family member when family member came out of room and requested assistance because patient was becoming ill.  Nurses were caring for other patients at the time so provided patient with kidney basin which he refused. Family member stated patient would only use a bag. Connected with nursing staff and provided patient with emesis bag and large basin. Once patient felt better introduced self and inquired about any further needs. Patient and family expressed appreciation for 's assistance and indicated no needs at this time. Advised of  availability as needed or desired. Chaplain Song Ta M.Div.    Paging Service [phone])

## 2018-04-14 NOTE — PROGRESS NOTES
[facility] Consult Service Progress NOTE  Pulmonary, Critical Care, and Sleep Medicine    Name: Jaylin Mcgarry MRN: [id number]   : 1975 Hospital: [facility]   Date: 2018  Admission Date: 2018       IMPRESSION:   1. Rhabdo: CPK 60K  2. Code Tiffany was called  Seemed to have acute delirium, Possible Encephalopathy due to his acute Metabolic Encephalopathy. 3. DKA  4. Hypoxia   5. Sepsis with leukocytosis and tachycardia with lactic acidosis   6. Right middle lobe PNA possible aspiration  7. Hypernatremia from fluid resuscitation,    8. hypocalcemia  9. Acute Encephalopathy from metabolic derangements. 10. HTN  11. Morbid obesity  12. Severe hypophosphatemia  13. erytrhocytosis- wonder about chronic hypoxemia or hemoconcentration  14. Multiorgan dysfunction as outlined above  15. Additional workup outlined below  16. Occasional Etoh on the Weekends. 17. Nonsmoker       RECOMMENDATIONS/PLAN:   1. Bicarb drip for rhabdo  2. Empiric abx  3. Supplemental oxygen to keep sats > 90%  4. Renal following  5. Na lower with free water  6. Renal fx worse   7. Pt needs IV fluids with additives and Drug therapy requiring intensive monitoring for toxicity  8. Prescription drug management with home med reconciliation reviewed  9. DVT, SUP prophylaxis  10. Transfer to tele today       My assessment/management discussed with: Nursing for coordination of care         The reason for providing this level of medical care was due to a critical illness that impaired one or more vital organ systems, such that there was a high probability of imminent or life threatening deterioration in the patient's condition. Pt's condition is unstable and unpredictable. Risk of deterioration: moderate   [x] High complexity decision making was performed  [x] See my orders for details  ICU disposition: to floor today          Chart and notes reviewed. Data reviewed.  Pt seen on rounds earlier today. I have evaluated and examined the patient. Pt is unstable and acutely ill in the CCU. Awake, alert, no acute distress  C/o upset stomach    MAR reviewed and pertinent medications noted or modified as needed   Current Facility-Administered Medications   Medication    insulin glargine (LANTUS) injection 20 Units    insulin lispro (HUMALOG) injection    glucose chewable tablet 16 g    dextrose (D50W) injection syrg 12.5-25 g    glucagon (GLUCAGEN) injection 1 mg    cefepime (MAXIPIME) 1 g in 0.9% sodium chloride (MBP/ADV) 50 mL    sodium bicarbonate (8.4%) 150 mEq in dextrose 5% 1,000 mL infusion    heparin (porcine) injection 5,000 Units    sodium chloride (NS) flush 10-40 mL    sodium chloride (NS) flush 10 mL    insulin regular (NOVOLIN R, HUMULIN R) 100 Units in 0.9% sodium chloride 100 mL infusion    glucose chewable tablet 16 g    dextrose (D50W) injection syrg 12.5-25 g    glucagon (GLUCAGEN) injection 1 mg    nitroglycerin (NITROBID) 2 % ointment 1 Inch    sodium chloride (NS) flush 5-10 mL    sodium chloride (NS) flush 5-10 mL    acetaminophen (TYLENOL) tablet 650 mg    ondansetron (ZOFRAN) injection 4 mg    nystatin (MYCOSTATIN) 100,000 unit/gram powder    mupirocin (BACTROBAN) 2 % ointment         ROS:Review of systems not obtained due to patient factors. Hemodynamics:    CO:    CI:    CVP:    SVR:   PAP Systolic:    PAP Diastolic:    PVR:    QM46:        Ventilator Settings:      Mode Rate TV Press PEEP FiO2 PIP Min.  Vent                            Vital Signs: Intake/Output: Intake/Output:   Visit Vitals    /75    Pulse 93    Temp 97.8 °F (36.6 °C)    Resp 27    Ht 5' 9\" (1.753 m)    Wt 122.5 kg (270 lb)    SpO2 98%    BMI 39.87 kg/m2    Temp (24hrs), Av.4 °F (36.9 °C), Min:97.8 °F (36.6 °C), Max:98.7 °F (37.1 °C)        Telemetry:    normal sinus rhythm   O2 Device: Nasal cannula  O2 Flow Rate (L/min): 4 l/min  Wt Readings from Last 4 Encounters: 04/11/18 122.5 kg (270 lb)   12/05/17 112 kg (247 lb)   09/05/17 151 kg (333 lb)   07/08/17 144.8 kg (319 lb 3.6 oz)          Intake/Output Summary (Last 24 hours) at 04/14/18 1359  Last data filed at 04/14/18 0700   Gross per 24 hour   Intake           3231.3 ml   Output              223 ml   Net           3008.3 ml     Last shift:         Last 3 shifts: 04/12 1901 - 04/14 0700  In: 7987.8 [P.O.:1480; I.V.:6507.8]  Out: 542 [Urine:542]     Physical Exam:    [address] American male; in no respiratory distress and acyanotic,    HEAD: Normocephalic, without obvious abnormality, atraumatic   EYES: conjunctivae clear. PERRL,  AN Icteric sclerae   NOSE: nares normal, no drainage, no nasal flaring,    THROAT: mucous membranes dry; Lips, mucosa dry; No Thrush; tongue midline   Neck: Supple, symmetrical, trachea midline,  No accessory mm use; No Stridor/ cuff leak, No goiter or thyroid tenderness   LYMPH: No abnormally enlarged lymph nodes. in neck or groin   Chest: normal   Lungs: Had good air movement anteriorly. Heart: Regular rate and rhythm ; NO edema nl s1, 2; No MRG. Abdomen: soft, protuberant, distended, nontender, obese   : normal;  Grigsby, clear urine; NO gross hematuria   Extremity: negative, clubbing; no joint swelling or erythema   Neuro: non focal   Psych: cooperative   Skin: Skin unremarkable;    Pulses:Bilateral, Radial, 2+   Capillary refill: normal ;warm,      Tubes:   Grigsby    DATA:    Interval lab and diagnostic data was reviewed. Interval radiology images were independently viewed and available reports were reviewed. Lab results reviewed. For significant abnormal values and values requiring intervention, see assessment and plan.     MAR reviewed and pertinent medications noted or modified as needed  MEDS:   Current Facility-Administered Medications   Medication    insulin glargine (LANTUS) injection 20 Units    insulin lispro (HUMALOG) injection    glucose chewable tablet 16 g  dextrose (D50W) injection syrg 12.5-25 g    glucagon (GLUCAGEN) injection 1 mg    cefepime (MAXIPIME) 1 g in 0.9% sodium chloride (MBP/ADV) 50 mL    sodium bicarbonate (8.4%) 150 mEq in dextrose 5% 1,000 mL infusion    heparin (porcine) injection 5,000 Units    sodium chloride (NS) flush 10-40 mL    sodium chloride (NS) flush 10 mL    insulin regular (NOVOLIN R, HUMULIN R) 100 Units in 0.9% sodium chloride 100 mL infusion    glucose chewable tablet 16 g    dextrose (D50W) injection syrg 12.5-25 g    glucagon (GLUCAGEN) injection 1 mg    nitroglycerin (NITROBID) 2 % ointment 1 Inch    sodium chloride (NS) flush 5-10 mL    sodium chloride (NS) flush 5-10 mL    acetaminophen (TYLENOL) tablet 650 mg    ondansetron (ZOFRAN) injection 4 mg    nystatin (MYCOSTATIN) 100,000 unit/gram powder    mupirocin (BACTROBAN) 2 % ointment        Labs:    Recent Labs      04/14/18   0404  04/13/18   0358  04/11/18   2215   WBC  15.9*  18.6*  14.3*   HGB  13.4  15.5  17.8*   PLT  115*  156  231     Recent Labs      04/14/18   0404  04/13/18   2308  04/13/18   1457  04/13/18   1035  04/13/18   0358   04/12/18   1334   04/12/18   0700  04/12/18   0305  04/12/18   0044   NA  155*  157*  160*  163*  162*   < >   --    < >  153*  140  137   K  3.5  3.7  4.3  3.5  5.3*   < >   --    < >  3.0*  3.3*  3.9   CL  123*  123*  128*  130*  131*   < >   --    < >  119*  103  97   CO2  21  20*  23  23  20*   < >   --    < >  22  20*  18*   GLU  290*  243*  287*  280*  242*   < >   --    < >  1018*  >1500*  >1500*   BUN  74*  73*  65*  63*  61*   < >   --    < >  61*  70*  69*   CREA  6.23*  5.70*  5.06*  4.64*  3.92*   < >   --    < >  4.21*  5.19*  4.91*   CA  7.4*  7.6*  7.7*  8.0*  8.3*   < >   --    < >  8.3*  8.6  8.9   MG  2.2  2.5*   --   3.0*   --    < >   --    < >   --    --   4.2*   PHOS  4.6  3.9   --   2.2*  5.5*   < >   --    < >   --    --   2.9   LAC   --    --    --    --    --    --   3.4*   --   4.4*  3.6*   -- ALB   --    --    --    --   2.6*   --    --    --    --    --   3.5   SGOT   --    --    --    --   715*   --    --    --    --    --   14*   ALT   --    --    --    --   165*   --    --    --    --    --   32    < > = values in this interval not displayed. No results for input(s): PH, PCO2, PO2, HCO3, FIO2 in the last 72 hours. Recent Labs      04/14/18   1026   CPK  65100*     No results found for: BNPP, BNP   Lab Results   Component Value Date/Time    Culture result: NO GROWTH 3 DAYS 04/11/2018 10:26 PM     Lab Results   Component Value Date/Time    CK 55610 (HH) 04/14/2018 10:26 AM     04/16/2013 03:30 PM     No results found for: IRON, FE, TIBC, IBCT, PSAT, FERR  Lab Results   Component Value Date/Time    RPR Non Reactive 09/06/2017 09:19 AM    TSH 1.530 12/29/2014 11:32 AM      Lab Results   Component Value Date/Time    RPR Non Reactive 09/06/2017 09:19 AM       Imaging: This care involved high complexity decision making which includes independently reviewing the patient's past medical records, current laboratory results, medication profiles that were immediately available to me and actual Xray images at the bedside in order to assess, support vital system function, and to treat this degree of vital organ system failure, and to prevent further life threatening deterioration of the patients condition. I was in direct communication with the nursing staff throughout this time. I have personally and independently reviewed the patients interval lab, diagnostic data, radiographs and the reports. I have ordered additional labs to follow the current medical conditions and to monitor treatment responses over the next 24 hours or sooner if needed. I will order additional imaging to follow longitudinal changes found on the most current imaging.      Danilo Flannery MD

## 2018-04-15 ENCOUNTER — APPOINTMENT (OUTPATIENT)
Dept: INTERVENTIONAL RADIOLOGY/VASCULAR | Age: 43
DRG: 871 | End: 2018-04-15
Attending: INTERNAL MEDICINE
Payer: COMMERCIAL

## 2018-04-15 ENCOUNTER — APPOINTMENT (OUTPATIENT)
Dept: GENERAL RADIOLOGY | Age: 43
DRG: 871 | End: 2018-04-15
Attending: INTERNAL MEDICINE
Payer: COMMERCIAL

## 2018-04-15 PROBLEM — M62.82 NON-TRAUMATIC RHABDOMYOLYSIS: Status: ACTIVE | Noted: 2018-04-15

## 2018-04-15 LAB
ADMINISTERED INITIALS, ADMINIT: NORMAL
ANION GAP SERPL CALC-SCNC: 13 MMOL/L (ref 5–15)
ANION GAP SERPL CALC-SCNC: 15 MMOL/L (ref 5–15)
ANION GAP SERPL CALC-SCNC: 19 MMOL/L (ref 5–15)
ARTERIAL PATENCY WRIST A: YES
BASE DEFICIT BLDA-SCNC: 6.8 MMOL/L
BDY SITE: ABNORMAL
BREATHS.SPONTANEOUS ON VENT: 30
BUN SERPL-MCNC: 109 MG/DL (ref 6–20)
BUN SERPL-MCNC: 114 MG/DL (ref 6–20)
BUN SERPL-MCNC: 78 MG/DL (ref 6–20)
BUN/CREAT SERPL: 10 (ref 12–20)
BUN/CREAT SERPL: 12 (ref 12–20)
BUN/CREAT SERPL: 12 (ref 12–20)
CALCIUM SERPL-MCNC: 7 MG/DL (ref 8.5–10.1)
CALCIUM SERPL-MCNC: 7.1 MG/DL (ref 8.5–10.1)
CALCIUM SERPL-MCNC: 8 MG/DL (ref 8.5–10.1)
CHLORIDE SERPL-SCNC: 102 MMOL/L (ref 97–108)
CHLORIDE SERPL-SCNC: 103 MMOL/L (ref 97–108)
CHLORIDE SERPL-SCNC: 105 MMOL/L (ref 97–108)
CK SERPL-CCNC: ABNORMAL U/L (ref 39–308)
CK SERPL-CCNC: ABNORMAL U/L (ref 39–308)
CO2 SERPL-SCNC: 14 MMOL/L (ref 21–32)
CO2 SERPL-SCNC: 17 MMOL/L (ref 21–32)
CO2 SERPL-SCNC: 24 MMOL/L (ref 21–32)
CREAT SERPL-MCNC: 7.6 MG/DL (ref 0.7–1.3)
CREAT SERPL-MCNC: 8.83 MG/DL (ref 0.7–1.3)
CREAT SERPL-MCNC: 9.38 MG/DL (ref 0.7–1.3)
D50 ADMINISTERED, D50ADM: 0 ML
D50 ORDER, D50ORD: 0 ML
ERYTHROCYTE [DISTWIDTH] IN BLOOD BY AUTOMATED COUNT: 11.3 % (ref 11.5–14.5)
GLSCOM COMMENTS: NORMAL
GLUCOSE BLD STRIP.AUTO-MCNC: 185 MG/DL (ref 65–100)
GLUCOSE BLD STRIP.AUTO-MCNC: 192 MG/DL (ref 65–100)
GLUCOSE BLD STRIP.AUTO-MCNC: 211 MG/DL (ref 65–100)
GLUCOSE BLD STRIP.AUTO-MCNC: 226 MG/DL (ref 65–100)
GLUCOSE BLD STRIP.AUTO-MCNC: 232 MG/DL (ref 65–100)
GLUCOSE BLD STRIP.AUTO-MCNC: 237 MG/DL (ref 65–100)
GLUCOSE BLD STRIP.AUTO-MCNC: 251 MG/DL (ref 65–100)
GLUCOSE BLD STRIP.AUTO-MCNC: 344 MG/DL (ref 65–100)
GLUCOSE BLD STRIP.AUTO-MCNC: 423 MG/DL (ref 65–100)
GLUCOSE BLD STRIP.AUTO-MCNC: 443 MG/DL (ref 65–100)
GLUCOSE BLD STRIP.AUTO-MCNC: 553 MG/DL (ref 65–100)
GLUCOSE BLD STRIP.AUTO-MCNC: 578 MG/DL (ref 65–100)
GLUCOSE BLD STRIP.AUTO-MCNC: 588 MG/DL (ref 65–100)
GLUCOSE BLD STRIP.AUTO-MCNC: >600 MG/DL (ref 65–100)
GLUCOSE SERPL-MCNC: 243 MG/DL (ref 65–100)
GLUCOSE SERPL-MCNC: 717 MG/DL (ref 65–100)
GLUCOSE SERPL-MCNC: 731 MG/DL (ref 65–100)
GLUCOSE, GLC: 185 MG/DL
GLUCOSE, GLC: 192 MG/DL
GLUCOSE, GLC: 211 MG/DL
GLUCOSE, GLC: 226 MG/DL
GLUCOSE, GLC: 232 MG/DL
GLUCOSE, GLC: 237 MG/DL
GLUCOSE, GLC: 251 MG/DL
GLUCOSE, GLC: 344 MG/DL
GLUCOSE, GLC: 423 MG/DL
GLUCOSE, GLC: 443 MG/DL
GLUCOSE, GLC: 578 MG/DL
GLUCOSE, GLC: 588 MG/DL
GLUCOSE, GLC: 601 MG/DL
HCO3 BLDA-SCNC: 17 MMOL/L (ref 22–26)
HCT VFR BLD AUTO: 35.6 % (ref 36.6–50.3)
HGB BLD-MCNC: 11.6 G/DL (ref 12.1–17)
HIGH TARGET, HITG: 180 MG/DL
HIGH TARGET, HITG: 300 MG/DL
INSULIN ADMINSTERED, INSADM: 10.8 UNITS/HOUR
INSULIN ADMINSTERED, INSADM: 16.2 UNITS/HOUR
INSULIN ADMINSTERED, INSADM: 17.7 UNITS/HOUR
INSULIN ADMINSTERED, INSADM: 18.3 UNITS/HOUR
INSULIN ADMINSTERED, INSADM: 18.5 UNITS/HOUR
INSULIN ADMINSTERED, INSADM: 18.8 UNITS/HOUR
INSULIN ADMINSTERED, INSADM: 19.1 UNITS/HOUR
INSULIN ADMINSTERED, INSADM: 19.6 UNITS/HOUR
INSULIN ADMINSTERED, INSADM: 20.6 UNITS/HOUR
INSULIN ADMINSTERED, INSADM: 21.6 UNITS/HOUR
INSULIN ADMINSTERED, INSADM: 27.1 UNITS/HOUR
INSULIN ADMINSTERED, INSADM: 28.4 UNITS/HOUR
INSULIN ADMINSTERED, INSADM: 30.6 UNITS/HOUR
INSULIN ADMINSTERED, INSADM: 32.5 UNITS/HOUR
INSULIN ADMINSTERED, INSADM: 32.7 UNITS/HOUR
INSULIN ADMINSTERED, INSADM: 36.3 UNITS/HOUR
INSULIN ADMINSTERED, INSADM: 42.2 UNITS/HOUR
INSULIN ORDER, INSORD: 10.8 UNITS/HOUR
INSULIN ORDER, INSORD: 16.2 UNITS/HOUR
INSULIN ORDER, INSORD: 17.7 UNITS/HOUR
INSULIN ORDER, INSORD: 18.3 UNITS/HOUR
INSULIN ORDER, INSORD: 18.5 UNITS/HOUR
INSULIN ORDER, INSORD: 18.8 UNITS/HOUR
INSULIN ORDER, INSORD: 19.1 UNITS/HOUR
INSULIN ORDER, INSORD: 19.6 UNITS/HOUR
INSULIN ORDER, INSORD: 20.6 UNITS/HOUR
INSULIN ORDER, INSORD: 21.6 UNITS/HOUR
INSULIN ORDER, INSORD: 27.1 UNITS/HOUR
INSULIN ORDER, INSORD: 28.4 UNITS/HOUR
INSULIN ORDER, INSORD: 30.6 UNITS/HOUR
INSULIN ORDER, INSORD: 32.5 UNITS/HOUR
INSULIN ORDER, INSORD: 32.7 UNITS/HOUR
INSULIN ORDER, INSORD: 36.3 UNITS/HOUR
INSULIN ORDER, INSORD: 42.2 UNITS/HOUR
LOW TARGET, LOT: 140 MG/DL
LOW TARGET, LOT: 200 MG/DL
MAGNESIUM SERPL-MCNC: 2.4 MG/DL (ref 1.6–2.4)
MAGNESIUM SERPL-MCNC: 2.4 MG/DL (ref 1.6–2.4)
MAGNESIUM SERPL-MCNC: 2.5 MG/DL (ref 1.6–2.4)
MCH RBC QN AUTO: 32.3 PG (ref 26–34)
MCHC RBC AUTO-ENTMCNC: 32.6 G/DL (ref 30–36.5)
MCV RBC AUTO: 99.2 FL (ref 80–99)
MINUTES UNTIL NEXT BG, NBG: 60 MIN
MULTIPLIER, MUL: 0.02
MULTIPLIER, MUL: 0.03
MULTIPLIER, MUL: 0.04
MULTIPLIER, MUL: 0.05
MULTIPLIER, MUL: 0.06
MULTIPLIER, MUL: 0.07
MULTIPLIER, MUL: 0.08
MULTIPLIER, MUL: 0.08
MULTIPLIER, MUL: 0.09
MULTIPLIER, MUL: 0.1
MULTIPLIER, MUL: 0.11
MULTIPLIER, MUL: 0.12
MULTIPLIER, MUL: 0.13
MULTIPLIER, MUL: 0.14
MULTIPLIER, MUL: 0.15
NRBC # BLD: 0 K/UL (ref 0–0.01)
NRBC BLD-RTO: 0 PER 100 WBC
ORDER INITIALS, ORDINIT: NORMAL
PCO2 BLDA: 31 MMHG (ref 35–45)
PH BLDA: 7.37 [PH] (ref 7.35–7.45)
PHOSPHATE SERPL-MCNC: 5.9 MG/DL (ref 2.6–4.7)
PHOSPHATE SERPL-MCNC: 7 MG/DL (ref 2.6–4.7)
PHOSPHATE SERPL-MCNC: 7.8 MG/DL (ref 2.6–4.7)
PLATELET # BLD AUTO: 89 K/UL (ref 150–400)
PO2 BLDA: 73 MMHG (ref 80–100)
POTASSIUM SERPL-SCNC: 3.4 MMOL/L (ref 3.5–5.1)
POTASSIUM SERPL-SCNC: 4.5 MMOL/L (ref 3.5–5.1)
POTASSIUM SERPL-SCNC: 4.6 MMOL/L (ref 3.5–5.1)
RBC # BLD AUTO: 3.59 M/UL (ref 4.1–5.7)
SAO2 % BLD: 94 % (ref 92–97)
SAO2% DEVICE SAO2% SENSOR NAME: ABNORMAL
SERVICE CMNT-IMP: ABNORMAL
SODIUM SERPL-SCNC: 134 MMOL/L (ref 136–145)
SODIUM SERPL-SCNC: 136 MMOL/L (ref 136–145)
SODIUM SERPL-SCNC: 142 MMOL/L (ref 136–145)
SPECIMEN SITE: ABNORMAL
WBC # BLD AUTO: 12.8 K/UL (ref 4.1–11.1)

## 2018-04-15 PROCEDURE — 74011000250 HC RX REV CODE- 250: Performed by: INTERNAL MEDICINE

## 2018-04-15 PROCEDURE — 02HV33Z INSERTION OF INFUSION DEVICE INTO SUPERIOR VENA CAVA, PERCUTANEOUS APPROACH: ICD-10-PCS | Performed by: RADIOLOGY

## 2018-04-15 PROCEDURE — 74011000258 HC RX REV CODE- 258: Performed by: INTERNAL MEDICINE

## 2018-04-15 PROCEDURE — 84100 ASSAY OF PHOSPHORUS: CPT | Performed by: INTERNAL MEDICINE

## 2018-04-15 PROCEDURE — 83735 ASSAY OF MAGNESIUM: CPT | Performed by: INTERNAL MEDICINE

## 2018-04-15 PROCEDURE — 74011250636 HC RX REV CODE- 250/636: Performed by: INTERNAL MEDICINE

## 2018-04-15 PROCEDURE — 90935 HEMODIALYSIS ONE EVALUATION: CPT

## 2018-04-15 PROCEDURE — 80048 BASIC METABOLIC PNL TOTAL CA: CPT | Performed by: INTERNAL MEDICINE

## 2018-04-15 PROCEDURE — 74011636637 HC RX REV CODE- 636/637: Performed by: INTERNAL MEDICINE

## 2018-04-15 PROCEDURE — 74011250636 HC RX REV CODE- 250/636: Performed by: RADIOLOGY

## 2018-04-15 PROCEDURE — 82550 ASSAY OF CK (CPK): CPT | Performed by: INTERNAL MEDICINE

## 2018-04-15 PROCEDURE — 87340 HEPATITIS B SURFACE AG IA: CPT | Performed by: INTERNAL MEDICINE

## 2018-04-15 PROCEDURE — 36415 COLL VENOUS BLD VENIPUNCTURE: CPT | Performed by: INTERNAL MEDICINE

## 2018-04-15 PROCEDURE — 65610000006 HC RM INTENSIVE CARE

## 2018-04-15 PROCEDURE — 71045 X-RAY EXAM CHEST 1 VIEW: CPT

## 2018-04-15 PROCEDURE — 82803 BLOOD GASES ANY COMBINATION: CPT | Performed by: INTERNAL MEDICINE

## 2018-04-15 PROCEDURE — 76937 US GUIDE VASCULAR ACCESS: CPT

## 2018-04-15 PROCEDURE — C1752 CATH,HEMODIALYSIS,SHORT-TERM: HCPCS

## 2018-04-15 PROCEDURE — 85027 COMPLETE CBC AUTOMATED: CPT | Performed by: INTERNAL MEDICINE

## 2018-04-15 PROCEDURE — 65660000000 HC RM CCU STEPDOWN

## 2018-04-15 PROCEDURE — 36600 WITHDRAWAL OF ARTERIAL BLOOD: CPT | Performed by: INTERNAL MEDICINE

## 2018-04-15 PROCEDURE — 77030018719 HC DRSG PTCH ANTIMIC J&J -A

## 2018-04-15 PROCEDURE — 82962 GLUCOSE BLOOD TEST: CPT

## 2018-04-15 PROCEDURE — 74011000250 HC RX REV CODE- 250: Performed by: RADIOLOGY

## 2018-04-15 PROCEDURE — C1892 INTRO/SHEATH,FIXED,PEEL-AWAY: HCPCS

## 2018-04-15 RX ORDER — LIDOCAINE HYDROCHLORIDE 20 MG/ML
20 INJECTION, SOLUTION INFILTRATION; PERINEURAL ONCE
Status: COMPLETED | OUTPATIENT
Start: 2018-04-15 | End: 2018-04-15

## 2018-04-15 RX ORDER — HEPARIN SODIUM (PORCINE) LOCK FLUSH IV SOLN 100 UNIT/ML 100 UNIT/ML
300 SOLUTION INTRAVENOUS ONCE
Status: COMPLETED | OUTPATIENT
Start: 2018-04-15 | End: 2018-04-15

## 2018-04-15 RX ORDER — HEPARIN SODIUM 200 [USP'U]/100ML
400 INJECTION, SOLUTION INTRAVENOUS ONCE
Status: ACTIVE | OUTPATIENT
Start: 2018-04-15 | End: 2018-04-15

## 2018-04-15 RX ORDER — INSULIN LISPRO 100 [IU]/ML
10 INJECTION, SOLUTION INTRAVENOUS; SUBCUTANEOUS ONCE
Status: COMPLETED | OUTPATIENT
Start: 2018-04-15 | End: 2018-04-15

## 2018-04-15 RX ADMIN — Medication 10 ML: at 16:31

## 2018-04-15 RX ADMIN — MUPIROCIN: 20 OINTMENT TOPICAL at 21:06

## 2018-04-15 RX ADMIN — SODIUM CHLORIDE 32.7 UNITS/HR: 900 INJECTION, SOLUTION INTRAVENOUS at 15:21

## 2018-04-15 RX ADMIN — INSULIN HUMAN 10 UNITS: 100 INJECTION, SUSPENSION SUBCUTANEOUS at 00:50

## 2018-04-15 RX ADMIN — WATER: 1000 INJECTION, SOLUTION INTRAVENOUS at 10:25

## 2018-04-15 RX ADMIN — Medication 10 ML: at 06:05

## 2018-04-15 RX ADMIN — CEFEPIME HYDROCHLORIDE 1 G: 1 INJECTION, POWDER, FOR SOLUTION INTRAMUSCULAR; INTRAVENOUS at 00:19

## 2018-04-15 RX ADMIN — Medication 10 ML: at 21:07

## 2018-04-15 RX ADMIN — MUPIROCIN: 20 OINTMENT TOPICAL at 10:18

## 2018-04-15 RX ADMIN — INSULIN LISPRO 10 UNITS: 100 INJECTION, SOLUTION INTRAVENOUS; SUBCUTANEOUS at 00:44

## 2018-04-15 RX ADMIN — SODIUM CHLORIDE 21.6 UNITS/HR: 900 INJECTION, SOLUTION INTRAVENOUS at 08:50

## 2018-04-15 RX ADMIN — SODIUM CHLORIDE 20.6 UNITS/HR: 900 INJECTION, SOLUTION INTRAVENOUS at 20:08

## 2018-04-15 RX ADMIN — LIDOCAINE HYDROCHLORIDE 200 MG: 20 INJECTION, SOLUTION INFILTRATION; PERINEURAL at 10:38

## 2018-04-15 RX ADMIN — HEPARIN SODIUM (PORCINE) LOCK FLUSH IV SOLN 100 UNIT/ML 300 UNITS: 100 SOLUTION at 10:40

## 2018-04-15 RX ADMIN — CEFEPIME HYDROCHLORIDE 1 G: 1 INJECTION, POWDER, FOR SOLUTION INTRAMUSCULAR; INTRAVENOUS at 23:12

## 2018-04-15 RX ADMIN — HEPARIN SODIUM 5000 UNITS: 5000 INJECTION, SOLUTION INTRAVENOUS; SUBCUTANEOUS at 21:06

## 2018-04-15 RX ADMIN — SODIUM CHLORIDE 36.3 UNITS/HR: 900 INJECTION, SOLUTION INTRAVENOUS at 12:14

## 2018-04-15 RX ADMIN — NYSTATIN: 100000 POWDER TOPICAL at 18:29

## 2018-04-15 RX ADMIN — Medication 10 ML: at 00:29

## 2018-04-15 RX ADMIN — SODIUM BICARBONATE: 84 INJECTION, SOLUTION INTRAVENOUS at 18:59

## 2018-04-15 RX ADMIN — SODIUM CHLORIDE 10.8 UNITS/HR: 900 INJECTION, SOLUTION INTRAVENOUS at 05:58

## 2018-04-15 NOTE — PROGRESS NOTES
NAME: Lelia Donald        :  1975        MRN:  [id number]        Assessment :    Plan:  --LITTLE   Rhabdo  Obesity  HTN  Sepsis --Creatinine worse. Poor UO. CK up (?). Plan for Hd today and tomorrow. Pull fluid as tolerated. Subjective:     Chief Complaint:  \" I feel a little better. \"  Still with RLQ pain, although better. -2/10. No N/V. Describes pain as \"hunger pain. \"  No diarrhea. No dyspnea. No HA. No edema. Has Grigsby. No rashes. Review of Systems:    Symptom Y/N Comments  Symptom Y/N Comments   Fever/Chills    Chest Pain     Poor Appetite    Edema     Cough    Abdominal Pain     Sputum    Joint Pain     SOB/WELSH    Pruritis/Rash     Nausea/vomit    Tolerating PT/OT     Diarrhea    Tolerating Diet     Constipation    Other       Could not obtain due to:      Objective:     VITALS:   Last 24hrs VS reviewed since prior progress note.  Most recent are:  Visit Vitals    /78 (BP 1 Location: Left arm, BP Patient Position: Supine)    Pulse 93    Temp 98.6 °F (37 °C)    Resp 29    Ht 5' 9\" (1.753 m)    Wt 122.5 kg (270 lb)    SpO2 92%    BMI 39.87 kg/m2       Intake/Output Summary (Last 24 hours) at 04/15/18 3846  Last data filed at 04/15/18 8681   Gross per 24 hour   Intake           1433.1 ml   Output              555 ml   Net            878.1 ml      Telemetry Reviewed:     PHYSICAL EXAM:  General: More alert  Few rhonchi  abd soft  No edema      Lab Data Reviewed: (see below)    Medications Reviewed: (see below)    PMH/SH reviewed - no change compared to H&P  ________________________________________________________________________  Care Plan discussed with:  Patient     Family      RN     Care Manager                    Consultant:          Comments   >50% of visit spent in counseling and coordination of care       ________________________________________________________________________  Logan Thurman Bianca Campbell MD     Procedures: see electronic medical records for all procedures/Xrays and details which  were not copied into this note but were reviewed prior to creation of Plan. LABS:  Recent Labs      04/15/18   0359  04/14/18   0404   WBC  12.8*  15.9*   HGB  11.6*  13.4   HCT  35.6*  42.2   PLT  89*  115*     Recent Labs      04/15/18   0359  04/14/18   0404  04/13/18   2308   NA  136  155*  157*   K  4.6  3.5  3.7   CL  103  123*  123*   CO2  14*  21  20*   BUN  109*  74*  73*   CREA  8.83*  6.23*  5.70*   GLU  717*  290*  243*   CA  7.1*  7.4*  7.6*   MG  2.4  2.2  2.5*   PHOS  7.0*  4.6  3.9   URICA   --   15.6*   --      Recent Labs      04/13/18 0358   SGOT  715*   AP  86   TP  6.8   ALB  2.6*   GLOB  4.2*     No results for input(s): INR, PTP, APTT in the last 72 hours. No lab exists for component: INREXT, INREXT   No results for input(s): FE, TIBC, PSAT, FERR in the last 72 hours.    No results found for: FOL, RBCF   Recent Labs      04/15/18   0610   PH  7.37   PCO2  31*   PO2  73*     Recent Labs      04/15/18   0359  04/14/18   1026   CPK  66460*  14820*     No components found for: [address] [place] [address]  Lab Results   Component Value Date/Time    Color YELLOW/STRAW 04/12/2018 12:44 AM    Appearance CLEAR 04/12/2018 12:44 AM    Specific gravity <1.005 04/12/2018 12:44 AM    Specific gravity 1.021 04/16/2013 05:05 PM    pH (UA) 5.0 04/12/2018 12:44 AM    Protein NEGATIVE  04/12/2018 12:44 AM    Glucose >1000 (A) 04/12/2018 12:44 AM    Ketone TRACE (A) 04/12/2018 12:44 AM    Bilirubin NEGATIVE  04/12/2018 12:44 AM    Urobilinogen 0.2 04/12/2018 12:44 AM    Nitrites NEGATIVE  04/12/2018 12:44 AM    Leukocyte Esterase NEGATIVE  04/12/2018 12:44 AM    Epithelial cells FEW 04/12/2018 12:44 AM    Bacteria NEGATIVE  04/12/2018 12:44 AM    WBC 0-4 04/12/2018 12:44 AM    RBC 0-5 04/12/2018 12:44 AM       MEDICATIONS:  Current Facility-Administered Medications   Medication Dose Route Frequency    insulin lispro (HUMALOG) injection   SubCUTAneous AC&HS    glucose chewable tablet 16 g  4 Tab Oral PRN    dextrose (D50W) injection syrg 12.5-25 g  12.5-25 g IntraVENous PRN    glucagon (GLUCAGEN) injection 1 mg  1 mg IntraMUSCular PRN    cefepime (MAXIPIME) 1 g in 0.9% sodium chloride (MBP/ADV) 50 mL  1 g IntraVENous Q24H    sodium bicarbonate (8.4%) 150 mEq in dextrose 5% 1,000 mL infusion   IntraVENous CONTINUOUS    heparin (porcine) injection 5,000 Units  5,000 Units SubCUTAneous Q12H    sodium chloride (NS) flush 10-40 mL  10-40 mL IntraVENous PRN    sodium chloride (NS) flush 10 mL  10 mL IntraVENous Q8H    insulin regular (NOVOLIN R, HUMULIN R) 100 Units in 0.9% sodium chloride 100 mL infusion  0-30 Units/hr IntraVENous TITRATE    nitroglycerin (NITROBID) 2 % ointment 1 Inch  1 Inch Topical Q6H PRN    sodium chloride (NS) flush 5-10 mL  5-10 mL IntraVENous Q8H    sodium chloride (NS) flush 5-10 mL  5-10 mL IntraVENous PRN    acetaminophen (TYLENOL) tablet 650 mg  650 mg Oral Q6H PRN    ondansetron (ZOFRAN) injection 4 mg  4 mg IntraVENous Q4H PRN    nystatin (MYCOSTATIN) 100,000 unit/gram powder   Topical BID    mupirocin (BACTROBAN) 2 % ointment   Both Nostrils Q12H

## 2018-04-15 NOTE — PROGRESS NOTES
General Daily Progress Note    Admit Date: 4/11/2018  Hospital day 5    Subjective:     Patient has fatigue. .   Medication side effects: none    Current Facility-Administered Medications   Medication Dose Route Frequency    insulin lispro (HUMALOG) injection   SubCUTAneous AC&HS    glucose chewable tablet 16 g  4 Tab Oral PRN    dextrose (D50W) injection syrg 12.5-25 g  12.5-25 g IntraVENous PRN    glucagon (GLUCAGEN) injection 1 mg  1 mg IntraMUSCular PRN    cefepime (MAXIPIME) 1 g in 0.9% sodium chloride (MBP/ADV) 50 mL  1 g IntraVENous Q24H    sodium bicarbonate (8.4%) 150 mEq in dextrose 5% 1,000 mL infusion   IntraVENous CONTINUOUS    heparin (porcine) injection 5,000 Units  5,000 Units SubCUTAneous Q12H    sodium chloride (NS) flush 10-40 mL  10-40 mL IntraVENous PRN    sodium chloride (NS) flush 10 mL  10 mL IntraVENous Q8H    insulin regular (NOVOLIN R, HUMULIN R) 100 Units in 0.9% sodium chloride 100 mL infusion  0-30 Units/hr IntraVENous TITRATE    nitroglycerin (NITROBID) 2 % ointment 1 Inch  1 Inch Topical Q6H PRN    sodium chloride (NS) flush 5-10 mL  5-10 mL IntraVENous Q8H    sodium chloride (NS) flush 5-10 mL  5-10 mL IntraVENous PRN    acetaminophen (TYLENOL) tablet 650 mg  650 mg Oral Q6H PRN    ondansetron (ZOFRAN) injection 4 mg  4 mg IntraVENous Q4H PRN    nystatin (MYCOSTATIN) 100,000 unit/gram powder   Topical BID    mupirocin (BACTROBAN) 2 % ointment   Both Nostrils Q12H        Review of Systems  Constitutional: positive for fatigue, malaise and anorexia  Respiratory: negative  Cardiovascular: negative  Gastrointestinal: negative  Musculoskeletal:negative    Objective:     Patient Vitals for the past 8 hrs:   BP Temp Pulse Resp SpO2   04/15/18 0708 137/78 98.6 °F (37 °C) 93 29 92 %   04/15/18 0700 - - 99 22 -   04/15/18 0400 134/90 98.8 °F (37.1 °C) (!) 102 28 96 %   04/15/18 0000 123/72 98.8 °F (37.1 °C) (!) 101 (!) 34 -        04/13 1901 - 04/15 0700  In: 3662.3 [P.O.:1640; I.V.:2022.3]  Out: 675 [Urine:675]    Physical Exam:   Visit Vitals    /78 (BP 1 Location: Left arm, BP Patient Position: Supine)    Pulse 93    Temp 98.6 °F (37 °C)    Resp 29    Ht 5' 9\" (1.753 m)    Wt 270 lb (122.5 kg)    SpO2 92%    BMI 39.87 kg/m2     General appearance: alert, fatigued, cooperative, no distress, appears stated age  Lungs: clear to auscultation bilaterally  Heart: regular rate and rhythm  Abdomen: soft, non-tender.  Bowel sounds normal. No masses,  no organomegaly  Extremities: extremities normal, atraumatic, no cyanosis or edema  Neurologic: Grossly normal      ECG: sinus tachycardia     Data Review   Recent Results (from the past 24 hour(s))   GLUCOSE, POC    Collection Time: 04/14/18  7:46 AM   Result Value Ref Range    Glucose (POC) 236 (H) 65 - 100 mg/dL    Performed by Kanika Real    GLUCOSTABILIZER    Collection Time: 04/14/18  7:48 AM   Result Value Ref Range    Glucose 236 mg/dL    Insulin order 11.3 units/hour    Insulin adminstered 11.3 units/hour    Multiplier 0.064     Low target 200 mg/dL    High target 300 mg/dL    D50 order 0.0 ml    D50 administered 0.00 ml    Minutes until next  min    Order initials cr     Administered initials cr     GLSCOM Comments     GLUCOSE, POC    Collection Time: 04/14/18  9:52 AM   Result Value Ref Range    Glucose (POC) 264 (H) 65 - 100 mg/dL    Performed by Francesco Soto    Collection Time: 04/14/18  9:53 AM   Result Value Ref Range    Glucose 264 mg/dL    Insulin order 13.1 units/hour    Insulin adminstered 13.1 units/hour    Multiplier 0.064     Low target 200 mg/dL    High target 300 mg/dL    D50 order 0.0 ml    D50 administered 0.00 ml    Minutes until next  min    Order initials cmsm     Administered initials cmsm     GLSCOM Comments     CK    Collection Time: 04/14/18 10:26 AM   Result Value Ref Range    CK 65956 (HH) 39 - 308 U/L   GLUCOSE, POC    Collection Time: 04/14/18 10:44 AM   Result Value Ref Range    Glucose (POC) 265 (H) 65 - 100 mg/dL    Performed by Rafael Real    GLUCOSE, POC    Collection Time: 04/14/18  4:08 PM   Result Value Ref Range    Glucose (POC) 399 (H) 65 - 100 mg/dL    Performed by Rafael Real    GLUCOSE, POC    Collection Time: 04/15/18 12:25 AM   Result Value Ref Range    Glucose (POC) >600 (HH) 65 - 100 mg/dL    Performed by Kayleen Soulier    GLUCOSE, POC    Collection Time: 04/15/18 12:29 AM   Result Value Ref Range    Glucose (POC) 553 (H) 65 - 100 mg/dL    Performed by [facility]    METABOLIC PANEL, BASIC    Collection Time: 04/15/18  3:59 AM   Result Value Ref Range    Sodium 136 136 - 145 mmol/L    Potassium 4.6 3.5 - 5.1 mmol/L    Chloride 103 97 - 108 mmol/L    CO2 14 (LL) 21 - 32 mmol/L    Anion gap 19 (H) 5 - 15 mmol/L    Glucose 717 (HH) 65 - 100 mg/dL     (H) 6 - 20 MG/DL    Creatinine 8.83 (H) 0.70 - 1.30 MG/DL    BUN/Creatinine ratio 12 12 - 20      GFR est AA 8 (L) >60 ml/min/1.73m2    GFR est non-AA 7 (L) >60 ml/min/1.73m2    Calcium 7.1 (L) 8.5 - 10.1 MG/DL   PHOSPHORUS    Collection Time: 04/15/18  3:59 AM   Result Value Ref Range    Phosphorus 7.0 (H) 2.6 - 4.7 MG/DL   MAGNESIUM    Collection Time: 04/15/18  3:59 AM   Result Value Ref Range    Magnesium 2.4 1.6 - 2.4 mg/dL   CK    Collection Time: 04/15/18  3:59 AM   Result Value Ref Range    CK 26046 (HH) 39 - 308 U/L   CBC W/O DIFF    Collection Time: 04/15/18  3:59 AM   Result Value Ref Range    WBC 12.8 (H) 4.1 - 11.1 K/uL    RBC 3.59 (L) 4.10 - 5.70 M/uL    HGB 11.6 (L) 12.1 - 17.0 g/dL    HCT 35.6 (L) 36.6 - 50.3 %    MCV 99.2 (H) 80.0 - 99.0 FL    MCH 32.3 26.0 - 34.0 PG    MCHC 32.6 30.0 - 36.5 g/dL    RDW 11.3 (L) 11.5 - 14.5 %    PLATELET 89 (L) 707 - 400 K/uL    NRBC 0.0 0  WBC    ABSOLUTE NRBC 0.00 0.00 - 0.01 K/uL   GLUCOSTABILIZER    Collection Time: 04/15/18  5:58 AM   Result Value Ref Range    Glucose 601 mg/dL    Insulin order 10.8 units/hour    Insulin adminstered 10.8 units/hour    Multiplier 0.020     Low target 200 mg/dL    High target 300 mg/dL    D50 order 0.0 ml    D50 administered 0.00 ml    Minutes until next BG 60 min    Order initials umang     Administered initials umang     GLSCOM Comments     BLOOD GAS, ARTERIAL    Collection Time: 04/15/18  6:10 AM   Result Value Ref Range    pH 7.37 7.35 - 7.45      PCO2 31 (L) 35.0 - 45.0 mmHg    PO2 73 (L) 80 - 100 mmHg    O2 SAT 94 92 - 97 %    BICARBONATE 17 (L) 22 - 26 mmol/L    BASE DEFICIT 6.8 mmol/L    O2 METHOD ROOM AIR      SPONTANEOUS RATE 30.0      Sample source ARTERIAL      SITE LEFT RADIAL      SANDY'S TEST YES     GLUCOSE, POC    Collection Time: 04/15/18  7:00 AM   Result Value Ref Range    Glucose (POC) >600 (HH) 65 - 100 mg/dL    Performed by Dana Mccullough    Collection Time: 04/15/18  7:01 AM   Result Value Ref Range    Glucose 601 mg/dL    Insulin order 16.2 units/hour    Insulin adminstered 16.2 units/hour    Multiplier 0.030     Low target 200 mg/dL    High target 300 mg/dL    D50 order 0.0 ml    D50 administered 0.00 ml    Minutes until next BG 60 min    Order initials umang     Administered initials umang     GLSCOM Comments             Assessment:     Active Problems:    Hyperlipidemia (5/1/2012)      Morbid obesity ([facility] (5/6/2013)      Hypertension (10/31/2014)      Type II diabetes mellitus, uncontrolled ([facility] [address] (6/30/2016)      LITTLE (acute kidney injury) ([facility] [address] (7/9/2017)      Hyperosmolar coma due to secondary diabetes ([facility] [address] (4/12/2018)      Acute encephalopathy (4/12/2018)      Non-traumatic rhabdomyolysis (4/15/2018)        Plan:     Feeling ok,less agitation. Scant urine output,striking CK elevation,rising Cr consistent with rhabdo as prime source of LITTLE. HD likely. Back on Insulin drip. Will need large doses of basal insulin when drip halted.

## 2018-04-15 NOTE — DIALYSIS
[facility]  [phone]    Vitals   Pre   Post   Assessment   Pre   Post     Temp  Temp: 98.6 °F (37 °C) (04/15/18 1545)  98.5 LOC  Lethargic  A&OX3 NO CHANGE   HR   Pulse (Heart Rate): 85 (04/15/18 1545) 87 Lungs   CTA, DIMINISHED  NO CHANGE   B/P   BP: 112/77 (04/15/18 1545) 121/82 Cardiac   SINUS  NO CHANGE   Resp   Resp Rate: 23 (04/15/18 1545) 25 Skin   WARM/DRY  INTACT  NO CHANGE   Pain level  Pain Intensity 1: 0 (04/15/18 1527) 0 Edema  NONE     NONE   Orders:    Duration:   Start:    9346 End:    0860 Total:   2.5 HRS   Dialyzer:   Dialyzer/Set Up Inspection: Revaclear (04/15/18 1545)   K Bath:   Dialysate K (mEq/L): 2 (04/15/18 1545)   Ca Bath:   Dialysate CA (mEq/L): 2.5 (04/15/18 1545)   Na/Bicarb:   Dialysate NA (mEq/L): 140 (04/15/18 1545)   Target Fluid Removal:   Goal/Amount of Fluid to Remove (mL): 2000 mL (04/15/18 1545)   Access     Type & Location:   RIGHT TEMP IJ   Labs     Obtained/Reviewed   Critical Results Called   Date when labs were drawn-  Hgb-    HGB   Date Value Ref Range Status   04/15/2018 11.6 (L) 12.1 - 17.0 g/dL Final     K-    Potassium   Date Value Ref Range Status   04/15/2018 4.5 3.5 - 5.1 mmol/L Final     Ca-   Calcium   Date Value Ref Range Status   04/15/2018 7.0 (L) 8.5 - 10.1 MG/DL Final     Bun-   BUN   Date Value Ref Range Status   04/15/2018 114 (H) 6 - 20 MG/DL Final     Creat-   Creatinine   Date Value Ref Range Status   04/15/2018 9.38 (H) 0.70 - 1.30 MG/DL Final        Medications/ Blood Products Given     Name   Dose   Route and Time     NA                Blood Volume Processed (BVP):    42 LITERS Net Fluid   Removed:  2000mL   Comments   Time Out Done: 1500  Primary Nurse Rpt Pre: Eddie Escalera RN  Primary Nurse Rpt Post: Ronette Jw, RN  Pt Education: PROCEDURAL  Care Plan: FOLLOW UP WITH NEPHROLOGY  Tx Summary: HD VIA RIJ TEMP CVC. +ASPIRATION/+FLUSH X 2 PORTS. DRESSING DRY AND INTACT WITH BIOPATCH PRESENT.  ACCESS VISIBLE AND LINES SECURE DURING TX. ALL POSSIBLE BLOOD RINSED BACK POST TX. CATHETER FLUSHED WITH NS AND CAPPED. REPORT EXCHANGED WITH PRIMARY RN. Admiting Diagnosis:   Pt's previous clinic- NA  Consent signed - Informed Consent Verified: Yes (04/15/18 1507)  [facility] Consent - OBTAINED  Hepatitis Status- PENDING  Machine #- Machine Number: [id number] (04/15/18 1545)  Telemetry status- BEDSIDE  Pre-dialysis wt. - Pre-Dialysis Weight: 155.8 kg (343 lb 7.6 oz) (04/15/18 1545)

## 2018-04-15 NOTE — PROGRESS NOTES
0700:  Bedside handoff report received from Tomás White RN (offgoing nurse). 5137:  Interventional Radiology updated on patient's need for central line/HD catheter. Will call team in.    967 6030:  CXR ordered to verify central line/Sukh placement. 1059:  Radiology notified of order for X-ray. 1153:  [facility] updated [facility]'s need [facility] 1240:  Dr. Carmen Campbell updated on patient's increasing insulin needs; see MAR. Will continue to titrate gtt per glucostabilizer program.    0471:  Patient VSS, BG slightly improved from previously with switch to non-dextrose IVF. To begin HD at this time. 1810:  HD finished; VSS; chemistries sent to lab. 1830:  Touched base with Dr. Valeriy Castro; will switch patient back to dextrose containing IVF as BG below 250 at this time. 1900:  Bedside handoff report given to LEIGHA Perry (oncoming nurse).     Chris Moody RN

## 2018-04-15 NOTE — PROGRESS NOTES
0000 Finger stick blood sugar 553. Dr. Erickson Lin called and updated on patient's history and his current blood sugar. How much insulin the patient has received today was reviewed. Orders received to give Insulin Lispro 10 units and Insulin NPH 10 units now. 0200 Up to the toilet in room with one nurse in assistance. Steady gait. 0245 Had a small BM without difficulty. Back to bed with minimal assistance. Tolerated activity fair. 3457 Dr. Erickson Lin called critical Blood sugar of 717 and CO2 of 14. Order received to restart insulin gtt and check ph.  0600 Insulin gtt restarted. Ph 7.37  0621 Critical CK level and ph called to Dr. Erickson Lin, order to call Dr. Estrellita Lyons CK and creatinine levels. 4296 Dr. Estrellita Lyons called critical CK (19878) and CO2 (14) levels. Updated on Creatinine and Blood glucose. No orders received at this time. Dr. Estrellita Lyons stated he would assess the patient this am.  0700 Bedside and Verbal shift change report given to Karen Jean-Baptiste.  Report included the following information SBAR, Kardex, ED Summary, Intake/Output, MAR, Recent Results and Cardiac Rhythm ST.

## 2018-04-16 LAB
ADMINISTERED INITIALS, ADMINIT: NORMAL
ANION GAP SERPL CALC-SCNC: 11 MMOL/L (ref 5–15)
BACTERIA SPEC CULT: NORMAL
BUN SERPL-MCNC: 103 MG/DL (ref 6–20)
BUN/CREAT SERPL: 11 (ref 12–20)
CALCIUM SERPL-MCNC: 8.1 MG/DL (ref 8.5–10.1)
CHLORIDE SERPL-SCNC: 106 MMOL/L (ref 97–108)
CK SERPL-CCNC: ABNORMAL U/L (ref 39–308)
CO2 SERPL-SCNC: 26 MMOL/L (ref 21–32)
CREAT SERPL-MCNC: 9.09 MG/DL (ref 0.7–1.3)
D50 ADMINISTERED, D50ADM: 0 ML
D50 ORDER, D50ORD: 0 ML
ERYTHROCYTE [DISTWIDTH] IN BLOOD BY AUTOMATED COUNT: 11.1 % (ref 11.5–14.5)
GLSCOM COMMENTS: NORMAL
GLUCOSE BLD STRIP.AUTO-MCNC: 117 MG/DL (ref 65–100)
GLUCOSE BLD STRIP.AUTO-MCNC: 128 MG/DL (ref 65–100)
GLUCOSE BLD STRIP.AUTO-MCNC: 131 MG/DL (ref 65–100)
GLUCOSE BLD STRIP.AUTO-MCNC: 137 MG/DL (ref 65–100)
GLUCOSE BLD STRIP.AUTO-MCNC: 141 MG/DL (ref 65–100)
GLUCOSE BLD STRIP.AUTO-MCNC: 145 MG/DL (ref 65–100)
GLUCOSE BLD STRIP.AUTO-MCNC: 150 MG/DL (ref 65–100)
GLUCOSE BLD STRIP.AUTO-MCNC: 150 MG/DL (ref 65–100)
GLUCOSE BLD STRIP.AUTO-MCNC: 152 MG/DL (ref 65–100)
GLUCOSE BLD STRIP.AUTO-MCNC: 154 MG/DL (ref 65–100)
GLUCOSE BLD STRIP.AUTO-MCNC: 157 MG/DL (ref 65–100)
GLUCOSE BLD STRIP.AUTO-MCNC: 171 MG/DL (ref 65–100)
GLUCOSE BLD STRIP.AUTO-MCNC: 296 MG/DL (ref 65–100)
GLUCOSE BLD STRIP.AUTO-MCNC: 356 MG/DL (ref 65–100)
GLUCOSE BLD STRIP.AUTO-MCNC: 380 MG/DL (ref 65–100)
GLUCOSE SERPL-MCNC: 144 MG/DL (ref 65–100)
GLUCOSE, GLC: 117 MG/DL
GLUCOSE, GLC: 128 MG/DL
GLUCOSE, GLC: 131 MG/DL
GLUCOSE, GLC: 137 MG/DL
GLUCOSE, GLC: 141 MG/DL
GLUCOSE, GLC: 145 MG/DL
GLUCOSE, GLC: 150 MG/DL
GLUCOSE, GLC: 150 MG/DL
GLUCOSE, GLC: 152 MG/DL
GLUCOSE, GLC: 154 MG/DL
GLUCOSE, GLC: 157 MG/DL
GLUCOSE, GLC: 171 MG/DL
HBV SURFACE AG SER QL: 0.29 INDEX
HBV SURFACE AG SER QL: NEGATIVE
HCT VFR BLD AUTO: 31.2 % (ref 36.6–50.3)
HGB BLD-MCNC: 10.6 G/DL (ref 12.1–17)
HIGH TARGET, HITG: 180 MG/DL
INSULIN ADMINSTERED, INSADM: 13.5 UNITS/HOUR
INSULIN ADMINSTERED, INSADM: 13.5 UNITS/HOUR
INSULIN ADMINSTERED, INSADM: 13.8 UNITS/HOUR
INSULIN ADMINSTERED, INSADM: 14.6 UNITS/HOUR
INSULIN ADMINSTERED, INSADM: 16.7 UNITS/HOUR
INSULIN ADMINSTERED, INSADM: 4.2 UNITS/HOUR
INSULIN ADMINSTERED, INSADM: 5.5 UNITS/HOUR
INSULIN ADMINSTERED, INSADM: 6.8 UNITS/HOUR
INSULIN ADMINSTERED, INSADM: 7.4 UNITS/HOUR
INSULIN ADMINSTERED, INSADM: 7.8 UNITS/HOUR
INSULIN ADMINSTERED, INSADM: 8.2 UNITS/HOUR
INSULIN ADMINSTERED, INSADM: 9 UNITS/HOUR
INSULIN ORDER, INSORD: 13.5 UNITS/HOUR
INSULIN ORDER, INSORD: 13.5 UNITS/HOUR
INSULIN ORDER, INSORD: 13.8 UNITS/HOUR
INSULIN ORDER, INSORD: 14.6 UNITS/HOUR
INSULIN ORDER, INSORD: 16.7 UNITS/HOUR
INSULIN ORDER, INSORD: 4.2 UNITS/HOUR
INSULIN ORDER, INSORD: 5.5 UNITS/HOUR
INSULIN ORDER, INSORD: 6.8 UNITS/HOUR
INSULIN ORDER, INSORD: 7.4 UNITS/HOUR
INSULIN ORDER, INSORD: 7.8 UNITS/HOUR
INSULIN ORDER, INSORD: 8.2 UNITS/HOUR
INSULIN ORDER, INSORD: 9 UNITS/HOUR
LOW TARGET, LOT: 140 MG/DL
MAGNESIUM SERPL-MCNC: 2.4 MG/DL (ref 1.6–2.4)
MCH RBC QN AUTO: 32.7 PG (ref 26–34)
MCHC RBC AUTO-ENTMCNC: 34 G/DL (ref 30–36.5)
MCV RBC AUTO: 96.3 FL (ref 80–99)
MINUTES UNTIL NEXT BG, NBG: 120 MIN
MINUTES UNTIL NEXT BG, NBG: 60 MIN
MULTIPLIER, MUL: 0.06
MULTIPLIER, MUL: 0.08
MULTIPLIER, MUL: 0.1
MULTIPLIER, MUL: 0.12
MULTIPLIER, MUL: 0.15
NRBC # BLD: 0 K/UL (ref 0–0.01)
NRBC BLD-RTO: 0 PER 100 WBC
ORDER INITIALS, ORDINIT: NORMAL
PHOSPHATE SERPL-MCNC: 7 MG/DL (ref 2.6–4.7)
PLATELET # BLD AUTO: 73 K/UL (ref 150–400)
PMV BLD AUTO: 13 FL (ref 8.9–12.9)
POTASSIUM SERPL-SCNC: 3.6 MMOL/L (ref 3.5–5.1)
RBC # BLD AUTO: 3.24 M/UL (ref 4.1–5.7)
SERVICE CMNT-IMP: ABNORMAL
SERVICE CMNT-IMP: NORMAL
SODIUM SERPL-SCNC: 143 MMOL/L (ref 136–145)
WBC # BLD AUTO: 11 K/UL (ref 4.1–11.1)

## 2018-04-16 PROCEDURE — 36415 COLL VENOUS BLD VENIPUNCTURE: CPT | Performed by: INTERNAL MEDICINE

## 2018-04-16 PROCEDURE — 74011250637 HC RX REV CODE- 250/637: Performed by: INTERNAL MEDICINE

## 2018-04-16 PROCEDURE — 83735 ASSAY OF MAGNESIUM: CPT | Performed by: INTERNAL MEDICINE

## 2018-04-16 PROCEDURE — 74011636637 HC RX REV CODE- 636/637: Performed by: INTERNAL MEDICINE

## 2018-04-16 PROCEDURE — 82550 ASSAY OF CK (CPK): CPT | Performed by: INTERNAL MEDICINE

## 2018-04-16 PROCEDURE — 5A1D70Z PERFORMANCE OF URINARY FILTRATION, INTERMITTENT, LESS THAN 6 HOURS PER DAY: ICD-10-PCS | Performed by: INTERNAL MEDICINE

## 2018-04-16 PROCEDURE — 65610000006 HC RM INTENSIVE CARE

## 2018-04-16 PROCEDURE — 80048 BASIC METABOLIC PNL TOTAL CA: CPT | Performed by: INTERNAL MEDICINE

## 2018-04-16 PROCEDURE — 74011000258 HC RX REV CODE- 258: Performed by: INTERNAL MEDICINE

## 2018-04-16 PROCEDURE — 74011250636 HC RX REV CODE- 250/636: Performed by: INTERNAL MEDICINE

## 2018-04-16 PROCEDURE — 85027 COMPLETE CBC AUTOMATED: CPT | Performed by: INTERNAL MEDICINE

## 2018-04-16 PROCEDURE — 74011000250 HC RX REV CODE- 250: Performed by: INTERNAL MEDICINE

## 2018-04-16 PROCEDURE — 65660000000 HC RM CCU STEPDOWN

## 2018-04-16 PROCEDURE — 84100 ASSAY OF PHOSPHORUS: CPT | Performed by: INTERNAL MEDICINE

## 2018-04-16 PROCEDURE — 82962 GLUCOSE BLOOD TEST: CPT

## 2018-04-16 PROCEDURE — 90935 HEMODIALYSIS ONE EVALUATION: CPT

## 2018-04-16 RX ORDER — INSULIN LISPRO 100 [IU]/ML
INJECTION, SOLUTION INTRAVENOUS; SUBCUTANEOUS
Status: DISCONTINUED | OUTPATIENT
Start: 2018-04-16 | End: 2018-04-28

## 2018-04-16 RX ORDER — MAGNESIUM SULFATE 100 %
4 CRYSTALS MISCELLANEOUS AS NEEDED
Status: DISCONTINUED | OUTPATIENT
Start: 2018-04-16 | End: 2018-04-30 | Stop reason: HOSPADM

## 2018-04-16 RX ORDER — INSULIN GLARGINE 100 [IU]/ML
60 INJECTION, SOLUTION SUBCUTANEOUS EVERY 12 HOURS
Status: DISCONTINUED | OUTPATIENT
Start: 2018-04-16 | End: 2018-04-17

## 2018-04-16 RX ORDER — DEXTROSE 50 % IN WATER (D50W) INTRAVENOUS SYRINGE
12.5-25 AS NEEDED
Status: DISCONTINUED | OUTPATIENT
Start: 2018-04-16 | End: 2018-04-30 | Stop reason: HOSPADM

## 2018-04-16 RX ADMIN — WATER: 1000 INJECTION, SOLUTION INTRAVENOUS at 11:41

## 2018-04-16 RX ADMIN — MUPIROCIN: 20 OINTMENT TOPICAL at 10:39

## 2018-04-16 RX ADMIN — Medication 10 ML: at 21:43

## 2018-04-16 RX ADMIN — SODIUM CHLORIDE 13.8 UNITS/HR: 900 INJECTION, SOLUTION INTRAVENOUS at 01:16

## 2018-04-16 RX ADMIN — INSULIN GLARGINE 60 UNITS: 100 INJECTION, SOLUTION SUBCUTANEOUS at 20:28

## 2018-04-16 RX ADMIN — SODIUM CHLORIDE 7.4 UNITS/HR: 900 INJECTION, SOLUTION INTRAVENOUS at 07:27

## 2018-04-16 RX ADMIN — MUPIROCIN: 20 OINTMENT TOPICAL at 21:43

## 2018-04-16 RX ADMIN — ONDANSETRON 4 MG: 2 INJECTION INTRAMUSCULAR; INTRAVENOUS at 16:55

## 2018-04-16 RX ADMIN — INSULIN LISPRO 7 UNITS: 100 INJECTION, SOLUTION INTRAVENOUS; SUBCUTANEOUS at 16:53

## 2018-04-16 RX ADMIN — Medication 10 ML: at 06:22

## 2018-04-16 RX ADMIN — Medication 10 ML: at 16:54

## 2018-04-16 RX ADMIN — INSULIN GLARGINE 60 UNITS: 100 INJECTION, SOLUTION SUBCUTANEOUS at 11:31

## 2018-04-16 RX ADMIN — ALUMINUM HYDROXIDE AND MAGNESIUM HYDROXIDE 30 ML: 200; 200 SUSPENSION ORAL at 21:41

## 2018-04-16 RX ADMIN — INSULIN LISPRO 10 UNITS: 100 INJECTION, SOLUTION INTRAVENOUS; SUBCUTANEOUS at 22:14

## 2018-04-16 RX ADMIN — HEPARIN SODIUM 5000 UNITS: 5000 INJECTION, SOLUTION INTRAVENOUS; SUBCUTANEOUS at 10:38

## 2018-04-16 RX ADMIN — NYSTATIN: 100000 POWDER TOPICAL at 10:39

## 2018-04-16 RX ADMIN — ACETAMINOPHEN 650 MG: 325 TABLET ORAL at 21:49

## 2018-04-16 RX ADMIN — HEPARIN SODIUM 5000 UNITS: 5000 INJECTION, SOLUTION INTRAVENOUS; SUBCUTANEOUS at 20:29

## 2018-04-16 RX ADMIN — Medication 10 ML: at 16:53

## 2018-04-16 RX ADMIN — NYSTATIN: 100000 POWDER TOPICAL at 18:00

## 2018-04-16 NOTE — PROGRESS NOTES
1330 Insulin drip d/c'd as per DTC instructions, 2 hours after Lantus dose. Patient aware. Next blood sugar due before dinner. 36 Assisted patient to bathroom for bowel movement, stated he also passed some urine \"but not much\" Patient received CHG bath while up and face, mouth, and prosper hygiene completed by patient. Patient began to feel \"whoozy\", vomited ~250 ml of yellowish/green emesis. Medicated with Zofran after patient returned to bed.      1645  dinner blood sugar 296, dosed with 7 units of insulin as ordered per sliding scale. 1900 Report given to on-coming nurse, Gabriela RN.

## 2018-04-16 NOTE — INTERDISCIPLINARY ROUNDS
Interdisciplinary team rounds were held 4/16/18 with the following team members:Care Management, Diabetes Treatment Specialist, Nursing, Nutrition, Pharmacy, Physical Therapy, Physician, Respiratory Therapy and Clinical Coordinator. Plan of care discussed. Goal: See MD orders and progress notes for further  interventions and desired outcomes.

## 2018-04-16 NOTE — PROGRESS NOTES
INTERNAL MEDICINE ATTENDING NOTE    S: Mr. Armani Mendoza was seen by me today during rounds. At this time, he is in ICU, awake, alert, and calm. He complains of metal taste in his mouth. ROS otherwise unremarkable except as noted elsewhere. O: Blood pressure 113/79, pulse (!) 105, temperature 98.1 °F (36.7 °C), temperature source Oral, resp. rate 23, height 5' 9\" (1.753 m), weight 336 lb 6.8 oz (152.6 kg), SpO2 94 %. Gen: Patient is in no acute distress. Lungs: CTAB. Heart: RRR. Abd: S, NT, ND, BS present. Extremities: Warm.     Recent Results (from the past 12 hour(s))   GLUCOSE, POC    Collection Time: 04/16/18  2:11 AM   Result Value Ref Range    Glucose (POC) 150 (H) 65 - 100 mg/dL    Performed by Zouxiu Gamma    Collection Time: 04/16/18  2:12 AM   Result Value Ref Range    Glucose 150 mg/dL    Insulin order 13.5 units/hour    Insulin adminstered 13.5 units/hour    Multiplier 0.150     Low target 140 mg/dL    High target 180 mg/dL    D50 order 0.0 ml    D50 administered 0.00 ml    Minutes until next BG 60 min    Order initials bc     Administered initials bc     GLSCOM Comments     GLUCOSE, POC    Collection Time: 04/16/18  3:15 AM   Result Value Ref Range    Glucose (POC) 157 (H) 65 - 100 mg/dL    Performed by Zouxiu Gamma    Collection Time: 04/16/18  3:15 AM   Result Value Ref Range    Glucose 157 mg/dL    Insulin order 14.6 units/hour    Insulin adminstered 14.6 units/hour    Multiplier 0.150     Low target 140 mg/dL    High target 180 mg/dL    D50 order 0.0 ml    D50 administered 0.00 ml    Minutes until next BG 60 min    Order initials bc     Administered initials bc     GLSCOM Comments     METABOLIC PANEL, BASIC    Collection Time: 04/16/18  3:56 AM   Result Value Ref Range    Sodium 143 136 - 145 mmol/L    Potassium 3.6 3.5 - 5.1 mmol/L    Chloride 106 97 - 108 mmol/L    CO2 26 21 - 32 mmol/L    Anion gap 11 5 - 15 mmol/L    Glucose 144 (H) 65 - 100 mg/dL     (H) 6 - 20 MG/DL    Creatinine 9.09 (H) 0.70 - 1.30 MG/DL    BUN/Creatinine ratio 11 (L) 12 - 20      GFR est AA 8 (L) >60 ml/min/1.73m2    GFR est non-AA 6 (L) >60 ml/min/1.73m2    Calcium 8.1 (L) 8.5 - 10.1 MG/DL   CBC W/O DIFF    Collection Time: 04/16/18  3:56 AM   Result Value Ref Range    WBC 11.0 4.1 - 11.1 K/uL    RBC 3.24 (L) 4.10 - 5.70 M/uL    HGB 10.6 (L) 12.1 - 17.0 g/dL    HCT 31.2 (L) 36.6 - 50.3 %    MCV 96.3 80.0 - 99.0 FL    MCH 32.7 26.0 - 34.0 PG    MCHC 34.0 30.0 - 36.5 g/dL    RDW 11.1 (L) 11.5 - 14.5 %    PLATELET 73 (L) 994 - 400 K/uL    MPV 13.0 (H) 8.9 - 12.9 FL    NRBC 0.0 0  WBC    ABSOLUTE NRBC 0.00 0.00 - 0.01 K/uL   PHOSPHORUS    Collection Time: 04/16/18  3:56 AM   Result Value Ref Range    Phosphorus 7.0 (H) 2.6 - 4.7 MG/DL   MAGNESIUM    Collection Time: 04/16/18  3:56 AM   Result Value Ref Range    Magnesium 2.4 1.6 - 2.4 mg/dL   CK    Collection Time: 04/16/18  3:56 AM   Result Value Ref Range    CK 32067 (HH) 39 - 308 U/L   GLUCOSE, POC    Collection Time: 04/16/18  4:17 AM   Result Value Ref Range    Glucose (POC) 150 (H) 65 - 100 mg/dL    Performed by Corina Perez    Collection Time: 04/16/18  4:18 AM   Result Value Ref Range    Glucose 150 mg/dL    Insulin order 13.5 units/hour    Insulin adminstered 13.5 units/hour    Multiplier 0.150     Low target 140 mg/dL    High target 180 mg/dL    D50 order 0.0 ml    D50 administered 0.00 ml    Minutes until next  min    Order initials bc     Administered initials bc     GLSCOM Comments     GLUCOSE, POC    Collection Time: 04/16/18  6:21 AM   Result Value Ref Range    Glucose (POC) 117 (H) 65 - 100 mg/dL    Performed by Corina Perez    Collection Time: 04/16/18  6:21 AM   Result Value Ref Range    Glucose 117 mg/dL    Insulin order 6.8 units/hour    Insulin adminstered 6.8 units/hour    Multiplier 0.120     Low target 140 mg/dL    High target 180 mg/dL    D50 order 0.0 ml    D50 administered 0.00 ml    Minutes until next BG 60 min    Order initials bc     Administered initials bc     GLSCOM Comments     GLUCOSE, POC    Collection Time: 04/16/18  7:26 AM   Result Value Ref Range    Glucose (POC) 137 (H) 65 - 100 mg/dL    Performed by Laya Hernandes    Collection Time: 04/16/18  7:26 AM   Result Value Ref Range    Glucose 137 mg/dL    Insulin order 7.4 units/hour    Insulin adminstered 7.4 units/hour    Multiplier 0.096     Low target 140 mg/dL    High target 180 mg/dL    D50 order 0.0 ml    D50 administered 0.00 ml    Minutes until next BG 60 min    Order initials bc     Administered initials bc     GLSCOM Comments     GLUCOSE, POC    Collection Time: 04/16/18  8:28 AM   Result Value Ref Range    Glucose (POC) 141 (H) 65 - 100 mg/dL    Performed by Mishel Keller    Collection Time: 04/16/18  8:29 AM   Result Value Ref Range    Glucose 141 mg/dL    Insulin order 7.8 units/hour    Insulin adminstered 7.8 units/hour    Multiplier 0.096     Low target 140 mg/dL    High target 180 mg/dL    D50 order 0.0 ml    D50 administered 0.00 ml    Minutes until next BG 60 min    Order initials ap     Administered initials ap     GLSCOM Comments     GLUCOSE, POC    Collection Time: 04/16/18  9:34 AM   Result Value Ref Range    Glucose (POC) 154 (H) 65 - 100 mg/dL    Performed by Mishel Keller    Collection Time: 04/16/18  9:34 AM   Result Value Ref Range    Glucose 154 mg/dL    Insulin order 9.0 units/hour    Insulin adminstered 9.0 units/hour    Multiplier 0.096     Low target 140 mg/dL    High target 180 mg/dL    D50 order 0.0 ml    D50 administered 0.00 ml    Minutes until next BG 60 min    Order initials ap     Administered initials ap     GLSCOM Comments     GLUCOSE, POC    Collection Time: 04/16/18 10:36 AM   Result Value Ref Range    Glucose (POC) 145 (H) 65 - 100 mg/dL    Performed by Mishel Beasley GLUCOSTABILIZER    Collection Time: 04/16/18 10:37 AM   Result Value Ref Range    Glucose 145 mg/dL    Insulin order 8.2 units/hour    Insulin adminstered 8.2 units/hour    Multiplier 0.096     Low target 140 mg/dL    High target 180 mg/dL    D50 order 0.0 ml    D50 administered 0.00 ml    Minutes until next BG 60 min    Order initials ap     Administered initials ap     GLSCOM Comments     GLUCOSE, POC    Collection Time: 04/16/18 11:38 AM   Result Value Ref Range    Glucose (POC) 131 (H) 65 - 100 mg/dL    Performed by [facility]    Collection Time: 04/16/18 11:40 AM   Result Value Ref Range    Glucose 131 mg/dL    Insulin order 5.5 units/hour    Insulin adminstered 5.5 units/hour    Multiplier 0.077     Low target 140 mg/dL    High target 180 mg/dL    D50 order 0.0 ml    D50 administered 0.00 ml    Minutes until next BG 60 min    Order initials ap     Administered initials ap     GLSCOM Comments     GLUCOSE, POC    Collection Time: 04/16/18 12:49 PM   Result Value Ref Range    Glucose (POC) 128 (H) 65 - 100 mg/dL    Performed by [facility]    Collection Time: 04/16/18 12:50 PM   Result Value Ref Range    Glucose 128 mg/dL    Insulin order 4.2 units/hour    Insulin adminstered 4.2 units/hour    Multiplier 0.062     Low target 140 mg/dL    High target 180 mg/dL    D50 order 0.0 ml    D50 administered 0.00 ml    Minutes until next BG 60 min    Order initials ap     Administered initials ap     GLSCOM Comments          CXR 4/15: No PTX. CXR 4/11: Minimal R ML infiltrate. CXR 4/12: No acute abnormality. US Retroperitoneal 4/14: Normal size and appearance of the kidneys without hydronephrosis. Nondistended urinary bladder with a Grigsby catheter. A/P:   1. Hyperosmolar coma due to secondary diabetes ([address] (4/12/2018): Treated with IVF and insulin. Glc is improved. 2. Rhabdomyolysis:   3. LITTLE: HD per Nephrology.    4. Hypernatremia: hydration; watch labs. 5. Sepsis due to aspiration pneumonia, POA: Empiric antibiotics. 6. Hypoxia: O2 as needed. 7. Acute encephalopathy (4/12/2018): Improving. He is now awake, No longer paranoid and belligerent when I saw him. 8. Hyperlipidemia (5/1/2012)  9. Morbid obesity ([facility] [address] (5/6/2013)  10. Hypertension (10/31/2014): BP okay.       Radha Adair MD, FACP  Best contact is via Pager: [phone], or via hospital  at [phone]

## 2018-04-16 NOTE — PROGRESS NOTES
[facility] Consult Service Progress NOTE  Pulmonary, Critical Care, and Sleep Medicine    Name: Maris Hwang MRN: [id number]   : 1975 Hospital: [facility]   Date: 2018  Admission Date: 2018       IMPRESSION:   1. Rhabdo: CPK 60K  2. Code Tiffany was called  Seemed to have acute delirium, Possible Encephalopathy due to his acute Metabolic Encephalopathy. 3. DKA  4. Hypoxia   5. Sepsis with leukocytosis and tachycardia with lactic acidosis   6. Right middle lobe PNA possible aspiration  7. Hypernatremia from fluid resuscitation,    8. hypocalcemia  9. Acute Encephalopathy from metabolic derangements. 10. HTN  11. Morbid obesity  12. Severe hypophosphatemia  13. erytrhocytosis- wonder about chronic hypoxemia or hemoconcentration  14. Multiorgan dysfunction as outlined above  15. Additional workup outlined below  16. Occasional Etoh on the Weekends. 17. Nonsmoker       RECOMMENDATIONS/PLAN:   1. Back on insulin drip on sterile water  2. Bicarb drip for rhabdo  3. CPK trending down  4. Empiric abx  5. Supplemental oxygen to keep sats > 90%  6. Renal following  7. RRT today  8. Plt count lower, monitor for now  9. Na lower with free water   10. Pt needs IV fluids with additives and Drug therapy requiring intensive monitoring for toxicity  11. Prescription drug management with home med reconciliation reviewed  12. DVT, SUP prophylaxis       My assessment/management discussed with: Nursing for coordination of care         The reason for providing this level of medical care was due to a critical illness that impaired one or more vital organ systems, such that there was a high probability of imminent or life threatening deterioration in the patient's condition. Pt's condition is unstable and unpredictable.      Risk of deterioration: moderate   [x] High complexity decision making was performed  [x] See my orders for details  ICU disposition: to floor today          Chart and notes reviewed. Data reviewed. Pt seen on rounds earlier today. I have evaluated and examined the patient. Pt is unstable and acutely ill in the CCU. Awake, alert, no acute distress  He is hungry    MAR reviewed and pertinent medications noted or modified as needed   Current Facility-Administered Medications   Medication    sodium bicarbonate (8.4%) 150 mEq in dextrose 5% 1,000 mL infusion    glucose chewable tablet 16 g    dextrose (D50W) injection syrg 12.5-25 g    glucagon (GLUCAGEN) injection 1 mg    cefepime (MAXIPIME) 1 g in 0.9% sodium chloride (MBP/ADV) 50 mL    heparin (porcine) injection 5,000 Units    sodium chloride (NS) flush 10-40 mL    sodium chloride (NS) flush 10 mL    insulin regular (NOVOLIN R, HUMULIN R) 100 Units in 0.9% sodium chloride 100 mL infusion    nitroglycerin (NITROBID) 2 % ointment 1 Inch    sodium chloride (NS) flush 5-10 mL    sodium chloride (NS) flush 5-10 mL    acetaminophen (TYLENOL) tablet 650 mg    ondansetron (ZOFRAN) injection 4 mg    nystatin (MYCOSTATIN) 100,000 unit/gram powder    mupirocin (BACTROBAN) 2 % ointment         ROS:Review of systems not obtained due to patient factors. Hemodynamics:    CO:    CI:    CVP:    SVR:   PAP Systolic:    PAP Diastolic:    PVR:    GN94:        Ventilator Settings:      Mode Rate TV Press PEEP FiO2 PIP Min.  Vent                            Vital Signs: Intake/Output: Intake/Output:   Visit Vitals    /80    Pulse 99    Temp 98.2 °F (36.8 °C)    Resp 25    Ht 5' 9\" (1.753 m)    Wt 152.6 kg (336 lb 6.8 oz)    SpO2 94%    BMI 49.68 kg/m2    Temp (24hrs), Av.3 °F (36.8 °C), Min:97.5 °F (36.4 °C), Max:99 °F (37.2 °C)        Telemetry:    normal sinus rhythm   O2 Device: Room air  O2 Flow Rate (L/min): 4 l/min  Wt Readings from Last 4 Encounters:   18 152.6 kg (336 lb 6.8 oz)   17 112 kg (247 lb)   17 151 kg (333 lb)   17 144.8 kg (319 lb 3.6 oz) Intake/Output Summary (Last 24 hours) at 04/16/18 0800  Last data filed at 04/16/18 0600   Gross per 24 hour   Intake          2450.99 ml   Output             2310 ml   Net           140.99 ml     Last shift:         Last 3 shifts: 04/14 1901 - 04/16 0700  In: 2978 [P.O.:1800; I.V.:2005]  Out: 2620 [Urine:620]     Physical Exam:    [address] American male; in no respiratory distress and acyanotic,    HEAD: Normocephalic, without obvious abnormality, atraumatic   EYES: conjunctivae clear. PERRL,  AN Icteric sclerae   NOSE: nares normal, no drainage, no nasal flaring,    THROAT: mucous membranes dry; Lips, mucosa dry; No Thrush; tongue midline   Neck: Supple, symmetrical, trachea midline,  No accessory mm use; No Stridor/ cuff leak, No goiter or thyroid tenderness   LYMPH: No abnormally enlarged lymph nodes. in neck or groin   Chest: normal   Lungs: Had good air movement anteriorly. Heart: Regular rate and rhythm ; NO edema nl s1, 2; No MRG. Abdomen: soft, protuberant, distended, nontender, obese   : normal;  Grigsby, clear urine; NO gross hematuria   Extremity: negative, clubbing; no joint swelling or erythema   Neuro: non focal   Psych: cooperative   Skin: Skin unremarkable;    Pulses:Bilateral, Radial, 2+   Capillary refill: normal ;warm,      Tubes:   Grigsby    DATA:    Interval lab and diagnostic data was reviewed. Interval radiology images were independently viewed and available reports were reviewed. Lab results reviewed. For significant abnormal values and values requiring intervention, see assessment and plan.     MAR reviewed and pertinent medications noted or modified as needed  MEDS:   Current Facility-Administered Medications   Medication    sodium bicarbonate (8.4%) 150 mEq in dextrose 5% 1,000 mL infusion    glucose chewable tablet 16 g    dextrose (D50W) injection syrg 12.5-25 g    glucagon (GLUCAGEN) injection 1 mg    cefepime (MAXIPIME) 1 g in 0.9% sodium chloride (MBP/ADV) 50 mL    heparin (porcine) injection 5,000 Units    sodium chloride (NS) flush 10-40 mL    sodium chloride (NS) flush 10 mL    insulin regular (NOVOLIN R, HUMULIN R) 100 Units in 0.9% sodium chloride 100 mL infusion    nitroglycerin (NITROBID) 2 % ointment 1 Inch    sodium chloride (NS) flush 5-10 mL    sodium chloride (NS) flush 5-10 mL    acetaminophen (TYLENOL) tablet 650 mg    ondansetron (ZOFRAN) injection 4 mg    nystatin (MYCOSTATIN) 100,000 unit/gram powder    mupirocin (BACTROBAN) 2 % ointment        Labs:    Recent Labs      04/16/18   0356  04/15/18   0359  04/14/18   0404   WBC  11.0  12.8*  15.9*   HGB  10.6*  11.6*  13.4   PLT  73*  89*  115*     Recent Labs      04/16/18   0356  04/15/18   1808  04/15/18   0759   NA  143  142  134*   K  3.6  3.4*  4.5   CL  106  105  102   CO2  26  24  17*   GLU  144*  243*  731*   BUN  103*  78*  114*   CREA  9.09*  7.60*  9.38*   CA  8.1*  8.0*  7.0*   MG  2.4  2.5*  2.4   PHOS  7.0*  5.9*  7.8*     Recent Labs      04/15/18   0610   PH  7.37   PCO2  31*   PO2  73*   HCO3  17*     Recent Labs      04/16/18   0356  04/15/18   0759  04/15/18   0359   CPK  02426*  85288*  08586*     No results found for: BNPP, BNP   Lab Results   Component Value Date/Time    Culture result: NO GROWTH 4 DAYS 04/11/2018 10:26 PM     Lab Results   Component Value Date/Time    CK 47883 () 04/16/2018 03:56 AM    CK 42831 () 04/15/2018 07:59 AM     No results found for: IRON, FE, TIBC, IBCT, PSAT, FERR  Lab Results   Component Value Date/Time    RPR Non Reactive 09/06/2017 09:19 AM    TSH 1.530 12/29/2014 11:32 AM      Lab Results   Component Value Date/Time    RPR Non Reactive 09/06/2017 09:19 AM    Hepatitis B surface Ag 0.29 04/15/2018 06:08 PM       Imaging:     This care involved high complexity decision making which includes independently reviewing the patient's past medical records, current laboratory results, medication profiles that were immediately available to me and actual Xray images at the bedside in order to assess, support vital system function, and to treat this degree of vital organ system failure, and to prevent further life threatening deterioration of the patients condition. I was in direct communication with the nursing staff throughout this time. I have personally and independently reviewed the patients interval lab, diagnostic data, radiographs and the reports. I have ordered additional labs to follow the current medical conditions and to monitor treatment responses over the next 24 hours or sooner if needed. I will order additional imaging to follow longitudinal changes found on the most current imaging.      Marisa Shelby MD

## 2018-04-16 NOTE — PROCEDURES
[facility] [place]  [phone]    Vitals   Pre   Post   Assessment   Pre   Post     Temp  Temp: 98.3 °F (36.8 °C) (04/16/18 0945)  98.1 LOC  A&O x4 same   HR   Pulse (Heart Rate): (!) 102 (04/16/18 0945) 105 Lungs   CTA same    B/P   BP: 107/70 (04/16/18 0945) 113/79 Cardiac   Telemetry, NSR, HRR same    Resp   Resp Rate: 29 (04/16/18 0945) 23 Skin   CDI same    Pain level  Pain Intensity 1: 0 (04/16/18 0400) 0 Edema  none     same   Orders:    Duration:   Start:    09:47 End:    12:57 Total:   3 h   Dialyzer:   Dialyzer/Set Up Inspection: Brenda Conway (04/16/18 0945)   K Bath:   Dialysate K (mEq/L): 2 (04/16/18 0945)   Ca Bath:   Dialysate CA (mEq/L): 2.5 (04/16/18 0945)   Na/Bicarb:   Dialysate NA (mEq/L): 140 (04/16/18 0945)   Target Fluid Removal:   Goal/Amount of Fluid to Remove (mL): 2000 mL (04/16/18 0945)   Access     Type & Location:   RIJ CVC: Dressing CDI last changed 04/15/18. No s/s of infection. Both lumens aspirate & flush well. Running well at .     Labs     Obtained/Reviewed   Critical Results Called   Date when labs were drawn-  Hgb-    HGB   Date Value Ref Range Status   04/16/2018 10.6 (L) 12.1 - 17.0 g/dL Final     K-    Potassium   Date Value Ref Range Status   04/16/2018 3.6 3.5 - 5.1 mmol/L Final     Ca-   Calcium   Date Value Ref Range Status   04/16/2018 8.1 (L) 8.5 - 10.1 MG/DL Final     Bun-   BUN   Date Value Ref Range Status   04/16/2018 103 (H) 6 - 20 MG/DL Final     Creat-   Creatinine   Date Value Ref Range Status   04/16/2018 9.09 (H) 0.70 - 1.30 MG/DL Final        Medications/ Blood Products Given     Name   Dose   Route and Time     none                Blood Volume Processed (BVP):   61.5 L Net Fluid   Removed:  2000 ml   Comments   Time Out Done: 09:45  Primary Nurse Rpt Pre: Brii Sage RN  Primary Nurse Rpt Post: Brii Sage RN  Pt Education: procedural  Care Plan: ongoing  Tx Summary:  Arrived to patient room, set up and tested machine and water. Patient is A&Ox4. Consent signed & on file. SBAR received from [address]. 09:15- MD Velasquez at bedside. 09:35- Patient is on IV insulin drip running 9 units/ml//hr  09:47- Both lumens of Sukh disinfected with Alcohol per policy. Each lumen aspirated for blood return and flushed with Normal Saline per policy. VSS. Dialysis Tx initiated. 10:00- Pt resting quietly. VSS  10:30- Primary RN to assess patient's blood glucose-145  10:39- Primary RN adjusted insulin drip, now patient receiving 8.2 units/ml/hr  11:00- Pt resting quietly. VSS  11:15- Flushed lines with 100ml of NS to prevent clotting. 11:25- Changed patient's bath to 3K 2.5Ca per MD order  11:30- Primary RN assessed patient's blood glucose- 131, gave 60 units of Lantus subQ  11:36- Patient states he feels hot, BP 95/69, turned UF off and gave 50ml bolus of NS.  11:37- BP is now 100/68.  11:42- UF back on  11:45- /67. Patient resting quietly, VSS. 12:00- Patient resting quietly. VSS  12:57- Tx ended. VSS. Central line catheter flushed with normal saline per policy. Ports disinfected with Alcohol per policy and lines disconnected and capped using aseptic technique. SBAR given to Primary, RN Josh Chacon. Admiting Diagnosis: Hyperosmolar coma/ rhabdomylysis  Pt's previous clinic- new start, TBD  Consent signed - Informed Consent Verified: Yes (04/16/18 0945)  Jaswinder Consent - on file  Hepatitis Status- negative 04/15/18, <10  Machine #- Machine Number: [id number] (04/16/18 0945)  Telemetry status- telemetry, NSR  Pre-dialysis wt. - Pre-Dialysis Weight: 155.8 kg (343 lb 7.6 oz) (04/15/18 1545)

## 2018-04-16 NOTE — DIABETES MGMT
DTC Elevated A1c Visit Note    Recommendations/ Comments:   Met with pt for consult. Pt very lethargic at time of visit but receptive to education, unsure how much information was retained. He reported that he has \"just been reckless\" with managing his DM and not doing what his is supposed to do. Pt stated that he slipped and fell while drinking juice and that led to him being found unresponsive. Pt began DM education classes in 2016 but never completed. He is however, interested in returning to classes. He stated that he typically gives his insulin injections in the lower abdomen in the same general area, he was unaware that you are able to use your entire abdomen. Pt with multiple questions regarding DM diet. Reviewed importance of decreasing portion sizes. Discussed some healthier alternatives to his usual food choices. Pt frequently eats out and consuming large portions. He also consumes regular soda and juices. Discussed with pt importance of switching to diet beverages. Provided education materials and DTC contact info. DTC to follow-up prior to d/c to schedule outpatient education. Current hospital DM medication:   -Lantus 60 units every 12 hours   -Humalog insulin resistant correction    Chart reviewed and initial evaluation complete on [address]. Patient is a 36 y.o. male with known history of Type 2 Diabetes on Glimepiride 4mg every morning, Lantus 25 units nightly, and Metformin ER 750mg - 2 tabs daily at home. BG monitoring at home 1 times per day - around 6pm.  Patient reports BG levels at home per pt \"all over the place\".     Assessed and instructed patient on the following:   ·  interpretation of lab results, blood sugar goals, complications of diabetes mellitus, exercise, SMBG skills, nutrition, referred to Diabetes Educator, site rotation and use of insulin pen    Encouraged the following:   · increased exercise  · dietary modifications: eliminating juice and regular soda  · regular blood sugar monitorin times daily    Provided patient with the following: [x]             Survival skills education materials               [x]             Insulin education materials               []             CHO counting education materials               []             Outpatient DTC contact number               []             Glucometer                   Discussed with patient and/or family need for follow up appointment for diabetes management after discharge. A1c:   Lab Results   Component Value Date/Time    Hemoglobin A1c 12.8 (H) 2018 12:44 AM       Recent Glucose Results: Lab Results   Component Value Date/Time     (H) 2018 03:56 AM     (H) 04/15/2018 06:08 PM    GLUCPOC 128 (H) 2018 12:49 PM    GLUCPOC 131 (H) 2018 11:38 AM    GLUCPOC 145 (H) 2018 10:36 AM        Lab Results   Component Value Date/Time    Creatinine 9.09 (H) 2018 03:56 AM     Estimated Creatinine Clearance: 15.3 mL/min (based on Cr of 9.09). Active Orders   Diet    DIET DIABETIC CONSISTENT CARB Regular        PO intake: Patient Vitals for the past 72 hrs:   % Diet Eaten   04/15/18 1900 25 %   04/15/18 1409 25 %       Will continue to follow as needed. Thank you.     Galindo Pulliam, [address], [address]  Office: [phone]

## 2018-04-16 NOTE — PROGRESS NOTES
NAME: Aldo Stone        :  1975        MRN:  [id number]        Assessment :    Plan:  --LITTLE   Rhabdo  Obesity  HTN  Sepsis --HD #1 4/15. Hd again today and then Wednesday. Watch for renal recovery. Pull fluid as tolerated. D/C Grigsby       Subjective:     Chief Complaint:  \" I feel much better. \"  Pain in left knee. No diarrhea. No dyspnea. No HA. No edema. No rashes. Review of Systems:    Symptom Y/N Comments  Symptom Y/N Comments   Fever/Chills    Chest Pain     Poor Appetite    Edema     Cough    Abdominal Pain     Sputum    Joint Pain     SOB/WELSH    Pruritis/Rash     Nausea/vomit    Tolerating PT/OT     Diarrhea    Tolerating Diet     Constipation    Other       Could not obtain due to:      Objective:     VITALS:   Last 24hrs VS reviewed since prior progress note.  Most recent are:  Visit Vitals    /80    Pulse 99    Temp 98.2 °F (36.8 °C)    Resp 25    Ht 5' 9\" (1.753 m)    Wt 152.6 kg (336 lb 6.8 oz)    SpO2 94%    BMI 49.68 kg/m2       Intake/Output Summary (Last 24 hours) at 18 4955  Last data filed at 18 0600   Gross per 24 hour   Intake          2450.99 ml   Output             2265 ml   Net           185.99 ml      Telemetry Reviewed:     PHYSICAL EXAM:  General: More alert  Few rhonchi  abd soft  No edema      Lab Data Reviewed: (see below)    Medications Reviewed: (see below)    PMH/SH reviewed - no change compared to H&P  ________________________________________________________________________  Care Plan discussed with:  Patient     Family      RN     Care Manager                    Consultant:          Comments   >50% of visit spent in counseling and coordination of care       ________________________________________________________________________  Slime Rob MD     Procedures: see electronic medical records for all procedures/Xrays and details which  were not copied into this note but were reviewed prior to creation of Plan. LABS:  Recent Labs      04/16/18   0356  04/15/18   0359   WBC  11.0  12.8*   HGB  10.6*  11.6*   HCT  31.2*  35.6*   PLT  73*  89*     Recent Labs      04/16/18   0356  04/15/18   1808  04/15/18   0759   04/14/18   0404   NA  143  142  134*   < >  155*   K  3.6  3.4*  4.5   < >  3.5   CL  106  105  102   < >  123*   CO2  26  24  17*   < >  21   BUN  103*  78*  114*   < >  74*   CREA  9.09*  7.60*  9.38*   < >  6.23*   GLU  144*  243*  731*   < >  290*   CA  8.1*  8.0*  7.0*   < >  7.4*   MG  2.4  2.5*  2.4   < >  2.2   PHOS  7.0*  5.9*  7.8*   < >  4.6   URICA   --    --    --    --   15.6*    < > = values in this interval not displayed. No results for input(s): SGOT, GPT, AP, TBIL, TP, ALB, GLOB, GGT, AML, LPSE in the last 72 hours. No lab exists for component: AMYP, HLPSE  No results for input(s): INR, PTP, APTT in the last 72 hours. No lab exists for component: INREXT, INREXT   No results for input(s): FE, TIBC, PSAT, FERR in the last 72 hours.    No results found for: Kirt Maradiaga   Recent Labs      04/15/18   0610   PH  7.37   PCO2  31*   PO2  73*     Recent Labs      04/16/18   0356  04/15/18   0759  04/15/18   0359   CPK  10801*  83090*  07440*     No components found for: [place]  Lab Results   Component Value Date/Time    Color YELLOW/STRAW 04/12/2018 12:44 AM    Appearance CLEAR 04/12/2018 12:44 AM    Specific gravity <1.005 04/12/2018 12:44 AM    Specific gravity 1.021 04/16/2013 05:05 PM    pH (UA) 5.0 04/12/2018 12:44 AM    Protein NEGATIVE  04/12/2018 12:44 AM    Glucose >1000 (A) 04/12/2018 12:44 AM    Ketone TRACE (A) 04/12/2018 12:44 AM    Bilirubin NEGATIVE  04/12/2018 12:44 AM    Urobilinogen 0.2 04/12/2018 12:44 AM    Nitrites NEGATIVE  04/12/2018 12:44 AM    Leukocyte Esterase NEGATIVE  04/12/2018 12:44 AM    Epithelial cells FEW 04/12/2018 12:44 AM    Bacteria NEGATIVE  04/12/2018 12:44 AM    WBC 0-4 04/12/2018 12:44 AM    RBC 0-5 04/12/2018 12:44 AM       MEDICATIONS:  Current Facility-Administered Medications   Medication Dose Route Frequency    sodium bicarbonate (8.4%) 150 mEq in dextrose 5% 1,000 mL infusion   IntraVENous CONTINUOUS    glucose chewable tablet 16 g  4 Tab Oral PRN    dextrose (D50W) injection syrg 12.5-25 g  12.5-25 g IntraVENous PRN    glucagon (GLUCAGEN) injection 1 mg  1 mg IntraMUSCular PRN    cefepime (MAXIPIME) 1 g in 0.9% sodium chloride (MBP/ADV) 50 mL  1 g IntraVENous Q24H    heparin (porcine) injection 5,000 Units  5,000 Units SubCUTAneous Q12H    sodium chloride (NS) flush 10-40 mL  10-40 mL IntraVENous PRN    sodium chloride (NS) flush 10 mL  10 mL IntraVENous Q8H    insulin regular (NOVOLIN R, HUMULIN R) 100 Units in 0.9% sodium chloride 100 mL infusion  0-50 Units/hr IntraVENous TITRATE    nitroglycerin (NITROBID) 2 % ointment 1 Inch  1 Inch Topical Q6H PRN    sodium chloride (NS) flush 5-10 mL  5-10 mL IntraVENous Q8H    sodium chloride (NS) flush 5-10 mL  5-10 mL IntraVENous PRN    acetaminophen (TYLENOL) tablet 650 mg  650 mg Oral Q6H PRN    ondansetron (ZOFRAN) injection 4 mg  4 mg IntraVENous Q4H PRN    nystatin (MYCOSTATIN) 100,000 unit/gram powder   Topical BID    mupirocin (BACTROBAN) 2 % ointment   Both Nostrils Q12H

## 2018-04-16 NOTE — PROGRESS NOTES
1915- Report received from [address] [place] [address].   2108- Pt complaining of burning in chest after eating, also tenderness to touch over RUQ on abd. Dr Erick Joseph paged. 2116- Dr Erick Joseph returned page. Informed RN that Dr Roro Hightower takes call on his own pts, please re-page through service. 2125- Dr Roro Hightower updated. Orders received for Abd ultrasound in am and Maalox 30mL Q6hr prn.    6425- . Call to Dr Roro Hightower. 0- Dr Roro Hightower updated. Orders received to give 10units Humalog tonight. 0502-Pt sleeping soundly, upon waking for assessment pt began complaining of headache, burning indigestion and left knee pain. Pt declines all offers of medications, repositioning or pillows for elevating his knee. 0515- Pt resting quietly with his eyes closed. 0730- Report given to Margie AHUJA.   0277- Dr. Esperanza Isaacs updated with am BG of 356.  Orders received for 15units lispro for SSI this am.

## 2018-04-16 NOTE — PROGRESS NOTES
1930- Bedside and Verbal shift change report given to  VI Randall RN (oncoming nurse) by DIANN Villeda RN (offgoing nurse). Report included the following information SBAR, Intake/Output, MAR and Recent Results. Pt visiting with family at bedside, eating his dinner tray, only ate approx 10% of meal.  Pt c/o being very \"drained\" and just wants to sleep. Lights dimmed to aid in rest. Assessment as noted    Shift summary-  Pt rested well throughout the night, dozing intermittently. Insulin gtt continues, and BS responding well. Pt has minimal complaint overnight of some knee stiffness, will discuss with MD on AM rounds. Critical lab received from lab this am CPK is 29,342. Much improved from previous day.  Will notify MD this am.

## 2018-04-16 NOTE — DIABETES MGMT
DTC Progress Note    Recommendations/ Comments:Pt reviewed in CCU rounds with rounding team and Dr. Buddy Barton. Plan to transition pt off insulin gtt to lantus 60units Q12hrs, humalog correction resistant scale and remove dextrose from bicarb gtt. Pt not eating well yesterday. If po intake improves then pt will likely require prandial insulin as well. Would consider beginning with humalog 12units ac tid. Pt previously requiring avg 15units/hr. Last 4hrs avg 7-8units/hr. Pt transitioned off insulin gtt over weekend, however, BG rebounded to >700 and insulin gtt was resumed. Current hospital DM medication: insulin gtt    Chart reviewed on [address] [facility] for hyperglycemia. Patient is a 36 y.o. male with known history of Type 2 Diabetes on Glimepiride 4mg every morning, Lantus 25 units nightly, and Metformin ER 750mg - 2 tabs daily at home. A1c:   Lab Results   Component Value Date/Time    Hemoglobin A1c 12.8 (H) 04/12/2018 12:44 AM    Hemoglobin A1c 5.6 12/05/2017 10:15 AM       Recent Glucose Results:   Lab Results   Component Value Date/Time     (H) 04/16/2018 03:56 AM     (H) 04/15/2018 06:08 PM    GLUCPOC 154 (H) 04/16/2018 09:34 AM    GLUCPOC 141 (H) 04/16/2018 08:28 AM    GLUCPOC 137 (H) 04/16/2018 07:26 AM        Lab Results   Component Value Date/Time    Creatinine 9.09 (H) 04/16/2018 03:56 AM     Estimated Creatinine Clearance: 15.3 mL/min (based on Cr of 9.09). Active Orders   Diet    DIET DIABETIC CONSISTENT CARB Regular        PO intake:   Patient Vitals for the past 72 hrs:   % Diet Eaten   04/15/18 1900 25 %   04/15/18 1409 25 %       Will continue to follow as needed.     Thank you  BARBARA Harrison, RN, [address]

## 2018-04-17 ENCOUNTER — APPOINTMENT (OUTPATIENT)
Dept: ULTRASOUND IMAGING | Age: 43
DRG: 871 | End: 2018-04-17
Attending: INTERNAL MEDICINE
Payer: COMMERCIAL

## 2018-04-17 LAB
ALBUMIN SERPL-MCNC: 2 G/DL (ref 3.5–5)
ALBUMIN/GLOB SERPL: 0.6 {RATIO} (ref 1.1–2.2)
ALP SERPL-CCNC: 76 U/L (ref 45–117)
ALT SERPL-CCNC: 226 U/L (ref 12–78)
ANION GAP SERPL CALC-SCNC: 11 MMOL/L (ref 5–15)
AST SERPL-CCNC: 209 U/L (ref 15–37)
BILIRUB SERPL-MCNC: 1.1 MG/DL (ref 0.2–1)
BUN SERPL-MCNC: 93 MG/DL (ref 6–20)
BUN/CREAT SERPL: 10 (ref 12–20)
CALCIUM SERPL-MCNC: 8.4 MG/DL (ref 8.5–10.1)
CHLORIDE SERPL-SCNC: 99 MMOL/L (ref 97–108)
CK MB CFR SERPL CALC: 0 % (ref 0–2.5)
CK MB SERPL-MCNC: 2.6 NG/ML (ref 5–25)
CK SERPL-CCNC: 9655 U/L (ref 39–308)
CO2 SERPL-SCNC: 27 MMOL/L (ref 21–32)
CREAT SERPL-MCNC: 9.31 MG/DL (ref 0.7–1.3)
ERYTHROCYTE [DISTWIDTH] IN BLOOD BY AUTOMATED COUNT: 11 % (ref 11.5–14.5)
GLOBULIN SER CALC-MCNC: 3.6 G/DL (ref 2–4)
GLUCOSE BLD STRIP.AUTO-MCNC: 249 MG/DL (ref 65–100)
GLUCOSE BLD STRIP.AUTO-MCNC: 304 MG/DL (ref 65–100)
GLUCOSE BLD STRIP.AUTO-MCNC: 310 MG/DL (ref 65–100)
GLUCOSE BLD STRIP.AUTO-MCNC: 338 MG/DL (ref 65–100)
GLUCOSE BLD STRIP.AUTO-MCNC: 348 MG/DL (ref 65–100)
GLUCOSE BLD STRIP.AUTO-MCNC: 356 MG/DL (ref 65–100)
GLUCOSE SERPL-MCNC: 346 MG/DL (ref 65–100)
HCT VFR BLD AUTO: 28.1 % (ref 36.6–50.3)
HGB BLD-MCNC: 9.6 G/DL (ref 12.1–17)
MCH RBC QN AUTO: 32.8 PG (ref 26–34)
MCHC RBC AUTO-ENTMCNC: 34.2 G/DL (ref 30–36.5)
MCV RBC AUTO: 95.9 FL (ref 80–99)
NRBC # BLD: 0 K/UL (ref 0–0.01)
NRBC BLD-RTO: 0 PER 100 WBC
PLATELET # BLD AUTO: 86 K/UL (ref 150–400)
PMV BLD AUTO: 12.9 FL (ref 8.9–12.9)
POTASSIUM SERPL-SCNC: 4 MMOL/L (ref 3.5–5.1)
PROT SERPL-MCNC: 5.6 G/DL (ref 6.4–8.2)
RBC # BLD AUTO: 2.93 M/UL (ref 4.1–5.7)
SERVICE CMNT-IMP: ABNORMAL
SODIUM SERPL-SCNC: 137 MMOL/L (ref 136–145)
WBC # BLD AUTO: 9.8 K/UL (ref 4.1–11.1)

## 2018-04-17 PROCEDURE — 74011250637 HC RX REV CODE- 250/637: Performed by: INTERNAL MEDICINE

## 2018-04-17 PROCEDURE — 74011250636 HC RX REV CODE- 250/636: Performed by: INTERNAL MEDICINE

## 2018-04-17 PROCEDURE — 36415 COLL VENOUS BLD VENIPUNCTURE: CPT | Performed by: INTERNAL MEDICINE

## 2018-04-17 PROCEDURE — 82550 ASSAY OF CK (CPK): CPT | Performed by: INTERNAL MEDICINE

## 2018-04-17 PROCEDURE — 65660000000 HC RM CCU STEPDOWN

## 2018-04-17 PROCEDURE — 82962 GLUCOSE BLOOD TEST: CPT

## 2018-04-17 PROCEDURE — 80053 COMPREHEN METABOLIC PANEL: CPT | Performed by: INTERNAL MEDICINE

## 2018-04-17 PROCEDURE — 85027 COMPLETE CBC AUTOMATED: CPT | Performed by: INTERNAL MEDICINE

## 2018-04-17 PROCEDURE — 76700 US EXAM ABDOM COMPLETE: CPT

## 2018-04-17 PROCEDURE — 74011636637 HC RX REV CODE- 636/637: Performed by: INTERNAL MEDICINE

## 2018-04-17 PROCEDURE — 74011000250 HC RX REV CODE- 250: Performed by: INTERNAL MEDICINE

## 2018-04-17 RX ORDER — INSULIN GLARGINE 100 [IU]/ML
75 INJECTION, SOLUTION SUBCUTANEOUS EVERY 12 HOURS
Status: DISCONTINUED | OUTPATIENT
Start: 2018-04-17 | End: 2018-04-19

## 2018-04-17 RX ORDER — INSULIN GLARGINE 100 [IU]/ML
15 INJECTION, SOLUTION SUBCUTANEOUS
Status: COMPLETED | OUTPATIENT
Start: 2018-04-17 | End: 2018-04-17

## 2018-04-17 RX ORDER — INSULIN LISPRO 100 [IU]/ML
15 INJECTION, SOLUTION INTRAVENOUS; SUBCUTANEOUS
Status: DISCONTINUED | OUTPATIENT
Start: 2018-04-17 | End: 2018-04-28

## 2018-04-17 RX ADMIN — NYSTATIN: 100000 POWDER TOPICAL at 17:54

## 2018-04-17 RX ADMIN — ALUMINUM HYDROXIDE AND MAGNESIUM HYDROXIDE 30 ML: 200; 200 SUSPENSION ORAL at 12:24

## 2018-04-17 RX ADMIN — INSULIN GLARGINE 75 UNITS: 100 INJECTION, SOLUTION SUBCUTANEOUS at 21:56

## 2018-04-17 RX ADMIN — Medication 10 ML: at 21:50

## 2018-04-17 RX ADMIN — Medication 10 ML: at 16:47

## 2018-04-17 RX ADMIN — HEPARIN SODIUM 5000 UNITS: 5000 INJECTION, SOLUTION INTRAVENOUS; SUBCUTANEOUS at 20:31

## 2018-04-17 RX ADMIN — ACETAMINOPHEN 650 MG: 325 TABLET ORAL at 17:58

## 2018-04-17 RX ADMIN — INSULIN LISPRO 15 UNITS: 100 INJECTION, SOLUTION INTRAVENOUS; SUBCUTANEOUS at 07:45

## 2018-04-17 RX ADMIN — INSULIN LISPRO 15 UNITS: 100 INJECTION, SOLUTION INTRAVENOUS; SUBCUTANEOUS at 16:45

## 2018-04-17 RX ADMIN — HEPARIN SODIUM 5000 UNITS: 5000 INJECTION, SOLUTION INTRAVENOUS; SUBCUTANEOUS at 09:34

## 2018-04-17 RX ADMIN — INSULIN LISPRO 15 UNITS: 100 INJECTION, SOLUTION INTRAVENOUS; SUBCUTANEOUS at 10:45

## 2018-04-17 RX ADMIN — WATER: 1000 INJECTION, SOLUTION INTRAVENOUS at 20:27

## 2018-04-17 RX ADMIN — MUPIROCIN: 20 OINTMENT TOPICAL at 09:44

## 2018-04-17 RX ADMIN — INSULIN LISPRO 10 UNITS: 100 INJECTION, SOLUTION INTRAVENOUS; SUBCUTANEOUS at 10:46

## 2018-04-17 RX ADMIN — INSULIN GLARGINE 15 UNITS: 100 INJECTION, SOLUTION SUBCUTANEOUS at 10:45

## 2018-04-17 RX ADMIN — Medication 10 ML: at 16:48

## 2018-04-17 RX ADMIN — INSULIN LISPRO 2 UNITS: 100 INJECTION, SOLUTION INTRAVENOUS; SUBCUTANEOUS at 21:55

## 2018-04-17 RX ADMIN — ONDANSETRON 4 MG: 2 INJECTION INTRAMUSCULAR; INTRAVENOUS at 07:45

## 2018-04-17 RX ADMIN — INSULIN GLARGINE 60 UNITS: 100 INJECTION, SOLUTION SUBCUTANEOUS at 09:34

## 2018-04-17 RX ADMIN — INSULIN LISPRO 10 UNITS: 100 INJECTION, SOLUTION INTRAVENOUS; SUBCUTANEOUS at 16:46

## 2018-04-17 RX ADMIN — Medication 10 ML: at 04:48

## 2018-04-17 NOTE — PROGRESS NOTES
INTERNAL MEDICINE ATTENDING NOTE    S: Mr. Linda Villarreal was seen by me today during rounds. At this time, he is in ICU, awake, alert, and calm. Sitting on commode. He has had some epigastric discomfort and nausea with eating. ROS otherwise unremarkable except as noted elsewhere. O: Blood pressure 132/74, pulse (!) 110, temperature 98.7 °F (37.1 °C), resp. rate 17, height 5' 9\" (1.753 m), weight 336 lb 6.8 oz (152.6 kg), SpO2 96 %. Gen: Patient is in no acute distress. Lungs: CTAB. Heart: RRR. Abd: S, NT, ND, BS present. Extremities: Warm. Recent Results (from the past 12 hour(s))   GLUCOSE, POC    Collection Time: 04/16/18  9:51 PM   Result Value Ref Range    Glucose (POC) 356 (H) 65 - 100 mg/dL    Performed by Nimo Ochoa    METABOLIC PANEL, COMPREHENSIVE    Collection Time: 04/17/18  4:47 AM   Result Value Ref Range    Sodium 137 136 - 145 mmol/L    Potassium 4.0 3.5 - 5.1 mmol/L    Chloride 99 97 - 108 mmol/L    CO2 27 21 - 32 mmol/L    Anion gap 11 5 - 15 mmol/L    Glucose 346 (H) 65 - 100 mg/dL    BUN 93 (H) 6 - 20 MG/DL    Creatinine 9.31 (H) 0.70 - 1.30 MG/DL    BUN/Creatinine ratio 10 (L) 12 - 20      GFR est AA 8 (L) >60 ml/min/1.73m2    GFR est non-AA 6 (L) >60 ml/min/1.73m2    Calcium 8.4 (L) 8.5 - 10.1 MG/DL    Bilirubin, total 1.1 (H) 0.2 - 1.0 MG/DL    ALT (SGPT) 226 (H) 12 - 78 U/L    AST (SGOT) 209 (H) 15 - 37 U/L    Alk.  phosphatase 76 45 - 117 U/L    Protein, total 5.6 (L) 6.4 - 8.2 g/dL    Albumin 2.0 (L) 3.5 - 5.0 g/dL    Globulin 3.6 2.0 - 4.0 g/dL    A-G Ratio 0.6 (L) 1.1 - 2.2     CBC W/O DIFF    Collection Time: 04/17/18  4:47 AM   Result Value Ref Range    WBC 9.8 4.1 - 11.1 K/uL    RBC 2.93 (L) 4.10 - 5.70 M/uL    HGB 9.6 (L) 12.1 - 17.0 g/dL    HCT 28.1 (L) 36.6 - 50.3 %    MCV 95.9 80.0 - 99.0 FL    MCH 32.8 26.0 - 34.0 PG    MCHC 34.2 30.0 - 36.5 g/dL    RDW 11.0 (L) 11.5 - 14.5 %    PLATELET 86 (L) 923 - 400 K/uL    MPV 12.9 8.9 - 12.9 FL    NRBC 0.0 0  WBC ABSOLUTE NRBC 0.00 0.00 - 0.01 K/uL   CK W/ CKMB & INDEX    Collection Time: 04/17/18  4:47 AM   Result Value Ref Range    CK 9655 (H) 39 - 308 U/L    CK - MB 2.6 <3.6 NG/ML    CK-MB Index 0.0 0 - 2.5     GLUCOSE, POC    Collection Time: 04/17/18  6:58 AM   Result Value Ref Range    Glucose (POC) 356 (H) 65 - 100 mg/dL    Performed by Virginia Ochoa         CXR 4/15: No PTX. CXR 4/11: Minimal R ML infiltrate. CXR 4/12: No acute abnormality. US Retroperitoneal 4/14: Normal size and appearance of the kidneys without hydronephrosis. Nondistended urinary bladder with a Grigsby catheter. A/P:   1. Hyperosmolar coma due to secondary diabetes ([facility] [address] (4/12/2018): Treated with IVF and insulin. Glc is improved. 2. Rhabdomyolysis: CK down   3. LITTLE: HD per Nephrology. 4. Hypernatremia: hydration; watch labs. 5. Sepsis due to aspiration pneumonia, POA: Empiric antibiotics. 6. Hypoxia: O2 as needed. 7. Acute encephalopathy (4/12/2018): Improving. He is now awake, no longer paranoid, seems at baseline. 8. Hyperlipidemia (5/1/2012)  9. Morbid obesity ([address] (5/6/2013)  10. Hypertension (10/31/2014): BP okay.       Kavya Acosta MD, FACP  Best contact is via Pager: [phone], or via hospital  at [phone]

## 2018-04-17 NOTE — DIABETES MGMT
DTC Progress Note    Recommendations/ Comments:Pt reviewed in CCU rounds with rounding team and Dr. Connor Cushing. Plan to increase lantus to 75units Q12hrs, 15units now to equal 75units total this am, and add humalog 15units ac tid. Pt eating well per nurse. Current hospital DM medication: insulin gtt    [facility] [address] [facility]. Patient is a 36 y.o. male with known history of Type 2 Diabetes on Glimepiride 4mg every morning, Lantus 25 units nightly, and Metformin ER 750mg - 2 tabs daily at home. A1c:   Lab Results   Component Value Date/Time    Hemoglobin A1c 12.8 (H) 04/12/2018 12:44 AM    Hemoglobin A1c 5.6 12/05/2017 10:15 AM       Recent Glucose Results:   Lab Results   Component Value Date/Time     (H) 04/17/2018 04:47 AM    GLUCPOC 348 (H) 04/17/2018 10:39 AM    GLUCPOC 338 (H) 04/17/2018 09:41 AM    GLUCPOC 356 (H) 04/17/2018 06:58 AM        Lab Results   Component Value Date/Time    Creatinine 9.31 (H) 04/17/2018 04:47 AM     Estimated Creatinine Clearance: 15 mL/min (based on Cr of 9.31). Active Orders   Diet    DIET DIABETIC CONSISTENT CARB Regular        PO intake:   Patient Vitals for the past 72 hrs:   % Diet Eaten   04/16/18 1800 30 %   04/16/18 1300 25 %   04/16/18 0830 25 %   04/15/18 1900 25 %   04/15/18 1409 25 %       Will continue to follow as needed.     Thank you  Monique Tubbs, NICON, RN, [address]

## 2018-04-17 NOTE — PROGRESS NOTES
NAME: Juvencio Irizarry        :  1975        MRN:  [id number]        Assessment :    Plan:  --LITTLE   Rhabdo  Obesity  HTN  Sepsis --HD #1 4/15. Hd today and then MWF. Watch for renal recovery. Suspect that recovery will take weeks. Will ask CM to help with outpatient placement. Check phos    Check iron status    Pull fluid as tolerated. Subjective:     Chief Complaint:  \" I threw up. \"  Feels better now. No diarrhea. No dyspnea. No HA. No edema. No rashes. Review of Systems:    Symptom Y/N Comments  Symptom Y/N Comments   Fever/Chills    Chest Pain     Poor Appetite    Edema     Cough    Abdominal Pain     Sputum    Joint Pain     SOB/WELSH    Pruritis/Rash     Nausea/vomit    Tolerating PT/OT     Diarrhea    Tolerating Diet     Constipation    Other       Could not obtain due to:      Objective:     VITALS:   Last 24hrs VS reviewed since prior progress note.  Most recent are:  Visit Vitals    /71    Pulse (!) 114    Temp 98.7 °F (37.1 °C)    Resp 17    Ht 5' 9\" (1.753 m)    Wt 152.6 kg (336 lb 6.8 oz)    SpO2 97%    BMI 49.68 kg/m2       Intake/Output Summary (Last 24 hours) at 18 0932  Last data filed at 18 0600   Gross per 24 hour   Intake          1728.98 ml   Output             2272 ml   Net          -543.02 ml      Telemetry Reviewed:     PHYSICAL EXAM:  General: NAD  CTA  abd soft  No edema      Lab Data Reviewed: (see below)    Medications Reviewed: (see below)    PMH/SH reviewed - no change compared to H&P  ________________________________________________________________________  Care Plan discussed with:  Patient     Family      RN     Care Manager                    Consultant:          Comments   >50% of visit spent in counseling and coordination of care       ________________________________________________________________________  Cate Sanchez MD     Procedures: see electronic medical records for all procedures/Xrays and details which  were not copied into this note but were reviewed prior to creation of Plan. LABS:  Recent Labs      04/17/18 0447 04/16/18   0356   WBC  9.8  11.0   HGB  9.6*  10.6*   HCT  28.1*  31.2*   PLT  86*  73*     Recent Labs      04/17/18   0447  04/16/18   0356  04/15/18   1808  04/15/18   0759   NA  137  143  142  134*   K  4.0  3.6  3.4*  4.5   CL  99  106  105  102   CO2  27  26  24  17*   BUN  93*  103*  78*  114*   CREA  9.31*  9.09*  7.60*  9.38*   GLU  346*  144*  243*  731*   CA  8.4*  8.1*  8.0*  7.0*   MG   --   2.4  2.5*  2.4   PHOS   --   7.0*  5.9*  7.8*     Recent Labs      04/17/18 0447   SGOT  209*   AP  76   TP  5.6*   ALB  2.0*   GLOB  3.6     No results for input(s): INR, PTP, APTT in the last 72 hours. No lab exists for component: INREXT, INREXT   No results for input(s): FE, TIBC, PSAT, FERR in the last 72 hours.    No results found for: [employer]   Recent Labs      04/15/18   0610   PH  7.37   PCO2  31*   PO2  73*     Recent Labs      04/17/18 0447  04/16/18 0356  04/15/18   0759   CPK  9655*  70388*  96354*   CKMB  2.6   --    --      No components found for: [place]  Lab Results   Component Value Date/Time    Color YELLOW/STRAW 04/12/2018 12:44 AM    Appearance CLEAR 04/12/2018 12:44 AM    Specific gravity <1.005 04/12/2018 12:44 AM    Specific gravity 1.021 04/16/2013 05:05 PM    pH (UA) 5.0 04/12/2018 12:44 AM    Protein NEGATIVE  04/12/2018 12:44 AM    Glucose >1000 (A) 04/12/2018 12:44 AM    Ketone TRACE (A) 04/12/2018 12:44 AM    Bilirubin NEGATIVE  04/12/2018 12:44 AM    Urobilinogen 0.2 04/12/2018 12:44 AM    Nitrites NEGATIVE  04/12/2018 12:44 AM    Leukocyte Esterase NEGATIVE  04/12/2018 12:44 AM    Epithelial cells FEW 04/12/2018 12:44 AM    Bacteria NEGATIVE  04/12/2018 12:44 AM    WBC 0-4 04/12/2018 12:44 AM    RBC 0-5 04/12/2018 12:44 AM       MEDICATIONS:  Current Facility-Administered Medications Medication Dose Route Frequency    insulin glargine (LANTUS) injection 60 Units  60 Units SubCUTAneous Q12H    insulin lispro (HUMALOG) injection   SubCUTAneous AC&HS    glucose chewable tablet 16 g  4 Tab Oral PRN    dextrose (D50W) injection syrg 12.5-25 g  12.5-25 g IntraVENous PRN    glucagon (GLUCAGEN) injection 1 mg  1 mg IntraMUSCular PRN    sodium bicarbonate (8.4%) 150 mEq in sterile water 1,000 mL infusion   IntraVENous CONTINUOUS    aluminum-magnesium hydroxide (MAALOX) oral suspension 30 mL  30 mL Oral Q6H PRN    heparin (porcine) injection 5,000 Units  5,000 Units SubCUTAneous Q12H    sodium chloride (NS) flush 10-40 mL  10-40 mL IntraVENous PRN    sodium chloride (NS) flush 10 mL  10 mL IntraVENous Q8H    nitroglycerin (NITROBID) 2 % ointment 1 Inch  1 Inch Topical Q6H PRN    sodium chloride (NS) flush 5-10 mL  5-10 mL IntraVENous Q8H    sodium chloride (NS) flush 5-10 mL  5-10 mL IntraVENous PRN    acetaminophen (TYLENOL) tablet 650 mg  650 mg Oral Q6H PRN    ondansetron (ZOFRAN) injection 4 mg  4 mg IntraVENous Q4H PRN    nystatin (MYCOSTATIN) 100,000 unit/gram powder   Topical BID    mupirocin (BACTROBAN) 2 % ointment   Both Nostrils Q12H

## 2018-04-17 NOTE — PROGRESS NOTES
0745: Bedside shift change report given to Bennie Wu RN (oncoming nurse) by Candice Golden RN (offgoing nurse). Report included the following information SBAR, Kardex, Intake/Output, MAR, Recent Results, Cardiac Rhythm Sinus tachycardia and Alarm Parameters . Assumed care of patient, reviewed chart, and completed full assessment. Patient is nauseated at this time and vomited approximately 200 mLs of greenish brown liquid emesis. Administered prn ondansetron and assisted patient to the toilet and then back to bed. Per night nurse, patient's blood glucose was 356 at 0658. Night nurse communicated this blood glucose level with Dr. Nancy Beebe, who instructed nursing staff to administer 15 units insulin lispro for sliding scale dose this morning. Will administer insulin glargine as soon as possible. Patient resting quietly in bed with bed in low position, bed wheels locked, bed alarm set, side rails up x3,and  call bell within reach. Will continue to monitor closely. 0945: Interdisciplinary rounds were completed, and patient's plan of care was discussed. Notified Dr. Nancy Beebe of most recent random glucose check: 338. Dr. Nancy Beebe placed orders for 15 units insulin glargine now, and for 15 units scheduled insulin lispro coverage before meals. Will continue to monitor closely. 1046: Blood glucose is 348. Patient received 10 units insulin lispro for sliding scale coverage in addition to scheduled insulin lispro and insulin glargine. Will continue to monitor closely. 1224: Patient is reporting \"heartburn. \" Administered Maalox. Patient asked for his lunch tray to be set up. Assisted patient with lunch tray setup. Advised patient to wait a few minutes before eating lunch if he is having active reflux symptoms. Patient verbalized understanding of the rationale for waiting to eat until his reflux symptoms have subsided. Patient voices no other concerns at this time. Will continue to monitor closely.      1646: Blood glucose is 310. Administered 10 units subcutaneous insulin lispro in addition to scheduled insulin lispro. Will continue to monitor closely. 1758: Patient reported that he had a headache after \"I sat up too quick in the bed. \" Blood pressure checked and was consistent with patient's previous blood pressures. Assessed patient's headache, which patient rated as a 5 on the numeric scale. Patient stated it was a dull headache behind his eyes. Patient requested pain medication. Offered prn acetaminophen, which patient accepted. Will continue to monitor closely. 1945: Bedside shift change report given to Morris RN (oncoming nurse) by Mario Rasmussen RN (offgoing nurse). Report included the following information SBAR, Kardex, Intake/Output, MAR, Recent Results, Cardiac Rhythm Normal sinus rhythm/sinus tachycardia and Alarm Parameters .

## 2018-04-17 NOTE — PROGRESS NOTES
Reason for Admission:    Pt is a 35 yo male admitted for treatment of DKA, encephalopathy, rhabdo. Pt was not feeling well for several days - was finally brought to ED due to increased lethargy. Admitted to CCU; has responded to insulin/bicarb drips; derangements trending down. Pt is 336 lbs. RRAT Score:    17              Do you (patient/family) have any concerns for transition/discharge? Pt was extremely sleepy - unable to focus on assessment and conversation about outpt HD after d/c. 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 Pt stated that his son lives with him - there is also a significant other, Ezequiel Kimbrough who is apparently his children's mother. Pt stated that he drives and plans to drive himself to HD - CM suggested it would be good idea to have someone drive him the first few sessions. Plan for utilizing home health:   [facility] for diabetic teaching/reinforcement vs pt attending Outpt education at [facility]. Pt started [facility] program in past but did not complete. Likelihood of readmission? Moderate        Transition of Care Plan:    Pt has orders to transfer to floor for routine progression of care. Received CM consult from Dr. Grace Castanon indicating that pt will need Outpt HD \"for weeks\" to see if renal fx returns. Info faxed to [facility] with request for MWF schedule. Will likely need PT/OT eval to assess mobility. [facility] will schedule outpt education and pt is willing to participate. Care Management Interventions  PCP Verified by CM: Yes  Palliative Care Criteria Met (RRAT>21 & CHF Dx)?: No  Mode of Transport at Discharge: Other (see comment) (family)  Transition of Care Consult (CM Consult): Other (Outpt HD set-up)  Discharge Durable Medical Equipment: No  Physical Therapy Consult: No  Occupational Therapy Consult: No  Speech Therapy Consult: No  Current Support Network:  Other (Son lives w/ him.)  Confirm Follow Up Transport: Self  Plan discussed with Pt/Family/Caregiver: Yes  Freedom of Choice Offered: Yes  Discharge Location  Discharge Placement: Home with outpatient services     Lorena KARI Jeffers

## 2018-04-17 NOTE — PROGRESS NOTES
[facility] Consult Service Progress NOTE  Pulmonary, Critical Care, and Sleep Medicine    Name: Luna Martinez MRN: [id number]   : 1975 Hospital: [facility]    Date: 2018  Admission Date: 2018       IMPRESSION:   1. Rhabdo: CPK 60K  2. Code Tiffany was called  Seemed to have acute delirium, Possible Encephalopathy due to his acute Metabolic Encephalopathy. 3. DKA  4. Hypoxia   5. Sepsis with leukocytosis and tachycardia with lactic acidosis   6. Right middle lobe PNA possible aspiration  7. Hypernatremia from fluid resuscitation,    8. hypocalcemia  9. Acute Encephalopathy from metabolic derangements. 10. HTN  11. Morbid obesity  12. Severe hypophosphatemia  13. erytrhocytosis- wonder about chronic hypoxemia or hemoconcentration  14. Multiorgan dysfunction as outlined above  15. Additional workup outlined below  16. Occasional Etoh on the Weekends. 17. Nonsmoker       RECOMMENDATIONS/PLAN:   1. Off insulin drip, on lantus  2. Bicarb drip for rhabdo  3. CPK trending down  4. Empiric abx  5. Supplemental oxygen to keep sats > 90%  6. Renal following  7. RRT per renal  8. Plt count better today  9. Na lower with free water   10. Pt needs IV fluids with additives and Drug therapy requiring intensive monitoring for toxicity  11. Prescription drug management with home med reconciliation reviewed  12. DVT, SUP prophylaxis  13. Transfer to floor today       My assessment/management discussed with: Nursing for coordination of care         The reason for providing this level of medical care was due to a critical illness that impaired one or more vital organ systems, such that there was a high probability of imminent or life threatening deterioration in the patient's condition. Pt's condition is unstable and unpredictable.      Risk of deterioration: moderate   [x] High complexity decision making was performed  [x] See my orders for details  ICU disposition: to floor today          Chart and notes reviewed. Data reviewed. Pt seen on rounds earlier today. I have evaluated and examined the patient. No acute events overnight  Awake, alert, no acute distress  He is hungry    MAR reviewed and pertinent medications noted or modified as needed   Current Facility-Administered Medications   Medication    insulin glargine (LANTUS) injection 60 Units    insulin lispro (HUMALOG) injection    glucose chewable tablet 16 g    dextrose (D50W) injection syrg 12.5-25 g    glucagon (GLUCAGEN) injection 1 mg    sodium bicarbonate (8.4%) 150 mEq in sterile water 1,000 mL infusion    aluminum-magnesium hydroxide (MAALOX) oral suspension 30 mL    heparin (porcine) injection 5,000 Units    sodium chloride (NS) flush 10-40 mL    sodium chloride (NS) flush 10 mL    insulin regular (NOVOLIN R, HUMULIN R) 100 Units in 0.9% sodium chloride 100 mL infusion    nitroglycerin (NITROBID) 2 % ointment 1 Inch    sodium chloride (NS) flush 5-10 mL    sodium chloride (NS) flush 5-10 mL    acetaminophen (TYLENOL) tablet 650 mg    ondansetron (ZOFRAN) injection 4 mg    nystatin (MYCOSTATIN) 100,000 unit/gram powder    mupirocin (BACTROBAN) 2 % ointment         ROS:Review of systems not obtained due to patient factors. Hemodynamics:    CO:    CI:    CVP:    SVR:   PAP Systolic:    PAP Diastolic:    PVR:    VX35:        Ventilator Settings:      Mode Rate TV Press PEEP FiO2 PIP Min.  Vent                            Vital Signs: Intake/Output: Intake/Output:   Visit Vitals    /59    Pulse 100    Temp 98.6 °F (37 °C)    Resp 26    Ht 5' 9\" (1.753 m)    Wt 152.6 kg (336 lb 6.8 oz)    SpO2 94%    BMI 49.68 kg/m2    Temp (24hrs), Av.3 °F (36.8 °C), Min:98 °F (36.7 °C), Max:98.8 °F (37.1 °C)        Telemetry:    normal sinus rhythm   O2 Device: Room air  O2 Flow Rate (L/min): 4 l/min  Wt Readings from Last 4 Encounters:   18 152.6 kg (336 lb 6.8 oz)   17 112 kg (247 lb) 09/05/17 151 kg (333 lb)   07/08/17 144.8 kg (319 lb 3.6 oz)          Intake/Output Summary (Last 24 hours) at 04/17/18 0750  Last data filed at 04/17/18 0600   Gross per 24 hour   Intake          1979.37 ml   Output             2299 ml   Net          -319.63 ml     Last shift:         Last 3 shifts: 04/15 1901 - 04/17 0700  In: 3174.1 [P.O.:1570; I.V.:1604.1]  Out: 2474 [Urine:224]     Physical Exam:    89 [facility] [employer] male; in no respiratory distress and acyanotic,    HEAD: Normocephalic, without obvious abnormality, atraumatic   EYES: conjunctivae clear. PERRL,  AN Icteric sclerae   NOSE: nares normal, no drainage, no nasal flaring,    THROAT: mucous membranes dry; Lips, mucosa dry; No Thrush; tongue midline   Neck: Supple, symmetrical, trachea midline,  No accessory mm use; No Stridor/ cuff leak, No goiter or thyroid tenderness   LYMPH: No abnormally enlarged lymph nodes. in neck or groin   Chest: normal   Lungs: Had good air movement anteriorly. Heart: Regular rate and rhythm ; NO edema nl s1, 2; No MRG. Abdomen: soft, protuberant, distended, nontender, obese   : normal;  Grigsby, clear urine; NO gross hematuria   Extremity: negative, clubbing; no joint swelling or erythema   Neuro: non focal   Psych: cooperative   Skin: Skin unremarkable;    Pulses:Bilateral, Radial, 2+   Capillary refill: normal ;warm,      Tubes:   Grigsby    DATA:    Interval lab and diagnostic data was reviewed. Interval radiology images were independently viewed and available reports were reviewed. Lab results reviewed. For significant abnormal values and values requiring intervention, see assessment and plan.     MAR reviewed and pertinent medications noted or modified as needed  MEDS:   Current Facility-Administered Medications   Medication    insulin glargine (LANTUS) injection 60 Units    insulin lispro (HUMALOG) injection    glucose chewable tablet 16 g    dextrose (D50W) injection syrg 12.5-25 g    glucagon (GLUCAGEN) injection 1 mg    sodium bicarbonate (8.4%) 150 mEq in sterile water 1,000 mL infusion    aluminum-magnesium hydroxide (MAALOX) oral suspension 30 mL    heparin (porcine) injection 5,000 Units    sodium chloride (NS) flush 10-40 mL    sodium chloride (NS) flush 10 mL    insulin regular (NOVOLIN R, HUMULIN R) 100 Units in 0.9% sodium chloride 100 mL infusion    nitroglycerin (NITROBID) 2 % ointment 1 Inch    sodium chloride (NS) flush 5-10 mL    sodium chloride (NS) flush 5-10 mL    acetaminophen (TYLENOL) tablet 650 mg    ondansetron (ZOFRAN) injection 4 mg    nystatin (MYCOSTATIN) 100,000 unit/gram powder    mupirocin (BACTROBAN) 2 % ointment        Labs:    Recent Labs      04/17/18   0447  04/16/18   0356  04/15/18   0359   WBC  9.8  11.0  12.8*   HGB  9.6*  10.6*  11.6*   PLT  86*  73*  89*     Recent Labs      04/17/18   0447  04/16/18   0356  04/15/18   1808  04/15/18   0759   NA  137  143  142  134*   K  4.0  3.6  3.4*  4.5   CL  99  106  105  102   CO2  27  26  24  17*   GLU  346*  144*  243*  731*   BUN  93*  103*  78*  114*   CREA  9.31*  9.09*  7.60*  9.38*   CA  8.4*  8.1*  8.0*  7.0*   MG   --   2.4  2.5*  2.4   PHOS   --   7.0*  5.9*  7.8*   ALB  2.0*   --    --    --    SGOT  209*   --    --    --    ALT  226*   --    --    --      Recent Labs      04/15/18   0610   PH  7.37   PCO2  31*   PO2  73*   HCO3  17*     Recent Labs      04/17/18   0447  04/16/18   0356  04/15/18   0759   CPK  9655*  47170*  37693*   CKNDX  0.0   --    --      No results found for: BNPP, BNP   Lab Results   Component Value Date/Time    Culture result: NO GROWTH 5 DAYS 04/11/2018 10:26 PM     Lab Results   Component Value Date/Time    CK 9655 (H) 04/17/2018 04:47 AM    CK 20611 (HH) 04/16/2018 03:56 AM     No results found for: IRON, FE, TIBC, IBCT, PSAT, FERR  Lab Results   Component Value Date/Time    RPR Non Reactive 09/06/2017 09:19 AM    TSH 1.530 12/29/2014 11:32 AM      Lab Results Component Value Date/Time    RPR Non Reactive 09/06/2017 09:19 AM    Hepatitis B surface Ag 0.29 04/15/2018 06:08 PM       Imaging: This care involved high complexity decision making which includes independently reviewing the patient's past medical records, current laboratory results, medication profiles that were immediately available to me and actual Xray images at the bedside in order to assess, support vital system function, and to treat this degree of vital organ system failure, and to prevent further life threatening deterioration of the patients condition. I was in direct communication with the nursing staff throughout this time. I have personally and independently reviewed the patients interval lab, diagnostic data, radiographs and the reports. I have ordered additional labs to follow the current medical conditions and to monitor treatment responses over the next 24 hours or sooner if needed. I will order additional imaging to follow longitudinal changes found on the most current imaging.      Ronn Reed MD

## 2018-04-17 NOTE — PROGRESS NOTES
Interdisciplinary team rounds were held 4/17/2018  with the following team members: Care Management, Diabetes Treatment Specialist, Nursing, Occupational Therapy, Pharmacy, Physical Therapy, Physician, Respiratory Therapy and Clinical Coordinator. Plan of care discussed. Goal: Adjust medications, continue to monitor and support. See MD orders and progress notes for further  interventions and desired outcomes.

## 2018-04-18 LAB
ANION GAP SERPL CALC-SCNC: 9 MMOL/L (ref 5–15)
BUN SERPL-MCNC: 117 MG/DL (ref 6–20)
BUN/CREAT SERPL: 10 (ref 12–20)
CALCIUM SERPL-MCNC: 8.7 MG/DL (ref 8.5–10.1)
CHLORIDE SERPL-SCNC: 98 MMOL/L (ref 97–108)
CK MB CFR SERPL CALC: 0 % (ref 0–2.5)
CK MB SERPL-MCNC: 2.2 NG/ML (ref 5–25)
CK SERPL-CCNC: 5267 U/L (ref 39–308)
CO2 SERPL-SCNC: 31 MMOL/L (ref 21–32)
CREAT SERPL-MCNC: 11.4 MG/DL (ref 0.7–1.3)
ERYTHROCYTE [DISTWIDTH] IN BLOOD BY AUTOMATED COUNT: 11 % (ref 11.5–14.5)
FERRITIN SERPL-MCNC: 1865 NG/ML (ref 26–388)
GLUCOSE BLD STRIP.AUTO-MCNC: 103 MG/DL (ref 65–100)
GLUCOSE BLD STRIP.AUTO-MCNC: 121 MG/DL (ref 65–100)
GLUCOSE BLD STRIP.AUTO-MCNC: 226 MG/DL (ref 65–100)
GLUCOSE BLD STRIP.AUTO-MCNC: 228 MG/DL (ref 65–100)
GLUCOSE SERPL-MCNC: 235 MG/DL (ref 65–100)
HCT VFR BLD AUTO: 25.9 % (ref 36.6–50.3)
HGB BLD-MCNC: 8.6 G/DL (ref 12.1–17)
IRON SATN MFR SERPL: 66 % (ref 20–50)
IRON SERPL-MCNC: 118 UG/DL (ref 35–150)
MCH RBC QN AUTO: 32.5 PG (ref 26–34)
MCHC RBC AUTO-ENTMCNC: 33.2 G/DL (ref 30–36.5)
MCV RBC AUTO: 97.7 FL (ref 80–99)
NRBC # BLD: 0 K/UL (ref 0–0.01)
NRBC BLD-RTO: 0 PER 100 WBC
PHOSPHATE SERPL-MCNC: 7.1 MG/DL (ref 2.6–4.7)
PLATELET # BLD AUTO: 139 K/UL (ref 150–400)
PMV BLD AUTO: 12.7 FL (ref 8.9–12.9)
POTASSIUM SERPL-SCNC: 3.5 MMOL/L (ref 3.5–5.1)
RBC # BLD AUTO: 2.65 M/UL (ref 4.1–5.7)
SERVICE CMNT-IMP: ABNORMAL
SODIUM SERPL-SCNC: 138 MMOL/L (ref 136–145)
TIBC SERPL-MCNC: 178 UG/DL (ref 250–450)
WBC # BLD AUTO: 10.9 K/UL (ref 4.1–11.1)

## 2018-04-18 PROCEDURE — 74011250636 HC RX REV CODE- 250/636: Performed by: INTERNAL MEDICINE

## 2018-04-18 PROCEDURE — 80048 BASIC METABOLIC PNL TOTAL CA: CPT | Performed by: INTERNAL MEDICINE

## 2018-04-18 PROCEDURE — 65660000000 HC RM CCU STEPDOWN

## 2018-04-18 PROCEDURE — 82550 ASSAY OF CK (CPK): CPT | Performed by: INTERNAL MEDICINE

## 2018-04-18 PROCEDURE — 74011250637 HC RX REV CODE- 250/637: Performed by: INTERNAL MEDICINE

## 2018-04-18 PROCEDURE — 74011636637 HC RX REV CODE- 636/637: Performed by: INTERNAL MEDICINE

## 2018-04-18 PROCEDURE — 84100 ASSAY OF PHOSPHORUS: CPT | Performed by: INTERNAL MEDICINE

## 2018-04-18 PROCEDURE — 82728 ASSAY OF FERRITIN: CPT | Performed by: INTERNAL MEDICINE

## 2018-04-18 PROCEDURE — 36415 COLL VENOUS BLD VENIPUNCTURE: CPT | Performed by: INTERNAL MEDICINE

## 2018-04-18 PROCEDURE — 82962 GLUCOSE BLOOD TEST: CPT

## 2018-04-18 PROCEDURE — 85027 COMPLETE CBC AUTOMATED: CPT | Performed by: INTERNAL MEDICINE

## 2018-04-18 PROCEDURE — 83540 ASSAY OF IRON: CPT | Performed by: INTERNAL MEDICINE

## 2018-04-18 RX ORDER — INSULIN GLARGINE 100 [IU]/ML
37 INJECTION, SOLUTION SUBCUTANEOUS ONCE
Status: COMPLETED | OUTPATIENT
Start: 2018-04-18 | End: 2018-04-18

## 2018-04-18 RX ORDER — CALCIUM CARBONATE 200(500)MG
400 TABLET,CHEWABLE ORAL
Status: DISCONTINUED | OUTPATIENT
Start: 2018-04-18 | End: 2018-04-24

## 2018-04-18 RX ADMIN — NYSTATIN: 100000 POWDER TOPICAL at 09:24

## 2018-04-18 RX ADMIN — INSULIN GLARGINE 75 UNITS: 100 INJECTION, SOLUTION SUBCUTANEOUS at 09:26

## 2018-04-18 RX ADMIN — Medication 10 ML: at 14:00

## 2018-04-18 RX ADMIN — INSULIN LISPRO 4 UNITS: 100 INJECTION, SOLUTION INTRAVENOUS; SUBCUTANEOUS at 09:26

## 2018-04-18 RX ADMIN — INSULIN LISPRO 15 UNITS: 100 INJECTION, SOLUTION INTRAVENOUS; SUBCUTANEOUS at 07:30

## 2018-04-18 RX ADMIN — INSULIN LISPRO 15 UNITS: 100 INJECTION, SOLUTION INTRAVENOUS; SUBCUTANEOUS at 18:06

## 2018-04-18 RX ADMIN — Medication 10 ML: at 05:03

## 2018-04-18 RX ADMIN — CALCIUM CARBONATE 400 MG: 500 TABLET, CHEWABLE ORAL at 11:47

## 2018-04-18 RX ADMIN — INSULIN GLARGINE 37 UNITS: 100 INJECTION, SOLUTION SUBCUTANEOUS at 21:43

## 2018-04-18 RX ADMIN — ACETAMINOPHEN 650 MG: 325 TABLET ORAL at 00:05

## 2018-04-18 RX ADMIN — ACETAMINOPHEN 650 MG: 325 TABLET ORAL at 20:56

## 2018-04-18 RX ADMIN — INSULIN LISPRO 15 UNITS: 100 INJECTION, SOLUTION INTRAVENOUS; SUBCUTANEOUS at 11:48

## 2018-04-18 RX ADMIN — Medication 10 ML: at 23:41

## 2018-04-18 RX ADMIN — HEPARIN SODIUM 5000 UNITS: 5000 INJECTION, SOLUTION INTRAVENOUS; SUBCUTANEOUS at 09:32

## 2018-04-18 RX ADMIN — ACETAMINOPHEN 650 MG: 325 TABLET ORAL at 11:46

## 2018-04-18 RX ADMIN — INSULIN LISPRO 4 UNITS: 100 INJECTION, SOLUTION INTRAVENOUS; SUBCUTANEOUS at 11:48

## 2018-04-18 NOTE — PROGRESS NOTES
NAME: Lelia Donald        :  1975        MRN:  [id number]        Assessment :    Plan:  --LITTLE   Rhabdo  Obesity  HTN  Sepsis --HD #1 4/15. Hd today and then MWF. Watch for renal recovery. Suspect that recovery will take weeks. Will ask CM to help with outpatient placement. Phos up. Change to renal diet. Add Tums as a binder. Iron OK. Start EPO    Pull fluid as tolerated. Stop IVF. Subjective:     Chief Complaint:  \" I feel better. \"    No diarrhea. No dyspnea. No HA. No edema. No rashes. Review of Systems:    Symptom Y/N Comments  Symptom Y/N Comments   Fever/Chills    Chest Pain     Poor Appetite    Edema     Cough    Abdominal Pain     Sputum    Joint Pain     SOB/WELSH    Pruritis/Rash     Nausea/vomit    Tolerating PT/OT     Diarrhea    Tolerating Diet     Constipation    Other       Could not obtain due to:      Objective:     VITALS:   Last 24hrs VS reviewed since prior progress note.  Most recent are:  Visit Vitals    /59 (BP 1 Location: Right arm, BP Patient Position: At rest)    Pulse 90    Temp 98.4 °F (36.9 °C)    Resp 20    Ht 5' 9\" (1.753 m)    Wt 152.6 kg (336 lb 6.8 oz)    SpO2 97%    BMI 49.68 kg/m2       Intake/Output Summary (Last 24 hours) at 18 0959  Last data filed at 18 0700   Gross per 24 hour   Intake           1576.5 ml   Output               50 ml   Net           1526.5 ml      Telemetry Reviewed:     PHYSICAL EXAM:  General: NAD  CTA  abd soft  No edema      Lab Data Reviewed: (see below)    Medications Reviewed: (see below)    PMH/SH reviewed - no change compared to H&P  ________________________________________________________________________  Care Plan discussed with:  Patient     Family      RN     Care Manager                    Consultant:          Comments   >50% of visit spent in counseling and coordination of care ________________________________________________________________________  Walker Mascorro MD     Procedures: see electronic medical records for all procedures/Xrays and details which  were not copied into this note but were reviewed prior to creation of Plan. LABS:  Recent Labs      04/18/18 0353 04/17/18 0447   WBC  10.9  9.8   HGB  8.6*  9.6*   HCT  25.9*  28.1*   PLT  139*  86*     Recent Labs      04/18/18   0353  04/17/18   0447  04/16/18   0356  04/15/18   1808   NA  138  137  143  142   K  3.5  4.0  3.6  3.4*   CL  98  99  106  105   CO2  31  27  26  24   BUN  117*  93*  103*  78*   CREA  11.40*  9.31*  9.09*  7.60*   GLU  235*  346*  144*  243*   CA  8.7  8.4*  8.1*  8.0*   MG   --    --   2.4  2.5*   PHOS  7.1*   --   7.0*  5.9*     Recent Labs      04/17/18 0447   SGOT  209*   AP  76   TP  5.6*   ALB  2.0*   GLOB  3.6     No results for input(s): INR, PTP, APTT in the last 72 hours. No lab exists for component: INREXT, INREXT   Recent Labs      04/18/18 0353   TIBC  178*   PSAT  66*   FERR  1865*      No results found for: FOL, RBCF   No results for input(s): PH, PCO2, PO2 in the last 72 hours.   Recent Labs      04/18/18 0353 04/17/18 0447 04/16/18 0356   CPK  5267*  9655*  16793*   CKMB  2.2  2.6   --      No components found for: [place]  Lab Results   Component Value Date/Time    Color YELLOW/STRAW 04/12/2018 12:44 AM    Appearance CLEAR 04/12/2018 12:44 AM    Specific gravity <1.005 04/12/2018 12:44 AM    Specific gravity 1.021 04/16/2013 05:05 PM    pH (UA) 5.0 04/12/2018 12:44 AM    Protein NEGATIVE  04/12/2018 12:44 AM    Glucose >1000 (A) 04/12/2018 12:44 AM    Ketone TRACE (A) 04/12/2018 12:44 AM    Bilirubin NEGATIVE  04/12/2018 12:44 AM    Urobilinogen 0.2 04/12/2018 12:44 AM    Nitrites NEGATIVE  04/12/2018 12:44 AM    Leukocyte Esterase NEGATIVE  04/12/2018 12:44 AM    Epithelial cells FEW 04/12/2018 12:44 AM    Bacteria NEGATIVE  04/12/2018 12:44 AM    WBC 0-4 04/12/2018 12:44 AM    RBC 0-5 04/12/2018 12:44 AM       MEDICATIONS:  Current Facility-Administered Medications   Medication Dose Route Frequency    calcium carbonate (TUMS) chewable tablet 400 mg [elemental]  400 mg Oral TID WITH MEALS    epoetin keri (EPOGEN;PROCRIT) injection 10,000 Units  10,000 Units SubCUTAneous DIALYSIS MON, WED & FRI    insulin glargine (LANTUS) injection 75 Units  75 Units SubCUTAneous Q12H    insulin lispro (HUMALOG) injection 15 Units  15 Units SubCUTAneous TIDAC    insulin lispro (HUMALOG) injection   SubCUTAneous AC&HS    glucose chewable tablet 16 g  4 Tab Oral PRN    dextrose (D50W) injection syrg 12.5-25 g  12.5-25 g IntraVENous PRN    glucagon (GLUCAGEN) injection 1 mg  1 mg IntraMUSCular PRN    aluminum-magnesium hydroxide (MAALOX) oral suspension 30 mL  30 mL Oral Q6H PRN    heparin (porcine) injection 5,000 Units  5,000 Units SubCUTAneous Q12H    sodium chloride (NS) flush 10-40 mL  10-40 mL IntraVENous PRN    sodium chloride (NS) flush 10 mL  10 mL IntraVENous Q8H    nitroglycerin (NITROBID) 2 % ointment 1 Inch  1 Inch Topical Q6H PRN    sodium chloride (NS) flush 5-10 mL  5-10 mL IntraVENous Q8H    sodium chloride (NS) flush 5-10 mL  5-10 mL IntraVENous PRN    acetaminophen (TYLENOL) tablet 650 mg  650 mg Oral Q6H PRN    ondansetron (ZOFRAN) injection 4 mg  4 mg IntraVENous Q4H PRN    nystatin (MYCOSTATIN) 100,000 unit/gram powder   Topical BID

## 2018-04-18 NOTE — PROGRESS NOTES
Bedside report received from night RN, Renita Gaspar. Pt alert, oriented x4. Sitting up in bed watching tv    0750 Pt assessed as noted    0900 Pt assisted to sit in bedside chair    1040 Assisted back to bed     Pt c/o headache PRN Tylenol given as prescribed    Armond 65 for transfer to Renal    TRANSFER - OUT REPORT:    Verbal report given to Castellanos Rahda (name) on Tg Angulo  being transferred to Renal tele (unit) for routine progression of care       Report consisted of patients Situation, Background, Assessment and   Recommendations(SBAR). Information from the following report(s) SBAR, Kardex, Intake/Output, Recent Results and Cardiac Rhythm NSR was reviewed with the receiving nurse. Lines:   Quad Lumen 04/15/18 Right Internal jugular (Active)   Central Line Being Utilized Yes 4/18/2018  1:46 PM   Criteria for Appropriate Use Limited/no vessel suitable for conventional peripheral access 4/18/2018  1:46 PM   Site Assessment Clean, dry, & intact 4/18/2018  1:46 PM   Infiltration Assessment 0 4/18/2018  1:46 PM   Affected Extremity/Extremities Color distal to insertion site pink (or appropriate for race) 4/18/2018  1:46 PM   Date of Last Dressing Change 04/15/18 4/18/2018  1:46 PM   Dressing Status Clean, dry, & intact 4/18/2018  1:46 PM   Dressing Type Disk with Chlorhexadine gluconate (CHG); Transparent 4/18/2018  1:46 PM   Action Taken Open ports on tubing capped 4/18/2018  4:00 AM   Proximal Hub Color/Line Status White;Capped 4/18/2018  1:46 PM   Positive Blood Return (Medial Site) Yes 4/17/2018 10:00 PM   Medial 1 Hub Color/Line Status Gray;Capped 4/18/2018  1:46 PM   Positive Blood Return (Lateral Site) Yes 4/17/2018 10:00 PM   Medial 2 Hub Color/Line Status Blue;Capped 4/18/2018  1:46 PM   Positive Blood Return (Site #3) Yes 4/17/2018 10:00 PM   Distal Hub Color/Line Status Brown;Capped 4/18/2018  1:46 PM   Positive Blood Return (Site #4) Yes 4/17/2018 10:00 PM   Alcohol Cap Used Yes 4/18/2018  1:46 PM        Opportunity for questions and clarification was provided.       Patient transported with:   Monitor  Patients medications from home  Patient-specific medications from Pharmacy  Registered Nurse  Tech

## 2018-04-18 NOTE — PROGRESS NOTES
1930- Patient had emesis. 0000- Patient received tylenol for headache.   0700- Patient denies medication for headache.   0700- Report to [facility].

## 2018-04-18 NOTE — ADT AUTH CERT NOTES
Utilization Review           Diabetes - Care Day 7 (4/18/2018) by Jayla Santana        Review Status Review Entered       Completed 4/18/2018       Details              Care Day: 7 Care Date: 4/18/2018 Level of Care: Telemetry       Guideline Day 2        Clinical Status       (X) * Alert       ( ) * No precipitating cause identified or cause manageable in outpatient setting       4/18/2018 3:22 PM EDT by Azael Sen         Rhabdomyolysis              (X) * Hemodynamic stability       4/18/2018 3:22 PM EDT by Azael Sen         97.4-111/62-88-18-94% RA              (X) * Acceptable acid-base balance       ( ) * Blood glucose under acceptable control       4/18/2018 3:22 PM EDT by Azael Sen         glu 235              ( ) * Dehydration absent       (X) * Electrolytes acceptable       (X) * Diet tolerated       4/18/2018 3:22 PM EDT by Azael Sen         renal diet              ( ) * Discharge plans and education understood              Activity       ( ) * Ambulatory              Routes       (X) * Oral hydration, medications, and diet              Medications       (X) Subcutaneous insulin       4/18/2018 3:22 PM EDT by Azael Sen         insulin sc x5              ( ) * Outpatient diabetic medication regimen established                                   * Milestone              Additional Notes       97.4-111/62-88-18-94% RA              Hyperosmolar coma due to secondary diabetes ([facility] [address] (4/12/2018): Treated with IVF and insulin.  Glc is improved.        Rhabdomyolysis: CK down        LITTLE: HD per Nephrology.        Hypernatremia: hydration; watch labs.       Sepsis due to aspiration pneumonia, POA: Empiric antibiotics.         Hypoxia: O2 as needed.        Acute encephalopathy (4/12/2018): Improving. He is now awake, no longer paranoid, seems at baseline.        Hyperlipidemia (5/1/2012):  Hold statin       Morbid obesity ([address] (5/6/2013)       Hypertension (10/31/2014): BP okay.        Epigastric pain and N/V: If persists beyond today, would consider GI consult and further work up. Continue Prn maalox.  Prn zofran.              Off insulin drip, on lantus       Bicarb drip for rhabdo       CPK trending down       Empiric abx       Supplemental oxygen to keep sats > 90%       Renal following       RRT per renal       Plt count better today       Na lower with free water        Pt needs IV fluids with additives and Drug therapy requiring intensive monitoring for toxicity       Prescription drug management with home med reconciliation reviewed       DVT, SUP prophylaxis              bun 117 creat 11.40 rbc 2.65 hgb 8.6 hct 25.9 plt 139 ck 5267 TIBC 178 iron sat 66%              ferritin 1865 phos 7.1        hemodialysis inpatient       cardiac monitoring       heparin sc x1           Diabetes - Care Day 6 (4/17/2018) by Cristela Martinez        Review Status Review Entered       Completed 4/18/2018       Details              Care Day: 6 Care Date: 4/17/2018 Level of Care: ICU       Guideline Day 2        Clinical Status       (X) * Alert       ( ) * No precipitating cause identified or cause manageable in outpatient setting       ( ) * Hemodynamic stability       4/18/2018 12:46 PM EDT by Logan Scott         98.7-132/-23-96% RA              (X) * Acceptable acid-base balance       ( ) * Blood glucose under acceptable control       4/18/2018 12:46 PM EDT by Logan Scott         glu 356              ( ) * Dehydration absent       (X) * Electrolytes acceptable       (X) * Diet tolerated       ( ) * Discharge plans and education understood              Activity       ( ) * Ambulatory              Routes       ( ) * Oral hydration, medications, and diet              Medications       (X) Subcutaneous insulin       4/18/2018 12:46 PM EDT by Logan Scott         insulin sc x9              ( ) * Outpatient diabetic medication regimen established                                   * Milestone            Additional Notes       Hyperosmolar coma due to secondary diabetes ([facility] [address] (4/12/2018): Treated with IVF and insulin.  Glc is improved.        Rhabdomyolysis: CK down        LITTLE: HD per Nephrology.        Hypernatremia: hydration; watch labs.       Sepsis due to aspiration pneumonia, POA: Empiric antibiotics.         Hypoxia: O2 as needed.        Acute encephalopathy (4/12/2018): Improving. He is now awake, no longer paranoid, seems at baseline.        Hyperlipidemia (5/1/2012)       Morbid obesity ([facility] [address] (5/6/2013)       Hypertension (10/31/2014): BP okay.       Off insulin drip, on lantus       Bicarb drip for rhabdo       CPK trending down       Empiric abx       Supplemental oxygen to keep sats > 90%       Renal following       RRT per renal       Plt count better today       Na lower with free water        Pt needs IV fluids with additives and Drug therapy requiring intensive monitoring for toxicity       Prescription drug management with home med reconciliation reviewed       DVT, SUP prophylaxis       rbc 2.93 hgb 9.6 hct 28.1 plt 86 ck 9655        US abd - Echogenic liver suggesting fatty infiltration with some areas of relative       sparing.               Contracted gallbladder. No obvious gallstones.               Pancreas not visualized due to overlying bowel gas.               No hydronephrosis.        care management consult       heparin sc x2           Diabetes - Care Day 5 (4/16/2018) by Miguel Mccarty        Review Status Review Entered       Completed 4/17/2018       Details              Care Day: 5 Care Date: 4/16/2018 Level of Care: ICU       Guideline Day 2        Clinical Status       ( ) * Alert       ( ) * No precipitating cause identified or cause manageable in outpatient setting       ( ) * Hemodynamic stability       4/17/2018 3:03 PM EDT by Mariana Dyer         /-43-94% RA              (X) * Acceptable acid-base balance       ( ) * Blood glucose under acceptable control       4/17/2018 3:03 PM EDT by Mendel Councilman         glu 380              ( ) * Dehydration absent       ( ) * Electrolytes acceptable       4/17/2018 3:03 PM EDT by Mendel Councilman         phos 7              (X) * Diet tolerated       4/17/2018 3:03 PM EDT by Mendel Councilman         diabetic diet              ( ) * Discharge plans and education understood              Activity       ( ) * Ambulatory              Routes       ( ) * Oral hydration, medications, and diet              Medications       ( ) * Outpatient diabetic medication regimen established                                   * Milestone              Additional Notes       Rhabdo: CPK 60K       Code Tiffany was called  Seemed to have acute delirium, Possible Encephalopathy due to his acute Metabolic Encephalopathy.        DKA       Hypoxia        Sepsis with leukocytosis and tachycardia with lactic acidosis        Right middle lobe PNA possible aspiration       Hypernatremia from fluid resuscitation,         hypocalcemia       Acute Encephalopathy from metabolic derangements.       HTN       Morbid obesity       Severe hypophosphatemia       erytrhocytosis- wonder about chronic hypoxemia or hemoconcentration              Back on insulin drip on sterile water       Bicarb drip for rhabdo       CPK trending down       Empiric abx       Supplemental oxygen to keep sats > 90%       Renal following       RRT today       Plt count lower, monitor for now       Na lower with free water        Pt needs IV fluids with additives and Drug therapy requiring intensive monitoring for toxicity       Prescription drug management with home med reconciliation reviewed       DVT, SUP prophylaxis       bun 103 creat 9.09 rbc 3.24 hgb 10.6 hct 31.2 plt 73 ck 29040        US abd pending       hemodialysis inpatient       heparin sc x2       insulin sc x4       Zofran iv x1       insulin drip           Diabetes Care Day 3 by Franco Vale        Review Status Review Entered       In Primary 4/17/2018       Details         4/14/18 review  ICU  98.3-107/-63-94% RA  Patient has abdominal discomfort. Mentating normally this am,c/o abdominal discomfort/constipation. Sugars acceptable on insulin drip per protocol. Na+ improving,though remains quite high. Cr rising rapidly ,Fluid balance positive by 4 l. LITTLE/CKD worsening. Continue current meds and treatments. Urine sediment fairly bland on admit.   Creatinine continues to worsen despite IVF.  Poor UO.  No acute need for Hd but he appears to be headed that way.  BP stable.  Will check CK.  ?rhabdo.  I've ordered renal U/S as well.     Addendum:  CK>48294. Chanel Vitaley with IV HCO3 although I suspect he will end up needing HD. Na 155 glu 399 bun 74 creat 6.23 Ca 7.4 wbc 15.9 plt 115 uric acid 15.6 ck 14782   renal US - Normal size and appearance of the kidneys without hydronephrosis.  Nondistended  urinary bladder with a Grigsby catheter.     diabetic diet  heparin sc x2  ivf 50cc/hr  insulin sc x3  insulin dri[  sod bicarb 42cc/hr                        Diabetes - Care Day 4 (4/15/2018) by Vandana Díaz        Review Status Review Entered       Completed 4/17/2018       Details              Care Day: 4 Care Date: 4/15/2018 Level of Care: ICU       Guideline Day 2        Clinical Status       ( ) * Alert       ( ) * No precipitating cause identified or cause manageable in outpatient setting       (X) * Hemodynamic stability       4/17/2018 2:47 PM EDT by Luis A Kimbrough         98.2-133/87-98-26-92% RA              ( ) * Acceptable acid-base balance       4/17/2018 2:47 PM EDT by Luis A Kimbrough         bicarb 17              ( ) * Blood glucose under acceptable control       4/17/2018 2:47 PM EDT by Luis A Kimbrough         glu 717              ( ) * Dehydration absent       ( ) * Electrolytes acceptable       4/17/2018 2:47 PM EDT by Luis A Kimbrough         phos 7.8              (X) * Diet tolerated       4/17/2018 2:47 PM EDT by Erasto Carvajal         diabetic diet              ( ) * Discharge plans and education understood              Activity       ( ) * Ambulatory              Routes       ( ) * Oral hydration, medications, and diet       4/17/2018 2:47 PM EDT by Erasto Carvajal         sod bicarb 42cc/hr                     Medications       ( ) * Outpatient diabetic medication regimen established                                   * Milestone              Additional Notes       Patient has fatigue.       Active Problems:         Hyperlipidemia (5/1/2012)                 Morbid obesity ([facility] [address] (5/6/2013)                 Hypertension (10/31/2014)                 Type II diabetes mellitus, uncontrolled ([facility] [address] (6/30/2016)                 LITTLE (acute kidney injury) ([facility] [address] (7/9/2017)                 Hyperosmolar coma due to secondary diabetes ([facility] [address] (4/12/2018)                 Acute encephalopathy (4/12/2018)                 Non-traumatic rhabdomyolysis (4/15/2018)               Feeling ok,less agitation. Scant urine output,striking CK elevation,rising Cr consistent with rhabdo as prime source of LITTLE. HD likely. Back on Insulin drip. Will need large doses of basal insulin when drip halted.       Creatinine worse.  Poor UO. CK up (?).  Plan for Hd today and tomorrow.               Pull fluid as tolerated.       CO2 14 glu 717 creat 8.83 Ca 7.1 ck 18424 wbc 12.8 rbc 3.59 hgb 11.6 hct 35.6        plt 89 pCO2 31 PO2 73        CXR - No pneumothorax       Successful placement of right internal jugular 14 M[place]ese, 20 cm non tunneled       dialysis catheter.  Catheter is ready for immediate use.       heparin sc x1       insulin sc x2       cefepime iv x2       insulin drip

## 2018-04-18 NOTE — PROGRESS NOTES
INTERNAL MEDICINE ATTENDING NOTE    S: Mr. Lelia Donald was seen by me today during rounds. At this time, he is in ICU, awake, alert, and calm. For the past couple of days he has had some epigastric discomfort and nausea with eating. ROS otherwise unremarkable except as noted elsewhere. O: Blood pressure 148/59, pulse 90, temperature 98.4 °F (36.9 °C), resp. rate 20, height 5' 9\" (1.753 m), weight 336 lb 6.8 oz (152.6 kg), SpO2 97 %. Gen: Patient is in no acute distress. Lungs: CTAB. Heart: RRR. Abd: S, NT, ND, BS present. Extremities: Warm.     Recent Results (from the past 12 hour(s))   GLUCOSE, POC    Collection Time: 04/17/18  9:51 PM   Result Value Ref Range    Glucose (POC) 249 (H) 65 - 100 mg/dL    Performed by [facility], BASIC    Collection Time: 04/18/18  3:53 AM   Result Value Ref Range    Sodium 138 136 - 145 mmol/L    Potassium 3.5 3.5 - 5.1 mmol/L    Chloride 98 97 - 108 mmol/L    CO2 31 21 - 32 mmol/L    Anion gap 9 5 - 15 mmol/L    Glucose 235 (H) 65 - 100 mg/dL     (H) 6 - 20 MG/DL    Creatinine 11.40 (H) 0.70 - 1.30 MG/DL    BUN/Creatinine ratio 10 (L) 12 - 20      GFR est AA 6 (L) >60 ml/min/1.73m2    GFR est non-AA 5 (L) >60 ml/min/1.73m2    Calcium 8.7 8.5 - 10.1 MG/DL   CBC W/O DIFF    Collection Time: 04/18/18  3:53 AM   Result Value Ref Range    WBC 10.9 4.1 - 11.1 K/uL    RBC 2.65 (L) 4.10 - 5.70 M/uL    HGB 8.6 (L) 12.1 - 17.0 g/dL    HCT 25.9 (L) 36.6 - 50.3 %    MCV 97.7 80.0 - 99.0 FL    MCH 32.5 26.0 - 34.0 PG    MCHC 33.2 30.0 - 36.5 g/dL    RDW 11.0 (L) 11.5 - 14.5 %    PLATELET 307 (L) 812 - 400 K/uL    MPV 12.7 8.9 - 12.9 FL    NRBC 0.0 0  WBC    ABSOLUTE NRBC 0.00 0.00 - 0.01 K/uL   CK W/ CKMB & INDEX    Collection Time: 04/18/18  3:53 AM   Result Value Ref Range    CK 5267 (H) 39 - 308 U/L    CK - MB 2.2 <3.6 NG/ML    CK-MB Index 0.0 0 - 2.5     IRON PROFILE    Collection Time: 04/18/18  3:53 AM   Result Value Ref Range    Iron 118 35 - 150 ug/dL    TIBC 178 (L) 250 - 450 ug/dL    Iron % saturation 66 (H) 20 - 50 %   FERRITIN    Collection Time: 04/18/18  3:53 AM   Result Value Ref Range    Ferritin 1865 (H) 26 - 388 NG/ML   PHOSPHORUS    Collection Time: 04/18/18  3:53 AM   Result Value Ref Range    Phosphorus 7.1 (H) 2.6 - 4.7 MG/DL   GLUCOSE, POC    Collection Time: 04/18/18  8:36 AM   Result Value Ref Range    Glucose (POC) 228 (H) 65 - 100 mg/dL    Performed by Valeriano Ivy         CXR 4/15: No PTX. CXR 4/11: Minimal R ML infiltrate. CXR 4/12: No acute abnormality. US Retroperitoneal 4/14: Normal size and appearance of the kidneys without hydronephrosis. Nondistended urinary bladder with a Grigsby catheter. US abdomen 4/17: Echogenic liver suggesting fatty infiltration with some areas of relative sparing. Contracted gallbladder. No obvious gallstones. Pancreas not visualized due to overlying bowel gas. No hydronephrosis. A/P:   1. Hyperosmolar coma due to secondary diabetes ([address] (4/12/2018): Treated with IVF and insulin. Glc is improved. 2. Rhabdomyolysis: CK down   3. LITTLE: HD per Nephrology. 4. Hypernatremia: hydration; watch labs. 5. Sepsis due to aspiration pneumonia, POA: Empiric antibiotics. 6. Hypoxia: O2 as needed. 7. Acute encephalopathy (4/12/2018): Improving. He is now awake, no longer paranoid, seems at baseline. 8. Hyperlipidemia (5/1/2012): Hold statin  9. Morbid obesity ([address] (5/6/2013)  10. Hypertension (10/31/2014): BP okay. 11. Epigastric pain and N/V: If persists beyond today, would consider GI consult and further work up. Continue Prn maalox. Prn zofran.       Juarez Hardin MD, FACP  Best contact is via Pager: [phone], or via hospital  at [phone]

## 2018-04-18 NOTE — PROGRESS NOTES
Nutrition Assessment:    RECOMMENDATIONS:   Continue diet per Nephrology    DIETITIANS INTERVENTIONS/PLAN:   Continue diet as tolerated  Monitor appetite/PO intake  Monitor BG     ASSESSMENT:   Pt admitted with hyperosmolar coma 2' DM.  PMH: HTN, DM. Chart reviewed for LOS, case discussed during CCU rounds. Pt has been too lethargic for education, DTC attempted yesterday. Pt has been having n/v the past few days, if this continues attending is considering GI consult. HD tx today, noted tums added as a binder. -304, DTC following and making recommendations. His appetite is improving per RN flowsheet's. SUBJECTIVE/OBJECTIVE:     Diet Order: Consistent carb, Renal, Other (comment) (1500mL FR )  % Eaten:  Patient Vitals for the past 72 hrs:   % Diet Eaten   04/18/18 0948 75 %   04/17/18 0934 75 %   04/16/18 1800 30 %   04/16/18 1300 25 %   04/16/18 0830 25 %   04/15/18 1900 25 %   04/15/18 1409 25 %     Pertinent Medications:tums, lantus, humalog. Chemistries:  Lab Results   Component Value Date/Time    Sodium 138 04/18/2018 03:53 AM    Potassium 3.5 04/18/2018 03:53 AM    Chloride 98 04/18/2018 03:53 AM    CO2 31 04/18/2018 03:53 AM    Anion gap 9 04/18/2018 03:53 AM    Glucose 235 (H) 04/18/2018 03:53 AM     (H) 04/18/2018 03:53 AM    Creatinine 11.40 (H) 04/18/2018 03:53 AM    BUN/Creatinine ratio 10 (L) 04/18/2018 03:53 AM    GFR est AA 6 (L) 04/18/2018 03:53 AM    GFR est non-AA 5 (L) 04/18/2018 03:53 AM    Calcium 8.7 04/18/2018 03:53 AM    AST (SGOT) 209 (H) 04/17/2018 04:47 AM    Alk.  phosphatase 76 04/17/2018 04:47 AM    Protein, total 5.6 (L) 04/17/2018 04:47 AM    Albumin 2.0 (L) 04/17/2018 04:47 AM    Globulin 3.6 04/17/2018 04:47 AM    A-G Ratio 0.6 (L) 04/17/2018 04:47 AM    ALT (SGPT) 226 (H) 04/17/2018 04:47 AM      Anthropometrics: Height: 5' 9\" (175.3 cm) Weight: 149.4 kg (329 lb 5.9 oz)  [x]bed scale (4/18)   []stated   []unknown    IBW (%IBW): 72.7 kg (160 lb 4.4 oz) ( ) UBW (%UBW):   (  %)    BMI: Body mass index is 48.64 kg/(m^2). This BMI is indicative of:  []Underweight   []Normal   []Overweight   [] Obesity   [x] Extreme Obesity (BMI>40)  Estimated Nutrition Needs (Based on): 2356 Kcals/day (MSJ 2380 x 1.2 (-500 for obesity)) , 102 g (1.4gPro/kg) Protein  Carbohydrate: At Least 130 g/day  Fluids: 1500 mL/day per Nephrology    Last BM: 4/16   []Active     []Hyperactive  [x]Hypoactive       [] Absent   BS  Skin:    [x] Intact   [] Incision  [] Breakdown   [] DTI   [] Tears/Excoriation/Abrasion  []Edema [] Other: Wt Readings from Last 30 Encounters:   04/18/18 149.4 kg (329 lb 5.9 oz)   12/05/17 112 kg (247 lb)   09/05/17 151 kg (333 lb)   07/08/17 144.8 kg (319 lb 3.6 oz)   07/07/17 142.2 kg (313 lb 6.4 oz)   05/03/17 157.9 kg (348 lb)   12/30/16 (!) 159.2 kg (351 lb)   10/17/16 155.1 kg (342 lb)   07/27/16 153.3 kg (338 lb)   06/30/16 152.9 kg (337 lb)   05/13/16 150.9 kg (332 lb 9.6 oz)   03/02/16 152.4 kg (336 lb)   02/02/16 152.9 kg (337 lb)   01/21/16 154.9 kg (341 lb 6.4 oz)   01/11/16 149.9 kg (330 lb 6.4 oz)   01/06/16 153.5 kg (338 lb 6.5 oz)   01/06/16 152.9 kg (337 lb)   12/15/15 (!) 160.6 kg (354 lb)   12/08/15 158.8 kg (350 lb)   10/31/14 152 kg (335 lb)   01/27/14 152.3 kg (335 lb 12.8 oz)   07/24/13 149.7 kg (330 lb)   06/05/13 150.6 kg (332 lb)   05/22/13 153.3 kg (338 lb)   05/06/13 151.5 kg (334 lb)   04/16/13 150.3 kg (331 lb 5.6 oz)   04/16/13 150.3 kg (331 lb 6.4 oz)   01/14/13 144.7 kg (319 lb)   05/01/12 142.4 kg (314 lb)   12/19/11 140.6 kg (310 lb)      NUTRITION DIAGNOSES:   Problem:  Altered nutrition-related lab values      Etiology: related to hx of DM and stress     Signs/Symptoms: as evidenced by -304. NUTRITION INTERVENTIONS:  Meals/Snacks: General/healthful diet                  GOAL:   Pt will consume >70% of meals with no more n/v in 3-5 days.      Cultural, Hindu, or Ethnic Dietary Needs: None     LEARNING NEEDS (Diet, Food/Nutrient-Drug Interaction):    [x] None Identified   [] Identified and Education Provided/Documented   [] Identified and Pt declined/was not appropriate      [x] Interdisciplinary Care Plan Reviewed/Documented    [x] Participated in Discharge Planning:  To be determined     [x] Interdisciplinary Rounds     NUTRITION RISK:    [x] High              [] Moderate           []  Low  []  Minimal/Uncompromised    PT SEEN FOR:    []  MD Consult: []Calorie Count      []Diabetic Diet Education        []Diet Education     []Electrolyte Management     []General Nutrition Management and Supplements     []Management of Tube Feeding     []TPN Recommendations    []  RN Referral:  []MST score >=2     []Enteral/Parenteral Nutrition PTA     []Pregnant: Gestational DM or Multigestation   []  Low BMI  []  Re-Screen   [x]  LOS   []  NPO/clears x 5 days   []  New TF/TPN    Steph Tubbs, RD, [address]    Pager [phone]  Weekend Pager [phone]

## 2018-04-18 NOTE — PROGRESS NOTES
[facility] Consult Service Progress NOTE  Pulmonary, Critical Care, and Sleep Medicine    Name: Karolyn Hatchet MRN: [id number]   : 1975 Hospital: [facility]   Date: 2018  Admission Date: 2018       IMPRESSION:   1. Rhabdo: CPK 60K  2. Code Tiffany was called  Seemed to have acute delirium, Possible Encephalopathy due to his acute Metabolic Encephalopathy. 3. DKA  4. Hypoxia   5. Sepsis with leukocytosis and tachycardia with lactic acidosis   6. Right middle lobe PNA possible aspiration  7. Hypernatremia from fluid resuscitation,    8. hypocalcemia  9. Acute Encephalopathy from metabolic derangements. 10. HTN  11. Morbid obesity  12. Severe hypophosphatemia  13. erytrhocytosis- wonder about chronic hypoxemia or hemoconcentration  14. Multiorgan dysfunction as outlined above  15. Additional workup outlined below  16. Occasional Etoh on the Weekends. 17. Nonsmoker       RECOMMENDATIONS/PLAN:   1. Off insulin drip, on lantus  2. Bicarb drip for rhabdo  3. CPK trending down  4. Empiric abx  5. Supplemental oxygen to keep sats > 90%  6. Renal following  7. RRT per renal  8. Plt count better today  9. Na lower with free water   10. Pt needs IV fluids with additives and Drug therapy requiring intensive monitoring for toxicity  11. Prescription drug management with home med reconciliation reviewed  12. DVT, SUP prophylaxis  13. Transfer to floor today, day 2 awaiting for a bed       My assessment/management discussed with: Nursing for coordination of care         The reason for providing this level of medical care was due to a critical illness that impaired one or more vital organ systems, such that there was a high probability of imminent or life threatening deterioration in the patient's condition. Pt's condition is unstable and unpredictable.      Risk of deterioration: moderate   [x] High complexity decision making was performed  [x] See my orders for details  ICU disposition: to floor today          Chart and notes reviewed. Data reviewed. Pt seen on rounds earlier today. I have evaluated and examined the patient. No acute events overnight  Awake, alert, no acute distress  He is hungry  C/o of a HA    MAR reviewed and pertinent medications noted or modified as needed   Current Facility-Administered Medications   Medication    insulin glargine (LANTUS) injection 75 Units    insulin lispro (HUMALOG) injection 15 Units    insulin lispro (HUMALOG) injection    glucose chewable tablet 16 g    dextrose (D50W) injection syrg 12.5-25 g    glucagon (GLUCAGEN) injection 1 mg    sodium bicarbonate (8.4%) 150 mEq in sterile water 1,000 mL infusion    aluminum-magnesium hydroxide (MAALOX) oral suspension 30 mL    heparin (porcine) injection 5,000 Units    sodium chloride (NS) flush 10-40 mL    sodium chloride (NS) flush 10 mL    nitroglycerin (NITROBID) 2 % ointment 1 Inch    sodium chloride (NS) flush 5-10 mL    sodium chloride (NS) flush 5-10 mL    acetaminophen (TYLENOL) tablet 650 mg    ondansetron (ZOFRAN) injection 4 mg    nystatin (MYCOSTATIN) 100,000 unit/gram powder         ROS:Review of systems not obtained due to patient factors. Hemodynamics:    CO:    CI:    CVP:    SVR:   PAP Systolic:    PAP Diastolic:    PVR:    PF89:        Ventilator Settings:      Mode Rate TV Press PEEP FiO2 PIP Min.  Vent                            Vital Signs: Intake/Output: Intake/Output:   Visit Vitals    /59 (BP 1 Location: Right arm, BP Patient Position: At rest)    Pulse 90    Temp 98.4 °F (36.9 °C)    Resp 20    Ht 5' 9\" (1.753 m)    Wt 152.6 kg (336 lb 6.8 oz)    SpO2 97%    BMI 49.68 kg/m2    Temp (24hrs), Av.1 °F (36.7 °C), Min:97.8 °F (36.6 °C), Max:98.4 °F (36.9 °C)        Telemetry:    normal sinus rhythm   O2 Device: Room air  O2 Flow Rate (L/min): 4 l/min  Wt Readings from Last 4 Encounters:   18 152.6 kg (336 lb 6.8 oz) 12/05/17 112 kg (247 lb)   09/05/17 151 kg (333 lb)   07/08/17 144.8 kg (319 lb 3.6 oz)          Intake/Output Summary (Last 24 hours) at 04/18/18 0814  Last data filed at 04/18/18 0700   Gross per 24 hour   Intake           1576.5 ml   Output               50 ml   Net           1526.5 ml     Last shift:         Last 3 shifts: 04/16 1901 - 04/18 0700  In: 2154 [P. O.:600; I.V.:1554]  Out: 250 [Urine:50]     Physical Exam:    89 [facility] American male; in no respiratory distress and acyanotic,    HEAD: Normocephalic, without obvious abnormality, atraumatic   EYES: conjunctivae clear. PERRL,  AN Icteric sclerae   NOSE: nares normal, no drainage, no nasal flaring,    THROAT: mucous membranes dry; Lips, mucosa dry; No Thrush; tongue midline   Neck: Supple, symmetrical, trachea midline,  No accessory mm use; No Stridor/ cuff leak, No goiter or thyroid tenderness   LYMPH: No abnormally enlarged lymph nodes. in neck or groin   Chest: normal   Lungs: Had good air movement anteriorly. Heart: Regular rate and rhythm ; NO edema nl s1, 2; No MRG. Abdomen: soft, protuberant, distended, nontender, obese   : normal;  Grigsby, clear urine; NO gross hematuria   Extremity: negative, clubbing; no joint swelling or erythema   Neuro: non focal   Psych: cooperative   Skin: Skin unremarkable;    Pulses:Bilateral, Radial, 2+   Capillary refill: normal ;warm,      Tubes:   Grigsby    DATA:    Interval lab and diagnostic data was reviewed. Interval radiology images were independently viewed and available reports were reviewed. Lab results reviewed. For significant abnormal values and values requiring intervention, see assessment and plan.     MAR reviewed and pertinent medications noted or modified as needed  MEDS:   Current Facility-Administered Medications   Medication    insulin glargine (LANTUS) injection 75 Units    insulin lispro (HUMALOG) injection 15 Units    insulin lispro (HUMALOG) injection    glucose chewable tablet 16 g    dextrose (D50W) injection syrg 12.5-25 g    glucagon (GLUCAGEN) injection 1 mg    sodium bicarbonate (8.4%) 150 mEq in sterile water 1,000 mL infusion    aluminum-magnesium hydroxide (MAALOX) oral suspension 30 mL    heparin (porcine) injection 5,000 Units    sodium chloride (NS) flush 10-40 mL    sodium chloride (NS) flush 10 mL    nitroglycerin (NITROBID) 2 % ointment 1 Inch    sodium chloride (NS) flush 5-10 mL    sodium chloride (NS) flush 5-10 mL    acetaminophen (TYLENOL) tablet 650 mg    ondansetron (ZOFRAN) injection 4 mg    nystatin (MYCOSTATIN) 100,000 unit/gram powder        Labs:    Recent Labs      04/18/18 0353 04/17/18 0447 04/16/18 0356   WBC  10.9  9.8  11.0   HGB  8.6*  9.6*  10.6*   PLT  139*  86*  73*     Recent Labs      04/18/18   0353  04/17/18   0447  04/16/18   0356  04/15/18   1808   NA  138  137  143  142   K  3.5  4.0  3.6  3.4*   CL  98  99  106  105   CO2  31  27  26  24   GLU  235*  346*  144*  243*   BUN  117*  93*  103*  78*   CREA  11.40*  9.31*  9.09*  7.60*   CA  8.7  8.4*  8.1*  8.0*   MG   --    --   2.4  2.5*   PHOS  7.1*   --   7.0*  5.9*   ALB   --   2.0*   --    --    SGOT   --   209*   --    --    ALT   --   226*   --    --      No results for input(s): PH, PCO2, PO2, HCO3, FIO2 in the last 72 hours.   Recent Labs      04/18/18 0353 04/17/18 0447 04/16/18 0356   CPK  5267*  9655*  62928*   CKNDX  0.0  0.0   --      No results found for: BNPP, BNP   Lab Results   Component Value Date/Time    Culture result: NO GROWTH 5 DAYS 04/11/2018 10:26 PM     Lab Results   Component Value Date/Time    CK 5267 (H) 04/18/2018 03:53 AM    CK 9655 (H) 04/17/2018 04:47 AM     Lab Results   Component Value Date/Time    Iron 118 04/18/2018 03:53 AM    TIBC 178 (L) 04/18/2018 03:53 AM    Iron % saturation 66 (H) 04/18/2018 03:53 AM    Ferritin 1865 (H) 04/18/2018 03:53 AM     Lab Results   Component Value Date/Time    RPR Non Reactive 09/06/2017 09:19 AM    TSH 1.530 12/29/2014 11:32 AM      Lab Results   Component Value Date/Time    RPR Non Reactive 09/06/2017 09:19 AM    Hepatitis B surface Ag 0.29 04/15/2018 06:08 PM       Imaging: This care involved high complexity decision making which includes independently reviewing the patient's past medical records, current laboratory results, medication profiles that were immediately available to me and actual Xray images at the bedside in order to assess, support vital system function, and to treat this degree of vital organ system failure, and to prevent further life threatening deterioration of the patients condition. I was in direct communication with the nursing staff throughout this time. I have personally and independently reviewed the patients interval lab, diagnostic data, radiographs and the reports. I have ordered additional labs to follow the current medical conditions and to monitor treatment responses over the next 24 hours or sooner if needed. I will order additional imaging to follow longitudinal changes found on the most current imaging.      Martínez Nagy MD

## 2018-04-18 NOTE — PROGRESS NOTES
Bedside shift change report given to Awais Dias (oncoming nurse) by Mariia Wills (offgoing nurse). Report included the following information SBAR, Kardex, Intake/Output, MAR, Recent Results and Cardiac Rhythm . [facility] Phone for oncoming shift:   [phone]    Shift Summary: Pt transferred from CCU today    LDAs           Quad Lumen 04/15/18 Right Internal jugular (Active)   Central Line Being Utilized Yes 4/18/2018  1:46 PM   Criteria for Appropriate Use Limited/no vessel suitable for conventional peripheral access 4/18/2018  1:46 PM   Site Assessment Clean, dry, & intact 4/18/2018  1:46 PM   Infiltration Assessment 0 4/18/2018  1:46 PM   Affected Extremity/Extremities Color distal to insertion site pink (or appropriate for race) 4/18/2018  1:46 PM   Date of Last Dressing Change 04/15/18 4/18/2018  1:46 PM   Dressing Status Clean, dry, & intact 4/18/2018  1:46 PM   Dressing Type Disk with Chlorhexadine gluconate (CHG); Transparent 4/18/2018  1:46 PM   Action Taken Open ports on tubing capped 4/18/2018  4:00 AM   Proximal Hub Color/Line Status White;Capped 4/18/2018  1:46 PM   Positive Blood Return (Medial Site) Yes 4/17/2018 10:00 PM   Medial 1 Hub Color/Line Status Gray;Capped 4/18/2018  1:46 PM   Positive Blood Return (Lateral Site) Yes 4/17/2018 10:00 PM   Medial 2 Hub Color/Line Status Blue;Capped 4/18/2018  1:46 PM   Positive Blood Return (Site #3) Yes 4/17/2018 10:00 PM   Distal Hub Color/Line Status Brown;Capped 4/18/2018  1:46 PM   Positive Blood Return (Site #4) Yes 4/17/2018 10:00 PM   Alcohol Cap Used Yes 4/18/2018  1:46 PM            Hemodialysis Access 04/15/18 (Active)   Central Line Being Utilized Yes 4/18/2018  1:46 PM   Criteria for Appropriate Use Dialysis/apheresis 4/18/2018  1:46 PM   Date Accessed  04/16/18 4/18/2018  4:00 AM   Site Assessment Clean, dry, & intact 4/18/2018  1:46 PM   Date of Last Dressing Change 04/15/18 4/18/2018  1:46 PM   Dressing Status Clean, dry, & intact 4/18/2018  1:46 PM   Dressing Type Disk with Chlorhexadine gluconate (CHG); Transparent 4/18/2018  1:46 PM   Proximal Hub Color/Line Status Red;Capped 4/18/2018  1:46 PM   Distal Hub Color/Line Status Blue;Capped 4/18/2018  1:46 PM                [REMOVED] Urinary Catheter 04/12/18 Grigsby-Indications for Use: Strict I/Os, every 1-2 hours   Intake & Output   Date 04/17/18 1900 - 04/18/18 0659 04/18/18 0700 - 04/19/18 0659   Shift 1266-3361 24 Hour Total 4047-2275 0616-5495 24 Hour Total   I  N  T  A  K  E   P. O.  600 240  240      P. O.  600 240  240    I.V.  (mL/kg/hr) 553.7 1008 42  (0)  42      Volume (sodium bicarbonate (8.4%) 150 mEq in sterile water 1,000 mL infusion) 553.7 1008 42  42    Shift Total  (mL/kg) 553.7  (3.6) 1608  (10.5) 282  (1.9)  282  (1.9)   O  U  T  P  U  T   Urine  (mL/kg/hr) 50 50 150  (0.1)  150      Urine Voided 50 50 150  150      Urine Occurrence(s)  1 x       Emesis/NG output  200         Emesis  200         Emesis Occurrence(s) 1 x 1 x       Shift Total  (mL/kg) 50  (0.3) 250  (1.6) 150  (1)  150  (1)   .7 1358 132  132   Weight (kg) 152.6 152.6 149.4 149.4 149.4      Last Bowel Movement Last Bowel Movement Date: 04/16/18   Glucose Checks [] N/A  [] AC/HS  [] Q6  Concerns:   Nutrition Active Orders   Diet    DIET RENAL WITH OPTIONS 80-80-80; Regular; Consistent Carb 2400kcal; FR 1500ML       Consults []PT  []OT  []Speech  []Case Management   Cardiac Monitoring []N/A []Yes Expires:

## 2018-04-18 NOTE — PROGRESS NOTES
Pt has transfer orders to 21  for routine progression of care. Handoff given to [address] Pt spoke to Vy Sales from [facility] today by phone ([phone]) to discuss coverage of HD svcs by his insur. CM did not speak w/ Neelam but from what pt related, is sounds as if pt will have significant copay and [facility]HA will be giving him an application for financial assistance for copay. Arrgmts are being made for HD at [facility] [place] ([phone]; [phone]). Pt will either have family to transport or will drive himself if able. Of note, pt's mother is critically ill on ventillator in Pod 3 of CCU. Pt and his brother Daniela Swain are struggling with care decisions - particularly decision about trach. Medical/Palliative staff are trying to give pt some time to recover from his own medical issues before pushing for a decision.     Keshav Rowley, MSW

## 2018-04-18 NOTE — PROGRESS NOTES
TRANSFER - IN REPORT:    Verbal report received from [address] Lisaan[address] Maddi (name) on Karolyn Hatchet  being received from CCU (unit) for routine progression of care      Report consisted of patients Situation, Background, Assessment and   Recommendations(SBAR). Information from the following report(s) SBAR, Kardex, Intake/Output, MAR, Recent Results and Cardiac Rhythm . was reviewed with the receiving nurse. Opportunity for questions and clarification was provided. Assessment completed upon patients arrival to unit and care assumed.      Primary Nurse Pushpa Castaneda RN and Jas Jean RN performed a dual skin assessment on this patient- tattoo to L arm noted  Jose score is 21

## 2018-04-19 LAB
ANION GAP SERPL CALC-SCNC: 12 MMOL/L (ref 5–15)
BUN SERPL-MCNC: 128 MG/DL (ref 6–20)
BUN/CREAT SERPL: 10 (ref 12–20)
CALCIUM SERPL-MCNC: 8.7 MG/DL (ref 8.5–10.1)
CHLORIDE SERPL-SCNC: 99 MMOL/L (ref 97–108)
CO2 SERPL-SCNC: 29 MMOL/L (ref 21–32)
CREAT SERPL-MCNC: 13.2 MG/DL (ref 0.7–1.3)
ERYTHROCYTE [DISTWIDTH] IN BLOOD BY AUTOMATED COUNT: 11.2 % (ref 11.5–14.5)
GLUCOSE BLD STRIP.AUTO-MCNC: 123 MG/DL (ref 65–100)
GLUCOSE BLD STRIP.AUTO-MCNC: 128 MG/DL (ref 65–100)
GLUCOSE BLD STRIP.AUTO-MCNC: 178 MG/DL (ref 65–100)
GLUCOSE BLD STRIP.AUTO-MCNC: 194 MG/DL (ref 65–100)
GLUCOSE SERPL-MCNC: 105 MG/DL (ref 65–100)
HCT VFR BLD AUTO: 23.1 % (ref 36.6–50.3)
HGB BLD-MCNC: 7.8 G/DL (ref 12.1–17)
MCH RBC QN AUTO: 33.1 PG (ref 26–34)
MCHC RBC AUTO-ENTMCNC: 33.8 G/DL (ref 30–36.5)
MCV RBC AUTO: 97.9 FL (ref 80–99)
NRBC # BLD: 0 K/UL (ref 0–0.01)
NRBC BLD-RTO: 0 PER 100 WBC
PLATELET # BLD AUTO: 207 K/UL (ref 150–400)
PMV BLD AUTO: 11.8 FL (ref 8.9–12.9)
POTASSIUM SERPL-SCNC: 3.4 MMOL/L (ref 3.5–5.1)
RBC # BLD AUTO: 2.36 M/UL (ref 4.1–5.7)
SERVICE CMNT-IMP: ABNORMAL
SODIUM SERPL-SCNC: 140 MMOL/L (ref 136–145)
WBC # BLD AUTO: 12.8 K/UL (ref 4.1–11.1)

## 2018-04-19 PROCEDURE — 74011636637 HC RX REV CODE- 636/637: Performed by: INTERNAL MEDICINE

## 2018-04-19 PROCEDURE — 80048 BASIC METABOLIC PNL TOTAL CA: CPT | Performed by: INTERNAL MEDICINE

## 2018-04-19 PROCEDURE — 85027 COMPLETE CBC AUTOMATED: CPT | Performed by: INTERNAL MEDICINE

## 2018-04-19 PROCEDURE — 74011250637 HC RX REV CODE- 250/637: Performed by: INTERNAL MEDICINE

## 2018-04-19 PROCEDURE — 90935 HEMODIALYSIS ONE EVALUATION: CPT

## 2018-04-19 PROCEDURE — 36415 COLL VENOUS BLD VENIPUNCTURE: CPT | Performed by: INTERNAL MEDICINE

## 2018-04-19 PROCEDURE — 65660000000 HC RM CCU STEPDOWN

## 2018-04-19 PROCEDURE — 74011250636 HC RX REV CODE- 250/636: Performed by: INTERNAL MEDICINE

## 2018-04-19 PROCEDURE — 82962 GLUCOSE BLOOD TEST: CPT

## 2018-04-19 RX ORDER — INSULIN GLARGINE 100 [IU]/ML
40 INJECTION, SOLUTION SUBCUTANEOUS EVERY 12 HOURS
Status: DISCONTINUED | OUTPATIENT
Start: 2018-04-19 | End: 2018-04-19

## 2018-04-19 RX ORDER — INSULIN GLARGINE 100 [IU]/ML
40 INJECTION, SOLUTION SUBCUTANEOUS EVERY 12 HOURS
Status: DISCONTINUED | OUTPATIENT
Start: 2018-04-19 | End: 2018-04-30 | Stop reason: HOSPADM

## 2018-04-19 RX ORDER — TRAMADOL HYDROCHLORIDE 50 MG/1
50 TABLET ORAL
Status: DISCONTINUED | OUTPATIENT
Start: 2018-04-19 | End: 2018-04-30 | Stop reason: HOSPADM

## 2018-04-19 RX ADMIN — HEPARIN SODIUM 5000 UNITS: 5000 INJECTION, SOLUTION INTRAVENOUS; SUBCUTANEOUS at 22:36

## 2018-04-19 RX ADMIN — TRAMADOL HYDROCHLORIDE 50 MG: 50 TABLET, FILM COATED ORAL at 09:34

## 2018-04-19 RX ADMIN — NYSTATIN: 100000 POWDER TOPICAL at 09:04

## 2018-04-19 RX ADMIN — ACETAMINOPHEN 650 MG: 325 TABLET ORAL at 14:41

## 2018-04-19 RX ADMIN — CALCIUM CARBONATE 400 MG: 500 TABLET, CHEWABLE ORAL at 09:03

## 2018-04-19 RX ADMIN — INSULIN LISPRO 15 UNITS: 100 INJECTION, SOLUTION INTRAVENOUS; SUBCUTANEOUS at 18:21

## 2018-04-19 RX ADMIN — Medication 10 ML: at 17:55

## 2018-04-19 RX ADMIN — Medication 10 ML: at 22:36

## 2018-04-19 RX ADMIN — Medication 10 ML: at 12:44

## 2018-04-19 RX ADMIN — INSULIN LISPRO 15 UNITS: 100 INJECTION, SOLUTION INTRAVENOUS; SUBCUTANEOUS at 08:03

## 2018-04-19 RX ADMIN — INSULIN GLARGINE 40 UNITS: 100 INJECTION, SOLUTION SUBCUTANEOUS at 22:36

## 2018-04-19 RX ADMIN — ACETAMINOPHEN 650 MG: 325 TABLET ORAL at 03:07

## 2018-04-19 RX ADMIN — NYSTATIN: 100000 POWDER TOPICAL at 17:56

## 2018-04-19 RX ADMIN — Medication 10 ML: at 17:54

## 2018-04-19 RX ADMIN — INSULIN LISPRO 3 UNITS: 100 INJECTION, SOLUTION INTRAVENOUS; SUBCUTANEOUS at 12:20

## 2018-04-19 RX ADMIN — HEPARIN SODIUM 5000 UNITS: 5000 INJECTION, SOLUTION INTRAVENOUS; SUBCUTANEOUS at 09:03

## 2018-04-19 RX ADMIN — INSULIN GLARGINE 40 UNITS: 100 INJECTION, SOLUTION SUBCUTANEOUS at 12:21

## 2018-04-19 RX ADMIN — INSULIN LISPRO 3 UNITS: 100 INJECTION, SOLUTION INTRAVENOUS; SUBCUTANEOUS at 09:34

## 2018-04-19 RX ADMIN — INSULIN LISPRO 15 UNITS: 100 INJECTION, SOLUTION INTRAVENOUS; SUBCUTANEOUS at 12:20

## 2018-04-19 NOTE — PROGRESS NOTES
Bedside shift change report given to LEIGHA Sanchez (oncoming nurse) by Farideh Quezada RN (offgoing nurse). Report included the following information SBAR, Intake/Output, MAR, Recent Results and Cardiac Rhythm NSR - Sinus Tach. Zone Phone for oncoming shift:       Shift Summary: Pt. Resting in bed, still has complaints of a headache. Pt was told to let Dr. Noris Velazquez know during rounds that he needs something else for the pain. LDAs           Quad Lumen 04/15/18 Right Internal jugular (Active)   Central Line Being Utilized Yes 4/19/2018  3:02 AM   Criteria for Appropriate Use Limited/no vessel suitable for conventional peripheral access 4/19/2018  3:02 AM   Site Assessment Clean, dry, & intact 4/19/2018  3:02 AM   Infiltration Assessment 0 4/19/2018  3:02 AM   Affected Extremity/Extremities Color distal to insertion site pink (or appropriate for race) 4/19/2018  3:02 AM   Date of Last Dressing Change 04/15/18 4/18/2018  7:55 PM   Dressing Status Clean, dry, & intact 4/19/2018  3:02 AM   Dressing Type Transparent;Disk with Chlorhexadine gluconate (CHG) 4/19/2018  3:02 AM   Action Taken Open ports on tubing capped 4/18/2018  4:00 AM   Proximal Hub Color/Line Status White;Capped 4/19/2018  3:02 AM   Positive Blood Return (Medial Site) Yes 4/19/2018  3:02 AM   Medial 1 Hub Color/Line Status Gray;Capped 4/19/2018  3:02 AM   Positive Blood Return (Lateral Site) Yes 4/17/2018 10:00 PM   Medial 2 Hub Color/Line Status Blue;Capped 4/18/2018  7:55 PM   Positive Blood Return (Site #3) Yes 4/18/2018  7:55 PM   Distal Hub Color/Line Status Capped; White 4/18/2018  7:55 PM   Positive Blood Return (Site #4) Yes 4/18/2018  7:55 PM   Alcohol Cap Used Yes 4/19/2018  3:02 AM            Hemodialysis Access 04/15/18 (Active)   Central Line Being Utilized Yes 4/19/2018  3:02 AM   Criteria for Appropriate Use Dialysis/apheresis 4/19/2018  3:02 AM   Date Accessed  04/16/18 4/18/2018  4:00 AM   Site Assessment Clean, dry, & intact 4/19/2018 3:02 AM   Date of Last Dressing Change 04/15/18 4/18/2018  7:55 PM   Dressing Status Clean, dry, & intact 4/19/2018  3:02 AM   Dressing Type Transparent;Disk with Chlorhexadine gluconate (CHG) 4/19/2018  3:02 AM   Proximal Hub Color/Line Status Capped;Red 4/19/2018  3:02 AM   Distal Hub Color/Line Status Blue;Capped 4/19/2018  3:02 AM                [REMOVED] Urinary Catheter 04/12/18 Grigsby-Indications for Use: Strict I/Os, every 1-2 hours   Intake & Output   Date 04/18/18 0700 - 04/19/18 0659 04/19/18 0700 - 04/20/18 0659   Shift 4574-6925 6111-6520 24 Hour Total 9690-8709 0075-2774 24 Hour Total   I  N  T  A  K  E   P.O. 240  240         P. O. 240  240       I.V.  (mL/kg/hr) 42  (0)  42  (0)         Volume (sodium bicarbonate (8.4%) 150 mEq in sterile water 1,000 mL infusion) 42  42       Shift Total  (mL/kg) 282  (1.9)  282  (1.9)      O  U  T  P  U  T   Urine  (mL/kg/hr) 150  (0.1)  150  (0)         Urine Voided 150  150       Shift Total  (mL/kg) 150  (1)  150  (1)        132      Weight (kg) 149. 4 150.2 150.2 150.2 150.2 150.2      Last Bowel Movement Last Bowel Movement Date: 04/16/18   Glucose Checks [] N/A  [x] AC/HS  [] Q6  Concerns:   Nutrition Active Orders   Diet    DIET RENAL WITH OPTIONS 80-80-80; Regular; Consistent Carb 2400kcal; FR 1500ML       Consults []PT  []OT  []Speech  []Case Management   Cardiac Monitoring []N/A [x]Yes Expires:

## 2018-04-19 NOTE — PROGRESS NOTES
Chart reviewed. Received pt in CRM handoff. I spoke with Nolan Figueroa at [address] [place] [address]. Carole who confirms pt has a TTS 0615 chair and will need to come in and do paperwork the day before. Their corporate office has spoken with the pt regarding costs, co pays, and options. He is in agreement per Nolan Figueroa.

## 2018-04-19 NOTE — PROGRESS NOTES
Bedside shift change report given to Pat Taylor (oncoming nurse) by Christel Linares (offgoing nurse). Report included the following information SBAR, Kardex, Intake/Output, MAR and Recent Results.

## 2018-04-19 NOTE — PROGRESS NOTES
INTERNAL MEDICINE ATTENDING NOTE    S: Mr. Basilia Coles was seen by me today during rounds. At this time, he is out of ICU, awake, alert, and calm. Nausea is better. Still having a lot of headaches. ROS otherwise unremarkable except as noted elsewhere. O: Blood pressure 140/71, pulse 95, temperature 97.5 °F (36.4 °C), resp. rate 18, height 5' 9\" (1.753 m), weight 331 lb 2.1 oz (150.2 kg), SpO2 90 %. Gen: Patient is in no acute distress. Lungs: CTAB. Heart: RRR. Abd: S, NT, ND, BS present. Extremities: Warm. Recent Results (from the past 12 hour(s))   METABOLIC PANEL, BASIC    Collection Time: 04/19/18  2:17 AM   Result Value Ref Range    Sodium 140 136 - 145 mmol/L    Potassium 3.4 (L) 3.5 - 5.1 mmol/L    Chloride 99 97 - 108 mmol/L    CO2 29 21 - 32 mmol/L    Anion gap 12 5 - 15 mmol/L    Glucose 105 (H) 65 - 100 mg/dL     (H) 6 - 20 MG/DL    Creatinine 13.20 (H) 0.70 - 1.30 MG/DL    BUN/Creatinine ratio 10 (L) 12 - 20      GFR est AA 5 (L) >60 ml/min/1.73m2    GFR est non-AA 4 (L) >60 ml/min/1.73m2    Calcium 8.7 8.5 - 10.1 MG/DL   CBC W/O DIFF    Collection Time: 04/19/18  2:17 AM   Result Value Ref Range    WBC 12.8 (H) 4.1 - 11.1 K/uL    RBC 2.36 (L) 4.10 - 5.70 M/uL    HGB 7.8 (L) 12.1 - 17.0 g/dL    HCT 23.1 (L) 36.6 - 50.3 %    MCV 97.9 80.0 - 99.0 FL    MCH 33.1 26.0 - 34.0 PG    MCHC 33.8 30.0 - 36.5 g/dL    RDW 11.2 (L) 11.5 - 14.5 %    PLATELET 449 992 - 975 K/uL    MPV 11.8 8.9 - 12.9 FL    NRBC 0.0 0  WBC    ABSOLUTE NRBC 0.00 0.00 - 0.01 K/uL   GLUCOSE, POC    Collection Time: 04/19/18  8:31 AM   Result Value Ref Range    Glucose (POC) 178 (H) 65 - 100 mg/dL    Performed by Livia Nix (PCT)         CXR 4/15: No PTX. CXR 4/11: Minimal R ML infiltrate. CXR 4/12: No acute abnormality. US Retroperitoneal 4/14: Normal size and appearance of the kidneys without hydronephrosis. Nondistended urinary bladder with a Grigsby catheter.     US abdomen 4/17: Echogenic liver suggesting fatty infiltration with some areas of relative sparing. Contracted gallbladder. No obvious gallstones. Pancreas not visualized due to overlying bowel gas. No hydronephrosis. A/P:   1. Hyperosmolar coma due to secondary diabetes ([facility] [address] (4/12/2018): Treated with IVF and insulin. Glc is improved. 2. Rhabdomyolysis: CK down   3. LITTLE: HD per Nephrology. 4. Hypernatremia: hydration; watch labs. 5. Sepsis due to aspiration pneumonia, POA: Empiric antibiotics. 6. Hypoxia: O2 as needed. 7. Acute encephalopathy (4/12/2018): Improving. He is now awake, no longer paranoid, seems at baseline. 8. Hyperlipidemia (5/1/2012): Hold statin  9. [facility] (5/6/2013)  10. Hypertension (10/31/2014): BP okay. 11. Epigastric pain and N/V: Improving; if flares up again would consider GI consult and further work up. Continue Prn maalox. Prn zofran.       Mohsen Rondon MD, FACP  Best contact is via Pager: [phone], or via hospital  at [phone]

## 2018-04-19 NOTE — PROCEDURES
[facility]  [phone]    Vitals   Pre   Post   Assessment   Pre   Post     Temp  Temp: 97.5 °F (36.4 °C) (04/19/18 1338)  98.0 LOC  A&OX4 A&OX4   HR   Pulse (Heart Rate): 95 (04/19/18 1138) 88 Lungs   diminished  diminished   B/P   BP: (!) 150/94 (04/19/18 1338) 164/91 Cardiac   NSR  NSR   Resp   Resp Rate: 18 (04/19/18 1338) 18 Skin   intact  intact   Pain level  Pain Intensity 1: 4 (04/19/18 1020)  Edema    none   none   Orders:    Duration:   Start:    7407 End:    6984 Total:   3:22   Dialyzer:   Dialyzer/Set Up Inspection: Pasty Res (04/19/18 1338)   K Bath:   Dialysate K (mEq/L): 3 (04/19/18 1338)   Ca Bath:   Dialysate CA (mEq/L): 2.5 (04/19/18 1338)   Na/Bicarb:   Dialysate NA (mEq/L): 140 (04/19/18 1338)   Target Fluid Removal:   Goal/Amount of Fluid to Remove (mL): 2000 mL (04/16/18 0945)   Access     Type & Location:   RIJ. Port of permcath disinfected with Alcohol per policy. Each lumen aspirated for blood return and flushed with Normal Saline per policy. No signs or symptoms of infection. Labs     Obtained/Reviewed   Critical Results Called   Date when labs were drawn-  Hgb-    HGB   Date Value Ref Range Status   04/19/2018 7.8 (L) 12.1 - 17.0 g/dL Final     K-    Potassium   Date Value Ref Range Status   04/19/2018 3.4 (L) 3.5 - 5.1 mmol/L Final     Ca-   Calcium   Date Value Ref Range Status   04/19/2018 8.7 8.5 - 10.1 MG/DL Final     Bun-   BUN   Date Value Ref Range Status   04/19/2018 128 (H) 6 - 20 MG/DL Final     Creat-   Creatinine   Date Value Ref Range Status   04/19/2018 13.20 (H) 0.70 - 1.30 MG/DL Final        Medications/ Blood Products Given     Name   Dose   Route and Time     tylenol 650 mg  Po/1441             Blood Volume Processed (BVP):    67.5 Net Fluid   Removed:  2000L   Comments RN completed patient assessment. RN reviewed technicians vital signs and procedure note. Tx completed.  Reviewed by Troy Granados RN, Jaswinder Panchal  Time Out Done: [facility]  Primary Nurse Rpt Pre: Sang Bingham RN   Primary Nurse Rpt Post: Sang Bingham RN  Pt Education: procedural  Care Plan: on going  Tx Summary:  Port of permcath disinfected with Alcohol per policy. Each lumen aspirated for blood return and flushed with Normal Saline per policy. No signs or symptoms of infection. Dialysis initiated. 1431: pt c/o headache  1500: Tx time reduced 15min per Dr. Susana Rodriguez  [phone]: Pt c/o feeling hot. Pt clammy to touch, 150ns given; goal reduced to 2000.  1722: Tx discontinued with 23 min remaining; pt clotting off system. Blood returned to patient with no issues. Admiting Diagnosis:  Pt's previous clinic- n/a  Consent signed - Informed Consent Verified: Yes (04/19/18 3127)  Jaswinder Consent - on file  Hepatitis Status- neg 04/15/18  Machine #- Machine Number: [id number] (04/19/18 1338)  Telemetry status-  Pre-dialysis wt. - Pre-Dialysis Weight: 155.8 kg (343 lb 7.6 oz) (04/15/18 9175)

## 2018-04-19 NOTE — PROGRESS NOTES
NAME: Opal Sanders        :  1975        MRN:  [id number]        Assessment :    Plan:  --LITTLE   Rhabdo  Obesity  HTN  Sepsis --HD #1 4/15. Hd today and then TTS. Watch for renal recovery. Suspect that recovery will take weeks. Will ask CM to help with outpatient placement. Phos up. Renal diet. Tums as binder. Iron OK. Continue EPO    Pull fluid as tolerated. Stop IVF. Subjective:     Chief Complaint:  \" I feel OK. \"    No diarrhea. No dyspnea. No HA. No edema. No rashes. Review of Systems:    Symptom Y/N Comments  Symptom Y/N Comments   Fever/Chills    Chest Pain     Poor Appetite    Edema     Cough    Abdominal Pain     Sputum    Joint Pain     SOB/WELSH    Pruritis/Rash     Nausea/vomit    Tolerating PT/OT     Diarrhea    Tolerating Diet     Constipation    Other       Could not obtain due to:      Objective:     VITALS:   Last 24hrs VS reviewed since prior progress note.  Most recent are:  Visit Vitals    /71    Pulse 95    Temp 97.5 °F (36.4 °C)    Resp 18    Ht 5' 9\" (1.753 m)    Wt 150.2 kg (331 lb 2.1 oz)    SpO2 90%    BMI 48.9 kg/m2       Intake/Output Summary (Last 24 hours) at 18 1059  Last data filed at 18 1659   Gross per 24 hour   Intake                0 ml   Output              150 ml   Net             -150 ml      Telemetry Reviewed:     PHYSICAL EXAM:  General: NAD  CTA  abd soft  No edema      Lab Data Reviewed: (see below)    Medications Reviewed: (see below)    PMH/SH reviewed - no change compared to H&P  ________________________________________________________________________  Care Plan discussed with:  Patient     Family      RN     Care Manager                    Consultant:          Comments   >50% of visit spent in counseling and coordination of care       ________________________________________________________________________  Chayito Srinivasan III, MD     Procedures: see electronic medical records for all procedures/Xrays and details which  were not copied into this note but were reviewed prior to creation of Plan. LABS:  Recent Labs      04/19/18 0217 04/18/18 0353   WBC  12.8*  10.9   HGB  7.8*  8.6*   HCT  23.1*  25.9*   PLT  207  139*     Recent Labs      04/19/18 0217 04/18/18 0353  04/17/18 0447   NA  140  138  137   K  3.4*  3.5  4.0   CL  99  98  99   CO2  29  31  27   BUN  128*  117*  93*   CREA  13.20*  11.40*  9.31*   GLU  105*  235*  346*   CA  8.7  8.7  8.4*   PHOS   --   7.1*   --      Recent Labs      04/17/18 0447   SGOT  209*   AP  76   TP  5.6*   ALB  2.0*   GLOB  3.6     No results for input(s): INR, PTP, APTT in the last 72 hours. No lab exists for component: INREXT, INREXT   Recent Labs      04/18/18 0353   TIBC  178*   PSAT  66*   FERR  1865*      No results found for: FOL, RBCF   No results for input(s): PH, PCO2, PO2 in the last 72 hours.   Recent Labs      04/18/18 0353 04/17/18 0447   CPK  5267*  9655*   CKMB  2.2  2.6     No components found for: [place]  Lab Results   Component Value Date/Time    Color YELLOW/STRAW 04/12/2018 12:44 AM    Appearance CLEAR 04/12/2018 12:44 AM    Specific gravity <1.005 04/12/2018 12:44 AM    Specific gravity 1.021 04/16/2013 05:05 PM    pH (UA) 5.0 04/12/2018 12:44 AM    Protein NEGATIVE  04/12/2018 12:44 AM    Glucose >1000 (A) 04/12/2018 12:44 AM    Ketone TRACE (A) 04/12/2018 12:44 AM    Bilirubin NEGATIVE  04/12/2018 12:44 AM    Urobilinogen 0.2 04/12/2018 12:44 AM    Nitrites NEGATIVE  04/12/2018 12:44 AM    Leukocyte Esterase NEGATIVE  04/12/2018 12:44 AM    Epithelial cells FEW 04/12/2018 12:44 AM    Bacteria NEGATIVE  04/12/2018 12:44 AM    WBC 0-4 04/12/2018 12:44 AM    RBC 0-5 04/12/2018 12:44 AM       MEDICATIONS:  Current Facility-Administered Medications   Medication Dose Route Frequency    traMADol (ULTRAM) tablet 50 mg  50 mg Oral Q6H PRN    insulin glargine (LANTUS) injection 40 Units  40 Units SubCUTAneous Q12H    calcium carbonate (TUMS) chewable tablet 400 mg [elemental]  400 mg Oral TID WITH MEALS    epoetin keri (EPOGEN;PROCRIT) injection 10,000 Units  10,000 Units SubCUTAneous DIALYSIS MON, WED & FRI    insulin lispro (HUMALOG) injection 15 Units  15 Units SubCUTAneous TIDAC    insulin lispro (HUMALOG) injection   SubCUTAneous AC&HS    glucose chewable tablet 16 g  4 Tab Oral PRN    dextrose (D50W) injection syrg 12.5-25 g  12.5-25 g IntraVENous PRN    glucagon (GLUCAGEN) injection 1 mg  1 mg IntraMUSCular PRN    aluminum-magnesium hydroxide (MAALOX) oral suspension 30 mL  30 mL Oral Q6H PRN    heparin (porcine) injection 5,000 Units  5,000 Units SubCUTAneous Q12H    sodium chloride (NS) flush 10-40 mL  10-40 mL IntraVENous PRN    sodium chloride (NS) flush 10 mL  10 mL IntraVENous Q8H    nitroglycerin (NITROBID) 2 % ointment 1 Inch  1 Inch Topical Q6H PRN    sodium chloride (NS) flush 5-10 mL  5-10 mL IntraVENous Q8H    sodium chloride (NS) flush 5-10 mL  5-10 mL IntraVENous PRN    acetaminophen (TYLENOL) tablet 650 mg  650 mg Oral Q6H PRN    ondansetron (ZOFRAN) injection 4 mg  4 mg IntraVENous Q4H PRN    nystatin (MYCOSTATIN) 100,000 unit/gram powder   Topical BID

## 2018-04-20 ENCOUNTER — ANESTHESIA (OUTPATIENT)
Dept: ENDOSCOPY | Age: 43
DRG: 871 | End: 2018-04-20
Payer: COMMERCIAL

## 2018-04-20 ENCOUNTER — ANESTHESIA EVENT (OUTPATIENT)
Dept: ENDOSCOPY | Age: 43
DRG: 871 | End: 2018-04-20
Payer: COMMERCIAL

## 2018-04-20 LAB
ALBUMIN SERPL-MCNC: 2.2 G/DL (ref 3.5–5)
ALBUMIN/GLOB SERPL: 0.6 {RATIO} (ref 1.1–2.2)
ALP SERPL-CCNC: 77 U/L (ref 45–117)
ALT SERPL-CCNC: 139 U/L (ref 12–78)
ANION GAP SERPL CALC-SCNC: 9 MMOL/L (ref 5–15)
AST SERPL-CCNC: 66 U/L (ref 15–37)
BASOPHILS # BLD: 0.2 K/UL (ref 0–0.1)
BASOPHILS NFR BLD: 1 % (ref 0–1)
BILIRUB SERPL-MCNC: 1.2 MG/DL (ref 0.2–1)
BUN SERPL-MCNC: 82 MG/DL (ref 6–20)
BUN/CREAT SERPL: 8 (ref 12–20)
CALCIUM SERPL-MCNC: 8.5 MG/DL (ref 8.5–10.1)
CHLORIDE SERPL-SCNC: 100 MMOL/L (ref 97–108)
CK SERPL-CCNC: 1663 U/L (ref 39–308)
CO2 SERPL-SCNC: 30 MMOL/L (ref 21–32)
CREAT SERPL-MCNC: 9.95 MG/DL (ref 0.7–1.3)
DIFFERENTIAL METHOD BLD: ABNORMAL
EOSINOPHIL # BLD: 0.5 K/UL (ref 0–0.4)
EOSINOPHIL NFR BLD: 3 % (ref 0–7)
ERYTHROCYTE [DISTWIDTH] IN BLOOD BY AUTOMATED COUNT: 11.5 % (ref 11.5–14.5)
GLOBULIN SER CALC-MCNC: 3.7 G/DL (ref 2–4)
GLUCOSE BLD STRIP.AUTO-MCNC: 119 MG/DL (ref 65–100)
GLUCOSE BLD STRIP.AUTO-MCNC: 143 MG/DL (ref 65–100)
GLUCOSE BLD STRIP.AUTO-MCNC: 151 MG/DL (ref 65–100)
GLUCOSE BLD STRIP.AUTO-MCNC: 180 MG/DL (ref 65–100)
GLUCOSE BLD STRIP.AUTO-MCNC: 187 MG/DL (ref 65–100)
GLUCOSE SERPL-MCNC: 181 MG/DL (ref 65–100)
HCT VFR BLD AUTO: 21.2 % (ref 36.6–50.3)
HGB BLD-MCNC: 6.9 G/DL (ref 12.1–17)
IMM GRANULOCYTES # BLD: 0 K/UL (ref 0–0.04)
IMM GRANULOCYTES NFR BLD AUTO: 0 % (ref 0–0.5)
INR PPP: 1 (ref 0.9–1.1)
LYMPHOCYTES # BLD: 2.3 K/UL (ref 0.8–3.5)
LYMPHOCYTES NFR BLD: 15 % (ref 12–49)
MCH RBC QN AUTO: 32.9 PG (ref 26–34)
MCHC RBC AUTO-ENTMCNC: 32.5 G/DL (ref 30–36.5)
MCV RBC AUTO: 101 FL (ref 80–99)
METAMYELOCYTES NFR BLD MANUAL: 4 %
MONOCYTES # BLD: 0.5 K/UL (ref 0–1)
MONOCYTES NFR BLD: 3 % (ref 5–13)
NEUTS BAND NFR BLD MANUAL: 4 %
NEUTS SEG # BLD: 11.5 K/UL (ref 1.8–8)
NEUTS SEG NFR BLD: 70 % (ref 32–75)
NRBC # BLD: 0.12 K/UL (ref 0–0.01)
NRBC BLD-RTO: 0.8 PER 100 WBC
PLATELET # BLD AUTO: 260 K/UL (ref 150–400)
PMV BLD AUTO: 11.3 FL (ref 8.9–12.9)
POTASSIUM SERPL-SCNC: 3.8 MMOL/L (ref 3.5–5.1)
PROT SERPL-MCNC: 5.9 G/DL (ref 6.4–8.2)
PROTHROMBIN TIME: 10.3 SEC (ref 9–11.1)
RBC # BLD AUTO: 2.1 M/UL (ref 4.1–5.7)
RBC MORPH BLD: ABNORMAL
SERVICE CMNT-IMP: ABNORMAL
SODIUM SERPL-SCNC: 139 MMOL/L (ref 136–145)
WBC # BLD AUTO: 15.5 K/UL (ref 4.1–11.1)

## 2018-04-20 PROCEDURE — 86900 BLOOD TYPING SEROLOGIC ABO: CPT | Performed by: INTERNAL MEDICINE

## 2018-04-20 PROCEDURE — 74011250637 HC RX REV CODE- 250/637: Performed by: INTERNAL MEDICINE

## 2018-04-20 PROCEDURE — 74011250636 HC RX REV CODE- 250/636

## 2018-04-20 PROCEDURE — 76040000019: Performed by: SPECIALIST

## 2018-04-20 PROCEDURE — 85025 COMPLETE CBC W/AUTO DIFF WBC: CPT | Performed by: INTERNAL MEDICINE

## 2018-04-20 PROCEDURE — 74011250637 HC RX REV CODE- 250/637: Performed by: SPECIALIST

## 2018-04-20 PROCEDURE — 0DJ08ZZ INSPECTION OF UPPER INTESTINAL TRACT, VIA NATURAL OR ARTIFICIAL OPENING ENDOSCOPIC: ICD-10-PCS | Performed by: SPECIALIST

## 2018-04-20 PROCEDURE — 77030018719 HC DRSG PTCH ANTIMIC J&J -A

## 2018-04-20 PROCEDURE — 74011636637 HC RX REV CODE- 636/637: Performed by: INTERNAL MEDICINE

## 2018-04-20 PROCEDURE — 36415 COLL VENOUS BLD VENIPUNCTURE: CPT | Performed by: INTERNAL MEDICINE

## 2018-04-20 PROCEDURE — 74011250636 HC RX REV CODE- 250/636: Performed by: INTERNAL MEDICINE

## 2018-04-20 PROCEDURE — 86923 COMPATIBILITY TEST ELECTRIC: CPT | Performed by: INTERNAL MEDICINE

## 2018-04-20 PROCEDURE — 65660000000 HC RM CCU STEPDOWN

## 2018-04-20 PROCEDURE — 82550 ASSAY OF CK (CPK): CPT | Performed by: INTERNAL MEDICINE

## 2018-04-20 PROCEDURE — 76060000031 HC ANESTHESIA FIRST 0.5 HR: Performed by: SPECIALIST

## 2018-04-20 PROCEDURE — 80053 COMPREHEN METABOLIC PANEL: CPT | Performed by: INTERNAL MEDICINE

## 2018-04-20 PROCEDURE — 74011000250 HC RX REV CODE- 250

## 2018-04-20 PROCEDURE — 82962 GLUCOSE BLOOD TEST: CPT

## 2018-04-20 PROCEDURE — 85610 PROTHROMBIN TIME: CPT | Performed by: PHYSICIAN ASSISTANT

## 2018-04-20 RX ORDER — ONDANSETRON 2 MG/ML
INJECTION INTRAMUSCULAR; INTRAVENOUS
Status: COMPLETED
Start: 2018-04-20 | End: 2018-04-20

## 2018-04-20 RX ORDER — SODIUM CHLORIDE 0.9 % (FLUSH) 0.9 %
5-10 SYRINGE (ML) INJECTION AS NEEDED
Status: ACTIVE | OUTPATIENT
Start: 2018-04-20 | End: 2018-04-20

## 2018-04-20 RX ORDER — MIDAZOLAM HYDROCHLORIDE 1 MG/ML
.25-5 INJECTION, SOLUTION INTRAMUSCULAR; INTRAVENOUS
Status: DISCONTINUED | OUTPATIENT
Start: 2018-04-20 | End: 2018-04-20 | Stop reason: HOSPADM

## 2018-04-20 RX ORDER — FLUMAZENIL 0.1 MG/ML
0.2 INJECTION INTRAVENOUS
Status: DISCONTINUED | OUTPATIENT
Start: 2018-04-20 | End: 2018-04-20 | Stop reason: HOSPADM

## 2018-04-20 RX ORDER — SUCRALFATE 1 G/10ML
1 SUSPENSION ORAL
Status: DISCONTINUED | OUTPATIENT
Start: 2018-04-20 | End: 2018-04-30 | Stop reason: HOSPADM

## 2018-04-20 RX ORDER — NALOXONE HYDROCHLORIDE 0.4 MG/ML
0.4 INJECTION, SOLUTION INTRAMUSCULAR; INTRAVENOUS; SUBCUTANEOUS
Status: DISCONTINUED | OUTPATIENT
Start: 2018-04-20 | End: 2018-04-20 | Stop reason: HOSPADM

## 2018-04-20 RX ORDER — FENTANYL CITRATE 50 UG/ML
50 INJECTION, SOLUTION INTRAMUSCULAR; INTRAVENOUS
Status: DISCONTINUED | OUTPATIENT
Start: 2018-04-20 | End: 2018-04-20 | Stop reason: HOSPADM

## 2018-04-20 RX ORDER — PANTOPRAZOLE SODIUM 40 MG/1
40 TABLET, DELAYED RELEASE ORAL
Status: DISCONTINUED | OUTPATIENT
Start: 2018-04-20 | End: 2018-04-30 | Stop reason: HOSPADM

## 2018-04-20 RX ORDER — SODIUM CHLORIDE 9 MG/ML
250 INJECTION, SOLUTION INTRAVENOUS AS NEEDED
Status: DISCONTINUED | OUTPATIENT
Start: 2018-04-20 | End: 2018-04-30 | Stop reason: HOSPADM

## 2018-04-20 RX ORDER — PROPOFOL 10 MG/ML
INJECTION, EMULSION INTRAVENOUS AS NEEDED
Status: DISCONTINUED | OUTPATIENT
Start: 2018-04-20 | End: 2018-04-20 | Stop reason: HOSPADM

## 2018-04-20 RX ORDER — ONDANSETRON 2 MG/ML
INJECTION INTRAMUSCULAR; INTRAVENOUS AS NEEDED
Status: DISCONTINUED | OUTPATIENT
Start: 2018-04-20 | End: 2018-04-20 | Stop reason: HOSPADM

## 2018-04-20 RX ORDER — SODIUM CHLORIDE 0.9 % (FLUSH) 0.9 %
5-10 SYRINGE (ML) INJECTION EVERY 8 HOURS
Status: ACTIVE | OUTPATIENT
Start: 2018-04-20 | End: 2018-04-20

## 2018-04-20 RX ORDER — LIDOCAINE HYDROCHLORIDE 20 MG/ML
INJECTION, SOLUTION EPIDURAL; INFILTRATION; INTRACAUDAL; PERINEURAL AS NEEDED
Status: DISCONTINUED | OUTPATIENT
Start: 2018-04-20 | End: 2018-04-20 | Stop reason: HOSPADM

## 2018-04-20 RX ADMIN — LIDOCAINE HYDROCHLORIDE 100 MG: 20 INJECTION, SOLUTION EPIDURAL; INFILTRATION; INTRACAUDAL; PERINEURAL at 15:46

## 2018-04-20 RX ADMIN — INSULIN GLARGINE 40 UNITS: 100 INJECTION, SOLUTION SUBCUTANEOUS at 22:19

## 2018-04-20 RX ADMIN — HEPARIN SODIUM 5000 UNITS: 5000 INJECTION, SOLUTION INTRAVENOUS; SUBCUTANEOUS at 07:57

## 2018-04-20 RX ADMIN — PROPOFOL 50 MG: 10 INJECTION, EMULSION INTRAVENOUS at 15:56

## 2018-04-20 RX ADMIN — SUCRALFATE 1 G: 1 SUSPENSION ORAL at 17:07

## 2018-04-20 RX ADMIN — PROPOFOL 50 MG: 10 INJECTION, EMULSION INTRAVENOUS at 15:47

## 2018-04-20 RX ADMIN — INSULIN LISPRO 15 UNITS: 100 INJECTION, SOLUTION INTRAVENOUS; SUBCUTANEOUS at 17:07

## 2018-04-20 RX ADMIN — SUCRALFATE 1 G: 1 SUSPENSION ORAL at 22:19

## 2018-04-20 RX ADMIN — SODIUM CHLORIDE 500 ML: 900 INJECTION, SOLUTION INTRAVENOUS at 14:58

## 2018-04-20 RX ADMIN — ONDANSETRON 4 MG: 2 INJECTION INTRAMUSCULAR; INTRAVENOUS at 15:42

## 2018-04-20 RX ADMIN — Medication 10 ML: at 14:00

## 2018-04-20 RX ADMIN — NYSTATIN: 100000 POWDER TOPICAL at 18:00

## 2018-04-20 RX ADMIN — Medication 10 ML: at 03:52

## 2018-04-20 RX ADMIN — INSULIN LISPRO 15 UNITS: 100 INJECTION, SOLUTION INTRAVENOUS; SUBCUTANEOUS at 07:55

## 2018-04-20 RX ADMIN — PROPOFOL 50 MG: 10 INJECTION, EMULSION INTRAVENOUS at 15:49

## 2018-04-20 RX ADMIN — Medication 10 ML: at 22:26

## 2018-04-20 RX ADMIN — NYSTATIN: 100000 POWDER TOPICAL at 08:00

## 2018-04-20 RX ADMIN — INSULIN GLARGINE 40 UNITS: 100 INJECTION, SOLUTION SUBCUTANEOUS at 07:56

## 2018-04-20 RX ADMIN — ERYTHROPOIETIN 10000 UNITS: 10000 INJECTION, SOLUTION INTRAVENOUS; SUBCUTANEOUS at 17:07

## 2018-04-20 RX ADMIN — LIDOCAINE HYDROCHLORIDE 100 MG: 20 INJECTION, SOLUTION EPIDURAL; INFILTRATION; INTRACAUDAL; PERINEURAL at 15:52

## 2018-04-20 RX ADMIN — PROPOFOL 50 MG: 10 INJECTION, EMULSION INTRAVENOUS at 15:59

## 2018-04-20 RX ADMIN — HEPARIN SODIUM 5000 UNITS: 5000 INJECTION, SOLUTION INTRAVENOUS; SUBCUTANEOUS at 22:19

## 2018-04-20 RX ADMIN — PROPOFOL 50 MG: 10 INJECTION, EMULSION INTRAVENOUS at 15:52

## 2018-04-20 RX ADMIN — PROPOFOL 100 MG: 10 INJECTION, EMULSION INTRAVENOUS at 15:46

## 2018-04-20 RX ADMIN — PANTOPRAZOLE SODIUM 40 MG: 40 TABLET, DELAYED RELEASE ORAL at 17:07

## 2018-04-20 RX ADMIN — INSULIN LISPRO 3 UNITS: 100 INJECTION, SOLUTION INTRAVENOUS; SUBCUTANEOUS at 17:07

## 2018-04-20 RX ADMIN — INSULIN LISPRO 3 UNITS: 100 INJECTION, SOLUTION INTRAVENOUS; SUBCUTANEOUS at 07:55

## 2018-04-20 NOTE — ROUTINE PROCESS
edside and Verbal shift change report given to Raheem Nicolas RN  (oncoming nurse) by Alaln Hopper RN (offgoing nurse). Report given with SBAR, Kardex, MAR and Recent Results.

## 2018-04-20 NOTE — PROGRESS NOTES
TRANSFER - IN REPORT:    Verbal report received from LEIGHA Ureña(name) [facility] [address] [facility] [facility]  [facility] from [facility](unit) for ordered procedure      Report consisted of patients Situation, Background, Assessment and   Recommendations(SBAR). Information from the following report(s) SBAR, Kardex, Procedure Summary, Intake/Output, MAR, Recent Results and Med Rec Status was reviewed with the receiving nurse. Opportunity for questions and clarification was provided. Assessment completed upon patients arrival to unit and care assumed.

## 2018-04-20 NOTE — PROCEDURES
Esophagogastroduodenoscopy Procedure Note      Linda Villarreal  1975  [id number]    Indication:  Refractory N/V     Endoscopist: Shereen Case MD    Referring Provider:  Reva Schwartz MD    Sedation:  MAC anesthesia Propofol    Procedure Details:  After infomed consent was obtained for the procedure, with all risks and benefits of procedure explained the patient was taken to the endoscopy suite and placed in the left lateral decubitus position. Following sequential administration of sedation as per above, the endoscope was inserted into the mouth and advanced under direct vision to second portion of the duodenum. A careful inspection was made as the gastroscope was withdrawn, including a retroflexed view of the proximal stomach; findings and interventions are described below. Findings:     Esophagus:   + There were linear ulcerations in the lower 1/2 of the esophagus > 8 cm in length and then there was a healing ulcer at EG junction with the appearance of a healing Chelly-Darlene tear. No active bleeding seen. Esophagitis was Grade D  + 4 cm hiatal hernia    Stomach:   + Bile in stomach but underlying mucosa was normal without any ulcers or erosions seen. No blood in stomach. Duodenum:   - The bulb and post bulbar mucosa is normal in appearance to the second portion. The duodenal folds appeared normal.     Therapies:  none    Specimen: Specimens were collected as described and send to the laboratory. Complications:   None were encountered during the procedure. EBL: < 10 ml.           Recommendations:   -elevate HOB  -PPI BID  -Sucralfate liquid QID  -antiemetics and control of BG levels  -repeat EGD in 3 months      Shereen Case MD  4/20/2018  3:58 PM

## 2018-04-20 NOTE — PROGRESS NOTES
Problem: Falls - Risk of  Goal: *Absence of Falls  Document Beth Fall Risk and appropriate interventions in the flowsheet.    Outcome: Progressing Towards Goal  Fall Risk Interventions:  Mobility Interventions: Patient to call before getting OOB    Mentation Interventions: Adequate sleep, hydration, pain control    Medication Interventions: Evaluate medications/consider consulting pharmacy    Elimination Interventions: Call light in reach

## 2018-04-20 NOTE — PROGRESS NOTES
I spoke with Bharathi Dillon at [address] [place] [address]. Carole who confirms pt has a TTS 0615 chair and will need to come in and do paperwork the day before. Their corporate office has spoken with the pt regarding costs, co pays, and options. He is in agreement per Bharathi Dillon. [address] [place] at [facility] spoke with pt on my phone regarding the need to come do paperwork on the day of discharge. She did not want to fax the paperwork to me so I could give it to the pt. Nallely Cosme says pt seemed confused on the phone so she is reaching out to his mother//brother. Please ask for early in the day permacath insertion on Monday if possible.   Thanks

## 2018-04-20 NOTE — ROUTINE PROCESS
Bedside and Verbal shift change report given to Dutch Berumen RN (oncoming nurse) by Cristobal Wyatt RN (offgoing nurse). Report given with SBAR, Kardex, MAR and Recent Results.

## 2018-04-20 NOTE — PROGRESS NOTES
Danie Olson  1975  [id number]    Situation:  Verbal report received from: Tye Mike RN  Procedure: Procedure(s):  ESOPHAGOGASTRODUODENOSCOPY (EGD)    Background:    Preoperative diagnosis: epigastric pain, nausea, vomiting  Postoperative diagnosis: erosive esophagitis; hiatal hernia;     :  Dr. Josefa Farias  Assistant(s): Endoscopy Technician-1: Marcos Carias  Endoscopy RN-1: Rocio Sykes RN    Specimens: * No specimens in log *  H. Pylori  no    Assessment:  Intra-procedure medications   Anesthesia gave intra-procedure sedation and medications, see anesthesia flow sheet yes    Intravenous fluids: NS@ KVO     Vital signs stable  yes    Abdominal assessment: round and soft  yes    Recommendation:.   Return to floor yes  Permission to share finding with family or friend yes

## 2018-04-20 NOTE — PROGRESS NOTES
Patient has been alert and oriented but flat affected. He denied any pain or discomfort. He had 1 episode of nausea and vomiting refused the zofran.

## 2018-04-20 NOTE — PROGRESS NOTES
Patient vomiting light green watery liquid; approximately 200 ml. Dr Bhardwaj Figures and Dr Martin Salter at bedside.

## 2018-04-20 NOTE — PROGRESS NOTES
Nutrition: Consult received for \"likes and dislikes. \" Patient has now been made NPO for GI work up. [facility] provides room service so patients can order what they want within the parameters of their diet. If diet is advanced, please encourage use of menu/room service so patient can ask for food that he likes. RD will follow up with patient as scheduled on Monday when diet will hopefully be advanced. Thank you.   Carolyne Acharya RD

## 2018-04-20 NOTE — ANESTHESIA POSTPROCEDURE EVALUATION
Post-Anesthesia Evaluation and Assessment    Patient: Maris Hwang MRN: [id number]  SSN: [id number]    YOB: 1975  Age: 36 y.o. Sex: male       Cardiovascular Function/Vital Signs  Visit Vitals    /81    Pulse 98    Temp 36.8 °C (98.2 °F)    Resp 18    Ht 5' 9\" (1.753 m)    Wt 147.6 kg (325 lb 6.4 oz)    SpO2 92%    BMI 48.05 kg/m2       Patient is status post general, total IV anesthesia anesthesia for Procedure(s):  ESOPHAGOGASTRODUODENOSCOPY (EGD). Nausea/Vomiting: None    Postoperative hydration reviewed and adequate. Pain:  Pain Scale 1: Numeric (0 - 10) (04/20/18 1606)  Pain Intensity 1: 0 (04/20/18 1606)   Managed    Neurological Status:   Neuro  Neurologic State: Alert (04/19/18 1947)  Orientation Level: Oriented X4 (04/19/18 1947)  Cognition: Appropriate decision making; Appropriate for age attention/concentration; Appropriate safety awareness;Recognition of people/places (04/19/18 1947)  Speech: Appropriate for age;Clear (04/19/18 1947)  Assessment L Pupil: Round (04/18/18 0815)  Size L Pupil (mm): 3 (04/18/18 0815)  Assessment R Pupil: Round (04/18/18 0815)  Size R Pupil (mm): 3 (04/18/18 0815)   At baseline    Mental Status and Level of Consciousness: Arousable    Pulmonary Status:   O2 Device: Room air (04/20/18 1606)   Adequate oxygenation and airway patent    Complications related to anesthesia: None    Post-anesthesia assessment completed.  No concerns    Signed By: Venice Cruz MD     April 20, 2018

## 2018-04-20 NOTE — PROGRESS NOTES
Endoscope was pre-cleaned at the bedside immediately following procedure by [facility].

## 2018-04-20 NOTE — CONSULTS
Gastroenterology Consult     Referring Physician: Dr. Erasmo Greer Date: 4/20/2018     Subjective:     Chief Complaint: stomach pain and nausea    History of Present Illness: Kamran Boyd is a 36 y.o. male who is seen in consultation for epigastric pain and nausea and to rule out ulcer. He was admitted 4/12/18 for hyperosmolar coma due to diabetes, aspiration pneumonia and ARF and has been started on dialysis. He is known to Dr. Sonam Kc for SUAREZ. Since his admission he has had intermittent upper abd pain and nausea. He states pain is 5/10 at its worst and is described as a stabbing. He gets it early in the night and it improves with getting up. He attributes it to the food he is being given here. He says he doesn't like it and therefore can't eat much and has to spit it out. He points to his R side when I ask him to pinpoint the location of the pain. U/S was negative for gallstones. He states his bowels are normal. Denies melena. He denies NSAIDs. Hgb has dropped from 8.6 to 6.9 in the past 2 days. He's had heartburn since his admission. He is drowsy and difficult to get a good history from.      Past Medical History:   Diagnosis Date    Hypertension 10/31/2014    Insomnia 5/6/2013    Morbid obesity ([facility] [address] 5/6/2013    Type II diabetes mellitus, uncontrolled ([facility] [address] [facility] 6/30/2016     Past Surgical History:   Procedure Laterality Date    HX HEENT      wisdom teeth x4      Family History   Problem Relation Age of Onset    Asthma Mother     Diabetes Mother     Diabetes Father      Social History   Substance Use Topics    Smoking status: Never Smoker    Smokeless tobacco: Never Used    Alcohol use Yes      Comment: socially      No Known Allergies  Current Facility-Administered Medications   Medication Dose Route Frequency    0.9% sodium chloride infusion 250 mL  250 mL IntraVENous PRN    traMADol (ULTRAM) tablet 50 mg  50 mg Oral Q6H PRN    insulin glargine (LANTUS) injection 40 Units 40 Units SubCUTAneous Q12H    calcium carbonate (TUMS) chewable tablet 400 mg [elemental]  400 mg Oral TID WITH MEALS    epoetin keri (EPOGEN;PROCRIT) injection 10,000 Units  10,000 Units SubCUTAneous DIALYSIS MON, WED & FRI    insulin lispro (HUMALOG) injection 15 Units  15 Units SubCUTAneous TIDAC    insulin lispro (HUMALOG) injection   SubCUTAneous AC&HS    glucose chewable tablet 16 g  4 Tab Oral PRN    dextrose (D50W) injection syrg 12.5-25 g  12.5-25 g IntraVENous PRN    glucagon (GLUCAGEN) injection 1 mg  1 mg IntraMUSCular PRN    aluminum-magnesium hydroxide (MAALOX) oral suspension 30 mL  30 mL Oral Q6H PRN    heparin (porcine) injection 5,000 Units  5,000 Units SubCUTAneous Q12H    sodium chloride (NS) flush 10-40 mL  10-40 mL IntraVENous PRN    sodium chloride (NS) flush 10 mL  10 mL IntraVENous Q8H    nitroglycerin (NITROBID) 2 % ointment 1 Inch  1 Inch Topical Q6H PRN    sodium chloride (NS) flush 5-10 mL  5-10 mL IntraVENous Q8H    sodium chloride (NS) flush 5-10 mL  5-10 mL IntraVENous PRN    acetaminophen (TYLENOL) tablet 650 mg  650 mg Oral Q6H PRN    ondansetron (ZOFRAN) injection 4 mg  4 mg IntraVENous Q4H PRN    nystatin (MYCOSTATIN) 100,000 unit/gram powder   Topical BID        Review of Systems:  A detailed 10 organ review of systems is obtained with pertinent positives as listed in the History of Present Illness and Past Medical History. A detailed review of systems was performed as follows:  Constitutional:  Negative  Eyes:  No ocular sensitivity to the sun, blurred vision or double vision. ENMT:  No nose or sinus problems. Respiratory: No coughing, wheezing or sob  Cardiac:  No chest pain, exertional chest pain or palpitations  Gastrointestinal:  See history of the present illness  :   No pain with urination or hematuria  Musculoskeletal:  No arthritis or hot swollen joints.     Endocrine:  +diabetes  Psychiatric: No depression or feeling blue  Integumentary:  No skin rash or sensitivity to the sun. Neurologic:  +headache. No stroke or seizure; no numbness or tingling of the extremities. Heme-Lymphatic:  No history of anemia, no unexplained lumps or bumps      Objective:     Physical Exam:  Visit Vitals    /57    Pulse 86    Temp 98.2 °F (36.8 °C)    Resp 18    Ht 5' 9\" (1.753 m)    Wt 147.6 kg (325 lb 6.4 oz)    SpO2 90%    BMI 48.05 kg/m2        Gen: NAD. Obese. Somnolent but arouses to voice and answers questions appropriately. Skin:  Extremities and face reveal no rashes. HEENT: Sclerae anicteric. No abnormal pigmentation of the lips. The neck is supple. Cardiovascular: Regular rate and rhythm. No murmurs, gallops, or rubs. Respiratory:  Comfortable breathing with no accessory muscle use. Clear breath sounds with no wheezes, rales, or rhonchi. GI:  Abdomen nondistended, soft, and nontender. Normal active bowel sounds. No enlargement of the liver or spleen. No masses palpable. Rectal:  Deferred  Musculoskeletal:  No pitting edema of the lower legs. Neurological:  Gross memory appears intact. Patient is alert and oriented though somnolent. Psychiatric:  Mood appears depressed  Lymphatic:  No cervical or supraclavicular adenopathy.     Lab/Data Review:  Lab Results   Component Value Date/Time    WBC 15.5 (H) 04/20/2018 03:49 AM    HGB 6.9 (L) 04/20/2018 03:49 AM    HCT 21.2 (L) 04/20/2018 03:49 AM    PLATELET 981 11/15/3228 03:49 AM    .0 (H) 04/20/2018 03:49 AM     Lab Results   Component Value Date/Time    Sodium 139 04/20/2018 03:49 AM    Potassium 3.8 04/20/2018 03:49 AM    Chloride 100 04/20/2018 03:49 AM    CO2 30 04/20/2018 03:49 AM    Anion gap 9 04/20/2018 03:49 AM    Glucose 181 (H) 04/20/2018 03:49 AM    BUN 82 (H) 04/20/2018 03:49 AM    Creatinine 9.95 (H) 04/20/2018 03:49 AM    BUN/Creatinine ratio 8 (L) 04/20/2018 03:49 AM    GFR est AA 7 (L) 04/20/2018 03:49 AM    GFR est non-AA 6 (L) 04/20/2018 03:49 AM    Calcium 8.5 04/20/2018 03:49 AM    Bilirubin, total 1.2 (H) 04/20/2018 03:49 AM    AST (SGOT) 66 (H) 04/20/2018 03:49 AM    Alk. phosphatase 77 04/20/2018 03:49 AM    Protein, total 5.9 (L) 04/20/2018 03:49 AM    Albumin 2.2 (L) 04/20/2018 03:49 AM    Globulin 3.7 04/20/2018 03:49 AM    A-G Ratio 0.6 (L) 04/20/2018 03:49 AM    ALT (SGPT) 139 (H) 04/20/2018 03:49 AM     US Results (most recent):    Results from [place] encounter on 04/11/18   US ABD COMP   Narrative EXAM:  US ABD COMP     INDICATION: Right upper quadrant pain. .    COMPARISON: CT dated December 14, 2015. Eura Hanna TECHNIQUE:   Real-time sonography of the abdomen was performed with multiple static images of  the liver, gallbladder, pancreas, spleen, kidneys and retroperitoneum obtained. FINDINGS:  LIVER:   Images through the liver reveal increased echogenicity with areas of focal fatty  sparing. No space-occupying lesion or biliary dilatation are present. LIVER VASCULATURE:   The portal vein flow is patent and hepatopedal.    GALLBLADDER:  The gallbladder mildly contracted without definite gallstones. There is no wall  thickening or fluid around the gallbladder. COMMON BILE DUCT:  There is no biliary duct dilatation and the common duct measures 5 mm in  diameter. PANCREAS:  The pancreas is not well visualized due to overlying bowel gas and patient's  body habitus. SPLEEN:  The spleen is normal in echotexture and size and measures 9.6 cm in length. RIGHT KIDNEY:  The right kidney demonstrates normal echogenicity with no mass, stone or  hydronephrosis. The right kidney measures 13 cm in length. LEFT KIDNEY:  The left kidney demonstrates normal echogenicity with no mass, stone or  hydronephrosis. The left kidney measures 13.9 cm in length. RETROPERITONEUM:  The aorta is not well visualized due to overlying bowel gas. The IVC is normal.  No retroperitoneal mass is identified.          Impression IMPRESSION:    Echogenic liver suggesting fatty infiltration with some areas of relative  sparing. Contracted gallbladder. No obvious gallstones. Pancreas not visualized due to overlying bowel gas. No hydronephrosis. Assessment/Plan:     Active Problems:  Epigastric pain  Nausea  Anemia  SUAREZ      Hyperlipidemia (5/1/2012)      Morbid obesity ([address] (5/6/2013)      Hypertension (10/31/2014)      Type II diabetes mellitus, uncontrolled ([address] (6/30/2016)      LITTLE (acute kidney injury) ([address] (7/9/2017)      Hyperosmolar coma due to secondary diabetes ([address] (4/12/2018)      Acute encephalopathy (4/12/2018)      Non-traumatic rhabdomyolysis (4/15/2018)         Given epigastric pain, nausea, dyspepsia, and drop in hgb, I recommend EGD to rule out PUD, esophagitis, GOO, etc.  If negative, would consider HIDA scan and GES. Check INR given hx of SUAREZ prior to EGD. Last fibroscan was negative for fibrosis. Plts normal.  No definite signs of cirrhosis on U/S. Nausea could also be from azotemia.       MAUREEN Goode  04/20/18  11:52 AM

## 2018-04-20 NOTE — PROGRESS NOTES
NAME: Estephania Alexander        :  1975        MRN:  [id number]        Assessment :    Plan:  --LITTLE   Rhabdo  Obesity  HTN  Sepsis --HD #1 4/15. HD TTS. Watch for renal recovery. Suspect that recovery will take weeks. Approved for outpatient HD at [address] [place] [address] for permcath Monday. Stop heparin Saturday. Check coags. Iron OK. Continue EPO. H/H down. 1 unit PRBC's on HD in AM.    Pull fluid as tolerated. Will ask GI to see given continued N/V and dropping hemoglobin. Subjective:     Chief Complaint:  \" I feel bad. \"    Tired. +n/v. No dyspnea. No HA. No edema. No rashes. Review of Systems:    Symptom Y/N Comments  Symptom Y/N Comments   Fever/Chills    Chest Pain     Poor Appetite    Edema     Cough    Abdominal Pain     Sputum    Joint Pain     SOB/WELSH    Pruritis/Rash     Nausea/vomit    Tolerating PT/OT     Diarrhea    Tolerating Diet     Constipation    Other       Could not obtain due to:      Objective:     VITALS:   Last 24hrs VS reviewed since prior progress note.  Most recent are:  Visit Vitals    /57    Pulse 86    Temp 98.2 °F (36.8 °C)    Resp 18    Ht 5' 9\" (1.753 m)    Wt 147.6 kg (325 lb 6.4 oz)    SpO2 90%    BMI 48.05 kg/m2       Intake/Output Summary (Last 24 hours) at 18 1036  Last data filed at 18 0606   Gross per 24 hour   Intake              920 ml   Output             2300 ml   Net            -1380 ml      Telemetry Reviewed:     PHYSICAL EXAM:  General: NAD  CTA  abd soft  No edema      Lab Data Reviewed: (see below)    Medications Reviewed: (see below)    PMH/SH reviewed - no change compared to H&P  ________________________________________________________________________  Care Plan discussed with:  Patient     Family      RN     Care Manager                    Consultant:          Comments   >50% of visit spent in counseling and coordination of care       ________________________________________________________________________  aJnelle Suazo MD     Procedures: see electronic medical records for all procedures/Xrays and details which  were not copied into this note but were reviewed prior to creation of Plan. LABS:  Recent Labs      04/20/18 0349 04/19/18 0217   WBC  15.5*  12.8*   HGB  6.9*  7.8*   HCT  21.2*  23.1*   PLT  260  207     Recent Labs      04/20/18 0349 04/19/18 0217 04/18/18 0353   NA  139  140  138   K  3.8  3.4*  3.5   CL  100  99  98   CO2  30  29  31   BUN  82*  128*  117*   CREA  9.95*  13.20*  11.40*   GLU  181*  105*  235*   CA  8.5  8.7  8.7   PHOS   --    --   7.1*     Recent Labs      04/20/18 0349   SGOT  66*   AP  77   TP  5.9*   ALB  2.2*   GLOB  3.7     No results for input(s): INR, PTP, APTT in the last 72 hours. No lab exists for component: INREXT, INREXT   Recent Labs      04/18/18 0353   TIBC  178*   PSAT  66*   FERR  1865*      No results found for: FOL, RBCF   No results for input(s): PH, PCO2, PO2 in the last 72 hours.   Recent Labs      04/20/18 0349 04/18/18 0353   CPK  1663*  5267*   CKMB   --   2.2     No components found for: [place]  Lab Results   Component Value Date/Time    Color YELLOW/STRAW 04/12/2018 12:44 AM    Appearance CLEAR 04/12/2018 12:44 AM    Specific gravity <1.005 04/12/2018 12:44 AM    Specific gravity 1.021 04/16/2013 05:05 PM    pH (UA) 5.0 04/12/2018 12:44 AM    Protein NEGATIVE  04/12/2018 12:44 AM    Glucose >1000 (A) 04/12/2018 12:44 AM    Ketone TRACE (A) 04/12/2018 12:44 AM    Bilirubin NEGATIVE  04/12/2018 12:44 AM    Urobilinogen 0.2 04/12/2018 12:44 AM    Nitrites NEGATIVE  04/12/2018 12:44 AM    Leukocyte Esterase NEGATIVE  04/12/2018 12:44 AM    Epithelial cells FEW 04/12/2018 12:44 AM    Bacteria NEGATIVE  04/12/2018 12:44 AM    WBC 0-4 04/12/2018 12:44 AM    RBC 0-5 04/12/2018 12:44 AM       MEDICATIONS:  Current Facility-Administered Medications Medication Dose Route Frequency    0.9% sodium chloride infusion 250 mL  250 mL IntraVENous PRN    traMADol (ULTRAM) tablet 50 mg  50 mg Oral Q6H PRN    insulin glargine (LANTUS) injection 40 Units  40 Units SubCUTAneous Q12H    calcium carbonate (TUMS) chewable tablet 400 mg [elemental]  400 mg Oral TID WITH MEALS    epoetin keri (EPOGEN;PROCRIT) injection 10,000 Units  10,000 Units SubCUTAneous DIALYSIS MON, WED & FRI    insulin lispro (HUMALOG) injection 15 Units  15 Units SubCUTAneous TIDAC    insulin lispro (HUMALOG) injection   SubCUTAneous AC&HS    glucose chewable tablet 16 g  4 Tab Oral PRN    dextrose (D50W) injection syrg 12.5-25 g  12.5-25 g IntraVENous PRN    glucagon (GLUCAGEN) injection 1 mg  1 mg IntraMUSCular PRN    aluminum-magnesium hydroxide (MAALOX) oral suspension 30 mL  30 mL Oral Q6H PRN    heparin (porcine) injection 5,000 Units  5,000 Units SubCUTAneous Q12H    sodium chloride (NS) flush 10-40 mL  10-40 mL IntraVENous PRN    sodium chloride (NS) flush 10 mL  10 mL IntraVENous Q8H    nitroglycerin (NITROBID) 2 % ointment 1 Inch  1 Inch Topical Q6H PRN    sodium chloride (NS) flush 5-10 mL  5-10 mL IntraVENous Q8H    sodium chloride (NS) flush 5-10 mL  5-10 mL IntraVENous PRN    acetaminophen (TYLENOL) tablet 650 mg  650 mg Oral Q6H PRN    ondansetron (ZOFRAN) injection 4 mg  4 mg IntraVENous Q4H PRN    nystatin (MYCOSTATIN) 100,000 unit/gram powder   Topical BID

## 2018-04-20 NOTE — ANESTHESIA PREPROCEDURE EVALUATION
Anesthetic History   No history of anesthetic complications            Review of Systems / Medical History  Patient summary reviewed, nursing notes reviewed and pertinent labs reviewed    Pulmonary  Within defined limits                 Neuro/Psych   Within defined limits           Cardiovascular  Within defined limits  Hypertension              Exercise tolerance: >4 METS     GI/Hepatic/Renal  Within defined limits       Renal disease: dialysis  Liver disease     Endo/Other  Within defined limits  Diabetes    Morbid obesity     Other Findings   Comments: Recent admission for DKA           Physical Exam    Airway  Mallampati: III  TM Distance: 4 - 6 cm  Neck ROM: normal range of motion   Mouth opening: Normal     Cardiovascular  Regular rate and rhythm,  S1 and S2 normal,  no murmur, click, rub, or gallop             Dental  No notable dental hx       Pulmonary  Breath sounds clear to auscultation               Abdominal  GI exam deferred       Other Findings            Anesthetic Plan    ASA: 3  Anesthesia type: general and total IV anesthesia          Induction: Intravenous  Anesthetic plan and risks discussed with: Patient      Propofol MAC

## 2018-04-20 NOTE — PROGRESS NOTES
INTERNAL MEDICINE ATTENDING NOTE    S: Mr. Mariano Zamora was seen by me today during rounds. At this time, he is awake, alert, and calm. Nausea is worse--threw up last night. Still having a lot of headaches. ROS otherwise unremarkable except as noted elsewhere. O: Blood pressure 122/53, pulse 85, temperature 98.4 °F (36.9 °C), resp. rate 18, height 5' 9\" (1.753 m), weight 325 lb 6.4 oz (147.6 kg), SpO2 91 %. Gen: Patient is in no acute distress. Lungs: CTAB. Heart: RRR. Abd: S, NT, ND, BS present. Extremities: Warm. Recent Results (from the past 12 hour(s))   GLUCOSE, POC    Collection Time: 04/19/18  8:59 PM   Result Value Ref Range    Glucose (POC) 128 (H) 65 - 100 mg/dL    Performed by Sin Kirkland (PCT)    CK    Collection Time: 04/20/18  3:49 AM   Result Value Ref Range    CK 1663 (H) 39 - 847 U/L   METABOLIC PANEL, COMPREHENSIVE    Collection Time: 04/20/18  3:49 AM   Result Value Ref Range    Sodium 139 136 - 145 mmol/L    Potassium 3.8 3.5 - 5.1 mmol/L    Chloride 100 97 - 108 mmol/L    CO2 30 21 - 32 mmol/L    Anion gap 9 5 - 15 mmol/L    Glucose 181 (H) 65 - 100 mg/dL    BUN 82 (H) 6 - 20 MG/DL    Creatinine 9.95 (H) 0.70 - 1.30 MG/DL    BUN/Creatinine ratio 8 (L) 12 - 20      GFR est AA 7 (L) >60 ml/min/1.73m2    GFR est non-AA 6 (L) >60 ml/min/1.73m2    Calcium 8.5 8.5 - 10.1 MG/DL    Bilirubin, total 1.2 (H) 0.2 - 1.0 MG/DL    ALT (SGPT) 139 (H) 12 - 78 U/L    AST (SGOT) 66 (H) 15 - 37 U/L    Alk.  phosphatase 77 45 - 117 U/L    Protein, total 5.9 (L) 6.4 - 8.2 g/dL    Albumin 2.2 (L) 3.5 - 5.0 g/dL    Globulin 3.7 2.0 - 4.0 g/dL    A-G Ratio 0.6 (L) 1.1 - 2.2     CBC WITH AUTOMATED DIFF    Collection Time: 04/20/18  3:49 AM   Result Value Ref Range    WBC 15.5 (H) 4.1 - 11.1 K/uL    RBC 2.10 (L) 4.10 - 5.70 M/uL    HGB 6.9 (L) 12.1 - 17.0 g/dL    HCT 21.2 (L) 36.6 - 50.3 %    .0 (H) 80.0 - 99.0 FL    MCH 32.9 26.0 - 34.0 PG    MCHC 32.5 30.0 - 36.5 g/dL    RDW 11.5 11.5 - 14.5 % PLATELET 466 904 - 154 K/uL    MPV 11.3 8.9 - 12.9 FL    NRBC 0.8 (H) 0  WBC    ABSOLUTE NRBC 0.12 (H) 0.00 - 0.01 K/uL    NEUTROPHILS 70 32 - 75 %    BAND NEUTROPHILS 4 %    LYMPHOCYTES 15 12 - 49 %    MONOCYTES 3 (L) 5 - 13 %    EOSINOPHILS 3 0 - 7 %    BASOPHILS 1 0 - 1 %    METAMYELOCYTES 4 %    IMMATURE GRANULOCYTES 0 0.0 - 0.5 %    ABS. NEUTROPHILS 11.5 (H) 1.8 - 8.0 K/UL    ABS. LYMPHOCYTES 2.3 0.8 - 3.5 K/UL    ABS. MONOCYTES 0.5 0.0 - 1.0 K/UL    ABS. EOSINOPHILS 0.5 (H) 0.0 - 0.4 K/UL    ABS. BASOPHILS 0.2 (H) 0.0 - 0.1 K/UL    ABS. IMM. GRANS. 0.0 0.00 - 0.04 K/UL    DF MANUAL      RBC COMMENTS NORMOCYTIC, NORMOCHROMIC     GLUCOSE, POC    Collection Time: 04/20/18  7:28 AM   Result Value Ref Range    Glucose (POC) 187 (H) 65 - 100 mg/dL    Performed by Denise Jacob (PCT)         CXR 4/15: No PTX. CXR 4/11: Minimal R ML infiltrate. CXR 4/12: No acute abnormality. US Retroperitoneal 4/14: Normal size and appearance of the kidneys without hydronephrosis. Nondistended urinary bladder with a Grigsby catheter. US abdomen 4/17: Echogenic liver suggesting fatty infiltration with some areas of relative sparing. Contracted gallbladder. No obvious gallstones. Pancreas not visualized due to overlying bowel gas. No hydronephrosis. A/P:   1. Hyperosmolar coma due to secondary diabetes ([facility] (4/12/2018): Treated with IVF and insulin. Glc is improved. 2. Epigastric pain and N/V: Ongoing for several days; has flared up again yesterday. We'll request GI consult to evaluate and recommend further work up if any. He has a low hemoglobin as well. Continue Prn maalox. Prn zofran. 3. Anemia: Defer to Nephrology. Perhaps can have a unit added via dialysis. 4. Rhabdomyolysis: CK trending down   5. LITTLE: HD per Nephrology. 6. Hypernatremia: hydration; watch labs. 7. Sepsis due to aspiration pneumonia, POA: Empiric antibiotics. 8. Hypoxia: O2 as needed.    9. Acute encephalopathy (4/12/2018): Improving. He is now awake, no longer paranoid, seems at baseline. 10. Hyperlipidemia (5/1/2012): Hold statin  11. Morbid obesity ([facility] [address] (5/6/2013)  12. Hypertension (10/31/2014): BP okay.        Alka Whitaker MD, FACP  Best contact is via Pager: [phone], or via hospital  at [phone]

## 2018-04-20 NOTE — PROGRESS NOTES
TRANSFER - OUT REPORT:    Verbal report given to LEIGHA Ureña(name) on 68 Ruvidya Weeks  being transferred to [facility](unit) for routine progression of care       Report consisted of patients Situation, Background, Assessment and   Recommendations(SBAR). Information from the following report(s) SBAR, Kardex, Procedure Summary, Intake/Output, MAR, Recent Results, Med Rec Status and Cardiac Rhythm NSR was reviewed with the receiving nurse. Lines:   Peripheral IV 04/20/18 Left Hand (Active)   Site Assessment Clean, dry, & intact 4/20/2018  2:00 PM   Phlebitis Assessment 0 4/20/2018  2:00 PM   Infiltration Assessment 0 4/20/2018  2:00 PM   Dressing Status Clean, dry, & intact 4/20/2018  2:00 PM   Dressing Type Tape;Transparent 4/20/2018  2:00 PM   Hub Color/Line Status Blue; Infusing 4/20/2018  2:00 PM        Opportunity for questions and clarification was provided.

## 2018-04-20 NOTE — PROGRESS NOTES
Anesthesia reports 350 mg Propofol, 200 mg Lidocaine and 150 ml of Normal Saline were given during procedure. Received report from anesthesia staff on vital signs and status of patient.

## 2018-04-21 LAB
ANION GAP SERPL CALC-SCNC: 11 MMOL/L (ref 5–15)
APTT PPP: 21.9 SEC (ref 22.1–32)
BUN SERPL-MCNC: 108 MG/DL (ref 6–20)
BUN/CREAT SERPL: 8 (ref 12–20)
CALCIUM SERPL-MCNC: 8.6 MG/DL (ref 8.5–10.1)
CHLORIDE SERPL-SCNC: 100 MMOL/L (ref 97–108)
CK SERPL-CCNC: 974 U/L (ref 39–308)
CO2 SERPL-SCNC: 27 MMOL/L (ref 21–32)
CREAT SERPL-MCNC: 12.8 MG/DL (ref 0.7–1.3)
GLUCOSE BLD STRIP.AUTO-MCNC: 104 MG/DL (ref 65–100)
GLUCOSE BLD STRIP.AUTO-MCNC: 186 MG/DL (ref 65–100)
GLUCOSE BLD STRIP.AUTO-MCNC: 190 MG/DL (ref 65–100)
GLUCOSE BLD STRIP.AUTO-MCNC: 202 MG/DL (ref 65–100)
GLUCOSE SERPL-MCNC: 191 MG/DL (ref 65–100)
INR PPP: 1 (ref 0.9–1.1)
POTASSIUM SERPL-SCNC: 3.8 MMOL/L (ref 3.5–5.1)
PROTHROMBIN TIME: 10.3 SEC (ref 9–11.1)
SERVICE CMNT-IMP: ABNORMAL
SODIUM SERPL-SCNC: 138 MMOL/L (ref 136–145)
THERAPEUTIC RANGE,PTTT: ABNORMAL SECS (ref 58–77)

## 2018-04-21 PROCEDURE — 90935 HEMODIALYSIS ONE EVALUATION: CPT

## 2018-04-21 PROCEDURE — 80048 BASIC METABOLIC PNL TOTAL CA: CPT | Performed by: INTERNAL MEDICINE

## 2018-04-21 PROCEDURE — 77010033678 HC OXYGEN DAILY

## 2018-04-21 PROCEDURE — 74011636637 HC RX REV CODE- 636/637: Performed by: INTERNAL MEDICINE

## 2018-04-21 PROCEDURE — P9016 RBC LEUKOCYTES REDUCED: HCPCS | Performed by: INTERNAL MEDICINE

## 2018-04-21 PROCEDURE — 85610 PROTHROMBIN TIME: CPT | Performed by: INTERNAL MEDICINE

## 2018-04-21 PROCEDURE — 74011250637 HC RX REV CODE- 250/637: Performed by: INTERNAL MEDICINE

## 2018-04-21 PROCEDURE — 74011250636 HC RX REV CODE- 250/636: Performed by: INTERNAL MEDICINE

## 2018-04-21 PROCEDURE — 65660000000 HC RM CCU STEPDOWN

## 2018-04-21 PROCEDURE — 74011250637 HC RX REV CODE- 250/637: Performed by: SPECIALIST

## 2018-04-21 PROCEDURE — 85730 THROMBOPLASTIN TIME PARTIAL: CPT | Performed by: INTERNAL MEDICINE

## 2018-04-21 PROCEDURE — 30233N1 TRANSFUSION OF NONAUTOLOGOUS RED BLOOD CELLS INTO PERIPHERAL VEIN, PERCUTANEOUS APPROACH: ICD-10-PCS | Performed by: INTERNAL MEDICINE

## 2018-04-21 PROCEDURE — 82962 GLUCOSE BLOOD TEST: CPT

## 2018-04-21 PROCEDURE — 36415 COLL VENOUS BLD VENIPUNCTURE: CPT | Performed by: INTERNAL MEDICINE

## 2018-04-21 PROCEDURE — 82550 ASSAY OF CK (CPK): CPT | Performed by: INTERNAL MEDICINE

## 2018-04-21 RX ORDER — HEPARIN SODIUM 1000 [USP'U]/ML
1100 INJECTION, SOLUTION INTRAVENOUS; SUBCUTANEOUS
Status: DISCONTINUED | OUTPATIENT
Start: 2018-04-21 | End: 2018-04-26

## 2018-04-21 RX ORDER — HEPARIN SODIUM 1000 [USP'U]/ML
1400 INJECTION, SOLUTION INTRAVENOUS; SUBCUTANEOUS
Status: DISCONTINUED | OUTPATIENT
Start: 2018-04-21 | End: 2018-04-26

## 2018-04-21 RX ADMIN — Medication 10 ML: at 21:58

## 2018-04-21 RX ADMIN — ALUMINUM HYDROXIDE AND MAGNESIUM HYDROXIDE 30 ML: 200; 200 SUSPENSION ORAL at 00:06

## 2018-04-21 RX ADMIN — HEPARIN SODIUM 2000 UNITS: 1000 INJECTION, SOLUTION INTRAVENOUS; SUBCUTANEOUS at 13:35

## 2018-04-21 RX ADMIN — INSULIN LISPRO 4 UNITS: 100 INJECTION, SOLUTION INTRAVENOUS; SUBCUTANEOUS at 16:39

## 2018-04-21 RX ADMIN — INSULIN LISPRO 3 UNITS: 100 INJECTION, SOLUTION INTRAVENOUS; SUBCUTANEOUS at 08:43

## 2018-04-21 RX ADMIN — Medication 40 ML: at 09:42

## 2018-04-21 RX ADMIN — Medication 10 ML: at 07:03

## 2018-04-21 RX ADMIN — Medication 10 ML: at 14:00

## 2018-04-21 RX ADMIN — INSULIN GLARGINE 40 UNITS: 100 INJECTION, SOLUTION SUBCUTANEOUS at 21:57

## 2018-04-21 RX ADMIN — INSULIN GLARGINE 40 UNITS: 100 INJECTION, SOLUTION SUBCUTANEOUS at 08:42

## 2018-04-21 RX ADMIN — HEPARIN SODIUM 5000 UNITS: 5000 INJECTION, SOLUTION INTRAVENOUS; SUBCUTANEOUS at 21:57

## 2018-04-21 RX ADMIN — SUCRALFATE 1 G: 1 SUSPENSION ORAL at 08:44

## 2018-04-21 RX ADMIN — PANTOPRAZOLE SODIUM 40 MG: 40 TABLET, DELAYED RELEASE ORAL at 16:38

## 2018-04-21 RX ADMIN — INSULIN LISPRO 15 UNITS: 100 INJECTION, SOLUTION INTRAVENOUS; SUBCUTANEOUS at 16:39

## 2018-04-21 RX ADMIN — PANTOPRAZOLE SODIUM 40 MG: 40 TABLET, DELAYED RELEASE ORAL at 08:44

## 2018-04-21 RX ADMIN — SUCRALFATE 1 G: 1 SUSPENSION ORAL at 16:38

## 2018-04-21 RX ADMIN — HEPARIN SODIUM 2000 UNITS: 1000 INJECTION, SOLUTION INTRAVENOUS; SUBCUTANEOUS at 13:34

## 2018-04-21 RX ADMIN — SUCRALFATE 1 G: 1 SUSPENSION ORAL at 21:57

## 2018-04-21 RX ADMIN — HEPARIN SODIUM 5000 UNITS: 5000 INJECTION, SOLUTION INTRAVENOUS; SUBCUTANEOUS at 08:45

## 2018-04-21 RX ADMIN — INSULIN LISPRO 15 UNITS: 100 INJECTION, SOLUTION INTRAVENOUS; SUBCUTANEOUS at 08:43

## 2018-04-21 RX ADMIN — TRAMADOL HYDROCHLORIDE 50 MG: 50 TABLET, FILM COATED ORAL at 00:06

## 2018-04-21 NOTE — PROCEDURES
[facility]  [phone]    Vitals   Pre   Post   Assessment   Pre   Post     Temp  Temp: 97.6 °F (36.4 °C) (04/21/18 0904)  97.7 LOC  Alert and oriented Alert and oriented   HR   Pulse (Heart Rate): 87 (04/21/18 0904) 90 Lungs   Clear anterior started oxygen  Nasal cannula during the night with  Low saturation now 92% on 5 liters  unlabored   B/P   BP: 130/68 (04/21/18 0904) 118/71 Cardiac   Regular on telemetry   no chest pain   Resp   Resp Rate: 20 (04/21/18 0904) 20 saturation 95% Skin   Warm and dry   warm and dry    Pain level  Pain Intensity 1: 0 (04/21/18 0702) Did not quantify but has a headache feels possibly from the oxygen  Edema  Minimal ble     No change    Orders:    Duration:   Start:    0904 End:    1312 Total:   4   Dialyzer:   Dialyzer/Set Up Inspection: Rema Hard (04/21/18 0904)   Josue Altamirano Bath:   Dialysate K (mEq/L): 3 (04/21/18 0904)   Ca Bath:   Dialysate CA (mEq/L): 2.5 (04/21/18 0904)   Na/Bicarb:   Dialysate NA (mEq/L): 140 (04/21/18 0904)   Target Fluid Removal:   Goal/Amount of Fluid to Remove (mL): 2500 mL (04/21/18 0904)   Access     Type & Location:   RIJ site without signs or sx of infection to include redness, warmth, and/or drainage. Ports cleansed with alcohol, aspirated, labs drawn, and flushed with normal saline.     Labs     Obtained/Reviewed   Critical Results Called   Date when labs were drawn-  Hgb-    HGB   Date Value Ref Range Status   04/20/2018 6.9 (L) 12.1 - 17.0 g/dL Final     K-    Potassium   Date Value Ref Range Status   04/20/2018 3.8 3.5 - 5.1 mmol/L Final     Ca-   Calcium   Date Value Ref Range Status   04/20/2018 8.5 8.5 - 10.1 MG/DL Final     Bun-   BUN   Date Value Ref Range Status   04/20/2018 82 (H) 6 - 20 MG/DL Final     Comment:     INVESTIGATED PER DELTA CHECK PROTOCOL     Creat-   Creatinine   Date Value Ref Range Status   04/20/2018 9.95 (H) 0.70 - 1.30 MG/DL Final     Comment:     INVESTIGATED PER DELTA CHECK PROTOCOL Medications/ Blood Products Given     Name   Dose   Route and Time     heparin 1:1000 1.1 and 1.4 arterial/venous port cvc dwell   PRBC One unit Transfused over one hour        Blood Volume Processed (BVP):    79.5 Net Fluid   Removed:  2000   Comments   Time Out Done: 0900  Primary Nurse Rpt Pre: Esha Nye RN   Primary Nurse Rpt PostDelfangelito Soliz RN  Pt Education: blood transfusion potential reaction symptoms to include chest pain, shortness of breath, chills and/or itching. Care Plan: continue current hemodialysis plan of care   Tx Summary: 09:04 HD initiated, pt on 5 liters nasal cannula with oxygen saturation of 95% . 09:15 primary dcotor in to see patient. Floor nurse called and will get PRBC to be transfused. Consent for blood transfusion obtained from patient. 09:30 first unit packed red blood cells initiated after verified with two RNs.  11:15 Dr. Chon Gaston GI in to see patient at bedside. 12:00 due to clotting of venous/arterial chambers all possible blood returned and obtained new set up. 12:20 dialysis resumed on new setup. Patient states he has a headache but he has had one and he asks to take oxygen off feels it may be contributing to the headache, oxygen decreased to 4 liters and constantly monitoring explained to patient it would be a weaning process. Spoke with primary nurse who agreed with plan. 13:12 all possible blood returned, ports cleansed with alcohol, flushed, heparinized and capped. Biopatch dressing change to [facility], site without redness, warmth or drainage. Nasal cannula oxygen 3 liters pulse ox 92-95%. SBAR report called to primary nurse. Admiting Diagnosis: Hyperosmolar coma  Pt's previous clinic-n/a  Consent signed - Informed Consent Verified: Yes (04/21/18 0904)  Car[place] Consent - verified   Hepatitis Status- 04/15/18 connect care antigen negative   Machine #- Machine Number: [id number] (04/21/18 7610)  Telemetry status-remote tele  Pre-dialysis wt. - Pre-Dialysis Weight: 155.8 kg (343 lb 7.6 oz) (04/15/18 8145)

## 2018-04-21 NOTE — PROGRESS NOTES
2339: pt. O2 saturation 74-88% while lying down. Pt. Refused oxygen but sat up in the bed and he eventually came up to 82-85%. 0016 Pt. Allowed RT Jarett Weldon to put O2 on at 6 L/min, pt. now sating mid-upper 90s. Will continue to monitor.

## 2018-04-21 NOTE — PROGRESS NOTES
Gastroenterology Progress Note(Stein for Yaneth)    4/21/2018    Admit Date: 4/11/2018    Subjective: Follow up for: UGI bleed, esophagitis,nausea    Seen in HD room,. C/o nausea with eating 'food from [place] or [place]\"    No pain  Patient was seen in rounds by me today.      Current Facility-Administered Medications   Medication Dose Route Frequency    0.9% sodium chloride infusion 250 mL  250 mL IntraVENous PRN    pantoprazole (PROTONIX) tablet 40 mg  40 mg Oral ACB&D    sucralfate (CARAFATE) 100 mg/mL oral suspension 1 g  1 g Oral AC&HS    traMADol (ULTRAM) tablet 50 mg  50 mg Oral Q6H PRN    insulin glargine (LANTUS) injection 40 Units  40 Units SubCUTAneous Q12H    calcium carbonate (TUMS) chewable tablet 400 mg [elemental]  400 mg Oral TID WITH MEALS    epoetin keri (EPOGEN;PROCRIT) injection 10,000 Units  10,000 Units SubCUTAneous DIALYSIS MON, WED & FRI    insulin lispro (HUMALOG) injection 15 Units  15 Units SubCUTAneous TIDAC    insulin lispro (HUMALOG) injection   SubCUTAneous AC&HS    glucose chewable tablet 16 g  4 Tab Oral PRN    dextrose (D50W) injection syrg 12.5-25 g  12.5-25 g IntraVENous PRN    glucagon (GLUCAGEN) injection 1 mg  1 mg IntraMUSCular PRN    aluminum-magnesium hydroxide (MAALOX) oral suspension 30 mL  30 mL Oral Q6H PRN    heparin (porcine) injection 5,000 Units  5,000 Units SubCUTAneous Q12H    sodium chloride (NS) flush 10-40 mL  10-40 mL IntraVENous PRN    sodium chloride (NS) flush 10 mL  10 mL IntraVENous Q8H    nitroglycerin (NITROBID) 2 % ointment 1 Inch  1 Inch Topical Q6H PRN    sodium chloride (NS) flush 5-10 mL  5-10 mL IntraVENous PRN    acetaminophen (TYLENOL) tablet 650 mg  650 mg Oral Q6H PRN    ondansetron (ZOFRAN) injection 4 mg  4 mg IntraVENous Q4H PRN    nystatin (MYCOSTATIN) 100,000 unit/gram powder   Topical BID        Objective:     Blood pressure 120/69, pulse 87, temperature 97.7 °F (36.5 °C), temperature source Oral, resp. rate 20, height 5' 9\" (1.753 m), weight 145.9 kg (321 lb 9.6 oz), SpO2 93 %. 04/19 1901 - 04/21 0700  In: 850 [P.O.:700; I.V.:150]  Out: 0         Physical Examination:       General:AAO x 3, obese.  Getting HD  HEENT:  EOMI, MMM  Chest:  CTA,   Heart: S1, S2, RRR  GI: Soft, NT, + bowel sounds  CNS: CNs grossly normal.    Data Review    Recent Results (from the past 24 hour(s))   TYPE + CROSSMATCH    Collection Time: 04/20/18  1:05 PM   Result Value Ref Range    Crossmatch Expiration 04/23/2018     ABO/Rh(D) AB POSITIVE     Antibody screen NEG     Unit number [id number]     Blood component type RC LR     Unit division 00     Status of unit ISSUED     Crossmatch result Compatible    PROTHROMBIN TIME + INR    Collection Time: 04/20/18  1:05 PM   Result Value Ref Range    INR 1.0 0.9 - 1.1      Prothrombin time 10.3 9.0 - 11.1 sec   GLUCOSE, POC    Collection Time: 04/20/18  2:45 PM   Result Value Ref Range    Glucose (POC) 151 (H) 65 - 100 mg/dL    Performed by [address] [place] [address], POC    Collection Time: 04/20/18  4:57 PM   Result Value Ref Range    Glucose (POC) 180 (H) 65 - 100 mg/dL    Performed by Candace Aaron    GLUCOSE, POC    Collection Time: 04/20/18  9:15 PM   Result Value Ref Range    Glucose (POC) 119 (H) 65 - 100 mg/dL    Performed by Maria Esther Hairston (PCT)    GLUCOSE, POC    Collection Time: 04/21/18  7:14 AM   Result Value Ref Range    Glucose (POC) 190 (H) 65 - 100 mg/dL    Performed by Edie Gannon    METABOLIC PANEL, BASIC    Collection Time: 04/21/18  9:00 AM   Result Value Ref Range    Sodium 138 136 - 145 mmol/L    Potassium 3.8 3.5 - 5.1 mmol/L    Chloride 100 97 - 108 mmol/L    CO2 27 21 - 32 mmol/L    Anion gap 11 5 - 15 mmol/L    Glucose 191 (H) 65 - 100 mg/dL     (H) 6 - 20 MG/DL    Creatinine 12.80 (H) 0.70 - 1.30 MG/DL    BUN/Creatinine ratio 8 (L) 12 - 20      GFR est AA 5 (L) >60 ml/min/1.73m2    GFR est non-AA 4 (L) >60 ml/min/1.73m2    Calcium 8.6 8.5 - 10.1 MG/DL   PROTHROMBIN TIME + INR    Collection Time: 04/21/18  9:00 AM   Result Value Ref Range    INR 1.0 0.9 - 1.1      Prothrombin time 10.3 9.0 - 11.1 sec   PTT    Collection Time: 04/21/18  9:00 AM   Result Value Ref Range    aPTT 21.9 (L) 22.1 - 32.0 sec    aPTT, therapeutic range     58.0 - 77.0 SECS   CK    Collection Time: 04/21/18  9:00 AM   Result Value Ref Range     (H) 39 - 308 U/L     Recent Labs      04/20/18   0349  04/19/18   0217   WBC  15.5*  12.8*   HGB  6.9*  7.8*   HCT  21.2*  23.1*   PLT  260  207     Recent Labs      04/21/18   0900 04/20/18   0349  04/19/18   0217   NA  138  139  140   K  3.8  3.8  3.4*   CL  100  100  99   CO2  27  30  29   BUN  108*  82*  128*   CREA  12.80*  9.95*  13.20*   GLU  191*  181*  105*   CA  8.6  8.5  8.7     Recent Labs      04/20/18   0349   SGOT  66*   AP  77   TP  5.9*   ALB  2.2*   GLOB  3.7     Recent Labs      04/21/18   0900  04/20/18   1305   INR  1.0  1.0   PTP  10.3  10.3   APTT  21.9*   --       No results for input(s): FE, TIBC, PSAT, FERR in the last 72 hours. No results found for: FOL, RBCF   No results for input(s): PH, PCO2, PO2 in the last 72 hours.   Recent Labs      04/21/18   0900  04/20/18   0349   CPK  974*  1663*     Lab Results   Component Value Date/Time    Cholesterol, total 133 12/05/2017 10:15 AM    HDL Cholesterol 29 (L) 12/05/2017 10:15 AM    LDL, calculated 61 12/05/2017 10:15 AM    Triglyceride 215 (H) 12/05/2017 10:15 AM     No components found for: [place]  Lab Results   Component Value Date/Time    Color YELLOW/STRAW 04/12/2018 12:44 AM    Appearance CLEAR 04/12/2018 12:44 AM    Specific gravity <1.005 04/12/2018 12:44 AM    Specific gravity 1.021 04/16/2013 05:05 PM    pH (UA) 5.0 04/12/2018 12:44 AM    Protein NEGATIVE  04/12/2018 12:44 AM    Glucose >1000 (A) 04/12/2018 12:44 AM    Ketone TRACE (A) 04/12/2018 12:44 AM    Bilirubin NEGATIVE  04/12/2018 12:44 AM    Urobilinogen 0.2 04/12/2018 12:44 AM Nitrites NEGATIVE  04/12/2018 12:44 AM    Leukocyte Esterase NEGATIVE  04/12/2018 12:44 AM    Epithelial cells FEW 04/12/2018 12:44 AM    Bacteria NEGATIVE  04/12/2018 12:44 AM    WBC 0-4 04/12/2018 12:44 AM    RBC 0-5 04/12/2018 12:44 AM        ROS: -CP, SOB, Dysuria, palpitations, cough. Assessment:    Active Problems:    Hyperlipidemia (5/1/2012)      Morbid obesity ([address] (5/6/2013)      Hypertension (10/31/2014)      Type II diabetes mellitus, uncontrolled ([address] (6/30/2016)      LITTLE (acute kidney injury) ([facility] (7/9/2017)      Hyperosmolar coma due to secondary diabetes ([facility] (4/12/2018)      Acute encephalopathy (4/12/2018)      Non-traumatic rhabdomyolysis (4/15/2018)           EGD                        There were linear ulcerations in the lower 1/2 of the esophagus > 8 cm in length and then there was a healing ulcer at EG junction with the appearance of a healing Chelly-Darlene tear. No active bleeding seen. Esophagitis was Grade D. 4 cm hiatal hernia    Bile in stomach but underlying mucosa was normal without any ulcers or erosions seen. No blood in stomach.     Plan/Discussion:     -EGD with ulcerative esophagitis as noted. .   -Continue PPI BID and sucralfate liquid QID  -On IV antiemetics. -There seems to be a disconnect between his disease severity and in his choices(food, lifestyle, ?compliance). -Start with a soft diet. Signed By: Jessie Morelos.  Nicole Portillo MD    4/21/2018  11:36 AM

## 2018-04-21 NOTE — PROGRESS NOTES
Bedside shift change report given to Radha Johnson RN (oncoming nurse) by Lorne Francois RN (offgoing nurse). Report included the following information SBAR, Intake/Output, MAR, Recent Results and Cardiac Rhythm NSR - ST. Zone Phone for oncoming shift:       Shift Summary: Pt. Currently sleeping. Updated Radha Johnson about pts. Drop in Oxygen saturations overnight.      LDAs               Peripheral IV 04/20/18 Left Hand (Active)   Site Assessment Clean, dry, & intact 4/21/2018  3:36 AM   Phlebitis Assessment 0 4/21/2018  3:36 AM   Infiltration Assessment 0 4/21/2018  3:36 AM   Dressing Status Clean, dry, & intact 4/21/2018  3:36 AM   Dressing Type Transparent;Tape 4/21/2018  3:36 AM   Hub Color/Line Status Blue;Flushed;Capped 4/21/2018  3:36 AM   Alcohol Cap Used Yes 4/21/2018  3:36 AM        Hemodialysis Access 04/15/18 (Active)   Central Line Being Utilized Yes 4/21/2018  3:36 AM   Criteria for Appropriate Use Dialysis/apheresis 4/21/2018  3:36 AM   Date Accessed  04/19/18 4/19/2018  7:47 PM   Site Assessment Clean, dry, & intact 4/21/2018  3:36 AM   Date of Last Dressing Change 04/19/18 4/19/2018  7:47 PM   Dressing Status Clean, dry, & intact 4/21/2018  3:36 AM   Dressing Type Transparent;Disk with Chlorhexadine gluconate (CHG) 4/21/2018  3:36 AM   Proximal Hub Color/Line Status Red 4/21/2018  3:36 AM   Distal Hub Color/Line Status Blue 4/21/2018  3:36 AM                [REMOVED] Urinary Catheter 04/12/18 Grigsby-Indications for Use: Strict I/Os, every 1-2 hours   Intake & Output   Date 04/20/18 0700 - 04/21/18 0659 04/21/18 0700 - 04/22/18 0659   Shift [id number] 1900-0659 24 Hour Total 0700-1859 1900-0659 24 Hour Total   I  N  T  A  K  E   I.V.  (mL/kg/hr) 150  (0.1)  150         Volume (0.9% sodium chloride infusion 250 mL) 150  150       Shift Total  (mL/kg) 150  (1)  150  (1)      O  U  T  P  U  T   Blood 0  0         Estimated Blood Loss 0  0       Shift Total  (mL/kg) 0  (0)  0  (0)        150      Weight (kg) 147.6 147.6 147.6 147.6 147.6 147.6      Last Bowel Movement Last Bowel Movement Date: 04/20/18 (pt. reported)   Glucose Checks [] N/A  [x] AC/HS  [] Q6  Concerns:   Nutrition Active Orders   Diet    DIET GI LITE (POST SURGICAL) No Conc.  Sweets       Consults []PT  []OT  []Speech  []Case Management   Cardiac Monitoring []N/A [x]Yes Expires:

## 2018-04-21 NOTE — PROGRESS NOTES
NAME: Lelia Donald        :  1975        MRN:  [id number]        Assessment :    Plan:  --LITTLE   Rhabdo  Obesity  HTN  Sepsis --HD #1 4/15. HD TTS. Watch for renal recovery. Suspect that recovery will take TIME. Approved for outpatient HD at [address]  HD today  Plan for permcath Monday. Stop heparin Saturday. Check coags. Pull fluid as tolerated. gi SEEING  RECOM PRBC TRANSFUSION           Subjective:     Chief Complaint:  \" needing more O2    Objective:     VITALS:   Last 24hrs VS reviewed since prior progress note. Most recent are:  Visit Vitals    /78    Pulse 90    Temp 97.6 °F (36.4 °C)    Resp 20    Ht 5' 9\" (1.753 m)    Wt 145.9 kg (321 lb 9.6 oz)    SpO2 91%    BMI 47.49 kg/m2       Intake/Output Summary (Last 24 hours) at 18 6266  Last data filed at 18 1602   Gross per 24 hour   Intake              150 ml   Output                0 ml   Net              150 ml      Telemetry Reviewed:     PHYSICAL EXAM:  General: NAD   distant BS  c  No edema       ________________________________________________________________________  Ralph Severino MD     Procedures: see electronic medical records for all procedures/Xrays and details which  were not copied into this note but were reviewed prior to creation of Plan. LABS:  Recent Labs      18   WBC  15.5*  12.8*   HGB  6.9*  7.8*   HCT  21.2*  23.1*   PLT  260  207     Recent Labs      18   0349  18   021   NA  139  140   K  3.8  3.4*   CL  100  99   CO2  30  29   BUN  82*  128*   CREA  9.95*  13.20*   GLU  181*  105*   CA  8.5  8.7     Recent Labs      18   0349   SGOT  66*   AP  77   TP  5.9*   ALB  2.2*   GLOB  3.7     Recent Labs      18   1305   INR  1.0   PTP  10.3      No results for input(s): FE, TIBC, PSAT, FERR in the last 72 hours.    No results found for: FOL, RBCF No results for input(s): PH, PCO2, PO2 in the last 72 hours.   Recent Labs      04/20/18   0349   CPK  1663*     No components found for: [place]  Lab Results   Component Value Date/Time    Color YELLOW/STRAW 04/12/2018 12:44 AM    Appearance CLEAR 04/12/2018 12:44 AM    Specific gravity <1.005 04/12/2018 12:44 AM    Specific gravity 1.021 04/16/2013 05:05 PM    pH (UA) 5.0 04/12/2018 12:44 AM    Protein NEGATIVE  04/12/2018 12:44 AM    Glucose >1000 (A) 04/12/2018 12:44 AM    Ketone TRACE (A) 04/12/2018 12:44 AM    Bilirubin NEGATIVE  04/12/2018 12:44 AM    Urobilinogen 0.2 04/12/2018 12:44 AM    Nitrites NEGATIVE  04/12/2018 12:44 AM    Leukocyte Esterase NEGATIVE  04/12/2018 12:44 AM    Epithelial cells FEW 04/12/2018 12:44 AM    Bacteria NEGATIVE  04/12/2018 12:44 AM    WBC 0-4 04/12/2018 12:44 AM    RBC 0-5 04/12/2018 12:44 AM       MEDICATIONS:

## 2018-04-21 NOTE — PROGRESS NOTES
INTERNAL MEDICINE ATTENDING NOTE    S: Mr. Danie Olson was seen by me today during rounds. At this time, he is awake, alert, and calm. Nausea is better. He desaturated into 70s overnight; now is on 5L. Still having a lot of headaches. ROS otherwise unremarkable except as noted elsewhere. O: Blood pressure 123/58, pulse 93, temperature 97.6 °F (36.4 °C), resp. rate 20, height 5' 9\" (1.753 m), weight 321 lb 9.6 oz (145.9 kg), SpO2 91 %. Gen: Patient is in no acute distress. Lungs: CTAB. Heart: RRR. Abd: S, NT, ND, BS present. Extremities: Warm. Recent Results (from the past 12 hour(s))   GLUCOSE, POC    Collection Time: 04/21/18  7:14 AM   Result Value Ref Range    Glucose (POC) 190 (H) 65 - 100 mg/dL    Performed by Lloyd Pineda         CXR 4/15: No PTX. CXR 4/11: Minimal R ML infiltrate. CXR 4/12: No acute abnormality. US Retroperitoneal 4/14: Normal size and appearance of the kidneys without hydronephrosis. Nondistended urinary bladder with a Grigsby catheter. US abdomen 4/17: Echogenic liver suggesting fatty infiltration with some areas of relative sparing. Contracted gallbladder. No obvious gallstones. Pancreas not visualized due to overlying bowel gas. No hydronephrosis. EGD 4/20: Ulcerative esophagitis, Hiatal hernia. A/P:   1. Hyperosmolar coma due to secondary diabetes ([facility] (4/12/2018): Treated with IVF and insulin. Glc is improved. 2. Epigastric pain and N/V: Ulcerative esophagitis on EGD; appreicate GI input. Ongoing for several days; has flared up again yesterday. Continue Prn maalox. Prn zofran. 3. Anemia: Defer to Nephrology. Perhaps can have a unit added via dialysis. 4. Rhabdomyolysis: CK trending down   5. LITTLE: HD per Nephrology. 6. Hypernatremia: hydration; watch labs. 7. Sepsis due to aspiration pneumonia, POA: Empiric antibiotics. 8. Hypoxia: O2 as needed. 9. Acute encephalopathy (4/12/2018): Improving.  He is now awake, no longer paranoid, seems at baseline. 10. Hyperlipidemia (5/1/2012): Hold statin  11. Morbid obesity ([facility] (5/6/2013)  12. Hypertension (10/31/2014): BP okay.        Evan Guerrero MD, FACP  Best contact is via Pager: [phone], or via hospital  at [phone]

## 2018-04-21 NOTE — PROGRESS NOTES
Pt on room air and now sitting up and his sats are 95%. Will leave the oxygen off and recheck his sats later.

## 2018-04-22 LAB
ERYTHROCYTE [DISTWIDTH] IN BLOOD BY AUTOMATED COUNT: 15 % (ref 11.5–14.5)
GLUCOSE BLD STRIP.AUTO-MCNC: 123 MG/DL (ref 65–100)
GLUCOSE BLD STRIP.AUTO-MCNC: 143 MG/DL (ref 65–100)
GLUCOSE BLD STRIP.AUTO-MCNC: 193 MG/DL (ref 65–100)
GLUCOSE BLD STRIP.AUTO-MCNC: 74 MG/DL (ref 65–100)
GLUCOSE BLD STRIP.AUTO-MCNC: 75 MG/DL (ref 65–100)
GLUCOSE BLD STRIP.AUTO-MCNC: 83 MG/DL (ref 65–100)
HCT VFR BLD AUTO: 19.2 % (ref 36.6–50.3)
HGB BLD-MCNC: 6.3 G/DL (ref 12.1–17)
MCH RBC QN AUTO: 33 PG (ref 26–34)
MCHC RBC AUTO-ENTMCNC: 32.8 G/DL (ref 30–36.5)
MCV RBC AUTO: 100.5 FL (ref 80–99)
NRBC # BLD: 5.23 K/UL (ref 0–0.01)
NRBC BLD-RTO: 20.4 PER 100 WBC
PLATELET # BLD AUTO: 345 K/UL (ref 150–400)
PMV BLD AUTO: 11.3 FL (ref 8.9–12.9)
RBC # BLD AUTO: 1.91 M/UL (ref 4.1–5.7)
SERVICE CMNT-IMP: ABNORMAL
SERVICE CMNT-IMP: NORMAL
WBC # BLD AUTO: 25.6 K/UL (ref 4.1–11.1)

## 2018-04-22 PROCEDURE — P9016 RBC LEUKOCYTES REDUCED: HCPCS | Performed by: INTERNAL MEDICINE

## 2018-04-22 PROCEDURE — 82962 GLUCOSE BLOOD TEST: CPT

## 2018-04-22 PROCEDURE — 36430 TRANSFUSION BLD/BLD COMPNT: CPT

## 2018-04-22 PROCEDURE — 74011250637 HC RX REV CODE- 250/637: Performed by: SPECIALIST

## 2018-04-22 PROCEDURE — 74011250637 HC RX REV CODE- 250/637: Performed by: INTERNAL MEDICINE

## 2018-04-22 PROCEDURE — 74011636637 HC RX REV CODE- 636/637: Performed by: INTERNAL MEDICINE

## 2018-04-22 PROCEDURE — 36415 COLL VENOUS BLD VENIPUNCTURE: CPT | Performed by: INTERNAL MEDICINE

## 2018-04-22 PROCEDURE — 65660000000 HC RM CCU STEPDOWN

## 2018-04-22 PROCEDURE — 85027 COMPLETE CBC AUTOMATED: CPT | Performed by: INTERNAL MEDICINE

## 2018-04-22 RX ORDER — SODIUM CHLORIDE 9 MG/ML
250 INJECTION, SOLUTION INTRAVENOUS AS NEEDED
Status: DISCONTINUED | OUTPATIENT
Start: 2018-04-22 | End: 2018-04-30 | Stop reason: HOSPADM

## 2018-04-22 RX ADMIN — PANTOPRAZOLE SODIUM 40 MG: 40 TABLET, DELAYED RELEASE ORAL at 08:24

## 2018-04-22 RX ADMIN — INSULIN GLARGINE 40 UNITS: 100 INJECTION, SOLUTION SUBCUTANEOUS at 08:24

## 2018-04-22 RX ADMIN — INSULIN GLARGINE 40 UNITS: 100 INJECTION, SOLUTION SUBCUTANEOUS at 22:51

## 2018-04-22 RX ADMIN — INSULIN LISPRO 15 UNITS: 100 INJECTION, SOLUTION INTRAVENOUS; SUBCUTANEOUS at 12:16

## 2018-04-22 RX ADMIN — SUCRALFATE 1 G: 1 SUSPENSION ORAL at 08:24

## 2018-04-22 RX ADMIN — Medication 10 ML: at 17:04

## 2018-04-22 RX ADMIN — Medication 10 ML: at 04:51

## 2018-04-22 RX ADMIN — INSULIN LISPRO 15 UNITS: 100 INJECTION, SOLUTION INTRAVENOUS; SUBCUTANEOUS at 08:25

## 2018-04-22 RX ADMIN — INSULIN LISPRO 3 UNITS: 100 INJECTION, SOLUTION INTRAVENOUS; SUBCUTANEOUS at 08:25

## 2018-04-22 RX ADMIN — Medication 10 ML: at 22:00

## 2018-04-22 NOTE — PROGRESS NOTES
Bedside shift change report given to Demetri Burch RN (oncoming nurse) by Teresa Chaudhry RN (offgoing nurse). Report included the following information SBAR, Kardex, Intake/Output, MAR, Recent Results and Cardiac Rhythm Sinus Tach.

## 2018-04-22 NOTE — PROGRESS NOTES
INTERNAL MEDICINE ATTENDING NOTE    S: Mr. Berneta Eisenmenger was seen by me today during rounds. At this time, he is awake, alert, and calm. Nausea is better. He desaturated into 70s overnight; now is on 5L. Still having headaches. ROS otherwise unremarkable except as noted elsewhere. O: Blood pressure 143/85, pulse 99, temperature 98.1 °F (36.7 °C), resp. rate 20, height 5' 9\" (1.753 m), weight 319 lb 14.2 oz (145.1 kg), SpO2 91 %. Gen: Patient is in no acute distress. Lungs: CTAB. Heart: RRR. Abd: S, NT, ND, BS present. Extremities: Warm. Recent Results (from the past 12 hour(s))   GLUCOSE, POC    Collection Time: 04/21/18  9:29 PM   Result Value Ref Range    Glucose (POC) 186 (H) 65 - 100 mg/dL    Performed by Ozzie Santana (PCT)    CBC W/O DIFF    Collection Time: 04/22/18  1:04 AM   Result Value Ref Range    WBC 25.6 (H) 4.1 - 11.1 K/uL    RBC 1.91 (L) 4.10 - 5.70 M/uL    HGB 6.3 (L) 12.1 - 17.0 g/dL    HCT 19.2 (L) 36.6 - 50.3 %    .5 (H) 80.0 - 99.0 FL    MCH 33.0 26.0 - 34.0 PG    MCHC 32.8 30.0 - 36.5 g/dL    RDW 15.0 (H) 11.5 - 14.5 %    PLATELET 237 510 - 395 K/uL    MPV 11.3 8.9 - 12.9 FL    NRBC 20.4 (H) 0  WBC    ABSOLUTE NRBC 5.23 (H) 0.00 - 0.01 K/uL   GLUCOSE, POC    Collection Time: 04/22/18  7:51 AM   Result Value Ref Range    Glucose (POC) 193 (H) 65 - 100 mg/dL    Performed by Lalina         CXR 4/15: No PTX. CXR 4/11: Minimal R ML infiltrate. CXR 4/12: No acute abnormality. US Retroperitoneal 4/14: Normal size and appearance of the kidneys without hydronephrosis. Nondistended urinary bladder with a Grigsby catheter. US abdomen 4/17: Echogenic liver suggesting fatty infiltration with some areas of relative sparing. Contracted gallbladder. No obvious gallstones. Pancreas not visualized due to overlying bowel gas. No hydronephrosis. EGD 4/20: Ulcerative esophagitis, Hiatal hernia. A/P:   1.  Hyperosmolar coma due to secondary diabetes ([facility] (4/12/2018): Treated with IVF and insulin. Glc is improved. 2. Epigastric pain and N/V: Ulcerative esophagitis on EGD; appreicate GI input. Ongoing for several days; has flared up again yesterday. Continue Prn maalox. Prn zofran. 3. Anemia: Blood added during dialysis; will need this again, it appears. Defer to Nephrology. 4. Rhabdomyolysis: CK trending down   5. LITTLE: HD per Nephrology. 6. Hypernatremia: hydration; watch labs. 7. Sepsis due to aspiration pneumonia, POA: Empiric antibiotics. 8. Hypoxia: O2 as needed. 9. Acute encephalopathy (4/12/2018): Improving. He is now awake, no longer paranoid, seems at baseline. 10. Hyperlipidemia (5/1/2012): Hold statin  11. Morbid obesity ([facility] (5/6/2013)  12. Hypertension (10/31/2014): BP okay.        Tonia Nance MD, FACP  Best contact is via Pager: [phone], or via hospital  at [phone]

## 2018-04-22 NOTE — PROGRESS NOTES
Gastroenterology Progress Note(Stein for Yaneth)    4/22/2018    Admit Date: 4/11/2018    Subjective: Follow up for: UGI bleed, esophagitis,nausea    Seen sleeping in the room with breakfast at bedside. Mumbling'I will get to it'  Patient was seen in rounds by me today.      Current Facility-Administered Medications   Medication Dose Route Frequency    heparin (porcine) 1,000 unit/mL injection 1,100 Units  1,100 Units Hemodialysis DIALYSIS PRN    heparin (porcine) 1,000 unit/mL injection 1,400 Units  1,400 Units Hemodialysis DIALYSIS PRN    0.9% sodium chloride infusion 250 mL  250 mL IntraVENous PRN    pantoprazole (PROTONIX) tablet 40 mg  40 mg Oral ACB&D    sucralfate (CARAFATE) 100 mg/mL oral suspension 1 g  1 g Oral AC&HS    traMADol (ULTRAM) tablet 50 mg  50 mg Oral Q6H PRN    insulin glargine (LANTUS) injection 40 Units  40 Units SubCUTAneous Q12H    calcium carbonate (TUMS) chewable tablet 400 mg [elemental]  400 mg Oral TID WITH MEALS    epoetin keri (EPOGEN;PROCRIT) injection 10,000 Units  10,000 Units SubCUTAneous DIALYSIS MON, WED & FRI    insulin lispro (HUMALOG) injection 15 Units  15 Units SubCUTAneous TIDAC    insulin lispro (HUMALOG) injection   SubCUTAneous AC&HS    glucose chewable tablet 16 g  4 Tab Oral PRN    dextrose (D50W) injection syrg 12.5-25 g  12.5-25 g IntraVENous PRN    glucagon (GLUCAGEN) injection 1 mg  1 mg IntraMUSCular PRN    aluminum-magnesium hydroxide (MAALOX) oral suspension 30 mL  30 mL Oral Q6H PRN    sodium chloride (NS) flush 10-40 mL  10-40 mL IntraVENous PRN    sodium chloride (NS) flush 10 mL  10 mL IntraVENous Q8H    nitroglycerin (NITROBID) 2 % ointment 1 Inch  1 Inch Topical Q6H PRN    sodium chloride (NS) flush 5-10 mL  5-10 mL IntraVENous PRN    acetaminophen (TYLENOL) tablet 650 mg  650 mg Oral Q6H PRN    ondansetron (ZOFRAN) injection 4 mg  4 mg IntraVENous Q4H PRN    nystatin (MYCOSTATIN) 100,000 unit/gram powder   Topical BID Objective:     Blood pressure 143/85, pulse 99, temperature 98.1 °F (36.7 °C), resp.  rate 20, height 5' 9\" (1.753 m), weight 145.1 kg (319 lb 14.2 oz), SpO2 91 %.         04/20 1901 - 04/22 0700  In: -   Out: 500 [Urine:500]        Physical Examination:       General:drowsy  HEENT:  Eyes closed  Chest:  CTA,   Heart: S1, S2, RRR  GI: Soft, NT, + bowel sounds      Data Review    Recent Results (from the past 24 hour(s))   GLUCOSE, POC    Collection Time: 04/21/18  1:38 PM   Result Value Ref Range    Glucose (POC) 104 (H) 65 - 100 mg/dL    Performed by [address], POC    Collection Time: 04/21/18  4:09 PM   Result Value Ref Range    Glucose (POC) 202 (H) 65 - 100 mg/dL    Performed by Maris Jarvis    GLUCOSE, POC    Collection Time: 04/21/18  9:29 PM   Result Value Ref Range    Glucose (POC) 186 (H) 65 - 100 mg/dL    Performed by Dell Farias (PCT)    CBC W/O DIFF    Collection Time: 04/22/18  1:04 AM   Result Value Ref Range    WBC 25.6 (H) 4.1 - 11.1 K/uL    RBC 1.91 (L) 4.10 - 5.70 M/uL    HGB 6.3 (L) 12.1 - 17.0 g/dL    HCT 19.2 (L) 36.6 - 50.3 %    .5 (H) 80.0 - 99.0 FL    MCH 33.0 26.0 - 34.0 PG    MCHC 32.8 30.0 - 36.5 g/dL    RDW 15.0 (H) 11.5 - 14.5 %    PLATELET 967 210 - 597 K/uL    MPV 11.3 8.9 - 12.9 FL    NRBC 20.4 (H) 0  WBC    ABSOLUTE NRBC 5.23 (H) 0.00 - 0.01 K/uL   GLUCOSE, POC    Collection Time: 04/22/18  7:51 AM   Result Value Ref Range    Glucose (POC) 193 (H) 65 - 100 mg/dL    Performed by LUIZ OLIVER      Recent Labs      04/22/18   0104  04/20/18   0349   WBC  25.6*  15.5*   HGB  6.3*  6.9*   HCT  19.2*  21.2*   PLT  345  260     Recent Labs      04/21/18   0900 04/20/18   0349   NA  138  139   K  3.8  3.8   CL  100  100   CO2  27  30   BUN  108*  82*   CREA  12.80*  9.95*   GLU  191*  181*   CA  8.6  8.5     Recent Labs      04/20/18   0349   SGOT  66*   AP  77   TP  5.9*   ALB  2.2*   GLOB  3.7     Recent Labs      04/21/18   0900  04/20/18   1305 INR  1.0  1.0   PTP  10.3  10.3   APTT  21.9*   --       No results for input(s): FE, TIBC, PSAT, FERR in the last 72 hours. No results found for: FOL, RBCF   No results for input(s): PH, PCO2, PO2 in the last 72 hours. Recent Labs      04/21/18   0900  04/20/18   0349   CPK  974*  1663*     Lab Results   Component Value Date/Time    Cholesterol, total 133 12/05/2017 10:15 AM    HDL Cholesterol 29 (L) 12/05/2017 10:15 AM    LDL, calculated 61 12/05/2017 10:15 AM    Triglyceride 215 (H) 12/05/2017 10:15 AM     No components found for: [place]  Lab Results   Component Value Date/Time    Color YELLOW/STRAW 04/12/2018 12:44 AM    Appearance CLEAR 04/12/2018 12:44 AM    Specific gravity <1.005 04/12/2018 12:44 AM    Specific gravity 1.021 04/16/2013 05:05 PM    pH (UA) 5.0 04/12/2018 12:44 AM    Protein NEGATIVE  04/12/2018 12:44 AM    Glucose >1000 (A) 04/12/2018 12:44 AM    Ketone TRACE (A) 04/12/2018 12:44 AM    Bilirubin NEGATIVE  04/12/2018 12:44 AM    Urobilinogen 0.2 04/12/2018 12:44 AM    Nitrites NEGATIVE  04/12/2018 12:44 AM    Leukocyte Esterase NEGATIVE  04/12/2018 12:44 AM    Epithelial cells FEW 04/12/2018 12:44 AM    Bacteria NEGATIVE  04/12/2018 12:44 AM    WBC 0-4 04/12/2018 12:44 AM    RBC 0-5 04/12/2018 12:44 AM        ROS: -CP, SOB, Dysuria, palpitations, cough. Assessment:    Active Problems:    Hyperlipidemia (5/1/2012)      Morbid obesity ([address] (5/6/2013)      Hypertension (10/31/2014)      Type II diabetes mellitus, uncontrolled ([facility] (6/30/2016)      LITTLE (acute kidney injury) ([facility] (7/9/2017)      Hyperosmolar coma due to secondary diabetes ([facility] (4/12/2018)      Acute encephalopathy (4/12/2018)      Non-traumatic rhabdomyolysis (4/15/2018)           EGD                        There were linear ulcerations in the lower 1/2 of the esophagus > 8 cm in length and then there was a healing ulcer at EG junction with the appearance of a healing Chelly-Darlene tear. No active bleeding seen. Esophagitis was Grade D. 4 cm hiatal hernia    Bile in stomach but underlying mucosa was normal without any ulcers or erosions seen. No blood in stomach.     Plan/Discussion:     -EGD with ulcerative esophagitis as noted. -Continue PPI BID, sucralfate liquid QID and IV antiemetics.  -On a soft diet.  -Dr Patel Ill to resume care tomorrow. Signed By: Sarmad Wei MD    4/22/2018 10 50 AM

## 2018-04-22 NOTE — PROGRESS NOTES
Friday, I spoke with Attila Zamora at [address] [place] [address]. Carole who confirms pt has a TTS 0615 chair and will need to come in and do paperwork the day before. [employer] office has spoken with the pt regarding costs, co pays, and options. Param Avel is in agreement per Mariann Dong at [facility] spoke with pt on my phone regarding the need to come do paperwork on the day before discharge. She did not want to fax the paperwork to me so I could give it to the pt. Juan J Cata says pt seemed confused on the phone so she is reaching out to his mother//brother. Please ask for early in the day permacath insertion on Monday if possible. Thanks                    D/C plans discussed with RN Hector Marquez. Updates ecin'd to RADHA Lam.  Of note, pt is on oxygen 5 LPM per chart notes. Will need to arrange home oxygen if needed. Please document RA PO to see if pt meets home oxygen criteria.

## 2018-04-23 ENCOUNTER — APPOINTMENT (OUTPATIENT)
Dept: INTERVENTIONAL RADIOLOGY/VASCULAR | Age: 43
DRG: 871 | End: 2018-04-23
Attending: INTERNAL MEDICINE
Payer: COMMERCIAL

## 2018-04-23 LAB
ANION GAP SERPL CALC-SCNC: 11 MMOL/L (ref 5–15)
BUN SERPL-MCNC: 83 MG/DL (ref 6–20)
BUN/CREAT SERPL: 7 (ref 12–20)
CALCIUM SERPL-MCNC: 8.8 MG/DL (ref 8.5–10.1)
CHLORIDE SERPL-SCNC: 101 MMOL/L (ref 97–108)
CO2 SERPL-SCNC: 27 MMOL/L (ref 21–32)
CREAT SERPL-MCNC: 11.1 MG/DL (ref 0.7–1.3)
ERYTHROCYTE [DISTWIDTH] IN BLOOD BY AUTOMATED COUNT: 17.2 % (ref 11.5–14.5)
GLUCOSE BLD STRIP.AUTO-MCNC: 112 MG/DL (ref 65–100)
GLUCOSE BLD STRIP.AUTO-MCNC: 172 MG/DL (ref 65–100)
GLUCOSE BLD STRIP.AUTO-MCNC: 183 MG/DL (ref 65–100)
GLUCOSE BLD STRIP.AUTO-MCNC: 211 MG/DL (ref 65–100)
GLUCOSE SERPL-MCNC: 137 MG/DL (ref 65–100)
HCT VFR BLD AUTO: 17.6 % (ref 36.6–50.3)
HGB BLD-MCNC: 5.6 G/DL (ref 12.1–17)
LDH SERPL L TO P-CCNC: 786 U/L (ref 85–241)
MAGNESIUM SERPL-MCNC: 2.6 MG/DL (ref 1.6–2.4)
MCH RBC QN AUTO: 31.8 PG (ref 26–34)
MCHC RBC AUTO-ENTMCNC: 31.8 G/DL (ref 30–36.5)
MCV RBC AUTO: 100 FL (ref 80–99)
NRBC # BLD: 7 K/UL (ref 0–0.01)
NRBC BLD-RTO: 32.4 PER 100 WBC
PATH REV BLD -IMP: NORMAL
PLATELET # BLD AUTO: 341 K/UL (ref 150–400)
PMV BLD AUTO: 11.2 FL (ref 8.9–12.9)
POTASSIUM SERPL-SCNC: 3.7 MMOL/L (ref 3.5–5.1)
RBC # BLD AUTO: 1.76 M/UL (ref 4.1–5.7)
SERVICE CMNT-IMP: ABNORMAL
SODIUM SERPL-SCNC: 139 MMOL/L (ref 136–145)
WBC # BLD AUTO: 21.6 K/UL (ref 4.1–11.1)

## 2018-04-23 PROCEDURE — 36415 COLL VENOUS BLD VENIPUNCTURE: CPT | Performed by: INTERNAL MEDICINE

## 2018-04-23 PROCEDURE — 83735 ASSAY OF MAGNESIUM: CPT | Performed by: INTERNAL MEDICINE

## 2018-04-23 PROCEDURE — 82962 GLUCOSE BLOOD TEST: CPT

## 2018-04-23 PROCEDURE — 83615 LACTATE (LD) (LDH) ENZYME: CPT | Performed by: INTERNAL MEDICINE

## 2018-04-23 PROCEDURE — P9016 RBC LEUKOCYTES REDUCED: HCPCS | Performed by: INTERNAL MEDICINE

## 2018-04-23 PROCEDURE — 74011250636 HC RX REV CODE- 250/636: Performed by: INTERNAL MEDICINE

## 2018-04-23 PROCEDURE — 85027 COMPLETE CBC AUTOMATED: CPT | Performed by: INTERNAL MEDICINE

## 2018-04-23 PROCEDURE — 74011636637 HC RX REV CODE- 636/637: Performed by: INTERNAL MEDICINE

## 2018-04-23 PROCEDURE — 90935 HEMODIALYSIS ONE EVALUATION: CPT

## 2018-04-23 PROCEDURE — 65660000000 HC RM CCU STEPDOWN

## 2018-04-23 PROCEDURE — 80048 BASIC METABOLIC PNL TOTAL CA: CPT | Performed by: INTERNAL MEDICINE

## 2018-04-23 RX ADMIN — Medication 10 ML: at 21:52

## 2018-04-23 RX ADMIN — INSULIN LISPRO 15 UNITS: 100 INJECTION, SOLUTION INTRAVENOUS; SUBCUTANEOUS at 12:24

## 2018-04-23 RX ADMIN — INSULIN LISPRO 3 UNITS: 100 INJECTION, SOLUTION INTRAVENOUS; SUBCUTANEOUS at 08:27

## 2018-04-23 RX ADMIN — ERYTHROPOIETIN 10000 UNITS: 10000 INJECTION, SOLUTION INTRAVENOUS; SUBCUTANEOUS at 18:35

## 2018-04-23 RX ADMIN — INSULIN GLARGINE 40 UNITS: 100 INJECTION, SOLUTION SUBCUTANEOUS at 08:26

## 2018-04-23 RX ADMIN — INSULIN LISPRO 2 UNITS: 100 INJECTION, SOLUTION INTRAVENOUS; SUBCUTANEOUS at 21:43

## 2018-04-23 RX ADMIN — INSULIN LISPRO 15 UNITS: 100 INJECTION, SOLUTION INTRAVENOUS; SUBCUTANEOUS at 08:26

## 2018-04-23 RX ADMIN — INSULIN LISPRO 3 UNITS: 100 INJECTION, SOLUTION INTRAVENOUS; SUBCUTANEOUS at 12:24

## 2018-04-23 RX ADMIN — INSULIN GLARGINE 40 UNITS: 100 INJECTION, SOLUTION SUBCUTANEOUS at 21:43

## 2018-04-23 NOTE — PROGRESS NOTES
Nutrition Assessment:    INTERVENTIONS/RECOMMENDATIONS:   Meals/Snacks: General/healthful diet:  CCD for BG management/coordination of meals with meds. RD ordered a new phos to determine the level for renal restriction. Current, standard renal diet automatically limits Na+, K+, Phos, and Protein. K+ is currently normal.  No need for a protein restriction. Will monitor Phos. ASSESSMENT:   4/23:  Chart reviewed; med noted for hyperosmolar coma due to secondary diabetes, epigastric pain with n/v. Appetite has improved and pt has been requesting more food. Diet has been advanced. Pt off unit for dialysis. Diet Order: Renal (No Conc Sweets)  % Eaten:  No data found. Pertinent Medications: [x] Reviewed []Other:  Pertinent Labs: [x]Reviewed  []Other: Creat 11.10  Food Allergies: [x]None []Other:     Last BM:    [x]Active     []Hyperactive  []Hypoactive       [] Absent  BS  Skin:    [x] Intact   [] Incision  [] Breakdown   []Edema   []Other:    Anthropometrics: Height: 5' 9\" (175.3 cm) Weight: 142.8 kg (314 lb 14.4 oz)    IBW (%IBW): 72.7 kg (160 lb 4.4 oz) ( ) UBW (%UBW):   (  %)    BMI: Body mass index is 46.5 kg/(m^2). This BMI is indicative of:  []Underweight   []Normal   []Overweight   [] Obesity   [x] Extreme Obesity (BMI>40)  Last Weight Metrics:  Weight Loss Metrics 4/23/2018 12/5/2017 9/5/2017 7/9/2017 7/8/2017 7/7/2017 5/3/2017   Today's Wt 314 lb 14.4 oz 247 lb 333 lb - 319 lb 3.6 oz 313 lb 6.4 oz 348 lb   BMI 46.5 kg/m2 35.44 kg/m2 47.78 kg/m2 45.8 kg/m2 - 43.71 kg/m2 48.54 kg/m2       Estimated Nutrition Needs (Based on): 5084 Kcals/day (MSJ 2380 x 1.2 (-500 for obesity)) , 102 g (1.4gPro/kg) Protein  Carbohydrate: At Least 130 g/day  Fluids: 2300 mL/day    NUTRITION DIAGNOSES:   Problem:  Altered nutrition-related lab values      Etiology: related to hx of DM and stress     Signs/Symptoms: as evidenced by -304.      Previous Nutrition Dx:  [] Resolved  [x] Unresolved           [] Progressing    NUTRITION INTERVENTIONS:  Meals/Snacks: General/healthful diet                  GOAL:   Pt will consume >70% of meals with no more n/v in 3-5 days. NUTRITION MONITORING AND EVALUATION   Food/Nutrient Intake Outcomes:  Total energy intake  Physical Signs/Symptoms Outcomes: GI, Glucose profile, GI profile, Electrolyte and renal profile, Weight/weight change    Previous Goal Met:   [] Met              [x] Progressing Towards Goal              [] Not Progressing Towards Goal   Previous Recommendations:   [x] Implemented          [] Not Implemented          [] Not Applicable    LEARNING NEEDS (Diet, Food/Nutrient-Drug Interaction):    [x] None Identified   [] Identified and Education Provided/Documented   [] Identified and Pt declined/was not appropriate     Cultural, Christianity, OR Ethnic Dietary Needs:    [x] None Identified   [] Identified and Addressed     [x] Interdisciplinary Care Plan Reviewed/Documented    [x] Discharge Planning:  Continue CCD with renal restrictions as needed   [] Participated in Interdisciplinary Rounds    NUTRITION RISK:    [x] Patient At Nutritional Risk    [] High              [x] Moderate/Mild           []  Low     [] Patient Not At [address] [place] [address]  Pager [phone]  Weekend Pager [phone]

## 2018-04-23 NOTE — PROGRESS NOTES
Bedside shift change report given to Emanuel Gamino (oncoming nurse) by Courtenay Gowers (offgoing nurse). Report included the following information SBAR, Kardex, Intake/Output, MAR, Accordion and Recent Results. Zone Phone for oncoming shift:   [phone]    Shift Summary: dialysis today; 2u RBCs given today    LDAs               Peripheral IV 04/20/18 Left Hand (Active)   Site Assessment Clean, dry, & intact 4/23/2018  8:00 AM   Phlebitis Assessment 0 4/23/2018  8:00 AM   Infiltration Assessment 0 4/23/2018  8:00 AM   Dressing Status Clean, dry, & intact 4/23/2018  8:00 AM   Dressing Type Tape;Transparent 4/23/2018  8:00 AM   Hub Color/Line Status Blue 4/23/2018  8:00 AM   Alcohol Cap Used Yes 4/22/2018  8:28 PM        Hemodialysis Access 04/15/18 (Active)   Central Line Being Utilized Yes 4/23/2018  8:00 AM   Criteria for Appropriate Use Dialysis/apheresis 4/23/2018  8:00 AM   Date Accessed  04/19/18 4/23/2018  1:59 AM   Site Assessment Clean, dry, & intact 4/23/2018  8:00 AM   Date of Last Dressing Change 04/19/18 4/23/2018  1:59 AM   Dressing Status Clean, dry, & intact 4/23/2018  8:00 AM   Dressing Type Disk with Chlorhexadine gluconate (CHG); Transparent 4/23/2018  8:00 AM   Proximal Hub Color/Line Status Red 4/23/2018  8:00 AM   Distal Hub Color/Line Status Blue 4/23/2018  8:00 AM                [REMOVED] Urinary Catheter 04/12/18 Grigsby-Indications for Use: Strict I/Os, every 1-2 hours   Intake & Output   Date 04/22/18 1900 - 04/23/18 0659 04/23/18 0700 - 04/24/18 0659   Shift 6632-3173 24 Hour Total 7160-2804 8586-9304 24 Hour Total   I  N  T  A  K  E   P.O. 250 250         P. O. 250 250       Blood 300 300         Volume (TRANSFUSE PACKED RBC'S) 300 300       Shift Total  (mL/kg) 550  (3.9) 550  (3.9)      O  U  T  P  U  T   Urine  (mL/kg/hr) 250 250         Urine Voided 250 250       Dialysis   2700  2700      NET Fluid Removed (mL)   2700  2700    Shift Total  (mL/kg) 250  (1.8) 250  (1.8) 2700  (18.9)  2700  (18.9)    300 -2700  -2700   Weight (kg) 142.8 142.8 142.8 142.8 142.8      Last Bowel Movement Last Bowel Movement Date: 04/20/18   Glucose Checks [] N/A  [x] AC/HS  [] Q6  Concerns:   Nutrition Active Orders   Diet    DIET RENAL Regular; Consistent Carb 2000kcal       Consults []PT  []OT  []Speech  []Case Management   Cardiac Monitoring []N/A [x]Yes Expires:

## 2018-04-23 NOTE — PROGRESS NOTES
NAME: Haydee Bowling        :  1975        MRN:  [id number]        Assessment :    Plan:  --LITTLE   Rhabdo  Obesity  HTN  Sepsis  Leukocytosis --HD #1 4/15. Last HD on Saturday. Watch for renal recovery (none so far). Suspect that recovery will take time. Approved for outpatient HD at [address] [place] [address] for permcath when WBC better. HD today with PRBC's. Pull fluid as tolerated. GI following, 1 unit PRBC's , 2 units today (hgb 6.3 to 5.6); check hemolysis labs; ulcerative esophagitis; on epo 10 k sc tiw    On tums with meals           Subjective:   oob in chair. Not very talkative. Denies n/v/d. No sob. He had no questions for me. I discussed the above. He is agreeable to transfusion. Objective:     VITALS:   Last 24hrs VS reviewed since prior progress note. Most recent are:  Visit Vitals    /67 (BP 1 Location: Right arm, BP Patient Position: Lying left side; At rest)    Pulse 94    Temp 98.2 °F (36.8 °C)    Resp 18    Ht 5' 9\" (1.753 m)    Wt 145.1 kg (319 lb 14.2 oz)    SpO2 94%    BMI 47.24 kg/m2       Intake/Output Summary (Last 24 hours) at 18 0551  Last data filed at 18   Gross per 24 hour   Intake              550 ml   Output              250 ml   Net              300 ml      Telemetry Reviewed:     PHYSICAL EXAM:  General: NAD   cta eduardo  Right ij mik is intact  Soft ntnd   No edema       ________________________________________________________________________  Madeline Velazquez MD     Procedures: see electronic medical records for all procedures/Xrays and details which  were not copied into this note but were reviewed prior to creation of Plan.       LABS:  Recent Labs      18   0358  18   0104   WBC  21.6*  25.6*   HGB  5.6*  6.3*   HCT  17.6*  19.2*   PLT  341  345     Recent Labs      18   0358  18   0900   NA  139  138   K  3.7  3.8   CL  101  100 CO2  27  27   BUN  83*  108*   CREA  11.10*  12.80*   GLU  137*  191*   CA  8.8  8.6   MG  2.6*   --      No results for input(s): SGOT, GPT, AP, TBIL, TP, ALB, GLOB, GGT, AML, LPSE in the last 72 hours. No lab exists for component: AMYP, HLPSE  Recent Labs      04/21/18   0900  04/20/18   1305   INR  1.0  1.0   PTP  10.3  10.3   APTT  21.9*   --       No results for input(s): FE, TIBC, PSAT, FERR in the last 72 hours. No results found for: FOL, RBCF   No results for input(s): PH, PCO2, PO2 in the last 72 hours.   Recent Labs      04/21/18   0900   CPK  974*     No components found for: [place]  Lab Results   Component Value Date/Time    Color YELLOW/STRAW 04/12/2018 12:44 AM    Appearance CLEAR 04/12/2018 12:44 AM    Specific gravity <1.005 04/12/2018 12:44 AM    Specific gravity 1.021 04/16/2013 05:05 PM    pH (UA) 5.0 04/12/2018 12:44 AM    Protein NEGATIVE  04/12/2018 12:44 AM    Glucose >1000 (A) 04/12/2018 12:44 AM    Ketone TRACE (A) 04/12/2018 12:44 AM    Bilirubin NEGATIVE  04/12/2018 12:44 AM    Urobilinogen 0.2 04/12/2018 12:44 AM    Nitrites NEGATIVE  04/12/2018 12:44 AM    Leukocyte Esterase NEGATIVE  04/12/2018 12:44 AM    Epithelial cells FEW 04/12/2018 12:44 AM    Bacteria NEGATIVE  04/12/2018 12:44 AM    WBC 0-4 04/12/2018 12:44 AM    RBC 0-5 04/12/2018 12:44 AM       MEDICATIONS:

## 2018-04-23 NOTE — DIABETES MGMT
DTC Progress Note    Recommendations/ Comments:Pt with hypoglycemia yesterday evening. Please consider changing correction scale to normal sensitivity and increasing evening lantus to 42units. Current hospital DM medication:lantus 40units BID, humalog 15units ac tid plus resistant scale correction    Chart reviewed on [address] for hyperglycemia. Patient is a 36 y.o. male with known history of Type 2 Diabetes on Glimepiride 4mg every morning, Lantus 25 units nightly, and Metformin ER 750mg - 2 tabs daily at home. A1c:   Lab Results   Component Value Date/Time    Hemoglobin A1c 12.8 (H) 04/12/2018 12:44 AM    Hemoglobin A1c 5.6 12/05/2017 10:15 AM       Recent Glucose Results:   Lab Results   Component Value Date/Time     (H) 04/23/2018 03:58 AM    GLUCPOC 172 (H) 04/23/2018 11:51 AM    GLUCPOC 183 (H) 04/23/2018 07:44 AM    GLUCPOC 143 (H) 04/22/2018 09:12 PM        Lab Results   Component Value Date/Time    Creatinine 11.10 (H) 04/23/2018 03:58 AM     Estimated Creatinine Clearance: 12.1 mL/min (based on Cr of 11.1). Active Orders   Diet    DIET RENAL Regular; Consistent Carb 2000kcal        PO intake:   No data found. Will continue to follow as needed.     Thank you  Marilee Hansen, BSN, RN, [address] [facility]

## 2018-04-23 NOTE — DIALYSIS
[facility] Acutes  [phone]    Vitals   Pre   Post   Assessment   Pre   Post     Temp  Temp: 97.5 °F (36.4 °C) (04/23/18 1550)  9.2 LOC  A/O x 3 A/O x 3   HR   Pulse (Heart Rate): 90 (04/23/18 1740) 95 Lungs   Diminished  Diminished   B/P   BP: 145/76 (04/23/18 1740) 153/79 Cardiac   s1s2  s1s2   Resp   Resp Rate: 20 (04/23/18 1550) 20 Skin   Intact Catheter  Cathetr Intact   Pain level  Pain Intensity 1: 0 (04/23/18 0745) 0 Edema    Generalized   Generalized   Orders:    Duration:   Start:    4430 End:   1740 Total:   4 hours   Dialyzer:   Dialyzer/Set Up Inspection: Revaclear (04/23/18 1340)   K Bath:   Dialysate K (mEq/L): 3 (04/23/18 1340)   Ca Bath:   Dialysate CA (mEq/L): 2.5 (04/23/18 1340)   Na/Bicarb:   Dialysate NA (mEq/L): 140 (04/23/18 1340)   Target Fluid Removal:   Goal/Amount of Fluid to Remove (mL): 2500 mL (04/23/18 1340)   Access     Type & Location:   Right non-tunneled catheter. Good aspirations/flushes. Dressing cganged on 4/21/18.  No s/s infection noted   Labs     Obtained/Reviewed   Critical Results Called   Date when labs were drawn-  Hgb-    HGB   Date Value Ref Range Status   04/23/2018 5.6 (LL) 12.1 - 17.0 g/dL Final     Comment:     RESULTS VERIFIED, PHONED TO AND READ BACK BY  TABATHA RN @ 0756        K-    Potassium   Date Value Ref Range Status   04/23/2018 3.7 3.5 - 5.1 mmol/L Final     Ca-   Calcium   Date Value Ref Range Status   04/23/2018 8.8 8.5 - 10.1 MG/DL Final     Bun-   BUN   Date Value Ref Range Status   04/23/2018 83 (H) 6 - 20 MG/DL Final     Creat-   Creatinine   Date Value Ref Range Status   04/23/2018 11.10 (H) 0.70 - 1.30 MG/DL Final        Medications/ Blood Products Given     Name   Dose   Route and Time     1  uPRBC IV at 9774   1 uPRBC IV at 1520        Blood Volume Processed (BVP):    68.2 Net Fluid   Removed:  0151-8365=2700 ml   Comments   Time Out Done: 1300  Primary Nurse Rpt Pre:Leandra Taylor RN  Primary Nurse Rpt [facility], RN  Pt Education:Procedure  Care Plan:Continue HD as ordered by Nephrologist  Tx Summary:Received via bed. Treatment initiated via right non-tunneled catheter. Received 2 uPRBC with HD tolerated well. And tolerated HD. All possible blood returned. Catheter ports flushed with NS only; no heparin. Sterile caps applied. Endorsed to primary; Cyndee Abdalla RN  Admiting Diagnosis:  Pt's previous clinic-  Consent signed - Informed Consent Verified: Yes (04/23/18 1340)  [place] Consent - Yes  Hepatitis Status- Negative; 4/15/18  Machine #- Machine Number: [id number] (04/23/18 1340)  Telemetry status-Yes  Pre-dialysis wt. - Pre-Dialysis Weight: 147.3 kg (324 lb 11.8 oz) (04/23/18 1340)  Post wt: =144.6 kg (-2.7 kg)

## 2018-04-23 NOTE — PROGRESS NOTES
Unable to place perm catheter today. Patient not made NPO and has eaten breakfast today.   Will put NPO order in and schedule on 2/34/2018

## 2018-04-23 NOTE — PROGRESS NOTES
Gastroenterology Daily Progress Note (Dr. Ricky Palacios)   [address] [place] Date: 4/11/2018       Subjective:       Patient requesting more food.   No vomiting since Friday  No chest pain or painful swallowing  No melena or BRBPR  Current Facility-Administered Medications   Medication Dose Route Frequency    0.9% sodium chloride infusion 250 mL  250 mL IntraVENous PRN    heparin (porcine) 1,000 unit/mL injection 1,100 Units  1,100 Units Hemodialysis DIALYSIS PRN    heparin (porcine) 1,000 unit/mL injection 1,400 Units  1,400 Units Hemodialysis DIALYSIS PRN    0.9% sodium chloride infusion 250 mL  250 mL IntraVENous PRN    pantoprazole (PROTONIX) tablet 40 mg  40 mg Oral ACB&D    sucralfate (CARAFATE) 100 mg/mL oral suspension 1 g  1 g Oral AC&HS    traMADol (ULTRAM) tablet 50 mg  50 mg Oral Q6H PRN    insulin glargine (LANTUS) injection 40 Units  40 Units SubCUTAneous Q12H    calcium carbonate (TUMS) chewable tablet 400 mg [elemental]  400 mg Oral TID WITH MEALS    epoetin keri (EPOGEN;PROCRIT) injection 10,000 Units  10,000 Units SubCUTAneous DIALYSIS MON, WED & FRI    insulin lispro (HUMALOG) injection 15 Units  15 Units SubCUTAneous TIDAC    insulin lispro (HUMALOG) injection   SubCUTAneous AC&HS    glucose chewable tablet 16 g  4 Tab Oral PRN    dextrose (D50W) injection syrg 12.5-25 g  12.5-25 g IntraVENous PRN    glucagon (GLUCAGEN) injection 1 mg  1 mg IntraMUSCular PRN    aluminum-magnesium hydroxide (MAALOX) oral suspension 30 mL  30 mL Oral Q6H PRN    sodium chloride (NS) flush 10-40 mL  10-40 mL IntraVENous PRN    sodium chloride (NS) flush 10 mL  10 mL IntraVENous Q8H    nitroglycerin (NITROBID) 2 % ointment 1 Inch  1 Inch Topical Q6H PRN    sodium chloride (NS) flush 5-10 mL  5-10 mL IntraVENous PRN    acetaminophen (TYLENOL) tablet 650 mg  650 mg Oral Q6H PRN    ondansetron (ZOFRAN) injection 4 mg  4 mg IntraVENous Q4H PRN    nystatin (MYCOSTATIN) 100,000 unit/gram powder   Topical BID        Objective:     Visit Vitals    /52    Pulse 93    Temp 97.9 °F (36.6 °C)    Resp 18    Ht 5' 9\" (1.753 m)    Wt 142.8 kg (314 lb 14.4 oz)    SpO2 93%    BMI 46.5 kg/m2   Blood pressure 102/52, pulse 93, temperature 97.9 °F (36.6 °C), resp. rate 18, height 5' 9\" (1.753 m), weight 142.8 kg (314 lb 14.4 oz), SpO2 93 %. 04/21 1901 - 04/23 0700  In: 550 [P.O.:250]  Out: 750 [Urine:750]      Intake/Output Summary (Last 24 hours) at 04/23/18 0839  Last data filed at 04/22/18 2028   Gross per 24 hour   Intake              550 ml   Output              250 ml   Net              300 ml     Physical Exam:     General: awake, obese BM In NAD  Chest:  CTA, No rhonchi, rales or rubs.   Heart: S1, S2, RRR  GI: Soft, NT, ND + bowel sounds    Labs:       Recent Results (from the past 24 hour(s))   GLUCOSE, POC    Collection Time: 04/22/18 11:58 AM   Result Value Ref Range    Glucose (POC) 123 (H) 65 - 100 mg/dL    Performed by [address], POC    Collection Time: 04/22/18  4:02 PM   Result Value Ref Range    Glucose (POC) 75 65 - 100 mg/dL    Performed by [address], POC    Collection Time: 04/22/18  4:03 PM   Result Value Ref Range    Glucose (POC) 74 65 - 100 mg/dL    Performed by [address], POC    Collection Time: 04/22/18  4:29 PM   Result Value Ref Range    Glucose (POC) 83 65 - 100 mg/dL    Performed by [address], POC    Collection Time: 04/22/18  9:12 PM   Result Value Ref Range    Glucose (POC) 143 (H) 65 - 100 mg/dL    Performed by Iban Woodard*    METABOLIC PANEL, BASIC    Collection Time: 04/23/18  3:58 AM   Result Value Ref Range    Sodium 139 136 - 145 mmol/L    Potassium 3.7 3.5 - 5.1 mmol/L    Chloride 101 97 - 108 mmol/L    CO2 27 21 - 32 mmol/L    Anion gap 11 5 - 15 mmol/L    Glucose 137 (H) 65 - 100 mg/dL    BUN 83 (H) 6 - 20 MG/DL    Creatinine 11.10 (H) 0.70 - 1.30 MG/DL BUN/Creatinine ratio 7 (L) 12 - 20      GFR est AA 6 (L) >60 ml/min/1.73m2    GFR est non-AA 5 (L) >60 ml/min/1.73m2    Calcium 8.8 8.5 - 10.1 MG/DL   MAGNESIUM    Collection Time: 04/23/18  3:58 AM   Result Value Ref Range    Magnesium 2.6 (H) 1.6 - 2.4 mg/dL   CBC W/O DIFF    Collection Time: 04/23/18  3:58 AM   Result Value Ref Range    WBC 21.6 (H) 4.1 - 11.1 K/uL    RBC 1.76 (L) 4.10 - 5.70 M/uL    HGB 5.6 (LL) 12.1 - 17.0 g/dL    HCT 17.6 (LL) 36.6 - 50.3 %    .0 (H) 80.0 - 99.0 FL    MCH 31.8 26.0 - 34.0 PG    MCHC 31.8 30.0 - 36.5 g/dL    RDW 17.2 (H) 11.5 - 14.5 %    PLATELET 333 223 - 077 K/uL    MPV 11.2 8.9 - 12.9 FL    NRBC 32.4 (H) 0  WBC    ABSOLUTE NRBC 7.00 (H) 0.00 - 0.01 K/uL   LD    Collection Time: 04/23/18  3:58 AM   Result Value Ref Range     (H) 85 - 241 U/L   GLUCOSE, POC    Collection Time: 04/23/18  7:44 AM   Result Value Ref Range    Glucose (POC) 183 (H) 65 - 100 mg/dL    Performed by [facility]    LABRCNT(wbc:2,hgb:2,hct:2,plt:2,)  Recent Labs      04/23/18   0358  04/21/18   0900   NA  139  138   K  3.7  3.8   CL  101  100   CO2  27  27   BUN  83*  108*   CREA  11.10*  12.80*   GLU  137*  191*   CA  8.8  8.6   MG  2.6*   --      Recent Labs      04/21/18   0900  04/20/18   1305   INR  1.0  1.0   PTP  10.3  10.3   APTT  21.9*   --         Ref. Range 4/18/2018 03:53 4/19/2018 02:17 4/20/2018 03:49 4/22/2018 01:04 4/23/2018 03:58   HGB Latest Ref Range: 12.1 - 17.0 g/dL 8.6 (L) 7.8 (L) 6.9 (L) 6.3 (L) 5.6 (LL)   HCT Latest Ref Range: 36.6 - 50.3 % 25.9 (L) 23.1 (L) 21.2 (L) 19.2 (L) 17.6 (LL)         Impression:    Refractory N/V    Ulcerative esopahgitis    Morbid obesity     Type II diabetes mellitus, uncontrolled     LITTLE (acute kidney injury)     Hyperosmolar coma due to secondary diabetes     Acute encephalopathy     Non-traumatic rhabdomyolysis        Plan:  Patient requesting more food. No further N/V so will advance diet.   Suspect anemia not GI blood loss given no melena or BRBPR reported.  -advance to renal diet  -continue PPI and carafate  -antiemetics         Saira Bronson MD    4/23/2018  [address], [address] [place] [address]  [phone] [address] [place]: [phone]

## 2018-04-23 NOTE — PROGRESS NOTES
INTERNAL MEDICINE ATTENDING NOTE    S: Mr. Jaylin Mcgarry was seen by me today during rounds. At this time, he is awake, alert, and calm. Nausea is better. Still having headaches. ROS otherwise unremarkable except as noted elsewhere. O: Blood pressure 102/52, pulse 93, temperature 97.9 °F (36.6 °C), resp. rate 18, height 5' 9\" (1.753 m), weight 314 lb 14.4 oz (142.8 kg), SpO2 93 %. Gen: Patient is in no acute distress. Lungs: CTAB. Heart: RRR. Abd: S, NT, ND, BS present. Extremities: Warm.     Recent Results (from the past 12 hour(s))   GLUCOSE, POC    Collection Time: 04/22/18  9:12 PM   Result Value Ref Range    Glucose (POC) 143 (H) 65 - 100 mg/dL    Performed by Imer Phillips*    METABOLIC PANEL, BASIC    Collection Time: 04/23/18  3:58 AM   Result Value Ref Range    Sodium 139 136 - 145 mmol/L    Potassium 3.7 3.5 - 5.1 mmol/L    Chloride 101 97 - 108 mmol/L    CO2 27 21 - 32 mmol/L    Anion gap 11 5 - 15 mmol/L    Glucose 137 (H) 65 - 100 mg/dL    BUN 83 (H) 6 - 20 MG/DL    Creatinine 11.10 (H) 0.70 - 1.30 MG/DL    BUN/Creatinine ratio 7 (L) 12 - 20      GFR est AA 6 (L) >60 ml/min/1.73m2    GFR est non-AA 5 (L) >60 ml/min/1.73m2    Calcium 8.8 8.5 - 10.1 MG/DL   MAGNESIUM    Collection Time: 04/23/18  3:58 AM   Result Value Ref Range    Magnesium 2.6 (H) 1.6 - 2.4 mg/dL   CBC W/O DIFF    Collection Time: 04/23/18  3:58 AM   Result Value Ref Range    WBC 21.6 (H) 4.1 - 11.1 K/uL    RBC 1.76 (L) 4.10 - 5.70 M/uL    HGB 5.6 (LL) 12.1 - 17.0 g/dL    HCT 17.6 (LL) 36.6 - 50.3 %    .0 (H) 80.0 - 99.0 FL    MCH 31.8 26.0 - 34.0 PG    MCHC 31.8 30.0 - 36.5 g/dL    RDW 17.2 (H) 11.5 - 14.5 %    PLATELET 344 688 - 547 K/uL    MPV 11.2 8.9 - 12.9 FL    NRBC 32.4 (H) 0  WBC    ABSOLUTE NRBC 7.00 (H) 0.00 - 0.01 K/uL   LD    Collection Time: 04/23/18  3:58 AM   Result Value Ref Range     (H) 85 - 241 U/L   GLUCOSE, POC    Collection Time: 04/23/18  7:44 AM   Result Value Ref Range    Glucose (POC) 183 (H) 65 - 100 mg/dL    Performed by Pardeep Roldan         CXR 4/15: No PTX. CXR 4/11: Minimal R ML infiltrate. CXR 4/12: No acute abnormality. US Retroperitoneal 4/14: Normal size and appearance of the kidneys without hydronephrosis. Nondistended urinary bladder with a Grigsby catheter. US abdomen 4/17: Echogenic liver suggesting fatty infiltration with some areas of relative sparing. Contracted gallbladder. No obvious gallstones. Pancreas not visualized due to overlying bowel gas. No hydronephrosis. EGD 4/20: Ulcerative esophagitis, Hiatal hernia. A/P:   1. Hyperosmolar coma due to secondary diabetes ([facility] [address] (4/12/2018): Treated with IVF and insulin. Glc is improved. 2. Epigastric pain and N/V: Ulcerative esophagitis on EGD; appreicate GI input. Continue Prn maalox. Prn zofran. 3. Anemia: Received 1 U PRBC last week; to get 2 U today during dialysis. Defer to Nephrology. 4. Rhabdomyolysis: CK trending down   5. LITTLE: HD per Nephrology. 6. Hypernatremia: hydration; watch labs. 7. Sepsis due to aspiration pneumonia, POA: Empiric antibiotics. 8. Hypoxia: O2 as needed. 9. Acute encephalopathy (4/12/2018): Improving. He is now awake, no longer paranoid, seems at baseline. 10. Hyperlipidemia (5/1/2012): Hold statin  11. Morbid obesity ([facility] [address] (5/6/2013)  12. Hypertension (10/31/2014): BP okay.        Radha Adair MD, FACP  Best contact is via Pager: [phone], or via hospital  at [phone]

## 2018-04-24 ENCOUNTER — APPOINTMENT (OUTPATIENT)
Dept: INTERVENTIONAL RADIOLOGY/VASCULAR | Age: 43
DRG: 871 | End: 2018-04-24
Attending: INTERNAL MEDICINE
Payer: COMMERCIAL

## 2018-04-24 LAB
ABO + RH BLD: NORMAL
ANION GAP SERPL CALC-SCNC: 9 MMOL/L (ref 5–15)
APPEARANCE UR: ABNORMAL
BACTERIA URNS QL MICRO: ABNORMAL /HPF
BILIRUB UR QL CFM: NEGATIVE
BLD PROD TYP BPU: NORMAL
BLOOD GROUP ANTIBODIES SERPL: NORMAL
BLOOD GROUP ANTIBODIES SERPL: NORMAL
BPU ID: NORMAL
BUN SERPL-MCNC: 63 MG/DL (ref 6–20)
BUN/CREAT SERPL: 7 (ref 12–20)
CALCIUM SERPL-MCNC: 8.7 MG/DL (ref 8.5–10.1)
CHLORIDE SERPL-SCNC: 101 MMOL/L (ref 97–108)
CO2 SERPL-SCNC: 28 MMOL/L (ref 21–32)
COLOR UR: ABNORMAL
CREAT SERPL-MCNC: 8.73 MG/DL (ref 0.7–1.3)
CROSSMATCH RESULT,%XM: NORMAL
DAT POLY-SP REAG RBC QL: NORMAL
EPITH CASTS URNS QL MICRO: ABNORMAL /LPF
ERYTHROCYTE [DISTWIDTH] IN BLOOD BY AUTOMATED COUNT: 18.5 % (ref 11.5–14.5)
FOLATE SERPL-MCNC: 11.4 NG/ML (ref 5–21)
GLUCOSE BLD STRIP.AUTO-MCNC: 103 MG/DL (ref 65–100)
GLUCOSE BLD STRIP.AUTO-MCNC: 160 MG/DL (ref 65–100)
GLUCOSE BLD STRIP.AUTO-MCNC: 190 MG/DL (ref 65–100)
GLUCOSE BLD STRIP.AUTO-MCNC: 98 MG/DL (ref 65–100)
GLUCOSE SERPL-MCNC: 144 MG/DL (ref 65–100)
GLUCOSE UR STRIP.AUTO-MCNC: NEGATIVE MG/DL
HAPTOGLOB SERPL-MCNC: <8 MG/DL (ref 30–200)
HCT VFR BLD AUTO: 20.8 % (ref 36.6–50.3)
HGB BLD-MCNC: 6.8 G/DL (ref 12.1–17)
HGB UR QL STRIP: ABNORMAL
KETONES UR QL STRIP.AUTO: ABNORMAL MG/DL
LEUKOCYTE ESTERASE UR QL STRIP.AUTO: ABNORMAL
MCH RBC QN AUTO: 31.5 PG (ref 26–34)
MCHC RBC AUTO-ENTMCNC: 32.7 G/DL (ref 30–36.5)
MCV RBC AUTO: 96.3 FL (ref 80–99)
NITRITE UR QL STRIP.AUTO: NEGATIVE
NRBC # BLD: 2.58 K/UL (ref 0–0.01)
NRBC BLD-RTO: 18 PER 100 WBC
PH UR STRIP: 5.5 [PH] (ref 5–8)
PHOSPHATE SERPL-MCNC: 6.2 MG/DL (ref 2.6–4.7)
PLATELET # BLD AUTO: 320 K/UL (ref 150–400)
PMV BLD AUTO: 10.8 FL (ref 8.9–12.9)
POTASSIUM SERPL-SCNC: 3.9 MMOL/L (ref 3.5–5.1)
PROT UR STRIP-MCNC: 30 MG/DL
RBC # BLD AUTO: 2.16 M/UL (ref 4.1–5.7)
RBC #/AREA URNS HPF: ABNORMAL /HPF (ref 0–5)
SERVICE CMNT-IMP: ABNORMAL
SERVICE CMNT-IMP: NORMAL
SODIUM SERPL-SCNC: 138 MMOL/L (ref 136–145)
SP GR UR REFRACTOMETRY: 1.01 (ref 1–1.03)
SPECIMEN EXP DATE BLD: NORMAL
STATUS OF UNIT,%ST: NORMAL
UA: UC IF INDICATED,UAUC: ABNORMAL
UNIT DIVISION, %UDIV: 0
UROBILINOGEN UR QL STRIP.AUTO: 0.2 EU/DL (ref 0.2–1)
VIT B12 SERPL-MCNC: 1358 PG/ML (ref 193–986)
WBC # BLD AUTO: 14.4 K/UL (ref 4.1–11.1)
WBC CASTS URNS QL MICRO: ABNORMAL /LPF
WBC URNS QL MICRO: ABNORMAL /HPF (ref 0–4)

## 2018-04-24 PROCEDURE — 87186 SC STD MICRODIL/AGAR DIL: CPT | Performed by: INTERNAL MEDICINE

## 2018-04-24 PROCEDURE — 84100 ASSAY OF PHOSPHORUS: CPT | Performed by: INTERNAL MEDICINE

## 2018-04-24 PROCEDURE — 85041 AUTOMATED RBC COUNT: CPT | Performed by: INTERNAL MEDICINE

## 2018-04-24 PROCEDURE — 82525 ASSAY OF COPPER: CPT | Performed by: INTERNAL MEDICINE

## 2018-04-24 PROCEDURE — 36415 COLL VENOUS BLD VENIPUNCTURE: CPT | Performed by: INTERNAL MEDICINE

## 2018-04-24 PROCEDURE — 84630 ASSAY OF ZINC: CPT | Performed by: INTERNAL MEDICINE

## 2018-04-24 PROCEDURE — 82784 ASSAY IGA/IGD/IGG/IGM EACH: CPT | Performed by: INTERNAL MEDICINE

## 2018-04-24 PROCEDURE — 87086 URINE CULTURE/COLONY COUNT: CPT | Performed by: INTERNAL MEDICINE

## 2018-04-24 PROCEDURE — 83010 ASSAY OF HAPTOGLOBIN QUANT: CPT | Performed by: INTERNAL MEDICINE

## 2018-04-24 PROCEDURE — 74011250637 HC RX REV CODE- 250/637: Performed by: SPECIALIST

## 2018-04-24 PROCEDURE — 82962 GLUCOSE BLOOD TEST: CPT

## 2018-04-24 PROCEDURE — 82607 VITAMIN B-12: CPT | Performed by: INTERNAL MEDICINE

## 2018-04-24 PROCEDURE — 74011636637 HC RX REV CODE- 636/637: Performed by: INTERNAL MEDICINE

## 2018-04-24 PROCEDURE — 86880 COOMBS TEST DIRECT: CPT | Performed by: INTERNAL MEDICINE

## 2018-04-24 PROCEDURE — 80048 BASIC METABOLIC PNL TOTAL CA: CPT | Performed by: INTERNAL MEDICINE

## 2018-04-24 PROCEDURE — 84155 ASSAY OF PROTEIN SERUM: CPT | Performed by: INTERNAL MEDICINE

## 2018-04-24 PROCEDURE — 83021 HEMOGLOBIN CHROMOTOGRAPHY: CPT | Performed by: INTERNAL MEDICINE

## 2018-04-24 PROCEDURE — 84220 ASSAY OF PYRUVATE KINASE: CPT | Performed by: INTERNAL MEDICINE

## 2018-04-24 PROCEDURE — 74011250637 HC RX REV CODE- 250/637: Performed by: INTERNAL MEDICINE

## 2018-04-24 PROCEDURE — 65660000000 HC RM CCU STEPDOWN

## 2018-04-24 PROCEDURE — 85027 COMPLETE CBC AUTOMATED: CPT | Performed by: INTERNAL MEDICINE

## 2018-04-24 PROCEDURE — 83883 ASSAY NEPHELOMETRY NOT SPEC: CPT | Performed by: INTERNAL MEDICINE

## 2018-04-24 PROCEDURE — 82746 ASSAY OF FOLIC ACID SERUM: CPT | Performed by: INTERNAL MEDICINE

## 2018-04-24 PROCEDURE — 81001 URINALYSIS AUTO W/SCOPE: CPT | Performed by: INTERNAL MEDICINE

## 2018-04-24 PROCEDURE — 87077 CULTURE AEROBIC IDENTIFY: CPT | Performed by: INTERNAL MEDICINE

## 2018-04-24 RX ORDER — SEVELAMER CARBONATE 800 MG/1
1600 TABLET, FILM COATED ORAL
Status: DISCONTINUED | OUTPATIENT
Start: 2018-04-24 | End: 2018-04-30 | Stop reason: HOSPADM

## 2018-04-24 RX ADMIN — INSULIN LISPRO 15 UNITS: 100 INJECTION, SOLUTION INTRAVENOUS; SUBCUTANEOUS at 09:20

## 2018-04-24 RX ADMIN — PANTOPRAZOLE SODIUM 40 MG: 40 TABLET, DELAYED RELEASE ORAL at 07:44

## 2018-04-24 RX ADMIN — TRAMADOL HYDROCHLORIDE 50 MG: 50 TABLET, FILM COATED ORAL at 05:33

## 2018-04-24 RX ADMIN — INSULIN LISPRO 15 UNITS: 100 INJECTION, SOLUTION INTRAVENOUS; SUBCUTANEOUS at 18:00

## 2018-04-24 RX ADMIN — Medication 10 ML: at 21:55

## 2018-04-24 RX ADMIN — INSULIN GLARGINE 40 UNITS: 100 INJECTION, SOLUTION SUBCUTANEOUS at 09:20

## 2018-04-24 RX ADMIN — NYSTATIN: 100000 POWDER TOPICAL at 09:24

## 2018-04-24 RX ADMIN — INSULIN LISPRO 3 UNITS: 100 INJECTION, SOLUTION INTRAVENOUS; SUBCUTANEOUS at 09:21

## 2018-04-24 RX ADMIN — Medication 10 ML: at 06:33

## 2018-04-24 RX ADMIN — INSULIN LISPRO 15 UNITS: 100 INJECTION, SOLUTION INTRAVENOUS; SUBCUTANEOUS at 12:59

## 2018-04-24 RX ADMIN — Medication 10 ML: at 18:02

## 2018-04-24 RX ADMIN — ALUMINUM HYDROXIDE AND MAGNESIUM HYDROXIDE 30 ML: 200; 200 SUSPENSION ORAL at 05:34

## 2018-04-24 RX ADMIN — CALCIUM CARBONATE 400 MG: 500 TABLET, CHEWABLE ORAL at 07:44

## 2018-04-24 RX ADMIN — INSULIN LISPRO 3 UNITS: 100 INJECTION, SOLUTION INTRAVENOUS; SUBCUTANEOUS at 13:00

## 2018-04-24 RX ADMIN — INSULIN GLARGINE 40 UNITS: 100 INJECTION, SOLUTION SUBCUTANEOUS at 21:51

## 2018-04-24 NOTE — PROGRESS NOTES
INTERNAL MEDICINE ATTENDING NOTE    S: Mr. Azael Magana was seen by me today during rounds. At this time, he is awake, alert, and calm. Nausea is better. +Headaches. ROS otherwise unremarkable except as noted elsewhere. O: Blood pressure 126/76, pulse (!) 101, temperature 98.2 °F (36.8 °C), resp. rate 20, height 5' 9\" (1.753 m), weight 312 lb 2.7 oz (141.6 kg), SpO2 100 %. Gen: Patient is in no acute distress. Lungs: CTAB. Heart: RRR. Abd: S, NT, ND, BS present. Extremities: Warm. Recent Results (from the past 12 hour(s))   PHOSPHORUS    Collection Time: 04/24/18  4:46 AM   Result Value Ref Range    Phosphorus 6.2 (H) 2.6 - 4.7 MG/DL   METABOLIC PANEL, BASIC    Collection Time: 04/24/18  4:46 AM   Result Value Ref Range    Sodium 138 136 - 145 mmol/L    Potassium 3.9 3.5 - 5.1 mmol/L    Chloride 101 97 - 108 mmol/L    CO2 28 21 - 32 mmol/L    Anion gap 9 5 - 15 mmol/L    Glucose 144 (H) 65 - 100 mg/dL    BUN 63 (H) 6 - 20 MG/DL    Creatinine 8.73 (H) 0.70 - 1.30 MG/DL    BUN/Creatinine ratio 7 (L) 12 - 20      GFR est AA 8 (L) >60 ml/min/1.73m2    GFR est non-AA 7 (L) >60 ml/min/1.73m2    Calcium 8.7 8.5 - 10.1 MG/DL   CBC W/O DIFF    Collection Time: 04/24/18  4:46 AM   Result Value Ref Range    WBC 14.4 (H) 4.1 - 11.1 K/uL    RBC 2.16 (L) 4.10 - 5.70 M/uL    HGB 6.8 (L) 12.1 - 17.0 g/dL    HCT 20.8 (L) 36.6 - 50.3 %    MCV 96.3 80.0 - 99.0 FL    MCH 31.5 26.0 - 34.0 PG    MCHC 32.7 30.0 - 36.5 g/dL    RDW 18.5 (H) 11.5 - 14.5 %    PLATELET 885 155 - 172 K/uL    MPV 10.8 8.9 - 12.9 FL    NRBC 18.0 (H) 0  WBC    ABSOLUTE NRBC 2.58 (H) 0.00 - 0.01 K/uL   HAPTOGLOBIN    Collection Time: 04/24/18  4:46 AM   Result Value Ref Range    Haptoglobin <8 (L) 30 - 200 mg/dL   GLUCOSE, POC    Collection Time: 04/24/18  7:33 AM   Result Value Ref Range    Glucose (POC) 190 (H) 65 - 100 mg/dL    Performed by Guille Vicente         CXR 4/15: No PTX. CXR 4/11: Minimal R ML infiltrate.      CXR 4/12: No acute abnormality. US Retroperitoneal 4/14: Normal size and appearance of the kidneys without hydronephrosis. Nondistended urinary bladder with a Grigsby catheter. US abdomen 4/17: Echogenic liver suggesting fatty infiltration with some areas of relative sparing. Contracted gallbladder. No obvious gallstones. Pancreas not visualized due to overlying bowel gas. No hydronephrosis. EGD 4/20: Ulcerative esophagitis, Hiatal hernia. A/P:   1. Hyperosmolar coma due to secondary diabetes ([facility] (4/12/2018): Treated with IVF and insulin. Glc is improved. 2. Epigastric pain and N/V: Ulcerative esophagitis on EGD; appreicate GI input. Continue Prn maalox. Prn zofran. 3. Anemia: Received 1 U PRBC last week; to get 2 U today during dialysis. Defer to Nephrology. He is concerned about the permacath--discussed this and recommended proceeding. 4. Rhabdomyolysis: CK trending down   5. LITTLE: HD per Nephrology. 6. Hypernatremia: hydration; watch labs. 7. Sepsis due to aspiration pneumonia, POA: Empiric antibiotics. 8. Hypoxia: O2 as needed. 9. Acute encephalopathy (4/12/2018): Improving. He is now awake, no longer paranoid, seems at baseline. 10. Hyperlipidemia (5/1/2012): Hold statin  11. Morbid obesity ([facility] [address] (5/6/2013)  12. Hypertension (10/31/2014): BP okay.        Evan Rodriguez MD, FACP  Best contact is via Pager: [phone], or via hospital  at [phone]

## 2018-04-24 NOTE — PROGRESS NOTES
NAME: Zhao Leone        :  1975        MRN:  [id number]        Assessment :    Plan:  --LITTLE   Rhabdo  Obesity  HTN  Sepsis  Leukocytosis --HD #1 4/15. Last HD yesterday. Watch for renal recovery (none so far). Suspect that recovery will take time. Approved for outpatient HD at [address] [place] [address] 15 today      No HD today     GI following, 2 units prbc's on ; LDH near 800, haptoglobin < 8 (looks to be hemolyzing - will ask Heme for advice; platelets ok - ttp unlikely); ulcerative esophagitis; on epo 10 k sc tiw    Change to Renvela with meals           Subjective:   oob in chair. We discussed the above. Denies n/v/d. No sob. Objective:     VITALS:   Last 24hrs VS reviewed since prior progress note. Most recent are:  Visit Vitals    /76 (BP 1 Location: Right arm, BP Patient Position: At rest;Sitting)    Pulse (!) 101    Temp 98.2 °F (36.8 °C)    Resp 20    Ht 5' 9\" (1.753 m)    Wt 141.6 kg (312 lb 2.7 oz)    SpO2 100%    BMI 46.1 kg/m2       Intake/Output Summary (Last 24 hours) at 18 0746  Last data filed at 18 1740   Gross per 24 hour   Intake                0 ml   Output             2700 ml   Net            -2700 ml      Telemetry Reviewed:     PHYSICAL EXAM:  General: NAD   cta eduardo  Right ij mik is intact  Soft ntnd   No edema       ________________________________________________________________________  Severo Fisher, MD     Procedures: see electronic medical records for all procedures/Xrays and details which  were not copied into this note but were reviewed prior to creation of Plan.       LABS:  Recent Labs      18   0358   WBC  14.4*  21.6*   HGB  6.8*  5.6*   HCT  20.8*  17.6*   PLT  320  341     Recent Labs      188  18   0900   NA  138  139  138   K  3.9  3.7  3.8   CL  101  101  100   CO2  28  27  27   BUN  63*  83* 108*   CREA  8.73*  11.10*  12.80*   GLU  144*  137*  191*   CA  8.7  8.8  8.6   MG   --   2.6*   --    PHOS  6.2*   --    --      No results for input(s): SGOT, GPT, AP, TBIL, TP, ALB, GLOB, GGT, AML, LPSE in the last 72 hours. No lab exists for component: AMYP, HLPSE  Recent Labs      04/21/18   0900   INR  1.0   PTP  10.3   APTT  21.9*      No results for input(s): FE, TIBC, PSAT, FERR in the last 72 hours. No results found for: FOL, RBCF   No results for input(s): PH, PCO2, PO2 in the last 72 hours.   Recent Labs      04/21/18   0900   CPK  974*     No components found for: [place]  Lab Results   Component Value Date/Time    Color YELLOW/STRAW 04/12/2018 12:44 AM    Appearance CLEAR 04/12/2018 12:44 AM    Specific gravity <1.005 04/12/2018 12:44 AM    Specific gravity 1.021 04/16/2013 05:05 PM    pH (UA) 5.0 04/12/2018 12:44 AM    Protein NEGATIVE  04/12/2018 12:44 AM    Glucose >1000 (A) 04/12/2018 12:44 AM    Ketone TRACE (A) 04/12/2018 12:44 AM    Bilirubin NEGATIVE  04/12/2018 12:44 AM    Urobilinogen 0.2 04/12/2018 12:44 AM    Nitrites NEGATIVE  04/12/2018 12:44 AM    Leukocyte Esterase NEGATIVE  04/12/2018 12:44 AM    Epithelial cells FEW 04/12/2018 12:44 AM    Bacteria NEGATIVE  04/12/2018 12:44 AM    WBC 0-4 04/12/2018 12:44 AM    RBC 0-5 04/12/2018 12:44 AM       MEDICATIONS:

## 2018-04-24 NOTE — CONSULTS
Tomi Stovall  MR#: [id number]  : 1975  ACCOUNT #: [de-identified]   DATE OF SERVICE: 2018    IMPRESSION:  Hemolytic anemia. The smear was reviewed and he has spherocytes, not schistocytes. He does not have elliptocytes nor sickle cells nor a puddled hemoglobin to suggest hemoglobin C disease. It is actually easier to say what he does not have than what he does have at this point. His haptoglobin is quite low and his LDH is high and his bilirubin blipped up by just a bit, supporting a diagnosis of hemolysis. His MCV was tremendously high on admission at 119, which raises the interesting question of why this might be. This finding is not what one would normally expect to see with diabetic ketoacidosis or hyperosmolar hyperglycemic coma. I have ordered a battery of screening studies for hemolysis and for macrocytosis and given his renal failure and the fact that the globulin is higher than the albumin, myeloma studies as well. I do very much appreciate this consult. DISCUSSION:  Elisha Martinez is a very pleasant 69-year-old gentleman who presented to the hospital with a significantly high blood sugar, greater than 1500. He was noted to be in acute renal failure. He was hypokalemic as well. He had altered mental status. He had been very much somnolent and hard to arouse. He was admitted to the ICU and started on insulin and hydrational fluids. He was seen in consultation by Dr. Juan Trinh on the , who noted his creatinine had risen to 4.6, having been 1.1 in 2017. He developed a significant free water deficit. The insulin drip was placed on D5W. His IV fluids were changed. He was started on hemodialysis. Unfortunately, his kidney function does not seem to have recovered quite yet.   His hemoglobin had been gradually drifting downward, having been 17.8 with a hematocrit of 64.1, MCV of 119, white count of 14.3 and a platelet count of 231,000 on admission. He was hydrated. His platelets rapidly dropped to 115 by 12/14, 89 on the 15th, back up to 139 by the 18th. His hemoglobin dropped from 17.8 to 15.5 to 13.4 to 11.6 to 10.6 by 04/16, then dropped to 7.8 by the 19th. He dropped down to 5.6 by 04/23. At that point, his white count was high at 21.6, hemoglobin 5.6 as noted, hematocrit 17.6 with an MCV of 100, platelet count of 983. Today's hemoglobin is 6.8, hematocrit was 20.9, white count 14.4, platelets 153,559. It should be noted his BUN and creatinine were notably high on admission and unfortunately, his kidney function has not significantly recovered. He had creatinine of 9.38 on the 15th, 11.4 on the 18th. He was noted to be in rhabdomyolysis with a CK of 60,810 on 04/14, 85,806 on 04/14, down to 5267 by 04/18. His CK was down to 974 by the 21st.  LDH was noted to be high on the 23rd at 786. Haptoglobin today less than 8, prompting the current consult. PAST MEDICAL HISTORY:  Significant for having his wisdom teeth removed. He has otherwise had no surgeries. He has had type 2 diabetes, morbid obesity, insomnia and hypertension. FAMILY HISTORY:  Reports his mother has asthma and diabetes. Father has diabetes. He is unaware of any personal history of sickle cell disease. His wife had sickle trait. Their son has sickle trait. SOCIAL HISTORY:  He is a never smoker, drinks alcohol socially. 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 ALLERGIES:  HE HAS NO ALLERGIES. MEDICATIONS:  Prior to admission include:  Hyzaar 100/25 one tab daily, Amaryl 4 mg q.a.m., Pravachol 20 mg daily, Lantus 25 units subcu nightly, Metformin 750 mg 2 tabs daily, fish oil daily. REVIEW OF SYSTEMS:  Currently unremarkable except for those things already mentioned specifically in the HPI. PHYSICAL EXAMINATION:  HEENT:  Currently unremarkable. His pupils are round and reactive.   Oropharynx without lesion. NECK:  Supple, without adenopathy. LUNGS:  Clear to auscultation. CARDIOVASCULAR:  Reveals regular rate and rhythm. ABDOMEN:  Soft, normoactive bowel sounds. He is mildly tender in the right upper quadrant. His size precludes accurate assessment of his liver or spleen size. EXTREMITIES:  Currently unremarkable. LABORATORY DATA:  Today specifically showed a white count of 14.4, hemoglobin 6.8, hematocrit 20.8, platelet count of 916,377. Sodium 138, potassium 3.9, glucose 144, BUN 63, creatinine 8.73. His type and crossmatch revealed that he is AB positive blood type, antibody screen negative.       Marc Dallas MD dictating on behalf of Marc Dallas MD       [facility] / [facility]IL[facility]  D: 04/24/2018 14:11     T: 04/24/2018 14:43  JOB #: [id number]  CC: Lucian Grimes MD  CC: Elisabeth Perez MD

## 2018-04-24 NOTE — CONSULTS
Impression: hemolytic anemia - his smear was reviewed and he has spherocytes, not schistocytes. He does not have elliptocytes nor sickle cells nor puddled hemoglobin. It is actually easier to say what he does not have at this point. His haptoglobin is quite low and his LDH is high and his bilirubin \"blipped up\" just a bit supporting a diagnosis of hemolysis. His MCV was tremendously high on admission at 119 which raises the interesting question of \"why? \" This finding is not what one would expect to see with DKA. I have ordered a batch of screening studies for hemolysis and macrocytosis and given his renal failure and globulin > albumin, myeloma as well. Thanks for the consutl!  Babar Lester MD FACP

## 2018-04-24 NOTE — PROGRESS NOTES
Gastroenterology Daily Progress Note (Dr. Rosemary Brownlee)   [facility]    Admit Date: 4/11/2018       Subjective:       No GI bleeding reported. No N/V.     Current Facility-Administered Medications   Medication Dose Route Frequency    sevelamer carbonate (RENVELA) tab 1,600 mg  1,600 mg Oral TID WITH MEALS    0.9% sodium chloride infusion 250 mL  250 mL IntraVENous PRN    heparin (porcine) 1,000 unit/mL injection 1,100 Units  1,100 Units Hemodialysis DIALYSIS PRN    heparin (porcine) 1,000 unit/mL injection 1,400 Units  1,400 Units Hemodialysis DIALYSIS PRN    0.9% sodium chloride infusion 250 mL  250 mL IntraVENous PRN    pantoprazole (PROTONIX) tablet 40 mg  40 mg Oral ACB&D    sucralfate (CARAFATE) 100 mg/mL oral suspension 1 g  1 g Oral AC&HS    traMADol (ULTRAM) tablet 50 mg  50 mg Oral Q6H PRN    insulin glargine (LANTUS) injection 40 Units  40 Units SubCUTAneous Q12H    epoetin keri (EPOGEN;PROCRIT) injection 10,000 Units  10,000 Units SubCUTAneous DIALYSIS MON, WED & FRI    insulin lispro (HUMALOG) injection 15 Units  15 Units SubCUTAneous TIDAC    insulin lispro (HUMALOG) injection   SubCUTAneous AC&HS    glucose chewable tablet 16 g  4 Tab Oral PRN    dextrose (D50W) injection syrg 12.5-25 g  12.5-25 g IntraVENous PRN    glucagon (GLUCAGEN) injection 1 mg  1 mg IntraMUSCular PRN    aluminum-magnesium hydroxide (MAALOX) oral suspension 30 mL  30 mL Oral Q6H PRN    sodium chloride (NS) flush 10-40 mL  10-40 mL IntraVENous PRN    sodium chloride (NS) flush 10 mL  10 mL IntraVENous Q8H    nitroglycerin (NITROBID) 2 % ointment 1 Inch  1 Inch Topical Q6H PRN    sodium chloride (NS) flush 5-10 mL  5-10 mL IntraVENous PRN    acetaminophen (TYLENOL) tablet 650 mg  650 mg Oral Q6H PRN    ondansetron (ZOFRAN) injection 4 mg  4 mg IntraVENous Q4H PRN    nystatin (MYCOSTATIN) 100,000 unit/gram powder   Topical BID        Objective:     Visit Vitals    /76 (BP 1 Location: Right arm, BP Patient Position: At rest;Sitting)    Pulse (!) 101    Temp 98.2 °F (36.8 °C)    Resp 20    Ht 5' 9\" (1.753 m)    Wt 141.6 kg (312 lb 2.7 oz)    SpO2 100%    BMI 46.1 kg/m2   Blood pressure 126/76, pulse (!) 101, temperature 98.2 °F (36.8 °C), resp. rate 20, height 5' 9\" (1.753 m), weight 141.6 kg (312 lb 2.7 oz), SpO2 100 %.     04/22 1901 - 04/24 0700  In: 550 [P.O.:250]  Out: 2950 [Urine:250]    Intake/Output Summary (Last 24 hours) at 04/24/18 1206  Last data filed at 04/23/18 1740   Gross per 24 hour   Intake                0 ml   Output             2700 ml   Net            -2700 ml     Physical Exam:     General: awake, obese BM In NAD  GI: Soft, NT, ND + bowel sounds    Labs:       Recent Results (from the past 24 hour(s))   GLUCOSE, POC    Collection Time: 04/23/18  4:59 PM   Result Value Ref Range    Glucose (POC) 112 (H) 65 - 100 mg/dL    Performed by Korin Nixon    GLUCOSE, POC    Collection Time: 04/23/18  8:43 PM   Result Value Ref Range    Glucose (POC) 211 (H) 65 - 100 mg/dL    Performed by Rin Goddard    PHOSPHORUS    Collection Time: 04/24/18  4:46 AM   Result Value Ref Range    Phosphorus 6.2 (H) 2.6 - 4.7 MG/DL   METABOLIC PANEL, BASIC    Collection Time: 04/24/18  4:46 AM   Result Value Ref Range    Sodium 138 136 - 145 mmol/L    Potassium 3.9 3.5 - 5.1 mmol/L    Chloride 101 97 - 108 mmol/L    CO2 28 21 - 32 mmol/L    Anion gap 9 5 - 15 mmol/L    Glucose 144 (H) 65 - 100 mg/dL    BUN 63 (H) 6 - 20 MG/DL    Creatinine 8.73 (H) 0.70 - 1.30 MG/DL    BUN/Creatinine ratio 7 (L) 12 - 20      GFR est AA 8 (L) >60 ml/min/1.73m2    GFR est non-AA 7 (L) >60 ml/min/1.73m2    Calcium 8.7 8.5 - 10.1 MG/DL   CBC W/O DIFF    Collection Time: 04/24/18  4:46 AM   Result Value Ref Range    WBC 14.4 (H) 4.1 - 11.1 K/uL    RBC 2.16 (L) 4.10 - 5.70 M/uL    HGB 6.8 (L) 12.1 - 17.0 g/dL    HCT 20.8 (L) 36.6 - 50.3 %    MCV 96.3 80.0 - 99.0 FL    MCH 31.5 26.0 - 34.0 PG    MCHC 32.7 30.0 - 36.5 g/dL    RDW 18.5 (H) 11.5 - 14.5 %    PLATELET 724 447 - 426 K/uL    MPV 10.8 8.9 - 12.9 FL    NRBC 18.0 (H) 0  WBC    ABSOLUTE NRBC 2.58 (H) 0.00 - 0.01 K/uL   HAPTOGLOBIN    Collection Time: 04/24/18  4:46 AM   Result Value Ref Range    Haptoglobin <8 (L) 30 - 200 mg/dL   GLUCOSE, POC    Collection Time: 04/24/18  7:33 AM   Result Value Ref Range    Glucose (POC) 190 (H) 65 - 100 mg/dL    Performed by Lennie Alonso    LABRCNT(wbc:2,hgb:2,hct:2,plt:2,)  Recent Labs      04/24/18   0446  04/23/18   0358   NA  138  139   K  3.9  3.7   CL  101  101   CO2  28  27   BUN  63*  83*   CREA  8.73*  11.10*   GLU  144*  137*   CA  8.7  8.8   MG   --   2.6*   PHOS  6.2*   --      No results for input(s): INR, PTP, APTT in the last 72 hours. No lab exists for component: INREXT, INREXT     Ref. Range 4/18/2018 03:53 4/19/2018 02:17 4/20/2018 03:49 4/22/2018 01:04 4/23/18 -> 4/24/18   HGB Latest Ref Range: 12.1 - 17.0 g/dL 8.6 (L) 7.8 (L) 6.9 (L) 6.3 (L) 5.6      -->     6.8   HCT Latest Ref Range: 36.6 - 50.3 % 25.9 (L) 23.1 (L) 21.2 (L) 19.2 (L) 17.6 (LL)         Impression:    Ulcerative esopahgitis    Hemolytic anemia    Morbid obesity     Type II diabetes mellitus, uncontrolled     LITTLE (acute kidney injury)     Hyperosmolar coma due to secondary diabetes     Acute encephalopathy     Non-traumatic rhabdomyolysis        Plan:  No further N/V on regular diet. Agree with heme consult for apparent anemia secondary to hemolysis.   -continue renal diet  -continue PPI and carafate  -will sign off reconsult with questions       Mariia Hercules MD    4/24/2018  [address], [address] [place] [address]  [id number]  [address] [place] [address] South: [phone]

## 2018-04-24 NOTE — INTERDISCIPLINARY ROUNDS
Bedside shift change report given to Carlos Cosby (oncoming nurse) by Namita Villeda (offgoing nurse). Report included the following information SBAR, Kardex, Intake/Output, MAR and Recent Results.

## 2018-04-25 LAB
ANION GAP SERPL CALC-SCNC: 10 MMOL/L (ref 5–15)
BUN SERPL-MCNC: 78 MG/DL (ref 6–20)
BUN/CREAT SERPL: 8 (ref 12–20)
CALCIUM SERPL-MCNC: 8.9 MG/DL (ref 8.5–10.1)
CHLORIDE SERPL-SCNC: 100 MMOL/L (ref 97–108)
CO2 SERPL-SCNC: 26 MMOL/L (ref 21–32)
CREAT SERPL-MCNC: 10.3 MG/DL (ref 0.7–1.3)
ERYTHROCYTE [DISTWIDTH] IN BLOOD BY AUTOMATED COUNT: 18.3 % (ref 11.5–14.5)
GLUCOSE BLD STRIP.AUTO-MCNC: 128 MG/DL (ref 65–100)
GLUCOSE BLD STRIP.AUTO-MCNC: 139 MG/DL (ref 65–100)
GLUCOSE BLD STRIP.AUTO-MCNC: 154 MG/DL (ref 65–100)
GLUCOSE BLD STRIP.AUTO-MCNC: 155 MG/DL (ref 65–100)
GLUCOSE BLD STRIP.AUTO-MCNC: 194 MG/DL (ref 65–100)
GLUCOSE SERPL-MCNC: 171 MG/DL (ref 65–100)
HCT VFR BLD AUTO: 20.6 % (ref 36.6–50.3)
HGB BLD-MCNC: 6.6 G/DL (ref 12.1–17)
MCH RBC QN AUTO: 31.1 PG (ref 26–34)
MCHC RBC AUTO-ENTMCNC: 32 G/DL (ref 30–36.5)
MCV RBC AUTO: 97.2 FL (ref 80–99)
NRBC # BLD: 0.21 K/UL (ref 0–0.01)
NRBC BLD-RTO: 1.7 PER 100 WBC
PHOSPHATE SERPL-MCNC: 8.6 MG/DL (ref 2.6–4.7)
PLATELET # BLD AUTO: 316 K/UL (ref 150–400)
PMV BLD AUTO: 10.7 FL (ref 8.9–12.9)
POTASSIUM SERPL-SCNC: 3.8 MMOL/L (ref 3.5–5.1)
RBC # BLD AUTO: 2.12 M/UL (ref 4.1–5.7)
SERVICE CMNT-IMP: ABNORMAL
SODIUM SERPL-SCNC: 136 MMOL/L (ref 136–145)
WBC # BLD AUTO: 12.5 K/UL (ref 4.1–11.1)

## 2018-04-25 PROCEDURE — 82962 GLUCOSE BLOOD TEST: CPT

## 2018-04-25 PROCEDURE — 74011250637 HC RX REV CODE- 250/637: Performed by: SPECIALIST

## 2018-04-25 PROCEDURE — 65660000000 HC RM CCU STEPDOWN

## 2018-04-25 PROCEDURE — 80048 BASIC METABOLIC PNL TOTAL CA: CPT | Performed by: INTERNAL MEDICINE

## 2018-04-25 PROCEDURE — 74011636637 HC RX REV CODE- 636/637: Performed by: INTERNAL MEDICINE

## 2018-04-25 PROCEDURE — 84100 ASSAY OF PHOSPHORUS: CPT | Performed by: INTERNAL MEDICINE

## 2018-04-25 PROCEDURE — 36415 COLL VENOUS BLD VENIPUNCTURE: CPT | Performed by: INTERNAL MEDICINE

## 2018-04-25 PROCEDURE — 85027 COMPLETE CBC AUTOMATED: CPT | Performed by: INTERNAL MEDICINE

## 2018-04-25 RX ADMIN — INSULIN LISPRO 3 UNITS: 100 INJECTION, SOLUTION INTRAVENOUS; SUBCUTANEOUS at 11:59

## 2018-04-25 RX ADMIN — INSULIN GLARGINE 40 UNITS: 100 INJECTION, SOLUTION SUBCUTANEOUS at 21:58

## 2018-04-25 RX ADMIN — INSULIN LISPRO 4 UNITS: 100 INJECTION, SOLUTION INTRAVENOUS; SUBCUTANEOUS at 21:58

## 2018-04-25 RX ADMIN — INSULIN LISPRO 15 UNITS: 100 INJECTION, SOLUTION INTRAVENOUS; SUBCUTANEOUS at 11:58

## 2018-04-25 RX ADMIN — INSULIN LISPRO 15 UNITS: 100 INJECTION, SOLUTION INTRAVENOUS; SUBCUTANEOUS at 17:55

## 2018-04-25 RX ADMIN — Medication 10 ML: at 07:05

## 2018-04-25 RX ADMIN — Medication 10 ML: at 22:00

## 2018-04-25 NOTE — PROGRESS NOTES
Hematology Oncology Progress Note       Follow up for: hemolytic anemia    Chart notes reviewed since last visit. Case discussed with following: patient. Patient complains of the following: no specific complaints today. Additional concerns noted by the staff:     Patient Vitals for the past 24 hrs:   BP Temp Pulse Resp SpO2 Weight   04/25/18 1143 128/75 98.2 °F (36.8 °C) 95 18 97 % -   04/25/18 0731 (!) 118/98 98.1 °F (36.7 °C) 96 18 99 % -   04/25/18 0319 139/62 98.1 °F (36.7 °C) 86 18 96 % -   04/24/18 2323 114/52 98.6 °F (37 °C) 95 16 98 % -   04/24/18 2320 - - - - - 142.2 kg (313 lb 7.9 oz)   04/24/18 1959 107/73 98.4 °F (36.9 °C) 87 20 96 % -   04/24/18 1508 101/59 98 °F (36.7 °C) 88 20 97 % -       ROS negative for 11 organ systems except as mentioned above. Physical Examination:  Constitutional Alert, cooperative, oriented. Mood and affect appropriate. Appears close to chronological age. Obese. Head Normocephalic; no scars   Eyes Conjunctivae and sclerae are clear and without icterus. Pupils are round   ENMT Sinuses are nontender. No oral exudates, ulcers, masses, thrush or mucositis. Oropharynx clear. Tongue normal.   Neck Supple without masses or thyromegaly. No jugular venous distension. Hematologic/Lymphatic No petechiae or purpura. No tender or palpable lymph nodes noted. Respiratory Lungs are clear to auscultation without rhonchi or wheezing. Cardiovascular Regular rate and rhythm of heart without murmurs, gallops or rubs. Chest / Line Site Chest is symmetric with no chest wall deformities. Abdomen Non-tender, non-distended, no masses, ascites or hepatosplenomegaly. Good bowel sounds. No guarding or rebound tenderness. No pulsatile masses. Musculoskeletal No tenderness or swelling, normal range of motion without obvious weakness. Extremities No visible deformities, no cyanosis, clubbing or edema. Skin No rashes, scars, or lesions suggestive of malignancy.  No petechiae, purpura, or ecchymoses. No excoriations. Neurologic No sensory or motor deficits noted. Psychiatric Alert and oriented. Coherent speech. Verbalizes understanding of our discussions today.        Labs:  Recent Results (from the past 24 hour(s))   DIRECT & INDIRECT LYSSA    Collection Time: 04/24/18  2:45 PM   Result Value Ref Range    MALACHI Poly NEG     Antibody screen NEG    VITAMIN B12    Collection Time: 04/24/18  2:45 PM   Result Value Ref Range    Vitamin B12 1358 (H) 193 - 986 pg/mL   FOLATE    Collection Time: 04/24/18  2:45 PM   Result Value Ref Range    Folate 11.4 5.0 - 21.0 ng/mL   GLUCOSE, POC    Collection Time: 04/24/18  5:05 PM   Result Value Ref Range    Glucose (POC) 98 65 - 100 mg/dL    Performed by Ale Velasco    URINALYSIS W/ REFLEX CULTURE    Collection Time: 04/24/18  6:13 PM   Result Value Ref Range    Color DARK YELLOW      Appearance CLOUDY (A) CLEAR      Specific gravity 1.015 1.003 - 1.030      pH (UA) 5.5 5.0 - 8.0      Protein 30 (A) NEG mg/dL    Glucose NEGATIVE  NEG mg/dL    Ketone TRACE (A) NEG mg/dL    Blood LARGE (A) NEG      Urobilinogen 0.2 0.2 - 1.0 EU/dL    Nitrites NEGATIVE  NEG      Leukocyte Esterase LARGE (A) NEG      WBC  0 - 4 /hpf    RBC 10-20 0 - 5 /hpf    Epithelial cells FEW FEW /lpf    Bacteria 1+ (A) NEG /hpf    UA:UC IF INDICATED URINE CULTURE ORDERED (A) CNI      WBC cast 2-5 (A) NEG /lpf   BILIRUBIN, CONFIRM    Collection Time: 04/24/18  6:13 PM   Result Value Ref Range    Bilirubin UA, confirm NEGATIVE  NEG     GLUCOSE, POC    Collection Time: 04/24/18  8:57 PM   Result Value Ref Range    Glucose (POC) 103 (H) 65 - 100 mg/dL    Performed by Ale Velasco    METABOLIC PANEL, BASIC    Collection Time: 04/25/18  3:17 AM   Result Value Ref Range    Sodium 136 136 - 145 mmol/L    Potassium 3.8 3.5 - 5.1 mmol/L    Chloride 100 97 - 108 mmol/L    CO2 26 21 - 32 mmol/L    Anion gap 10 5 - 15 mmol/L    Glucose 171 (H) 65 - 100 mg/dL BUN 78 (H) 6 - 20 MG/DL    Creatinine 10.30 (H) 0.70 - 1.30 MG/DL    BUN/Creatinine ratio 8 (L) 12 - 20      GFR est AA 7 (L) >60 ml/min/1.73m2    GFR est non-AA 6 (L) >60 ml/min/1.73m2    Calcium 8.9 8.5 - 10.1 MG/DL   CBC W/O DIFF    Collection Time: 04/25/18  3:17 AM   Result Value Ref Range    WBC 12.5 (H) 4.1 - 11.1 K/uL    RBC 2.12 (L) 4.10 - 5.70 M/uL    HGB 6.6 (L) 12.1 - 17.0 g/dL    HCT 20.6 (L) 36.6 - 50.3 %    MCV 97.2 80.0 - 99.0 FL    MCH 31.1 26.0 - 34.0 PG    MCHC 32.0 30.0 - 36.5 g/dL    RDW 18.3 (H) 11.5 - 14.5 %    PLATELET 074 651 - 258 K/uL    MPV 10.7 8.9 - 12.9 FL    NRBC 1.7 (H) 0  WBC    ABSOLUTE NRBC 0.21 (H) 0.00 - 0.01 K/uL   PHOSPHORUS    Collection Time: 04/25/18  3:17 AM   Result Value Ref Range    Phosphorus 8.6 (H) 2.6 - 4.7 MG/DL   GLUCOSE, POC    Collection Time: 04/25/18  7:34 AM   Result Value Ref Range    Glucose (POC) 155 (H) 65 - 100 mg/dL    Performed by Cassandra Steward, POC    Collection Time: 04/25/18 11:45 AM   Result Value Ref Range    Glucose (POC) 194 (H) 65 - 100 mg/dL    Performed by Aristeo Rdz        Assessment and Plan:   Hemolytic anemia - COomb's testing negative so not an obvious autoimmune hemolytic anemia. B12 okay so not intramedullary hemolysis. Awaiting remainder of tests to assess for hemoglobinopathy and enzyme deficiency state that predisposes to premature destruction. Would transfuse 2 units with next dialysis. Hyperosmolar coma due to DM - much improved    Rhabdomyolysis - improving. Acute kidney failure - being followed by Dr. Laura Hauser. For permacath placmeent. Sepsis secondary to aspiration pneumonia - on antibiotics.

## 2018-04-25 NOTE — ADT AUTH CERT NOTES
Utilization Review           Diabetes - Care Day 12 (4/23/2018) by Nayla Bowman        Review Status Review Entered       Completed 4/24/2018       Details              Care Day: 12 Care Date: 4/23/2018 Level of Care: Telemetry       Guideline Day 2        Clinical Status       (X) * Alert       ( ) * No precipitating cause identified or cause manageable in outpatient setting       ( ) * Hemodynamic stability       4/24/2018 4:46 PM EDT by Yesy Sears         98.7-147/-46-96% RA              (X) * Acceptable acid-base balance       ( ) * Blood glucose under acceptable control       4/24/2018 4:46 PM EDT by Yesy Sears         glu 211              (X) * Dehydration absent       (X) * Electrolytes acceptable       (X) * Diet tolerated       4/24/2018 4:46 PM EDT by Yesy Sears         renal diet              ( ) * Discharge plans and education understood              Activity       ( ) * Ambulatory              Routes       (X) * Oral hydration, medications, and diet              Medications       (X) Subcutaneous insulin       4/24/2018 4:46 PM EDT by Yesy Sears         insulin sc x7              ( ) * Outpatient diabetic medication regimen established                                   * Milestone              Additional Notes       Hyperosmolar coma due to secondary diabetes ([facility] [address] (4/12/2018): Treated with IVF and insulin.  Glc is improved.        Epigastric pain and N/V: Ulcerative esophagitis on EGD; appreicate GI input. Continue Prn maalox. Prn zofran.       Anemia: Received 1 U PRBC last week; to get 2 U today during dialysis. Defer to Nephrology.        Rhabdomyolysis: CK trending down        LITTLE: HD per Nephrology.        Hypernatremia: hydration; watch labs.       Sepsis due to aspiration pneumonia, POA: Empiric antibiotics.         Hypoxia: O2 as needed.        Acute encephalopathy (4/12/2018): Improving.  He is now awake, no longer paranoid, seems at baseline.        Hyperlipidemia (5/1/2012): Hold statin       Morbid obesity ([facility] [address] (5/6/2013)       Hypertension (10/31/2014): BP okay.       Plan:       Patient requesting more food.  No further N/V so will advance diet.  Suspect anemia not GI blood loss given no melena or BRBPR reported.       -advance to renal diet       -continue PPI and carafate       -antiemetics        bun 83 creat 11.10 mag 2.6 wbc 21.6 rbc 1.76 hgb 5.6 hct 17.6        hemodialysis inpatient       transfuse 2U PRBCs       epogen sc x1           Diabetes - Care Day 11 (4/22/2018) by Martha Sparrow        Review Status Review Entered       Completed 4/24/2018       Details              Care Day: 11 Care Date: 4/22/2018 Level of Care: Telemetry       Guideline Day 2        Clinical Status       (X) * Alert       ( ) * No precipitating cause identified or cause manageable in outpatient setting       4/24/2018 1:03 PM EDT by Cezar Vargas         Ulcerative esophagitis on EGD anemia              ( ) * Hemodynamic stability       (X) * Acceptable acid-base balance       ( ) * Blood glucose under acceptable control       4/24/2018 1:03 PM EDT by Cezar Vargas         glu 193              ( ) * Dehydration absent       (X) * Electrolytes acceptable       (X) * Diet tolerated       4/24/2018 1:03 PM EDT by Cezar Vargas         On a soft diet              ( ) * Discharge plans and education understood              Activity       ( ) * Ambulatory              Routes       (X) * Oral hydration, medications, and diet              Medications       (X) Subcutaneous insulin       4/24/2018 1:03 PM EDT by Cezar Vargas         insulin sc x5              ( ) * Outpatient diabetic medication regimen established                                   * Milestone              Additional Notes       97.9-137/-85-90% RA       Hyperosmolar coma due to secondary diabetes ([facility] [address] (4/12/2018):  Treated with IVF and insulin.  Glc is improved.        Epigastric pain and N/V: Ulcerative esophagitis on EGD; appreicate GI input. Ongoing for several days; has flared up again yesterday.  Continue Prn maalox. Prn zofran.       Anemia: Blood added during dialysis; will need this again, it appears. Defer to Nephrology.        Rhabdomyolysis: CK trending down        LITTLE: HD per Nephrology.        Hypernatremia: hydration; watch labs.       Sepsis due to aspiration pneumonia, POA: Empiric antibiotics.         Hypoxia: O2 as needed.        Acute encephalopathy (4/12/2018): Improving. He is now awake, no longer paranoid, seems at baseline.        Hyperlipidemia (5/1/2012): Hold statin       Morbid obesity ([facility] [address] (5/6/2013)       Hypertension (10/31/2014): BP okay.         There were linear ulcerations in the lower 1/2 of the esophagus > 8 cm in length and then there was a healing ulcer at EG junction with the appearance of a healing Chelly-Darlene tear.  No active bleeding seen.  Esophagitis was Grade D. 4 cm hiatal hernia         Bile in stomach but underlying mucosa was normal without any ulcers or erosions seen.  No blood in stomach.       EGD with ulcerative esophagitis as noted.        Continue PPI BID, sucralfate liquid QID and IV antiemetics.               wbc 25.6 rbc 1.91 hgb 6.3 hct 19.2        transfuse 1U PRBCs       protonix po x1

## 2018-04-25 NOTE — PROGRESS NOTES
Bedside shift change report given to Jesi Varela (oncoming nurse) by [facility] (offgoing nurse). Report included the following information SBAR, Kardex, Intake/Output, MAR and Recent Results. Zone Phone for oncoming shift:   [phone]    Shift Summary: Per Dr. Stanislav Carter note \"Would transfuse 2 units with next dialysis. \" No actual order placed for blood transfusion. Placed order for type and screen in anticipation of order.        LDAs               Peripheral IV 04/20/18 Left Hand (Active)   Site Assessment Clean, dry, & intact 4/25/2018  3:25 PM   Phlebitis Assessment 0 4/25/2018  3:25 PM   Infiltration Assessment 0 4/25/2018  3:25 PM   Dressing Status Clean, dry, & intact 4/25/2018  3:25 PM   Dressing Type Transparent;Tape 4/25/2018  3:25 PM   Hub Color/Line Status Blue;Capped 4/25/2018  3:25 PM   Alcohol Cap Used Yes 4/25/2018  3:30 AM       Peripheral IV 04/25/18 Left;Upper Arm (Active)   Site Assessment Clean, dry, & intact 4/25/2018  3:25 PM   Phlebitis Assessment 0 4/25/2018  3:25 PM   Infiltration Assessment 0 4/25/2018  3:25 PM   Dressing Status Clean, dry, & intact 4/25/2018  3:25 PM   Dressing Type Transparent;Tape 4/25/2018  3:25 PM   Hub Color/Line Status Blue;Capped 4/25/2018  3:25 PM        Hemodialysis Access 04/15/18 (Active)   Central Line Being Utilized Yes 4/25/2018  3:25 PM   Criteria for Appropriate Use Dialysis/apheresis 4/25/2018  3:25 PM   Date Accessed  04/15/18 4/24/2018  3:00 PM   Site Assessment Clean, dry, & intact 4/25/2018  3:25 PM   Date of Last Dressing Change 04/21/18 4/25/2018  3:25 PM   Dressing Status Clean, dry, & intact 4/25/2018  3:25 PM   Dressing Type Disk with Chlorhexadine gluconate (CHG); Transparent 4/25/2018  3:25 PM   Proximal Hub Color/Line Status Red;Capped 4/25/2018  3:25 PM   Distal Hub Color/Line Status Blue;Capped 4/25/2018  3:25 PM                [REMOVED] Urinary Catheter 04/12/18 Grigsby-Indications for Use: Strict I/Os, every 1-2 hours   Intake & Output   Date 04/24/18 1900 - 04/25/18 0659 04/25/18 0700 - 04/26/18 0659   Shift 2230-0765 24 Hour Total 0741-1228 1630-5332 24 Hour Total   I  N  T  A  K  E   P.O.  480         P. O.  480       Shift Total  (mL/kg)  480  (3.4)      O  U  T  P  U  T   Urine  (mL/kg/hr)  50         Urine Voided  50         Urine Occurrence(s)  2 x       Shift Total  (mL/kg)  50  (0.4)      NET  430      Weight (kg) 142.2 142.2 142.2 142.2 142.2      Last Bowel Movement Last Bowel Movement Date: 04/23/18   Glucose Checks [] N/A  [] AC/HS  [] Q6  Concerns:   Nutrition Active Orders   Diet    DIET RENAL Regular; Consistent Carb 2000kcal       Consults []PT  []OT  []Speech  []Case Management   Cardiac Monitoring []N/A []Yes Expires:

## 2018-04-25 NOTE — PROGRESS NOTES
INTERNAL MEDICINE ATTENDING NOTE    S: Mr. Mariano Zamora was seen by me today during rounds. At this time, he is awake, alert, and calm. Nausea is better. Having headaches off and on. ROS otherwise unremarkable except as noted elsewhere. O: Blood pressure (!) 118/98, pulse 96, temperature 98.1 °F (36.7 °C), resp. rate 18, height 5' 9\" (1.753 m), weight 313 lb 7.9 oz (142.2 kg), SpO2 99 %. Gen: Patient is in no acute distress. Lungs: CTAB. Heart: RRR. Abd: S, NT, ND, BS present. Extremities: Warm. Recent Results (from the past 12 hour(s))   GLUCOSE, POC    Collection Time: 04/24/18  8:57 PM   Result Value Ref Range    Glucose (POC) 103 (H) 65 - 100 mg/dL    Performed by Jaclyn ZeePearl    METABOLIC PANEL, BASIC    Collection Time: 04/25/18  3:17 AM   Result Value Ref Range    Sodium 136 136 - 145 mmol/L    Potassium 3.8 3.5 - 5.1 mmol/L    Chloride 100 97 - 108 mmol/L    CO2 26 21 - 32 mmol/L    Anion gap 10 5 - 15 mmol/L    Glucose 171 (H) 65 - 100 mg/dL    BUN 78 (H) 6 - 20 MG/DL    Creatinine 10.30 (H) 0.70 - 1.30 MG/DL    BUN/Creatinine ratio 8 (L) 12 - 20      GFR est AA 7 (L) >60 ml/min/1.73m2    GFR est non-AA 6 (L) >60 ml/min/1.73m2    Calcium 8.9 8.5 - 10.1 MG/DL   CBC W/O DIFF    Collection Time: 04/25/18  3:17 AM   Result Value Ref Range    WBC 12.5 (H) 4.1 - 11.1 K/uL    RBC 2.12 (L) 4.10 - 5.70 M/uL    HGB 6.6 (L) 12.1 - 17.0 g/dL    HCT 20.6 (L) 36.6 - 50.3 %    MCV 97.2 80.0 - 99.0 FL    MCH 31.1 26.0 - 34.0 PG    MCHC 32.0 30.0 - 36.5 g/dL    RDW 18.3 (H) 11.5 - 14.5 %    PLATELET 211 722 - 859 K/uL    MPV 10.7 8.9 - 12.9 FL    NRBC 1.7 (H) 0  WBC    ABSOLUTE NRBC 0.21 (H) 0.00 - 0.01 K/uL   PHOSPHORUS    Collection Time: 04/25/18  3:17 AM   Result Value Ref Range    Phosphorus 8.6 (H) 2.6 - 4.7 MG/DL   GLUCOSE, POC    Collection Time: 04/25/18  7:34 AM   Result Value Ref Range    Glucose (POC) 155 (H) 65 - 100 mg/dL    Performed by Neelam Rodriguez         CXR 4/15: No PTX. CXR 4/11: Minimal R ML infiltrate. CXR 4/12: No acute abnormality. US Retroperitoneal 4/14: Normal size and appearance of the kidneys without hydronephrosis. Nondistended urinary bladder with a Grigsby catheter. US abdomen 4/17: Echogenic liver suggesting fatty infiltration with some areas of relative sparing. Contracted gallbladder. No obvious gallstones. Pancreas not visualized due to overlying bowel gas. No hydronephrosis. EGD 4/20: Ulcerative esophagitis, Hiatal hernia. A/P:   1. Hyperosmolar coma due to secondary diabetes ([facility] [address] (4/12/2018): Treated with IVF and insulin. Glc is improved. 2. Epigastric pain and N/V: Ulcerative esophagitis on EGD; appreicate GI input. Continue Prn maalox. Prn zofran. 3. Hemolytic anemia: May need further transfusion with dialysis; Defer to Nephrology. Hem-Onc following now as well. Work up in progress. 4. Rhabdomyolysis: CK trending down   5. LITTLE: HD per Nephrology. 6. Hypernatremia: hydration; watch labs. 7. Sepsis due to aspiration pneumonia, POA: Empiric antibiotics. 8. Hypoxia: O2 as needed. 9. Acute encephalopathy (4/12/2018): Improving. He is now awake, no longer paranoid, seems at baseline. 10. Hyperlipidemia (5/1/2012): Hold statin  11. Morbid obesity ([facility] [address] (5/6/2013)  12. Hypertension (10/31/2014): BP okay.        Rosario Felipe MD, FACP  Best contact is via Pager: [phone], or via hospital  at [phone]

## 2018-04-25 NOTE — PROGRESS NOTES
0317 HGB 6.6 and patient is scheduled for permacath placement sometime today, Dr Miles Zhang notified, no new orders.

## 2018-04-25 NOTE — PROGRESS NOTES
NAME: Adan Damon        :  1975        MRN:  [id number]        Assessment :    Plan:  --LITTLE   Rhabdo  Obesity  HTN  Sepsis  Leukocytosis --HD #1 4/15. no HD today; HD tomorrow and then TTS schedule. Watch for renal recovery (none so far). Suspect that recovery will take time. Approved for outpatient HD at [address] [place] [address] 15 today      GI following, 2 units prbc's on ; ulcerative esophagitis    Has a hemolytic anemia as well (spherocytes); Dr. Marcia Galvin following; on epo 10 k sc tiw    Renvela with meals       Subjective: In bed, not terrible talkative, depressed. We discussed the above. Denies n/v/d. No sob. Objective:     VITALS:   Last 24hrs VS reviewed since prior progress note. Most recent are:  Visit Vitals    /62 (BP 1 Location: Right arm, BP Patient Position: At rest)    Pulse 86    Temp 98.1 °F (36.7 °C)    Resp 18    Ht 5' 9\" (1.753 m)    Wt 142.2 kg (313 lb 7.9 oz)    SpO2 96%    BMI 46.3 kg/m2       Intake/Output Summary (Last 24 hours) at 18 5716  Last data filed at 18 1800   Gross per 24 hour   Intake              480 ml   Output               50 ml   Net              430 ml      Telemetry Reviewed:     PHYSICAL EXAM:  General: NAD   cta eduardo  Right ij mik is intact  Soft ntnd   No edema       ________________________________________________________________________  Clearence MD [place]     Procedures: see electronic medical records for all procedures/Xrays and details which  were not copied into this note but were reviewed prior to creation of Plan.       LABS:  Recent Labs      18   0446   WBC  12.5*  14.4*   HGB  6.6*  6.8*   HCT  20.6*  20.8*   PLT  316  320     Recent Labs      18   0446  18   0358   NA  136  138  139   K  3.8  3.9  3.7   CL  100  101  101   CO2  26  28  27   BUN  78*  63*  83*   CREA  10.30*  8.73* 11.10*   GLU  171*  144*  137*   CA  8.9  8.7  8.8   MG   --    --   2.6*   PHOS  8.6*  6.2*   --      No results for input(s): SGOT, GPT, AP, TBIL, TP, ALB, GLOB, GGT, AML, LPSE in the last 72 hours. No lab exists for component: AMYP, HLPSE  No results for input(s): INR, PTP, APTT in the last 72 hours. No lab exists for component: INREXT, INREXT   No results for input(s): FE, TIBC, PSAT, FERR in the last 72 hours. Lab Results   Component Value Date/Time    Folate 11.4 04/24/2018 02:45 PM      No results for input(s): PH, PCO2, PO2 in the last 72 hours. No results for input(s): CPK, CKMB in the last 72 hours.     No lab exists for component: TROPONINI  No components found for: [place]  Lab Results   Component Value Date/Time    Color DARK YELLOW 04/24/2018 06:13 PM    Appearance CLOUDY (A) 04/24/2018 06:13 PM    Specific gravity 1.015 04/24/2018 06:13 PM    Specific gravity <1.005 04/12/2018 12:44 AM    pH (UA) 5.5 04/24/2018 06:13 PM    Protein 30 (A) 04/24/2018 06:13 PM    Glucose NEGATIVE  04/24/2018 06:13 PM    Ketone TRACE (A) 04/24/2018 06:13 PM    Bilirubin NEGATIVE  04/12/2018 12:44 AM    Urobilinogen 0.2 04/24/2018 06:13 PM    Nitrites NEGATIVE  04/24/2018 06:13 PM    Leukocyte Esterase LARGE (A) 04/24/2018 06:13 PM    Epithelial cells FEW 04/24/2018 06:13 PM    Bacteria 1+ (A) 04/24/2018 06:13 PM    WBC  04/24/2018 06:13 PM    RBC 10-20 04/24/2018 06:13 PM       MEDICATIONS:

## 2018-04-25 NOTE — DIABETES MGMT
DTC Progress Note    Recommendations/ Comments: continue with current regimen at this time    Current hospital DM medication: Lantus 40 units bid, Lispro 15 units tid and correctional scale - resistant ac and hs. DTC will continue to follow patient as needed. Chart reviewed and initial evaluation complete on [address]. Patient is a 36 y.o. male with known history of Type 2 Diabetes on Glimepiride 4mg every morning, Lantus 25 units nightly, and Metformin ER 750mg - 2 tabs daily at home. A1c:   Lab Results   Component Value Date/Time    Hemoglobin A1c 12.8 (H) 04/12/2018 12:44 AM       Recent Glucose Results: Lab Results   Component Value Date/Time     (H) 04/25/2018 03:17 AM    GLUCPOC 155 (H) 04/25/2018 07:34 AM    GLUCPOC 103 (H) 04/24/2018 08:57 PM    GLUCPOC 98 04/24/2018 05:05 PM        Lab Results   Component Value Date/Time    Creatinine 10.30 (H) 04/25/2018 03:17 AM       Active Orders   Diet    DIET RENAL Regular; Consistent Carb 2000kcal        PO intake: Patient Vitals for the past 72 hrs:   % Diet Eaten   04/24/18 1800 50 %   04/24/18 1500 75 %       Thank you.   Werner Encarnacion RD CDE

## 2018-04-26 ENCOUNTER — APPOINTMENT (OUTPATIENT)
Dept: INTERVENTIONAL RADIOLOGY/VASCULAR | Age: 43
DRG: 871 | End: 2018-04-26
Attending: INTERNAL MEDICINE
Payer: COMMERCIAL

## 2018-04-26 LAB
ALBUMIN SERPL ELPH-MCNC: 2.9 G/DL (ref 2.9–4.4)
ALBUMIN/GLOB SERPL: 0.8 {RATIO} (ref 0.7–1.7)
ALPHA1 GLOB SERPL ELPH-MCNC: 0.4 G/DL (ref 0–0.4)
ALPHA2 GLOB SERPL ELPH-MCNC: 0.7 G/DL (ref 0.4–1)
ANION GAP SERPL CALC-SCNC: 14 MMOL/L (ref 5–15)
B-GLOBULIN SERPL ELPH-MCNC: 1.4 G/DL (ref 0.7–1.3)
BUN SERPL-MCNC: 85 MG/DL (ref 6–20)
BUN/CREAT SERPL: 8 (ref 12–20)
CALCIUM SERPL-MCNC: 8.8 MG/DL (ref 8.5–10.1)
CHLORIDE SERPL-SCNC: 103 MMOL/L (ref 97–108)
CO2 SERPL-SCNC: 24 MMOL/L (ref 21–32)
COPPER SERPL-MCNC: 150 UG/DL (ref 72–166)
CREAT SERPL-MCNC: 11.2 MG/DL (ref 0.7–1.3)
DEPRECATED HGB OTHER BLD-IMP: 0 %
ERYTHROCYTE [DISTWIDTH] IN BLOOD BY AUTOMATED COUNT: 17.4 % (ref 11.5–14.5)
G6PD BLD QN: 240 U/10E12 RBC (ref 146–376)
GAMMA GLOB SERPL ELPH-MCNC: 0.9 G/DL (ref 0.4–1.8)
GLOBULIN SER CALC-MCNC: 3.5 G/DL (ref 2.2–3.9)
GLUCOSE BLD STRIP.AUTO-MCNC: 115 MG/DL (ref 65–100)
GLUCOSE BLD STRIP.AUTO-MCNC: 128 MG/DL (ref 65–100)
GLUCOSE BLD STRIP.AUTO-MCNC: 136 MG/DL (ref 65–100)
GLUCOSE BLD STRIP.AUTO-MCNC: 206 MG/DL (ref 65–100)
GLUCOSE SERPL-MCNC: 120 MG/DL (ref 65–100)
HCT VFR BLD AUTO: 20.8 % (ref 36.6–50.3)
HGB A MFR BLD: 97.9 % (ref 96.4–98.8)
HGB A2 MFR BLD COLUMN CHROM: 2.1 % (ref 1.8–3.2)
HGB BLD-MCNC: 6.7 G/DL (ref 12.1–17)
HGB C MFR BLD: 0 %
HGB F MFR BLD: 0 % (ref 0–2)
HGB FRACT BLD-IMP: NORMAL
HGB S BLD QL SOLY: NEGATIVE
HGB S MFR BLD: 0 %
KAPPA LC FREE SER-MCNC: 86.5 MG/L (ref 3.3–19.4)
KAPPA LC FREE/LAMBDA FREE SER: 1.61 {RATIO} (ref 0.26–1.65)
LAMBDA LC FREE SERPL-MCNC: 53.7 MG/L (ref 5.7–26.3)
M PROTEIN SERPL ELPH-MCNC: ABNORMAL G/DL
MCH RBC QN AUTO: 31.5 PG (ref 26–34)
MCHC RBC AUTO-ENTMCNC: 32.2 G/DL (ref 30–36.5)
MCV RBC AUTO: 97.7 FL (ref 80–99)
NRBC # BLD: 0.02 K/UL (ref 0–0.01)
NRBC BLD-RTO: 0.2 PER 100 WBC
PHOSPHATE SERPL-MCNC: 9.9 MG/DL (ref 2.6–4.7)
PLATELET # BLD AUTO: 309 K/UL (ref 150–400)
PMV BLD AUTO: 10.9 FL (ref 8.9–12.9)
POTASSIUM SERPL-SCNC: 4.1 MMOL/L (ref 3.5–5.1)
PROT SERPL-MCNC: 6.4 G/DL (ref 6–8.5)
RBC # BLD AUTO: 2.13 M/UL (ref 4.1–5.7)
RBC # BLD AUTO: 2.22 X10E6/UL (ref 4.14–5.8)
SERVICE CMNT-IMP: ABNORMAL
SODIUM SERPL-SCNC: 141 MMOL/L (ref 136–145)
WBC # BLD AUTO: 10.8 K/UL (ref 4.1–11.1)
ZINC SERPL-MCNC: 82 UG/DL (ref 56–134)

## 2018-04-26 PROCEDURE — 77030018719 HC DRSG PTCH ANTIMIC J&J -A

## 2018-04-26 PROCEDURE — 82962 GLUCOSE BLOOD TEST: CPT

## 2018-04-26 PROCEDURE — 86923 COMPATIBILITY TEST ELECTRIC: CPT | Performed by: INTERNAL MEDICINE

## 2018-04-26 PROCEDURE — 74011000250 HC RX REV CODE- 250: Performed by: RADIOLOGY

## 2018-04-26 PROCEDURE — 86850 RBC ANTIBODY SCREEN: CPT | Performed by: INTERNAL MEDICINE

## 2018-04-26 PROCEDURE — 74011636637 HC RX REV CODE- 636/637: Performed by: INTERNAL MEDICINE

## 2018-04-26 PROCEDURE — 74011250637 HC RX REV CODE- 250/637: Performed by: INTERNAL MEDICINE

## 2018-04-26 PROCEDURE — P9016 RBC LEUKOCYTES REDUCED: HCPCS | Performed by: INTERNAL MEDICINE

## 2018-04-26 PROCEDURE — 74011250637 HC RX REV CODE- 250/637: Performed by: SPECIALIST

## 2018-04-26 PROCEDURE — 74011250636 HC RX REV CODE- 250/636: Performed by: RADIOLOGY

## 2018-04-26 PROCEDURE — 84100 ASSAY OF PHOSPHORUS: CPT | Performed by: INTERNAL MEDICINE

## 2018-04-26 PROCEDURE — 74011250636 HC RX REV CODE- 250/636: Performed by: INTERNAL MEDICINE

## 2018-04-26 PROCEDURE — 90935 HEMODIALYSIS ONE EVALUATION: CPT

## 2018-04-26 PROCEDURE — 85027 COMPLETE CBC AUTOMATED: CPT | Performed by: INTERNAL MEDICINE

## 2018-04-26 PROCEDURE — C1750 CATH, HEMODIALYSIS,LONG-TERM: HCPCS

## 2018-04-26 PROCEDURE — 36415 COLL VENOUS BLD VENIPUNCTURE: CPT | Performed by: INTERNAL MEDICINE

## 2018-04-26 PROCEDURE — 02HV33Z INSERTION OF INFUSION DEVICE INTO SUPERIOR VENA CAVA, PERCUTANEOUS APPROACH: ICD-10-PCS | Performed by: RADIOLOGY

## 2018-04-26 PROCEDURE — 65660000000 HC RM CCU STEPDOWN

## 2018-04-26 PROCEDURE — 77030031139 HC SUT VCRL2 J&J -A

## 2018-04-26 PROCEDURE — 99152 MOD SED SAME PHYS/QHP 5/>YRS: CPT

## 2018-04-26 PROCEDURE — 80048 BASIC METABOLIC PNL TOTAL CA: CPT | Performed by: INTERNAL MEDICINE

## 2018-04-26 RX ORDER — HEPARIN SODIUM 200 [USP'U]/100ML
200 INJECTION, SOLUTION INTRAVENOUS ONCE
Status: COMPLETED | OUTPATIENT
Start: 2018-04-26 | End: 2018-04-29

## 2018-04-26 RX ORDER — SODIUM CHLORIDE 9 MG/ML
25 INJECTION, SOLUTION INTRAVENOUS CONTINUOUS
Status: DISCONTINUED | OUTPATIENT
Start: 2018-04-26 | End: 2018-04-26

## 2018-04-26 RX ORDER — HEPARIN SODIUM 1000 [USP'U]/ML
1900 INJECTION, SOLUTION INTRAVENOUS; SUBCUTANEOUS
Status: DISCONTINUED | OUTPATIENT
Start: 2018-04-26 | End: 2018-04-30 | Stop reason: HOSPADM

## 2018-04-26 RX ORDER — HEPARIN SODIUM 1000 [USP'U]/ML
10000 INJECTION, SOLUTION INTRAVENOUS; SUBCUTANEOUS
Status: COMPLETED | OUTPATIENT
Start: 2018-04-26 | End: 2018-04-26

## 2018-04-26 RX ORDER — CEFAZOLIN SODIUM/WATER 2 G/20 ML
2 SYRINGE (ML) INTRAVENOUS ONCE
Status: COMPLETED | OUTPATIENT
Start: 2018-04-26 | End: 2018-04-26

## 2018-04-26 RX ORDER — FENTANYL CITRATE 50 UG/ML
100 INJECTION, SOLUTION INTRAMUSCULAR; INTRAVENOUS
Status: DISCONTINUED | OUTPATIENT
Start: 2018-04-26 | End: 2018-04-26

## 2018-04-26 RX ORDER — LIDOCAINE HYDROCHLORIDE 20 MG/ML
20 INJECTION, SOLUTION INFILTRATION; PERINEURAL ONCE
Status: COMPLETED | OUTPATIENT
Start: 2018-04-26 | End: 2018-04-26

## 2018-04-26 RX ORDER — MIDAZOLAM HYDROCHLORIDE 1 MG/ML
5 INJECTION, SOLUTION INTRAMUSCULAR; INTRAVENOUS
Status: DISCONTINUED | OUTPATIENT
Start: 2018-04-26 | End: 2018-04-26

## 2018-04-26 RX ADMIN — HEPARIN SODIUM 3800 UNITS: 1000 INJECTION, SOLUTION INTRAVENOUS; SUBCUTANEOUS at 09:15

## 2018-04-26 RX ADMIN — TRAMADOL HYDROCHLORIDE 50 MG: 50 TABLET, FILM COATED ORAL at 19:47

## 2018-04-26 RX ADMIN — LIDOCAINE HYDROCHLORIDE 400 MG: 20 INJECTION, SOLUTION INFILTRATION; PERINEURAL at 09:14

## 2018-04-26 RX ADMIN — HEPARIN SODIUM 2000 UNITS: 1000 INJECTION, SOLUTION INTRAVENOUS; SUBCUTANEOUS at 15:41

## 2018-04-26 RX ADMIN — PANTOPRAZOLE SODIUM 40 MG: 40 TABLET, DELAYED RELEASE ORAL at 17:11

## 2018-04-26 RX ADMIN — Medication 10 ML: at 17:13

## 2018-04-26 RX ADMIN — Medication 2 G: at 08:18

## 2018-04-26 RX ADMIN — FENTANYL CITRATE 50 MCG: 50 INJECTION, SOLUTION INTRAMUSCULAR; INTRAVENOUS at 09:11

## 2018-04-26 RX ADMIN — SEVELAMER CARBONATE 1600 MG: 800 TABLET, FILM COATED ORAL at 17:11

## 2018-04-26 RX ADMIN — SUCRALFATE 1 G: 1 SUSPENSION ORAL at 21:14

## 2018-04-26 RX ADMIN — HEPARIN SODIUM 2000 UNITS: 1000 INJECTION, SOLUTION INTRAVENOUS; SUBCUTANEOUS at 15:42

## 2018-04-26 RX ADMIN — ERYTHROPOIETIN 10000 UNITS: 10000 INJECTION, SOLUTION INTRAVENOUS; SUBCUTANEOUS at 17:13

## 2018-04-26 RX ADMIN — FENTANYL CITRATE 25 MCG: 50 INJECTION, SOLUTION INTRAMUSCULAR; INTRAVENOUS at 09:16

## 2018-04-26 RX ADMIN — MIDAZOLAM 2 MG: 1 INJECTION INTRAMUSCULAR; INTRAVENOUS at 09:15

## 2018-04-26 RX ADMIN — Medication 10 ML: at 06:14

## 2018-04-26 RX ADMIN — HEPARIN SODIUM IN SODIUM CHLORIDE 200 UNITS: 200 INJECTION INTRAVENOUS at 09:15

## 2018-04-26 RX ADMIN — INSULIN LISPRO 2 UNITS: 100 INJECTION, SOLUTION INTRAVENOUS; SUBCUTANEOUS at 21:25

## 2018-04-26 RX ADMIN — SODIUM CHLORIDE 25 ML/HR: 900 INJECTION, SOLUTION INTRAVENOUS at 08:18

## 2018-04-26 RX ADMIN — MIDAZOLAM 2 MG: 1 INJECTION INTRAMUSCULAR; INTRAVENOUS at 09:09

## 2018-04-26 RX ADMIN — INSULIN GLARGINE 40 UNITS: 100 INJECTION, SOLUTION SUBCUTANEOUS at 21:16

## 2018-04-26 RX ADMIN — Medication 10 ML: at 21:14

## 2018-04-26 RX ADMIN — SUCRALFATE 1 G: 1 SUSPENSION ORAL at 17:11

## 2018-04-26 NOTE — PROGRESS NOTES
Problem: Falls - Risk of  Goal: *Absence of Falls  Document Beth Fall Risk and appropriate interventions in the flowsheet.    Outcome: Progressing Towards Goal  Fall Risk Interventions:  Mobility Interventions: Communicate number of staff needed for ambulation/transfer    Mentation Interventions: Adequate sleep, hydration, pain control, Door open when patient unattended    Medication Interventions: Evaluate medications/consider consulting pharmacy, Patient to call before getting OOB    Elimination Interventions: Call light in reach

## 2018-04-26 NOTE — PROGRESS NOTES
INTERNAL MEDICINE ATTENDING NOTE    S: Mr. Kamran Boyd was unavailable during rounds. Data reviewed. O: Blood pressure 134/82, pulse 84, temperature 98.4 °F (36.9 °C), resp. rate 18, height 5' 9\" (1.753 m), weight 302 lb 11.2 oz (137.3 kg), SpO2 96 %.         Not available for exam.     Recent Results (from the past 12 hour(s))   GLUCOSE, POC    Collection Time: 04/25/18  9:13 PM   Result Value Ref Range    Glucose (POC) 154 (H) 65 - 100 mg/dL    Performed by Jeffery Merino, POC    Collection Time: 04/25/18  9:30 PM   Result Value Ref Range    Glucose (POC) 128 (H) 65 - 100 mg/dL    Performed by Yesenia Clemens    PHOSPHORUS    Collection Time: 04/26/18  6:18 AM   Result Value Ref Range    Phosphorus 9.9 (H) 2.6 - 4.7 MG/DL   METABOLIC PANEL, BASIC    Collection Time: 04/26/18  6:18 AM   Result Value Ref Range    Sodium 141 136 - 145 mmol/L    Potassium 4.1 3.5 - 5.1 mmol/L    Chloride 103 97 - 108 mmol/L    CO2 24 21 - 32 mmol/L    Anion gap 14 5 - 15 mmol/L    Glucose 120 (H) 65 - 100 mg/dL    BUN 85 (H) 6 - 20 MG/DL    Creatinine 11.20 (H) 0.70 - 1.30 MG/DL    BUN/Creatinine ratio 8 (L) 12 - 20      GFR est AA 6 (L) >60 ml/min/1.73m2    GFR est non-AA 5 (L) >60 ml/min/1.73m2    Calcium 8.8 8.5 - 10.1 MG/DL   CBC W/O DIFF    Collection Time: 04/26/18  6:18 AM   Result Value Ref Range    WBC 10.8 4.1 - 11.1 K/uL    RBC 2.13 (L) 4.10 - 5.70 M/uL    HGB 6.7 (L) 12.1 - 17.0 g/dL    HCT 20.8 (L) 36.6 - 50.3 %    MCV 97.7 80.0 - 99.0 FL    MCH 31.5 26.0 - 34.0 PG    MCHC 32.2 30.0 - 36.5 g/dL    RDW 17.4 (H) 11.5 - 14.5 %    PLATELET 862 703 - 562 K/uL    MPV 10.9 8.9 - 12.9 FL    NRBC 0.2 (H) 0  WBC    ABSOLUTE NRBC 0.02 (H) 0.00 - 0.01 K/uL   TYPE & SCREEN    Collection Time: 04/26/18  6:18 AM   Result Value Ref Range    Crossmatch Expiration 04/29/2018     ABO/Rh(D) AB POSITIVE     Antibody screen NEG     Unit number [id number]     Blood component type  LR     Unit division 00     Status of unit ALLOCATED     Crossmatch result Compatible     Unit number [id number]     Blood component type RC LR     Unit division 00     Status of unit ALLOCATED     Crossmatch result Compatible    GLUCOSE, POC    Collection Time: 04/26/18  7:02 AM   Result Value Ref Range    Glucose (POC) 136 (H) 65 - 100 mg/dL    Performed by Lisset Coreas         CXR 4/15: No PTX. CXR 4/11: Minimal R ML infiltrate. CXR 4/12: No acute abnormality. US Retroperitoneal 4/14: Normal size and appearance of the kidneys without hydronephrosis. Nondistended urinary bladder with a Grigsby catheter. US abdomen 4/17: Echogenic liver suggesting fatty infiltration with some areas of relative sparing. Contracted gallbladder. No obvious gallstones. Pancreas not visualized due to overlying bowel gas. No hydronephrosis. EGD 4/20: Ulcerative esophagitis, Hiatal hernia. A/P:   1. Hemolytic anemia: Will need further transfusion with dialysis; Defer to Nephrology. Hem-Onc following now as well. Work up in progress. 2. Rhabdomyolysis: CK trending down   3. LITTLE: HD per Nephrology. 4. Hyperosmolar coma due to secondary diabetes ([facility] [address] (4/12/2018): Treated with IVF and insulin. Glc is improved. 5. Epigastric pain and N/V: Ulcerative esophagitis on EGD; appreicate GI input. Continue Prn maalox. Prn zofran. 6. Hypernatremia: hydration; watch labs. 7. Sepsis due to aspiration pneumonia, POA: Empiric antibiotics. 8. Hypoxia: O2 as needed. 9. Acute encephalopathy (4/12/2018): Improving. He is now awake, no longer paranoid, seems at baseline. 10. Hyperlipidemia (5/1/2012): Hold statin  11. Morbid obesity ([facility] [address] (5/6/2013)  12. Hypertension (10/31/2014): BP okay.        Evan Rodriguez MD, FACP  Best contact is via Pager: [phone], or via hospital  at [phone]

## 2018-04-26 NOTE — PROGRESS NOTES
Bedside shift change report given to LEIGHA Zamora (oncoming nurse) by Yelitza French RN (offgoing nurse). Report included the following information SBAR, Procedure Summary, Intake/Output, MAR, Recent Results and Cardiac Rhythm NSR.

## 2018-04-26 NOTE — PROGRESS NOTES
Attempted follow up visit with LAM pt in 5080 who's mother is currently also a pt. On both occasions found pt out of room on Dialysis. Left card on tray table. No family present. Will follow up as needed/able. LAINE Holguin. Alisa Gutiérrez

## 2018-04-26 NOTE — ROUTINE PROCESS
Arrived into the ay recovery area via stretcher, here today for a perm cath placement and mik cath removal.

## 2018-04-26 NOTE — PROCEDURES
[facility]  [phone]    Vitals   Pre   Post   Assessment   Pre   Post     Temp  Temp: 97.6 °F (36.4 °C) (04/26/18 1138)  98.3 LOC  Alert and oriented  Alert and oriented   HR   Pulse (Heart Rate): (!) 59 (04/26/18 1138) 83 Lungs   Clear anterior   unlabored   B/P   BP: 110/44 (04/26/18 1138) 113/70 Cardiac   Regular on telemetry   no chest pain   Resp   Resp Rate: 12 (04/26/18 1138) 20 Skin   Warm and dry  warm and dry    Pain level  Pain Intensity 1: 0 (04/26/18 1022) Some soreness right chest where CVC was tunneled today  Edema    Trace ble    No change    Orders:    Duration:   Start:   1138 End:    1538 Total:   4   Dialyzer:   Dialyzer/Set Up Inspection: Revaclear (04/26/18 1138)   K Bath:   Dialysate K (mEq/L): 3 (04/26/18 1138)   Ca Bath:   Dialysate CA (mEq/L): 2.5 (04/26/18 1138)   Na/Bicarb:   Dialysate NA (mEq/L): 140 (04/26/18 1138)   Target Fluid Removal:   Goal/Amount of Fluid to Remove (mL): 2500 mL (04/26/18 1138)   Access     Type & Location:   RIJ tunneled catheter this AM, confirmed placement, dressing dry and intact, ports cleansed with alcohol, aspirated and flushed HD initiated.      Labs     Obtained/Reviewed   Critical Results Called   Date when labs were drawn-  Hgb-    HGB   Date Value Ref Range Status   04/26/2018 6.7 (L) 12.1 - 17.0 g/dL Final     K-    Potassium   Date Value Ref Range Status   04/26/2018 4.1 3.5 - 5.1 mmol/L Final     Ca-   Calcium   Date Value Ref Range Status   04/26/2018 8.8 8.5 - 10.1 MG/DL Final     Bun-   BUN   Date Value Ref Range Status   04/26/2018 85 (H) 6 - 20 MG/DL Final     Creat-   Creatinine   Date Value Ref Range Status   04/26/2018 11.20 (H) 0.70 - 1.30 MG/DL Final        Medications/ Blood Products Given     Name   Dose   Route and Time     heparin 1:1000 1.9/1.9   PRBC 2 units Drip each over one hour        Blood Volume Processed (BVP):    86.6 Net Fluid   Removed:  2300   Comments   Time Out Done: 1130  Primary Nurse Rpt Pre: Marvin Christopher RN   Primary Nurse Rpt PostRandal LEIGHA Greene   Pt Education: Infection control CVC wearing mask initiating and discontinuing dialysis   Care Plan: continue current hemodialysis plan of care   Tx Summary: 11:38 after confirming placement of tunneled CVC, HD initiated. 12:30 first unit prbc verified with two RN and initiated. Patient is to report any chills, chest pain, sob or change in how he feels at present, verbalized understanding. 12:45 Dr. Hunter [place] in to see patient 13:00 blood transfusing without difficulty no complaints 14:00 first unit PRBC completed, 2nd unit initiated. 14:45  2nd unit packed red blood cells completed without incident. No evidence of transfusion reaction. 15:38 all possible blood returned, ports cleansed with alcohol, flushed, heparinized and capped. SBAR report called to primary nurse. Pt's previous clinic- to be admitted   Consent signed - Informed Consent Verified: Yes (04/26/18 1138)  Car[place]a Consent - verified   Hepatitis Status- 04/15/18 negative antigen connect care   Machine #- Machine Number: [id number] (04/26/18 1138)  Telemetry status- remote tele   Pre-dialysis wt. - Pre-Dialysis Weight: 147.3 kg (324 lb 11.8 oz) (04/23/18 1340)

## 2018-04-26 NOTE — PROGRESS NOTES
Hematology Oncology Progress Note       Follow up for: hemolytic anemia    Chart notes reviewed since last visit. Case discussed with following: patient. . Seen in dialysis. Patient complains of the following: no specific complaints today. Additional concerns noted by the staff:     Patient Vitals for the past 24 hrs:   BP Temp Temp src Pulse Resp SpO2 Weight   04/26/18 1300 112/56 97.6 °F (36.4 °C) - 83 16 - -   04/26/18 1245 105/53 97.6 °F (36.4 °C) - 84 19 - -   04/26/18 1230 105/54 97.6 °F (36.4 °C) - 92 18 - -   04/26/18 1200 97/45 - - 80 16 - -   04/26/18 1138 110/44 97.6 °F (36.4 °C) Oral (!) 59 12 - -   04/26/18 0955 140/64 - - 76 12 96 % -   04/26/18 0940 137/66 - - 80 12 96 % -   04/26/18 0935 133/62 - - 78 12 97 % -   04/26/18 0930 144/65 - - 82 12 98 % -   04/26/18 0919 136/78 - - 80 16 99 % -   04/26/18 0914 144/73 - - 78 12 100 % -   04/26/18 0909 141/80 - - 80 20 100 % -   04/26/18 0816 134/82 - - 84 18 96 % -   04/26/18 0622 - - - - - - 137.3 kg (302 lb 11.2 oz)   04/26/18 0613 - - - - - - 138.9 kg (306 lb 4.8 oz)   04/26/18 0311 137/88 98.4 °F (36.9 °C) - 85 18 96 % -   04/25/18 2313 138/86 98.4 °F (36.9 °C) - 93 20 97 % -   04/25/18 1925 99/63 98.3 °F (36.8 °C) - 86 17 97 % -   04/25/18 1617 121/75 98.5 °F (36.9 °C) - 92 18 99 % -       ROS negative for 11 organ systems except as mentioned above. Physical Examination:  Constitutional Alert, cooperative, oriented. Mood and affect appropriate. Appears close to chronological age. Obese. Head Normocephalic; no scars   Eyes Conjunctivae and sclerae are clear and without icterus. Pupils are round   ENMT Sinuses are nontender. No oral exudates, ulcers, masses, thrush or mucositis. Oropharynx clear. Tongue normal.   Neck Supple without masses or thyromegaly. No jugular venous distension. Hematologic/Lymphatic No petechiae or purpura. No tender or palpable lymph nodes noted.     Respiratory Lungs are clear to auscultation without rhonchi or wheezing. Cardiovascular Regular rate and rhythm of heart without murmurs, gallops or rubs. Chest / Line Site Chest is symmetric with no chest wall deformities. Abdomen Non-tender, non-distended, no masses, ascites or hepatosplenomegaly. Good bowel sounds. No guarding or rebound tenderness. No pulsatile masses. Musculoskeletal No tenderness or swelling, normal range of motion without obvious weakness. Extremities No visible deformities, no cyanosis, clubbing or edema. Skin No rashes, scars, or lesions suggestive of malignancy. No petechiae, purpura, or ecchymoses. No excoriations. Neurologic No sensory or motor deficits noted. Psychiatric Alert and oriented. Coherent speech. Verbalizes understanding of our discussions today.        Labs:  Recent Results (from the past 24 hour(s))   GLUCOSE, POC    Collection Time: 04/25/18  4:19 PM   Result Value Ref Range    Glucose (POC) 139 (H) 65 - 100 mg/dL    Performed by Holley Larson, POC    Collection Time: 04/25/18  9:13 PM   Result Value Ref Range    Glucose (POC) 154 (H) 65 - 100 mg/dL    Performed by Bhavik Browne, POC    Collection Time: 04/25/18  9:30 PM   Result Value Ref Range    Glucose (POC) 128 (H) 65 - 100 mg/dL    Performed by Smiley Borden    PHOSPHORUS    Collection Time: 04/26/18  6:18 AM   Result Value Ref Range    Phosphorus 9.9 (H) 2.6 - 4.7 MG/DL   METABOLIC PANEL, BASIC    Collection Time: 04/26/18  6:18 AM   Result Value Ref Range    Sodium 141 136 - 145 mmol/L    Potassium 4.1 3.5 - 5.1 mmol/L    Chloride 103 97 - 108 mmol/L    CO2 24 21 - 32 mmol/L    Anion gap 14 5 - 15 mmol/L    Glucose 120 (H) 65 - 100 mg/dL    BUN 85 (H) 6 - 20 MG/DL    Creatinine 11.20 (H) 0.70 - 1.30 MG/DL    BUN/Creatinine ratio 8 (L) 12 - 20      GFR est AA 6 (L) >60 ml/min/1.73m2    GFR est non-AA 5 (L) >60 ml/min/1.73m2    Calcium 8.8 8.5 - 10.1 MG/DL   CBC W/O DIFF    Collection Time: 04/26/18  6:18 AM   Result Value Ref Range    WBC 10.8 4.1 - 11.1 K/uL    RBC 2.13 (L) 4.10 - 5.70 M/uL    HGB 6.7 (L) 12.1 - 17.0 g/dL    HCT 20.8 (L) 36.6 - 50.3 %    MCV 97.7 80.0 - 99.0 FL    MCH 31.5 26.0 - 34.0 PG    MCHC 32.2 30.0 - 36.5 g/dL    RDW 17.4 (H) 11.5 - 14.5 %    PLATELET 612 489 - 169 K/uL    MPV 10.9 8.9 - 12.9 FL    NRBC 0.2 (H) 0  WBC    ABSOLUTE NRBC 0.02 (H) 0.00 - 0.01 K/uL   TYPE & SCREEN    Collection Time: 04/26/18  6:18 AM   Result Value Ref Range    Crossmatch Expiration 04/29/2018     ABO/Rh(D) AB POSITIVE     Antibody screen NEG     Unit number [id number]     Blood component type [facility]     Unit division 00     Status of unit ISSUED     Crossmatch result Compatible     Unit number [id number]     Blood component type [facility]     Unit division 00     Status of unit ISSUED     Crossmatch result Compatible    GLUCOSE, POC    Collection Time: 04/26/18  7:02 AM   Result Value Ref Range    Glucose (POC) 136 (H) 65 - 100 mg/dL    Performed by Crissy Beny    GLUCOSE, POC    Collection Time: 04/26/18 12:48 PM   Result Value Ref Range    Glucose (POC) 128 (H) 65 - 100 mg/dL    Performed by Chantelle Hernandez        Assessment and Plan:   Hemolytic anemia - Coomb's testing negative so not an obvious autoimmune hemolytic anemia. B12 okay so not intramedullary hemolysis. Awaiting remainder of tests to assess for hemoglobinopathy and enzyme deficiency state that predisposes to premature destruction. getting 2 units with dialysis today. Hyperosmolar coma due to DM - much improved    Rhabdomyolysis - improving. Acute kidney failure - being followed by Dr. Mary Chau. permacath placed. Sepsis secondary to aspiration pneumonia - on antibiotics.

## 2018-04-26 NOTE — PROGRESS NOTES
NAME: Basilia Coles        :  1975        MRN:  [id number]        Assessment :    Plan:  --LITLTE   Rhabdo  Obesity  HTN  Sepsis  Leukocytosis --HD #1 4/15. HD today and then TTS schedule. Watch for renal recovery (none so far). Suspect that recovery will take time. Approved for outpatient HD at [address] [place] [address] 15 today (has been delayed b/c of severe illness in his mother)      GI following, 2 units prbc's on  and again today; ulcerative esophagitis    Has a hemolytic anemia as well (spherocytes); Dr. Rachel Delgado following; on epo 10 k sc tiw    Renvela with meals    He is stable for discharge from my standpoint. Subjective: We discussed the above. Denies n/v/d. No sob. S/p perm cath earlier today. The patient was seen on dialysis at 1:40 PM .  BP is stable. Catheter is functioning well. Objective:     VITALS:   Last 24hrs VS reviewed since prior progress note. Most recent are:  Visit Vitals    /88    Pulse 85    Temp 98.4 °F (36.9 °C)    Resp 18    Ht 5' 9\" (1.753 m)    Wt 137.3 kg (302 lb 11.2 oz)    SpO2 96%    BMI 44.7 kg/m2     No intake or output data in the 24 hours ending 18 0713   Telemetry Reviewed:     PHYSICAL EXAM:  General: NAD   cta eduardo  Right ij mik is intact  Soft ntnd   No edema       ________________________________________________________________________  Kajal Damon MD     Procedures: see electronic medical records for all procedures/Xrays and details which  were not copied into this note but were reviewed prior to creation of Plan.       LABS:  Recent Labs      18   WBC  10.8  12.5*   HGB  6.7*  6.6*   HCT  20.8*  20.6*   PLT  309  316     Recent Labs      1818  187  18   0446   NA  141  136  138   K  4.1  3.8  3.9   CL  103  100  101   CO2  24  26  28   BUN  85*  78*  63*   CREA  11.20*  10.30*  8.73* GLU  120*  171*  144*   CA  8.8  8.9  8.7   PHOS  9.9*  8.6*  6.2*     No results for input(s): SGOT, GPT, AP, TBIL, TP, ALB, GLOB, GGT, AML, LPSE in the last 72 hours. No lab exists for component: AMYP, HLPSE  No results for input(s): INR, PTP, APTT in the last 72 hours. No lab exists for component: INREXT, INREXT   No results for input(s): FE, TIBC, PSAT, FERR in the last 72 hours. Lab Results   Component Value Date/Time    Folate 11.4 04/24/2018 02:45 PM      No results for input(s): PH, PCO2, PO2 in the last 72 hours. No results for input(s): CPK, CKMB in the last 72 hours.     No lab exists for component: TROPONINI  No components found for: [place]  Lab Results   Component Value Date/Time    Color DARK YELLOW 04/24/2018 06:13 PM    Appearance CLOUDY (A) 04/24/2018 06:13 PM    Specific gravity 1.015 04/24/2018 06:13 PM    Specific gravity <1.005 04/12/2018 12:44 AM    pH (UA) 5.5 04/24/2018 06:13 PM    Protein 30 (A) 04/24/2018 06:13 PM    Glucose NEGATIVE  04/24/2018 06:13 PM    Ketone TRACE (A) 04/24/2018 06:13 PM    Bilirubin NEGATIVE  04/12/2018 12:44 AM    Urobilinogen 0.2 04/24/2018 06:13 PM    Nitrites NEGATIVE  04/24/2018 06:13 PM    Leukocyte Esterase LARGE (A) 04/24/2018 06:13 PM    Epithelial cells FEW 04/24/2018 06:13 PM    Bacteria 1+ (A) 04/24/2018 06:13 PM    WBC  04/24/2018 06:13 PM    RBC 10-20 04/24/2018 06:13 PM       MEDICATIONS:

## 2018-04-26 NOTE — PROGRESS NOTES
Bedside shift change report given to LEIGHA Naik (oncoming nurse) by Antelmo Higgins RN (offgoing nurse). Report included the following information SBAR, Kardex, Procedure Summary, Intake/Output, MAR, Recent Results and Cardiac Rhythm NSR - Sinus Tach.

## 2018-04-26 NOTE — PROGRESS NOTES
I spoke with Murali Wilkins at [address] [place] [address]. Carole who confirms pt has a TTS 0615 chair and will need to come in and do paperwork the day before. [facility] office has spoken with the pt regarding costs, co pays, and options. [facility] [place] is in agreement per Murali Wilkins.                                  Updates ecin'd to RADHA Lam.  Of note, pt is on oxygen. Will need to arrange home oxygen if needed. Please document RA PO to see if pt meets home oxygen criteria.           MD-please order home oxygen if in agreement. I\"ll fax updates to [facility] tomorrow.

## 2018-04-26 NOTE — ROUTINE PROCESS
Name of procedure: [facility] SPECIALTY [facility] Cath Placement      Complications, if any, r/t procedure: None      Sedation medications given: 4 mg Versed, 75 mcg Fentanyl      Sedation tolerated: Well      VS : Stable vs Unstable: Stable      Post Procedure Care Needed/order sets in connectcare: Yes      Any other specific needs to pt. Care: Report called to primary RN at ext. [phone]. SBAR items covered.

## 2018-04-27 LAB
ABO + RH BLD: NORMAL
BACTERIA SPEC CULT: ABNORMAL
BLD PROD TYP BPU: NORMAL
BLD PROD TYP BPU: NORMAL
BLOOD GROUP ANTIBODIES SERPL: NORMAL
BPU ID: NORMAL
BPU ID: NORMAL
CC UR VC: ABNORMAL
CROSSMATCH RESULT,%XM: NORMAL
CROSSMATCH RESULT,%XM: NORMAL
ERYTHROCYTE [DISTWIDTH] IN BLOOD BY AUTOMATED COUNT: 18.8 % (ref 11.5–14.5)
GLUCOSE BLD STRIP.AUTO-MCNC: 147 MG/DL (ref 65–100)
GLUCOSE BLD STRIP.AUTO-MCNC: 192 MG/DL (ref 65–100)
GLUCOSE BLD STRIP.AUTO-MCNC: 70 MG/DL (ref 65–100)
GLUCOSE BLD STRIP.AUTO-MCNC: 71 MG/DL (ref 65–100)
GLUCOSE BLD STRIP.AUTO-MCNC: 81 MG/DL (ref 65–100)
GLUCOSE BLD STRIP.AUTO-MCNC: 96 MG/DL (ref 65–100)
HCT VFR BLD AUTO: 26.2 % (ref 36.6–50.3)
HGB BLD-MCNC: 8.7 G/DL (ref 12.1–17)
MCH RBC QN AUTO: 31 PG (ref 26–34)
MCHC RBC AUTO-ENTMCNC: 33.2 G/DL (ref 30–36.5)
MCV RBC AUTO: 93.2 FL (ref 80–99)
NRBC # BLD: 0.02 K/UL (ref 0–0.01)
NRBC BLD-RTO: 0.2 PER 100 WBC
PLATELET # BLD AUTO: 270 K/UL (ref 150–400)
PMV BLD AUTO: 10.4 FL (ref 8.9–12.9)
RBC # BLD AUTO: 2.81 M/UL (ref 4.1–5.7)
SERVICE CMNT-IMP: ABNORMAL
SERVICE CMNT-IMP: NORMAL
SPECIMEN EXP DATE BLD: NORMAL
STATUS OF UNIT,%ST: NORMAL
STATUS OF UNIT,%ST: NORMAL
UNIT DIVISION, %UDIV: 0
UNIT DIVISION, %UDIV: 0
WBC # BLD AUTO: 8.9 K/UL (ref 4.1–11.1)

## 2018-04-27 PROCEDURE — 74011636637 HC RX REV CODE- 636/637: Performed by: INTERNAL MEDICINE

## 2018-04-27 PROCEDURE — 82962 GLUCOSE BLOOD TEST: CPT

## 2018-04-27 PROCEDURE — 74011250636 HC RX REV CODE- 250/636: Performed by: INTERNAL MEDICINE

## 2018-04-27 PROCEDURE — 85027 COMPLETE CBC AUTOMATED: CPT | Performed by: INTERNAL MEDICINE

## 2018-04-27 PROCEDURE — 65660000000 HC RM CCU STEPDOWN

## 2018-04-27 PROCEDURE — 74011250637 HC RX REV CODE- 250/637: Performed by: SPECIALIST

## 2018-04-27 PROCEDURE — 74011250637 HC RX REV CODE- 250/637: Performed by: INTERNAL MEDICINE

## 2018-04-27 PROCEDURE — 36415 COLL VENOUS BLD VENIPUNCTURE: CPT | Performed by: INTERNAL MEDICINE

## 2018-04-27 RX ADMIN — SUCRALFATE 1 G: 1 SUSPENSION ORAL at 16:05

## 2018-04-27 RX ADMIN — PANTOPRAZOLE SODIUM 40 MG: 40 TABLET, DELAYED RELEASE ORAL at 07:42

## 2018-04-27 RX ADMIN — ONDANSETRON 4 MG: 2 INJECTION INTRAMUSCULAR; INTRAVENOUS at 07:26

## 2018-04-27 RX ADMIN — SUCRALFATE 1 G: 1 SUSPENSION ORAL at 22:20

## 2018-04-27 RX ADMIN — INSULIN GLARGINE 40 UNITS: 100 INJECTION, SOLUTION SUBCUTANEOUS at 07:41

## 2018-04-27 RX ADMIN — SUCRALFATE 1 G: 1 SUSPENSION ORAL at 07:43

## 2018-04-27 RX ADMIN — SEVELAMER CARBONATE 1600 MG: 800 TABLET, FILM COATED ORAL at 07:42

## 2018-04-27 RX ADMIN — SEVELAMER CARBONATE 1600 MG: 800 TABLET, FILM COATED ORAL at 11:45

## 2018-04-27 RX ADMIN — Medication 10 ML: at 22:13

## 2018-04-27 RX ADMIN — INSULIN GLARGINE 40 UNITS: 100 INJECTION, SOLUTION SUBCUTANEOUS at 22:12

## 2018-04-27 RX ADMIN — INSULIN LISPRO 3 UNITS: 100 INJECTION, SOLUTION INTRAVENOUS; SUBCUTANEOUS at 07:40

## 2018-04-27 RX ADMIN — PANTOPRAZOLE SODIUM 40 MG: 40 TABLET, DELAYED RELEASE ORAL at 16:05

## 2018-04-27 RX ADMIN — Medication 10 ML: at 06:12

## 2018-04-27 RX ADMIN — Medication 10 ML: at 14:00

## 2018-04-27 RX ADMIN — INSULIN LISPRO 3 UNITS: 100 INJECTION, SOLUTION INTRAVENOUS; SUBCUTANEOUS at 16:49

## 2018-04-27 RX ADMIN — INSULIN LISPRO 15 UNITS: 100 INJECTION, SOLUTION INTRAVENOUS; SUBCUTANEOUS at 07:40

## 2018-04-27 RX ADMIN — SEVELAMER CARBONATE 1600 MG: 800 TABLET, FILM COATED ORAL at 16:05

## 2018-04-27 RX ADMIN — SUCRALFATE 1 G: 1 SUSPENSION ORAL at 11:45

## 2018-04-27 RX ADMIN — INSULIN LISPRO 15 UNITS: 100 INJECTION, SOLUTION INTRAVENOUS; SUBCUTANEOUS at 16:48

## 2018-04-27 NOTE — PROGRESS NOTES
[facility] documents that pt is on RA. Updates faxed to [facility]([phone]; fax [phone]). I spoke with Earnest Sanchez who will expect him between 0930-12n on Monday to do paperwork with a chairtime of 0615 on Tuesday.

## 2018-04-27 NOTE — PROGRESS NOTES
Hematology Oncology Progress Note       Follow up for: hemolytic anemia    Chart notes reviewed since last visit. Case discussed with following: patient. .     Patient complains of the following: no specific complaints today. Sitting up in chair. Additional concerns noted by the staff:     Patient Vitals for the past 24 hrs:   BP Temp Temp src Pulse Resp SpO2   04/27/18 1125 132/82 97.9 °F (36.6 °C) - 87 18 98 %   04/27/18 0813 126/73 97.2 °F (36.2 °C) - 93 18 97 %   04/27/18 0342 106/81 98 °F (36.7 °C) - 83 18 97 %   04/26/18 2342 123/76 98.1 °F (36.7 °C) - 86 18 99 %   04/26/18 1935 107/63 98 °F (36.7 °C) - 85 20 94 %   04/26/18 1651 100/72 - - 97 - -   04/26/18 1642 (!) 141/100 97.5 °F (36.4 °C) - 86 19 95 %   04/26/18 1538 113/70 98.3 °F (36.8 °C) Oral 83 20 -   04/26/18 1530 118/67 - - 87 19 -   04/26/18 1500 137/77 - - 76 17 -   04/26/18 1445 129/83 98 °F (36.7 °C) - 77 20 -   04/26/18 1430 119/68 97.8 °F (36.6 °C) - 82 23 -   04/26/18 1415 110/71 97.9 °F (36.6 °C) - 79 20 -   04/26/18 1357 116/64 97.9 °F (36.6 °C) - 74 23 -       ROS negative for 11 organ systems except as mentioned above. Physical Examination:  Constitutional Alert, cooperative, oriented. Mood and affect appropriate. Appears close to chronological age. Obese. Head Normocephalic; no scars   Eyes Conjunctivae and sclerae are clear and without icterus. Pupils are round   ENMT Sinuses are nontender. No oral exudates, ulcers, masses, thrush or mucositis. Oropharynx clear. Tongue normal.   Neck Supple without masses or thyromegaly. No jugular venous distension. Hematologic/Lymphatic No petechiae or purpura. No tender or palpable lymph nodes noted. Respiratory Lungs are clear to auscultation without rhonchi or wheezing. Cardiovascular Regular rate and rhythm of heart without murmurs, gallops or rubs. Chest / Line Site Chest is symmetric with no chest wall deformities.    Abdomen Non-tender, non-distended, no masses, ascites or hepatosplenomegaly. Good bowel sounds. No guarding or rebound tenderness. No pulsatile masses. Musculoskeletal No tenderness or swelling, normal range of motion without obvious weakness. Extremities No visible deformities, no cyanosis, clubbing or edema. Skin No rashes, scars, or lesions suggestive of malignancy. No petechiae, purpura, or ecchymoses. No excoriations. Neurologic No sensory or motor deficits noted. Psychiatric Alert and oriented. Coherent speech. Verbalizes understanding of our discussions today.        Labs:  Recent Results (from the past 24 hour(s))   GLUCOSE, POC    Collection Time: 04/26/18  4:41 PM   Result Value Ref Range    Glucose (POC) 115 (H) 65 - 100 mg/dL    Performed by [address], POC    Collection Time: 04/26/18  8:40 PM   Result Value Ref Range    Glucose (POC) 206 (H) 65 - 100 mg/dL    Performed by Saloni Brantley    CBC W/O DIFF    Collection Time: 04/27/18  3:35 AM   Result Value Ref Range    WBC 8.9 4.1 - 11.1 K/uL    RBC 2.81 (L) 4.10 - 5.70 M/uL    HGB 8.7 (L) 12.1 - 17.0 g/dL    HCT 26.2 (L) 36.6 - 50.3 %    MCV 93.2 80.0 - 99.0 FL    MCH 31.0 26.0 - 34.0 PG    MCHC 33.2 30.0 - 36.5 g/dL    RDW 18.8 (H) 11.5 - 14.5 %    PLATELET 230 633 - 722 K/uL    MPV 10.4 8.9 - 12.9 FL    NRBC 0.2 (H) 0  WBC    ABSOLUTE NRBC 0.02 (H) 0.00 - 0.01 K/uL   GLUCOSE, POC    Collection Time: 04/27/18  7:27 AM   Result Value Ref Range    Glucose (POC) 192 (H) 65 - 100 mg/dL    Performed by Maris Jarvis    GLUCOSE, POC    Collection Time: 04/27/18 11:26 AM   Result Value Ref Range    Glucose (POC) 71 65 - 100 mg/dL    Performed by Maris Jarvis    GLUCOSE, POC    Collection Time: 04/27/18 11:27 AM   Result Value Ref Range    Glucose (POC) 70 65 - 100 mg/dL    Performed by Maris Loft    GLUCOSE, POC    Collection Time: 04/27/18 11:46 AM   Result Value Ref Range    Glucose (POC) 81 65 - 100 mg/dL    Performed by Mani Rivas and Plan: Hemolytic anemia - Coomb's testing negative so not an obvious autoimmune hemolytic anemia. B12 okay so not intramedullary hemolysis. No evidence of plasma cell dyscrasia (given flipped alb:globulin - has no M spike and normal free light chain ratio) and hgb electropheresis is normal, ruling out a variety of hemoglobinopathies. Still await the two most important - G6PD and pyruvate kinase. Reviewed with him. Hyperosmolar coma due to DM - much improved    Rhabdomyolysis - improving. Acute kidney failure - being followed by Dr. Nelsy Suarez. permacath placed. Sepsis secondary to aspiration pneumonia - on antibiotics.

## 2018-04-27 NOTE — DIABETES MGMT
DTC Progress Note    Recommendations/ Comments: Pt hypoglycemic today at lunch time. Please consider:  1. Decreasing meal-time insulin to 12 units  2. Changing correction scale to normal sensitivity   3. Please document PO intake to better assess DM medication needs    Current hospital DM medication: Lantus 40 units bid, Lispro 15 units tid and correctional scale - resistant ac and hs. DTC will continue to follow patient as needed. Chart reviewed and [facility] [address] [facility] Patient is a 36 y.o. male with known history of Type 2 Diabetes on Glimepiride 4mg every morning, Lantus 25 units nightly, and Metformin ER 750mg - 2 tabs daily at home. A1c:   Lab Results   Component Value Date/Time    Hemoglobin A1c 12.8 (H) 04/12/2018 12:44 AM       Recent Glucose Results:   Lab Results   Component Value Date/Time    GLUCPOC 81 04/27/2018 11:46 AM    GLUCPOC 70 04/27/2018 11:27 AM    GLUCPOC 71 04/27/2018 11:26 AM        Lab Results   Component Value Date/Time    Creatinine 11.20 (H) 04/26/2018 06:18 AM       Active Orders   Diet    DIET DIABETIC CONSISTENT CARB Regular; 2 GM NA (House Low NA); Low Phosphorus, Low Potassium        PO intake:   Patient Vitals for the past 72 hrs:   % Diet Eaten   04/24/18 1800 50 %       Thank you.   Ervin Israel, [address] [place] [address]    [phone]

## 2018-04-27 NOTE — PROGRESS NOTES
Bedside and Verbal shift change report given to Nyla Coe (oncoming nurse) by Morales Chavarria RN (offgoing nurse). Report included the following information Kardex, MAR and Recent Results.

## 2018-04-27 NOTE — PROGRESS NOTES
Nutrition Assessment:    INTERVENTIONS/RECOMMENDATIONS:   Meals/Snacks: Modify diet/texture/consistency/nutrients: Recommend Consistent carbohydrate, 2g Na, low Phos, low K+ diet    ASSESSMENT:   Chart reviewed; patient medically noted for ESRD and new HD. Patient reports an improved appetite and eating well. Not much of a breakfast eater but had a hard boiled egg and some fruit. Phos remains elevated, K+ WNL. Will d/c renal restriction as to not restrict protein. Diet education provided on \"renal\" diet and following a low sodium, low phos, low potassium diet. Discussed high sodium foods to avoid and what foods contain high amounts of K+ and phos. Handouts provided and questions answered. Will keep K+ restriction for now despite levels being WNL as patient with many questions on diet and still learning K+ containing foods. Diet Order: Renal, Consistent carb  % Eaten:  Patient Vitals for the past 72 hrs:   % Diet Eaten   04/24/18 1800 50 %   04/24/18 1500 75 %     Pertinent Medications: [x] Reviewed []Other: Epogen, Lantus, Humalog, Protonix, Renvela   Pertinent Labs: [x]Reviewed  []Other: Phos 9.9, [phone]  Food Allergies: [x]None []Other:     Last BM: 4/24  [x]Active     []Hyperactive  []Hypoactive       [] Absent  BS  Skin:    [x] Intact   [] Incision  [] Breakdown   []Edema   []Other:    Anthropometrics: Height: 5' 9\" (175.3 cm) Weight: 137.3 kg (302 lb 11.2 oz)    IBW (%IBW): 72.7 kg (160 lb 4.4 oz) ( ) UBW (%UBW):   (  %)    BMI: Body mass index is 44.7 kg/(m^2).     This BMI is indicative of:  []Underweight   []Normal   []Overweight   [] Obesity   [x] Extreme Obesity (BMI>40)  Last Weight Metrics:  Weight Loss Metrics 4/26/2018 12/5/2017 9/5/2017 7/9/2017 7/8/2017 7/7/2017 5/3/2017   Today's Wt 302 lb 11.2 oz 247 lb 333 lb - 319 lb 3.6 oz 313 lb 6.4 oz 348 lb   BMI 44.7 kg/m2 35.44 kg/m2 47.78 kg/m2 45.8 kg/m2 - 43.71 kg/m2 48.54 kg/m2       Estimated Nutrition Needs (Based on): 2182 Kcals/day (BMR (2895) x 1. 3AF -750kcal) , 95 g (-110g (1.3-1.5 g/kg IBW)) Protein  Carbohydrate: At Least 130 g/day  Fluids: 1800 mL/day (or per nephrology)      Pt expected to meet estimated nutrient needs: [x]Yes []No    NUTRITION DIAGNOSES:   Problem:  Altered nutrition-related lab values      Etiology: related to ESRD     Signs/Symptoms: as evidenced by phos 9.9      NUTRITION INTERVENTIONS:  Meals/Snacks: Modify diet/texture/consistency/nutrients                  GOAL:   PO intake >70% of meals with phos trend WNL next 4-6 days    NUTRITION MONITORING AND EVALUATION     Food/Nutrient Intake Outcomes:  Total energy intake  Physical Signs/Symptoms Outcomes: Weight/weight change, Electrolyte and renal profile, GI profile, Glucose profile    Previous Goal Met:   [x] Met              [] Progressing Towards Goal              [] Not Progressing Towards Goal   Previous Recommendations:   [x] Implemented          [] Not Implemented          [] Not Applicable    LEARNING NEEDS (Diet, Food/Nutrient-Drug Interaction):    [x] None Identified   [] Identified and Education Provided/Documented   [] Identified and Pt declined/was not appropriate     Cultural, Scientologist, OR Ethnic Dietary Needs:    [x] None Identified   [] Identified and Addressed     [x] Interdisciplinary Care Plan Reviewed/Documented    [x] Discharge Planning: Consistent carbohydrate/renal diet    [] Participated in Interdisciplinary Rounds    NUTRITION RISK:    [] High              [] Moderate           [x]  Low  []  Minimal/Uncompromised      Carmen Barrett  Pager [phone]              Weekend Pager [phone]

## 2018-04-27 NOTE — PROGRESS NOTES
INTERNAL MEDICINE ATTENDING NOTE    S: Mr. Garret Quintanilla is a patient of mine who was seen by me today during rounds. At this time, he is awake, alert, and calm. He had an episode of tingling in \"my whole body\" last night after placement of permcath; improved. Nausea is better. +Headaches. ROS otherwise unremarkable except as noted elsewhere. O: Blood pressure 126/73, pulse 93, temperature 97.2 °F (36.2 °C), resp. rate 18, height 5' 9\" (1.753 m), weight 302 lb 11.2 oz (137.3 kg), SpO2 97 %. Gen: Patient is in no acute distress. Lungs: CTAB. Heart: RRR. Abd: S, NT, ND, BS present. Extremities: Warm. Recent Results (from the past 12 hour(s))   CBC W/O DIFF    Collection Time: 04/27/18  3:35 AM   Result Value Ref Range    WBC 8.9 4.1 - 11.1 K/uL    RBC 2.81 (L) 4.10 - 5.70 M/uL    HGB 8.7 (L) 12.1 - 17.0 g/dL    HCT 26.2 (L) 36.6 - 50.3 %    MCV 93.2 80.0 - 99.0 FL    MCH 31.0 26.0 - 34.0 PG    MCHC 33.2 30.0 - 36.5 g/dL    RDW 18.8 (H) 11.5 - 14.5 %    PLATELET 479 154 - 442 K/uL    MPV 10.4 8.9 - 12.9 FL    NRBC 0.2 (H) 0  WBC    ABSOLUTE NRBC 0.02 (H) 0.00 - 0.01 K/uL   GLUCOSE, POC    Collection Time: 04/27/18  7:27 AM   Result Value Ref Range    Glucose (POC) 192 (H) 65 - 100 mg/dL    Performed by Jaclyn Fuentes         CXR 4/15: No PTX. CXR 4/11: Minimal R ML infiltrate. CXR 4/12: No acute abnormality. US Retroperitoneal 4/14: Normal size and appearance of the kidneys without hydronephrosis. Nondistended urinary bladder with a Grigsby catheter. US abdomen 4/17: Echogenic liver suggesting fatty infiltration with some areas of relative sparing. Contracted gallbladder. No obvious gallstones. Pancreas not visualized due to overlying bowel gas. No hydronephrosis. EGD 4/20: Ulcerative esophagitis, Hiatal hernia. A/P:   1. Hemolytic anemia: Has been receiving transfusions with dialysis; Per Nephrology. Hem-Onc is following now as well.   Work up in progress. 2. Rhabdomyolysis: CK trending down   3. LITTLE: HD per Nephrology. 4. Hyperosmolar coma due to secondary diabetes ([facility] [address] (4/12/2018): Treated with IVF and insulin. Glc is improved. 5. Epigastric pain and N/V: Ulcerative esophagitis on EGD; appreicate GI input. Continue Prn maalox. Prn zofran. 6. Hypernatremia: hydration; watch labs. 7. Sepsis due to aspiration pneumonia, POA: Empiric antibiotics. 8. Hypoxia: O2 as needed. 9. Acute encephalopathy (4/12/2018): Improving. He is now awake, no longer paranoid, seems at baseline. 10. Hyperlipidemia (5/1/2012): Hold statin  11. Morbid obesity ([address] (5/6/2013)  12. Hypertension (10/31/2014): BP okay.        Santino Corral MD, FACP  Best contact is via Pager: [phone], or via hospital  at [phone]

## 2018-04-27 NOTE — PROGRESS NOTES
NAME: Carly Serrato        :  1975        MRN:  [id number]        Assessment :    Plan:  --LITTLE   Rhabdo  Obesity  HTN  Sepsis  Leukocytosis --HD #1 4/15. HD TTS schedule. Watch for renal recovery (none so far). Suspect that recovery will take time. Approved for outpatient HD at [address] [place] [address] 15      2 units prbc's on  and again ; ulcerative esophagitis    Has a hemolytic anemia as well (spherocytes); Dr. Yvon Richard following; on epo 10 k sc tiw    Renvela with meals    He is stable for discharge from my standpoint. Subjective: We discussed the above. Denies n/v/d. No sob. Objective:     VITALS:   Last 24hrs VS reviewed since prior progress note. Most recent are:  Visit Vitals    /82    Pulse 87    Temp 97.9 °F (36.6 °C)    Resp 18    Ht 5' 9\" (1.753 m)    Wt 137.3 kg (302 lb 11.2 oz)    SpO2 98%    BMI 44.7 kg/m2       Intake/Output Summary (Last 24 hours) at 18 1204  Last data filed at 18 0556   Gross per 24 hour   Intake                0 ml   Output              850 ml   Net             -850 ml      Telemetry Reviewed:     PHYSICAL EXAM:  General: NAD   cta eduardo  Right ij mik is intact  Soft ntnd   No edema       ________________________________________________________________________  Liz Lundy MD     Procedures: see electronic medical records for all procedures/Xrays and details which  were not copied into this note but were reviewed prior to creation of Plan.       LABS:  Recent Labs      18   0335  18   WBC  8.9  10.8   HGB  8.7*  6.7*   HCT  26.2*  20.8*   PLT  270  309     Recent Labs      1818  18   0317   NA  141  136   K  4.1  3.8   CL  103  100   CO2  24  26   BUN  85*  78*   CREA  11.20*  10.30*   GLU  120*  171*   CA  8.8  8.9   PHOS  9.9*  8.6*     Recent Labs      18   1445   TP  6.4     No results for input(s): INR, PTP, APTT in the last 72 hours. No lab exists for component: INREXT, INREXT   No results for input(s): FE, TIBC, PSAT, FERR in the last 72 hours. Lab Results   Component Value Date/Time    Folate 11.4 04/24/2018 02:45 PM      No results for input(s): PH, PCO2, PO2 in the last 72 hours. No results for input(s): CPK, CKMB in the last 72 hours.     No lab exists for component: TROPONINI  No components found for: [place]  Lab Results   Component Value Date/Time    Color DARK YELLOW 04/24/2018 06:13 PM    Appearance CLOUDY (A) 04/24/2018 06:13 PM    Specific gravity 1.015 04/24/2018 06:13 PM    Specific gravity <1.005 04/12/2018 12:44 AM    pH (UA) 5.5 04/24/2018 06:13 PM    Protein 30 (A) 04/24/2018 06:13 PM    Glucose NEGATIVE  04/24/2018 06:13 PM    Ketone TRACE (A) 04/24/2018 06:13 PM    Bilirubin NEGATIVE  04/12/2018 12:44 AM    Urobilinogen 0.2 04/24/2018 06:13 PM    Nitrites NEGATIVE  04/24/2018 06:13 PM    Leukocyte Esterase LARGE (A) 04/24/2018 06:13 PM    Epithelial cells FEW 04/24/2018 06:13 PM    Bacteria 1+ (A) 04/24/2018 06:13 PM    WBC  04/24/2018 06:13 PM    RBC 10-20 04/24/2018 06:13 PM       MEDICATIONS:

## 2018-04-28 LAB
ANION GAP SERPL CALC-SCNC: 10 MMOL/L (ref 5–15)
BUN SERPL-MCNC: 53 MG/DL (ref 6–20)
BUN/CREAT SERPL: 6 (ref 12–20)
CALCIUM SERPL-MCNC: 9.2 MG/DL (ref 8.5–10.1)
CHLORIDE SERPL-SCNC: 101 MMOL/L (ref 97–108)
CO2 SERPL-SCNC: 28 MMOL/L (ref 21–32)
CREAT SERPL-MCNC: 8.46 MG/DL (ref 0.7–1.3)
ERYTHROCYTE [DISTWIDTH] IN BLOOD BY AUTOMATED COUNT: 17.2 % (ref 11.5–14.5)
GLUCOSE BLD STRIP.AUTO-MCNC: 101 MG/DL (ref 65–100)
GLUCOSE BLD STRIP.AUTO-MCNC: 141 MG/DL (ref 65–100)
GLUCOSE BLD STRIP.AUTO-MCNC: 176 MG/DL (ref 65–100)
GLUCOSE SERPL-MCNC: 87 MG/DL (ref 65–100)
HCT VFR BLD AUTO: 26.3 % (ref 36.6–50.3)
HGB BLD-MCNC: 8.6 G/DL (ref 12.1–17)
MCH RBC QN AUTO: 30.6 PG (ref 26–34)
MCHC RBC AUTO-ENTMCNC: 32.7 G/DL (ref 30–36.5)
MCV RBC AUTO: 93.6 FL (ref 80–99)
NRBC # BLD: 0 K/UL (ref 0–0.01)
NRBC BLD-RTO: 0 PER 100 WBC
PHOSPHATE SERPL-MCNC: 7.3 MG/DL (ref 2.6–4.7)
PLATELET # BLD AUTO: 272 K/UL (ref 150–400)
PMV BLD AUTO: 10.9 FL (ref 8.9–12.9)
POTASSIUM SERPL-SCNC: 3.6 MMOL/L (ref 3.5–5.1)
PYRUVATE KINASE: 3.6 U/G HB (ref 4.6–11.2)
RBC # BLD AUTO: 2.81 M/UL (ref 4.1–5.7)
SERVICE CMNT-IMP: ABNORMAL
SODIUM SERPL-SCNC: 139 MMOL/L (ref 136–145)
WBC # BLD AUTO: 10.8 K/UL (ref 4.1–11.1)

## 2018-04-28 PROCEDURE — 74011250637 HC RX REV CODE- 250/637: Performed by: INTERNAL MEDICINE

## 2018-04-28 PROCEDURE — 82962 GLUCOSE BLOOD TEST: CPT

## 2018-04-28 PROCEDURE — 80048 BASIC METABOLIC PNL TOTAL CA: CPT | Performed by: INTERNAL MEDICINE

## 2018-04-28 PROCEDURE — 85027 COMPLETE CBC AUTOMATED: CPT | Performed by: INTERNAL MEDICINE

## 2018-04-28 PROCEDURE — 90935 HEMODIALYSIS ONE EVALUATION: CPT

## 2018-04-28 PROCEDURE — 65660000000 HC RM CCU STEPDOWN

## 2018-04-28 PROCEDURE — 74011636637 HC RX REV CODE- 636/637: Performed by: INTERNAL MEDICINE

## 2018-04-28 PROCEDURE — 74011250636 HC RX REV CODE- 250/636: Performed by: INTERNAL MEDICINE

## 2018-04-28 PROCEDURE — 36415 COLL VENOUS BLD VENIPUNCTURE: CPT | Performed by: INTERNAL MEDICINE

## 2018-04-28 PROCEDURE — 74011250637 HC RX REV CODE- 250/637: Performed by: SPECIALIST

## 2018-04-28 PROCEDURE — 84100 ASSAY OF PHOSPHORUS: CPT | Performed by: INTERNAL MEDICINE

## 2018-04-28 RX ORDER — INSULIN LISPRO 100 [IU]/ML
14 INJECTION, SOLUTION INTRAVENOUS; SUBCUTANEOUS
Status: DISCONTINUED | OUTPATIENT
Start: 2018-04-28 | End: 2018-04-29

## 2018-04-28 RX ORDER — INSULIN LISPRO 100 [IU]/ML
INJECTION, SOLUTION INTRAVENOUS; SUBCUTANEOUS
Status: DISCONTINUED | OUTPATIENT
Start: 2018-04-28 | End: 2018-04-30 | Stop reason: HOSPADM

## 2018-04-28 RX ORDER — INSULIN LISPRO 100 [IU]/ML
13 INJECTION, SOLUTION INTRAVENOUS; SUBCUTANEOUS
Status: DISCONTINUED | OUTPATIENT
Start: 2018-04-28 | End: 2018-04-28

## 2018-04-28 RX ADMIN — SEVELAMER CARBONATE 1600 MG: 800 TABLET, FILM COATED ORAL at 16:50

## 2018-04-28 RX ADMIN — INSULIN LISPRO 14 UNITS: 100 INJECTION, SOLUTION INTRAVENOUS; SUBCUTANEOUS at 13:30

## 2018-04-28 RX ADMIN — Medication 10 ML: at 22:04

## 2018-04-28 RX ADMIN — HEPARIN SODIUM 1900 UNITS: 1000 INJECTION, SOLUTION INTRAVENOUS; SUBCUTANEOUS at 10:36

## 2018-04-28 RX ADMIN — TRAMADOL HYDROCHLORIDE 50 MG: 50 TABLET, FILM COATED ORAL at 05:42

## 2018-04-28 RX ADMIN — INSULIN LISPRO 3 UNITS: 100 INJECTION, SOLUTION INTRAVENOUS; SUBCUTANEOUS at 16:49

## 2018-04-28 RX ADMIN — Medication 10 ML: at 06:00

## 2018-04-28 RX ADMIN — SUCRALFATE 1 G: 1 SUSPENSION ORAL at 22:02

## 2018-04-28 RX ADMIN — INSULIN LISPRO 14 UNITS: 100 INJECTION, SOLUTION INTRAVENOUS; SUBCUTANEOUS at 16:48

## 2018-04-28 RX ADMIN — INSULIN GLARGINE 40 UNITS: 100 INJECTION, SOLUTION SUBCUTANEOUS at 22:02

## 2018-04-28 RX ADMIN — Medication 10 ML: at 14:00

## 2018-04-28 RX ADMIN — PANTOPRAZOLE SODIUM 40 MG: 40 TABLET, DELAYED RELEASE ORAL at 16:50

## 2018-04-28 RX ADMIN — SUCRALFATE 1 G: 1 SUSPENSION ORAL at 16:50

## 2018-04-28 RX ADMIN — ERYTHROPOIETIN 10000 UNITS: 10000 INJECTION, SOLUTION INTRAVENOUS; SUBCUTANEOUS at 16:49

## 2018-04-28 RX ADMIN — INSULIN LISPRO 2 UNITS: 100 INJECTION, SOLUTION INTRAVENOUS; SUBCUTANEOUS at 13:31

## 2018-04-28 NOTE — PROGRESS NOTES
INTERNAL MEDICINE ATTENDING NOTE    S: Mr. Eddie Duncan was seen by me  today during rounds. He had a hypoglycemic episode during lunch yesterday glucose 70. This AM glucose is 100  Overall patient feels well, he is undergoing HD this AM.   He is concerned his blood count may drop again today it is 8.6 yesterday was 8.7, Reviewed this with patient. ROS otherwise unremarkable except as noted elsewhere. O: Blood pressure 118/69, pulse 88, temperature 98.3 °F (36.8 °C), resp. rate 18, height 5' 9\" (1.753 m), weight 312 lb 8 oz (141.7 kg), SpO2 98 %. Gen: Patient is in no acute distress. Lungs: CTAB. Heart: RRR. Normal S1 S2 no m/g/r                                            Abd: S, NT, ND, BS present. Extremities: Warm.                                              HD catheter on right chest is clean and intact       Recent Results (from the past 12 hour(s))   GLUCOSE, POC    Collection Time: 04/28/18  7:19 AM   Result Value Ref Range    Glucose (POC) 101 (H) 65 - 100 mg/dL    Performed by Rosalino Sosa    PHOSPHORUS    Collection Time: 04/28/18  8:25 AM   Result Value Ref Range    Phosphorus 7.3 (H) 2.6 - 4.7 MG/DL   METABOLIC PANEL, BASIC    Collection Time: 04/28/18  8:25 AM   Result Value Ref Range    Sodium 139 136 - 145 mmol/L    Potassium 3.6 3.5 - 5.1 mmol/L    Chloride 101 97 - 108 mmol/L    CO2 28 21 - 32 mmol/L    Anion gap 10 5 - 15 mmol/L    Glucose 87 65 - 100 mg/dL    BUN 53 (H) 6 - 20 MG/DL    Creatinine 8.46 (H) 0.70 - 1.30 MG/DL    BUN/Creatinine ratio 6 (L) 12 - 20      GFR est AA 8 (L) >60 ml/min/1.73m2    GFR est non-AA 7 (L) >60 ml/min/1.73m2    Calcium 9.2 8.5 - 10.1 MG/DL   CBC W/O DIFF    Collection Time: 04/28/18  8:25 AM   Result Value Ref Range    WBC 10.8 4.1 - 11.1 K/uL    RBC 2.81 (L) 4.10 - 5.70 M/uL    HGB 8.6 (L) 12.1 - 17.0 g/dL    HCT 26.3 (L) 36.6 - 50.3 %    MCV 93.6 80.0 - 99.0 FL    MCH 30.6 26.0 - 34.0 PG    MCHC 32.7 30.0 - 36.5 g/dL    RDW 17.2 (H) 11.5 - 14.5 %    PLATELET 712 705 - 267 K/uL    MPV 10.9 8.9 - 12.9 FL    NRBC 0.0 0  WBC    ABSOLUTE NRBC 0.00 0.00 - 0.01 K/uL        CXR 4/15: No PTX. CXR 4/11: Minimal R ML infiltrate. CXR 4/12: No acute abnormality. US Retroperitoneal 4/14: Normal size and appearance of the kidneys without hydronephrosis. Nondistended urinary bladder with a Grigsby catheter. US abdomen 4/17: Echogenic liver suggesting fatty infiltration with some areas of relative sparing. Contracted gallbladder. No obvious gallstones. Pancreas not visualized due to overlying bowel gas. No hydronephrosis. EGD 4/20: Ulcerative esophagitis, Hiatal hernia. A/P:     1. Hemolytic anemia: last transfusion was on 04/26 and hemoglobin today is 8.6. Hem-Onc is following and part of the work is pending. Reviewed          Hem onc note. Will monitor patient (CBC) for another 24 hours   2. Rhabdomyolysis: CK trending down   3. LITTLE without signs of renal recovery: followed by renal. patient is on HD, permcath placed 04/26. Patient is set for outpatient HD at [address] [facility]   4. Hyperosmolar coma due to secondary diabetes: resolved, currently on lantus long acting 40 units and will decrease short acting to 13 units with  meals  given episode of hypoglycemia   5. Ulcerative esophagitis: nausea/ vomiting improved. EGD on 04/20 . Continue pantoprazole BID ; Continue Prn maalox. Prn zofran. 6. Hypernatremia: resolved   7. Sepsis due to aspiration pneumonia: completed antibiotics  8. Acute encephalopathy:  Improved  9. Hyperlipidemia:  Holding statin  10.  Morbid obesity: counseled on diet     Coy Nelson MD   Contact

## 2018-04-28 NOTE — PROGRESS NOTES
Problem: Falls - Risk of  Goal: *Absence of Falls  Document Beth Fall Risk and appropriate interventions in the flowsheet.    Outcome: Progressing Towards Goal  Fall Risk Interventions:  Mobility Interventions: Communicate number of staff needed for ambulation/transfer    Mentation Interventions: Adequate sleep, hydration, pain control, Increase mobility, Update white board    Medication Interventions: Teach patient to arise slowly    Elimination Interventions: Call light in reach, Urinal in reach

## 2018-04-28 NOTE — PROCEDURES
[facility] Dialysis Team [facility]  [phone]    Vitals   Pre   Post   Assessment   Pre   Post     Temp  Temp: 98.3 °F (36.8 °C) (04/28/18 0812)  98.1 LOC  A&OX4 A&OX4   HR   Pulse (Heart Rate): 98 (04/28/18 0812) 97 Lungs   clear  clear   B/P   BP: 125/73 (04/28/18 0812) 154/88 Cardiac   HRR  HRR   Resp   Resp Rate: 18 (04/28/18 0812) 18 Skin   intact  intact   Pain level  Pain Intensity 1: 0 (04/28/18 0742) 0 Edema  none     none   Orders:    Duration:   Start:   1699 End:    2407 Total:   4 hours   Dialyzer:   Dialyzer/Set Up Inspection: Nicolette Comes (04/28/18 0808)   K Bath:   Dialysate K (mEq/L): 3 (04/28/18 0808)   Ca Bath:   Dialysate CA (mEq/L): 2.5 (04/28/18 0769)   Na/Bicarb:   Dialysate NA (mEq/L): 140 (04/28/18 0808)   Target Fluid Removal:   Goal/Amount of Fluid to Remove (mL): 2500 mL (04/28/18 0808)   Access     Type & Location:   RIJ. No s/s of infection. Labs     Obtained/Reviewed   Critical Results Called   Date when labs were drawn-  Hgb-    HGB   Date Value Ref Range Status   04/27/2018 8.7 (L) 12.1 - 17.0 g/dL Final     K-    Potassium   Date Value Ref Range Status   04/26/2018 4.1 3.5 - 5.1 mmol/L Final     Ca-   Calcium   Date Value Ref Range Status   04/26/2018 8.8 8.5 - 10.1 MG/DL Final     Bun-   BUN   Date Value Ref Range Status   04/26/2018 85 (H) 6 - 20 MG/DL Final     Creat-   Creatinine   Date Value Ref Range Status   04/26/2018 11.20 (H) 0.70 - 1.30 MG/DL Final        Medications/ Blood Products Given     Name   Dose   Route and Time     Heparin 1000u/ml 3800 units  intracath/1245             Blood Volume Processed (BVP):    65.6 liters Net Fluid   Removed:  2500 ml   Comments RN completed patient assessment. RN reviewed technicians vital signs and procedure note. Tx completed.  Reviewed by LEIGHA Baptiste  Time Out Done: 0800  Primary Nurse Rpt Pre:  Luisa Marin, RN  Primary Nurse Rpt Post: Luisa April RN  Pt Education: procedural  Care Plan: on going  Tx Summary:  Port of permcath disinfected with Alcohol per policy. Each lumen aspirated for blood return and flushed with Normal Saline per policy. Dialysis initiated. 0930: Pt resting well, denies complaints  1030: 200ns given to flush system  1045: system changed d/t clotting    Admiting Diagnosis:  Pt's previous clinic-n/a  Consent signed - Informed Consent Verified: Yes (04/28/18 5935)  [facility] Consent - signed and on file  Hepatitis Status- neg 4/15/18  Machine #- Machine Number: [id number] (04/28/18 7793)  Telemetry status-  Pre-dialysis wt. - Pre-Dialysis Weight: 147.3 kg (324 lb 11.8 oz) (04/23/18 1340)

## 2018-04-28 NOTE — PROGRESS NOTES
Problem: Pressure Injury - Risk of  Goal: *Prevention of pressure ulcer   04/18/18 0815 04/27/18 8162   Wound Prevention and Protection Methods   Orientation of Wound Prevention --  Posterior   Location of Wound Prevention --  Buttocks; Sacrum/Coccyx   Dressing Present  --  No   Dressing Status Intact --    Read Only, Retired: Wound Treatment (non-mechanical)   Wound Offloading (Prevention Methods) --  Bed, pressure reduction mattress;Pillows;Repositioning;Turning

## 2018-04-28 NOTE — PROGRESS NOTES
NAME: Prasad Javed        :  1975        MRN:  [id number]        Assessment :    Plan:  --LITTLE   Rhabdo  Obesity  HTN  Sepsis  Leukocytosis --HD #1 4/15. HD TTS schedule. Watch for renal recovery (none so far). Suspect that recovery will take time. Approved for outpatient HD at [facility].  Seen on HD today-stable on hd    permcath      2 units prbc's on  and again ; ulcerative esophagitis    Has a hemolytic anemia as well (spherocytes); Dr. Pablo Dow following; on epo 10 k sc tiw    Renvela with meals    He is stable for discharge from my standpoint. Will see again on monday       Subjective:   Seen on hd,. Denies n/v/d. No sob. Objective:     VITALS:   Last 24hrs VS reviewed since prior progress note. Most recent are:  Visit Vitals    /88    Pulse 97    Temp 98.1 °F (36.7 °C)    Resp 18    Ht 5' 9\" (1.753 m)    Wt 141.7 kg (312 lb 8 oz)    SpO2 98%    BMI 46.15 kg/m2       Intake/Output Summary (Last 24 hours) at 18 1347  Last data filed at 18 1245   Gross per 24 hour   Intake          2563.25 ml   Output             1000 ml   Net          1563.25 ml      Telemetry Reviewed:     PHYSICAL EXAM:  General: NAD   cta eduardo  Right ij mik is intact  Soft ntnd   No edema       ________________________________________________________________________  Venice Silveira MD     Procedures: see electronic medical records for all procedures/Xrays and details which  were not copied into this note but were reviewed prior to creation of Plan.       LABS:  Recent Labs      18   0825  18   0335   WBC  10.8  8.9   HGB  8.6*  8.7*   HCT  26.3*  26.2*   PLT  272  270     Recent Labs      18   0825  18   0618   NA  139  141   K  3.6  4.1   CL  101  103   CO2  28  24   BUN  53*  85*   CREA  8.46*  11.20*   GLU  87  120*   CA  9.2  8.8   PHOS  7.3*  9.9*     No results for input(s): SGOT, GPT, AP, TBIL, TP, ALB, GLOB, GGT, AML, LPSE in the last 72 hours. No lab exists for component: AMYP, HLPSE  No results for input(s): INR, PTP, APTT in the last 72 hours. No lab exists for component: INREXT, INREXT   No results for input(s): FE, TIBC, PSAT, FERR in the last 72 hours. Lab Results   Component Value Date/Time    Folate 11.4 04/24/2018 02:45 PM      No results for input(s): PH, PCO2, PO2 in the last 72 hours. No results for input(s): CPK, CKMB in the last 72 hours.     No lab exists for component: TROPONINI  No components found for: [place]  Lab Results   Component Value Date/Time    Color DARK YELLOW 04/24/2018 06:13 PM    Appearance CLOUDY (A) 04/24/2018 06:13 PM    Specific gravity 1.015 04/24/2018 06:13 PM    Specific gravity <1.005 04/12/2018 12:44 AM    pH (UA) 5.5 04/24/2018 06:13 PM    Protein 30 (A) 04/24/2018 06:13 PM    Glucose NEGATIVE  04/24/2018 06:13 PM    Ketone TRACE (A) 04/24/2018 06:13 PM    Bilirubin NEGATIVE  04/12/2018 12:44 AM    Urobilinogen 0.2 04/24/2018 06:13 PM    Nitrites NEGATIVE  04/24/2018 06:13 PM    Leukocyte Esterase LARGE (A) 04/24/2018 06:13 PM    Epithelial cells FEW 04/24/2018 06:13 PM    Bacteria 1+ (A) 04/24/2018 06:13 PM    WBC  04/24/2018 06:13 PM    RBC 10-20 04/24/2018 06:13 PM       MEDICATIONS:

## 2018-04-29 LAB
BASOPHILS # BLD: 0.1 K/UL (ref 0–0.1)
BASOPHILS NFR BLD: 1 % (ref 0–1)
DIFFERENTIAL METHOD BLD: ABNORMAL
EOSINOPHIL # BLD: 0.1 K/UL (ref 0–0.4)
EOSINOPHIL NFR BLD: 1 % (ref 0–7)
ERYTHROCYTE [DISTWIDTH] IN BLOOD BY AUTOMATED COUNT: 16.9 % (ref 11.5–14.5)
GLUCOSE BLD STRIP.AUTO-MCNC: 101 MG/DL (ref 65–100)
GLUCOSE BLD STRIP.AUTO-MCNC: 147 MG/DL (ref 65–100)
GLUCOSE BLD STRIP.AUTO-MCNC: 168 MG/DL (ref 65–100)
GLUCOSE BLD STRIP.AUTO-MCNC: 170 MG/DL (ref 65–100)
GLUCOSE BLD STRIP.AUTO-MCNC: 173 MG/DL (ref 65–100)
GLUCOSE BLD STRIP.AUTO-MCNC: 62 MG/DL (ref 65–100)
GLUCOSE BLD STRIP.AUTO-MCNC: 65 MG/DL (ref 65–100)
GLUCOSE BLD STRIP.AUTO-MCNC: 91 MG/DL (ref 65–100)
HCT VFR BLD AUTO: 30.6 % (ref 36.6–50.3)
HGB BLD-MCNC: 10 G/DL (ref 12.1–17)
IMM GRANULOCYTES # BLD: 0.1 K/UL (ref 0–0.04)
IMM GRANULOCYTES NFR BLD AUTO: 1 % (ref 0–0.5)
LYMPHOCYTES # BLD: 2.6 K/UL (ref 0.8–3.5)
LYMPHOCYTES NFR BLD: 23 % (ref 12–49)
MCH RBC QN AUTO: 30.6 PG (ref 26–34)
MCHC RBC AUTO-ENTMCNC: 32.7 G/DL (ref 30–36.5)
MCV RBC AUTO: 93.6 FL (ref 80–99)
MONOCYTES # BLD: 0.9 K/UL (ref 0–1)
MONOCYTES NFR BLD: 9 % (ref 5–13)
NEUTS SEG # BLD: 7.2 K/UL (ref 1.8–8)
NEUTS SEG NFR BLD: 66 % (ref 32–75)
NRBC # BLD: 0.02 K/UL (ref 0–0.01)
NRBC BLD-RTO: 0.2 PER 100 WBC
PLATELET # BLD AUTO: 287 K/UL (ref 150–400)
PMV BLD AUTO: 10.4 FL (ref 8.9–12.9)
RBC # BLD AUTO: 3.27 M/UL (ref 4.1–5.7)
SERVICE CMNT-IMP: ABNORMAL
SERVICE CMNT-IMP: NORMAL
SERVICE CMNT-IMP: NORMAL
WBC # BLD AUTO: 10.9 K/UL (ref 4.1–11.1)

## 2018-04-29 PROCEDURE — 74011636637 HC RX REV CODE- 636/637: Performed by: INTERNAL MEDICINE

## 2018-04-29 PROCEDURE — 74011250637 HC RX REV CODE- 250/637: Performed by: INTERNAL MEDICINE

## 2018-04-29 PROCEDURE — 82962 GLUCOSE BLOOD TEST: CPT

## 2018-04-29 PROCEDURE — 74011250637 HC RX REV CODE- 250/637: Performed by: SPECIALIST

## 2018-04-29 PROCEDURE — 65660000000 HC RM CCU STEPDOWN

## 2018-04-29 PROCEDURE — 36415 COLL VENOUS BLD VENIPUNCTURE: CPT | Performed by: INTERNAL MEDICINE

## 2018-04-29 PROCEDURE — 85025 COMPLETE CBC W/AUTO DIFF WBC: CPT | Performed by: INTERNAL MEDICINE

## 2018-04-29 RX ORDER — INSULIN LISPRO 100 [IU]/ML
12 INJECTION, SOLUTION INTRAVENOUS; SUBCUTANEOUS
Status: DISCONTINUED | OUTPATIENT
Start: 2018-04-29 | End: 2018-04-30 | Stop reason: HOSPADM

## 2018-04-29 RX ADMIN — NYSTATIN: 100000 POWDER TOPICAL at 09:08

## 2018-04-29 RX ADMIN — INSULIN LISPRO 12 UNITS: 100 INJECTION, SOLUTION INTRAVENOUS; SUBCUTANEOUS at 17:06

## 2018-04-29 RX ADMIN — Medication 10 ML: at 21:30

## 2018-04-29 RX ADMIN — INSULIN GLARGINE 40 UNITS: 100 INJECTION, SOLUTION SUBCUTANEOUS at 21:30

## 2018-04-29 RX ADMIN — PANTOPRAZOLE SODIUM 40 MG: 40 TABLET, DELAYED RELEASE ORAL at 09:07

## 2018-04-29 RX ADMIN — SEVELAMER CARBONATE 1600 MG: 800 TABLET, FILM COATED ORAL at 12:27

## 2018-04-29 RX ADMIN — INSULIN LISPRO 3 UNITS: 100 INJECTION, SOLUTION INTRAVENOUS; SUBCUTANEOUS at 17:06

## 2018-04-29 RX ADMIN — INSULIN LISPRO 3 UNITS: 100 INJECTION, SOLUTION INTRAVENOUS; SUBCUTANEOUS at 09:17

## 2018-04-29 RX ADMIN — NYSTATIN: 100000 POWDER TOPICAL at 17:08

## 2018-04-29 RX ADMIN — SEVELAMER CARBONATE 1600 MG: 800 TABLET, FILM COATED ORAL at 17:07

## 2018-04-29 RX ADMIN — Medication 10 ML: at 17:05

## 2018-04-29 RX ADMIN — INSULIN GLARGINE 40 UNITS: 100 INJECTION, SOLUTION SUBCUTANEOUS at 09:17

## 2018-04-29 RX ADMIN — INSULIN LISPRO 14 UNITS: 100 INJECTION, SOLUTION INTRAVENOUS; SUBCUTANEOUS at 09:17

## 2018-04-29 RX ADMIN — SEVELAMER CARBONATE 1600 MG: 800 TABLET, FILM COATED ORAL at 09:07

## 2018-04-29 RX ADMIN — Medication 10 ML: at 17:06

## 2018-04-29 RX ADMIN — PANTOPRAZOLE SODIUM 40 MG: 40 TABLET, DELAYED RELEASE ORAL at 17:07

## 2018-04-29 NOTE — PROGRESS NOTES
INTERNAL MEDICINE ATTENDING NOTE    S: Jitendra Gonzales was seen by me  today during rounds. He complained of vision changes and noted to have low sugar in 60s prior to lunch. Improved with eating. Denies CP and dyspnea         ROS otherwise unremarkable except as noted elsewhere. O: Blood pressure 115/51, pulse 92, temperature 98.8 °F (37.1 °C), resp. rate 20, height 5' 9\" (1.753 m), weight 304 lb 0.2 oz (137.9 kg), SpO2 97 %. Gen: Patient is in no acute distress. Lungs: CTAB. Heart: RRR. Normal S1 S2 no m/g/r                                            Abd: S, NT, ND, BS present. Extremities: Warm. No edema                                             HD catheter on right chest is clean and intact       Recent Results (from the past 12 hour(s))   CBC WITH AUTOMATED DIFF    Collection Time: 04/29/18  3:56 AM   Result Value Ref Range    WBC 10.9 4.1 - 11.1 K/uL    RBC 3.27 (L) 4.10 - 5.70 M/uL    HGB 10.0 (L) 12.1 - 17.0 g/dL    HCT 30.6 (L) 36.6 - 50.3 %    MCV 93.6 80.0 - 99.0 FL    MCH 30.6 26.0 - 34.0 PG    MCHC 32.7 30.0 - 36.5 g/dL    RDW 16.9 (H) 11.5 - 14.5 %    PLATELET 732 601 - 982 K/uL    MPV 10.4 8.9 - 12.9 FL    NRBC 0.2 (H) 0  WBC    ABSOLUTE NRBC 0.02 (H) 0.00 - 0.01 K/uL    NEUTROPHILS 66 32 - 75 %    LYMPHOCYTES 23 12 - 49 %    MONOCYTES 9 5 - 13 %    EOSINOPHILS 1 0 - 7 %    BASOPHILS 1 0 - 1 %    IMMATURE GRANULOCYTES 1 (H) 0.0 - 0.5 %    ABS. NEUTROPHILS 7.2 1.8 - 8.0 K/UL    ABS. LYMPHOCYTES 2.6 0.8 - 3.5 K/UL    ABS. MONOCYTES 0.9 0.0 - 1.0 K/UL    ABS. EOSINOPHILS 0.1 0.0 - 0.4 K/UL    ABS. BASOPHILS 0.1 0.0 - 0.1 K/UL    ABS. IMM.  GRANS. 0.1 (H) 0.00 - 0.04 K/UL    DF AUTOMATED     GLUCOSE, POC    Collection Time: 04/29/18  8:35 AM   Result Value Ref Range    Glucose (POC) 147 (H) 65 - 100 mg/dL    Performed by Letitia Campbell (PCT)    GLUCOSE, POC    Collection Time: 04/29/18  9:03 AM   Result Value Ref Range    Glucose (POC) 170 (H) 65 - 100 mg/dL    Performed by Terrye Scheuermann (PCT)    GLUCOSE, POC    Collection Time: 04/29/18 11:38 AM   Result Value Ref Range    Glucose (POC) 65 65 - 100 mg/dL    Performed by Autumn Haji (PCT)    GLUCOSE, POC    Collection Time: 04/29/18 11:59 AM   Result Value Ref Range    Glucose (POC) 62 (L) 65 - 100 mg/dL    Performed by Terrye Scheuermann (PCT)    GLUCOSE, POC    Collection Time: 04/29/18 12:15 PM   Result Value Ref Range    Glucose (POC) 91 65 - 100 mg/dL    Performed by Terrye Scheuermann (PCT)         CXR 4/15: No PTX. CXR 4/11: Minimal R ML infiltrate. CXR 4/12: No acute abnormality. US Retroperitoneal 4/14: Normal size and appearance of the kidneys without hydronephrosis. Nondistended urinary bladder with a Grigsby catheter. US abdomen 4/17: Echogenic liver suggesting fatty infiltration with some areas of relative sparing. Contracted gallbladder. No obvious gallstones. Pancreas not visualized due to overlying bowel gas. No hydronephrosis. EGD 4/20: Ulcerative esophagitis, Hiatal hernia. A/P:     1. Hemolytic anemia: last transfusion was on 04/26 and hemoglobin today is 10.0. Hem-Onc is following and  Work up in progress. Reviewed          Hem onc note. Will monitor patient (CBC) for another 24 hours   2. Rhabdomyolysis: CK trending down   3. LITTLE without signs of renal recovery: followed by renal. patient is on HD, permcath placed 04/26. Patient is set for outpatient HD at [address] [facility]   4. Hyperosmolar coma due to secondary diabetes: resolved, currently on lantus long acting 40 units and will decrease short acting to 12 units with  meals given episode of hypoglycemia. He may need meal insulin on discharge / possibly starting with largest meal.   5. Ulcerative esophagitis: nausea/ vomiting improved. EGD on 04/20 . Continue pantoprazole BID ; Continue Prn maalox.  Prn zofran. 6. Hypernatremia: resolved   7. Sepsis due to aspiration pneumonia: completed antibiotics  8. Acute encephalopathy:  Improved  9. Hyperlipidemia:  Holding statin, resume at discharge  10.  Morbid obesity: counseled on diet     Sherry BARRIENTOS   Contact

## 2018-04-29 NOTE — PROGRESS NOTES
Problem: Falls - Risk of  Goal: *Absence of Falls  Document Beth Fall Risk and appropriate interventions in the flowsheet.    Outcome: Progressing Towards Goal  Fall Risk Interventions:  Mobility Interventions: Communicate number of staff needed for ambulation/transfer    Mentation Interventions: Adequate sleep, hydration, pain control    Medication Interventions: Evaluate medications/consider consulting pharmacy, Teach patient to arise slowly    Elimination Interventions: Call light in reach, Urinal in reach    History of Falls Interventions: Consult care management for discharge planning

## 2018-04-29 NOTE — PROGRESS NOTES
HYPOGLYCEMIC EPISODE DOCUMENTATION    Patient with hypoglycemic episode(s) at 1138(time) on 04/29(date). BG value(s) pre-treatment 72    Was patient symptomatic? [] yes, [x] no  Patient was treated with the following rescue medications/treatments: [] D50                [] Glucose tablets                [] Glucagon                [] 4oz juice                [x] 6oz reg soda                [] 8oz low fat milk  BG value post-treatment: 62  Pt given 8 oz apple juice and christina crackers. Recheck at [phone] was 91.   Once BG treated and value greater than 80mg/dl, pt was provided with the following:  [x] snack  [x] meal  Name of MD notified:None  The following orders were received: None

## 2018-04-29 NOTE — PROGRESS NOTES
Problem: Pressure Injury - Risk of  Goal: *Prevention of pressure ulcer  Outcome: Progressing Towards Goal   04/18/18 0815 04/28/18 1095   Wound Prevention and Protection Methods   Orientation of Wound Prevention --  Posterior   Location of Wound Prevention --  Buttocks; Sacrum/Coccyx   Dressing Present  --  No   Dressing Status Intact --    Read Only, Retired: Wound Treatment (non-mechanical)   Wound Offloading (Prevention Methods) --  Bed, pressure reduction mattress;Pillows;Repositioning;Turning

## 2018-04-29 NOTE — PROGRESS NOTES
Bedside and Verbal shift change report given to Valeriano Wolfe RN (oncoming nurse) by Liyah Simon RN (offgoing nurse). Report included the following information SBAR, Kardex, Intake/Output, MAR and Cardiac Rhythm Normal sinus to sinus tach.     Zone Phone for oncoming shift:   0806    Shift Summary: shift uneventful, possible discharge    LDAs               Peripheral IV 04/20/18 Left Hand (Active)   Site Assessment Clean, dry, & intact 4/29/2018  3:12 AM   Phlebitis Assessment 0 4/29/2018  3:12 AM   Infiltration Assessment 0 4/29/2018  3:12 AM   Dressing Status Clean;Dry;Loose 4/29/2018  3:12 AM   Dressing Type Transparent;Tape 4/29/2018  3:12 AM   Hub Color/Line Status Blue 4/29/2018  3:12 AM   Alcohol Cap Used Yes 4/26/2018  3:05 AM       Peripheral IV 04/25/18 Left;Upper Arm (Active)   Site Assessment Clean, dry, & intact 4/29/2018  3:12 AM   Phlebitis Assessment 0 4/29/2018  3:12 AM   Infiltration Assessment 0 4/29/2018  3:12 AM   Dressing Status Clean, dry, & intact 4/29/2018  3:12 AM   Dressing Type Transparent;Tape 4/29/2018  3:12 AM   Hub Color/Line Status Blue 4/28/2018  6:59 PM   Alcohol Cap Used Yes 4/26/2018  3:05 AM        Hemodialysis Access 04/26/18 (Active)   Central Line Being Utilized Yes 4/29/2018  3:12 AM   Criteria for Appropriate Use Dialysis/apheresis 4/29/2018  3:12 AM   Site Assessment Clean, dry, & intact 4/29/2018  3:12 AM   Date of Last Dressing Change 04/28/18 4/28/2018  6:59 PM   Dressing Status Clean, dry, & intact 4/29/2018  3:12 AM   Dressing Type Transparent;Tape 4/29/2018  3:12 AM   Proximal Hub Color/Line Status Blue 4/29/2018  3:12 AM   Distal Hub Color/Line Status Red 4/29/2018  3:12 AM                [REMOVED] Urinary Catheter 04/12/18 Grigsby-Indications for Use: Strict I/Os, every 1-2 hours   Intake & Output   Date 04/28/18 0700 - 04/29/18 0659 04/29/18 0700 - 04/30/18 0659   Shift 0700-1859 1900-0659 24 Hour Total 0700-1859 1900-0659 24 Hour Total   I  N  T  A  K  E   Shift Total  (mL/kg) O  U  T  P  U  T   Dialysis 1000  1000         NET Fluid Removed (mL) 1000  1000       Shift Total  (mL/kg) 1000  (7.1)  1000  (7.1)      NET -1000  -1000      Weight (kg) 141.7 141.7 141.7 141.7 141.7 141.7      Last Bowel Movement Last Bowel Movement Date: 04/27/18   Glucose Checks [] N/A  [x] AC/HS  [] Q6  Concerns: no sliding coverage needed, gave 40 units lantus   Nutrition Active Orders   Diet    DIET DIABETIC CONSISTENT CARB Regular; 2 GM NA (House Low NA);  Low Phosphorus, Low Potassium       Consults []PT  []OT  []Speech  [x]Case Management   Cardiac Monitoring []N/A [x]Yes Expires:

## 2018-04-29 NOTE — PROGRESS NOTES
Problem: Falls - Risk of  Goal: *Absence of Falls  Document Beth Fall Risk and appropriate interventions in the flowsheet.    Outcome: Progressing Towards Goal  Fall Risk Interventions:  Mobility Interventions: Communicate number of staff needed for ambulation/transfer    Mentation Interventions: Adequate sleep, hydration, pain control, Update white board    Medication Interventions: Teach patient to arise slowly    Elimination Interventions: Call light in reach, Urinal in reach

## 2018-04-30 ENCOUNTER — TELEPHONE (OUTPATIENT)
Dept: INTERNAL MEDICINE CLINIC | Age: 43
End: 2018-04-30

## 2018-04-30 VITALS
RESPIRATION RATE: 18 BRPM | TEMPERATURE: 98.5 F | WEIGHT: 302.03 LBS | OXYGEN SATURATION: 97 % | HEART RATE: 92 BPM | HEIGHT: 69 IN | SYSTOLIC BLOOD PRESSURE: 105 MMHG | BODY MASS INDEX: 44.73 KG/M2 | DIASTOLIC BLOOD PRESSURE: 85 MMHG

## 2018-04-30 LAB
ANION GAP SERPL CALC-SCNC: 9 MMOL/L (ref 5–15)
BUN SERPL-MCNC: 43 MG/DL (ref 6–20)
BUN/CREAT SERPL: 6 (ref 12–20)
CALCIUM SERPL-MCNC: 9.3 MG/DL (ref 8.5–10.1)
CHLORIDE SERPL-SCNC: 102 MMOL/L (ref 97–108)
CO2 SERPL-SCNC: 27 MMOL/L (ref 21–32)
CREAT SERPL-MCNC: 6.93 MG/DL (ref 0.7–1.3)
ERYTHROCYTE [DISTWIDTH] IN BLOOD BY AUTOMATED COUNT: 16.4 % (ref 11.5–14.5)
GLUCOSE BLD STRIP.AUTO-MCNC: 96 MG/DL (ref 65–100)
GLUCOSE SERPL-MCNC: 89 MG/DL (ref 65–100)
HCT VFR BLD AUTO: 28.8 % (ref 36.6–50.3)
HGB BLD-MCNC: 9.3 G/DL (ref 12.1–17)
IGA SERPL-MCNC: 326 MG/DL (ref 90–386)
IGG SERPL-MCNC: 966 MG/DL (ref 700–1600)
IGM SERPL-MCNC: 16 MG/DL (ref 20–172)
MAGNESIUM SERPL-MCNC: 2.2 MG/DL (ref 1.6–2.4)
MCH RBC QN AUTO: 30.5 PG (ref 26–34)
MCHC RBC AUTO-ENTMCNC: 32.3 G/DL (ref 30–36.5)
MCV RBC AUTO: 94.4 FL (ref 80–99)
NRBC # BLD: 0.02 K/UL (ref 0–0.01)
NRBC BLD-RTO: 0.2 PER 100 WBC
PHOSPHATE SERPL-MCNC: 5.8 MG/DL (ref 2.6–4.7)
PLATELET # BLD AUTO: 273 K/UL (ref 150–400)
PMV BLD AUTO: 10.4 FL (ref 8.9–12.9)
POTASSIUM SERPL-SCNC: 4 MMOL/L (ref 3.5–5.1)
PROT PATTERN SERPL IFE-IMP: ABNORMAL
RBC # BLD AUTO: 3.05 M/UL (ref 4.1–5.7)
SERVICE CMNT-IMP: NORMAL
SODIUM SERPL-SCNC: 138 MMOL/L (ref 136–145)
WBC # BLD AUTO: 10.4 K/UL (ref 4.1–11.1)

## 2018-04-30 PROCEDURE — 85027 COMPLETE CBC AUTOMATED: CPT | Performed by: INTERNAL MEDICINE

## 2018-04-30 PROCEDURE — 84100 ASSAY OF PHOSPHORUS: CPT | Performed by: INTERNAL MEDICINE

## 2018-04-30 PROCEDURE — 80048 BASIC METABOLIC PNL TOTAL CA: CPT | Performed by: INTERNAL MEDICINE

## 2018-04-30 PROCEDURE — 83735 ASSAY OF MAGNESIUM: CPT | Performed by: INTERNAL MEDICINE

## 2018-04-30 PROCEDURE — 74011636637 HC RX REV CODE- 636/637: Performed by: INTERNAL MEDICINE

## 2018-04-30 PROCEDURE — 74011250637 HC RX REV CODE- 250/637: Performed by: SPECIALIST

## 2018-04-30 PROCEDURE — 82962 GLUCOSE BLOOD TEST: CPT

## 2018-04-30 PROCEDURE — 74011250637 HC RX REV CODE- 250/637: Performed by: INTERNAL MEDICINE

## 2018-04-30 PROCEDURE — 36415 COLL VENOUS BLD VENIPUNCTURE: CPT | Performed by: INTERNAL MEDICINE

## 2018-04-30 RX ORDER — SEVELAMER CARBONATE 800 MG/1
1600 TABLET, FILM COATED ORAL
Qty: 90 TAB | Refills: 1 | Status: SHIPPED | OUTPATIENT
Start: 2018-04-30 | End: 2019-03-12 | Stop reason: ALTCHOICE

## 2018-04-30 RX ORDER — PANTOPRAZOLE SODIUM 40 MG/1
40 TABLET, DELAYED RELEASE ORAL
Qty: 30 TAB | Refills: 3 | Status: SHIPPED | OUTPATIENT
Start: 2018-04-30 | End: 2019-03-12

## 2018-04-30 RX ORDER — INSULIN GLARGINE 100 [IU]/ML
30 INJECTION, SOLUTION SUBCUTANEOUS 2 TIMES DAILY
Qty: 1 VIAL | Refills: 11 | Status: SHIPPED | OUTPATIENT
Start: 2018-04-30 | End: 2018-08-14 | Stop reason: SDUPTHER

## 2018-04-30 RX ORDER — SUCRALFATE 1 G/10ML
1 SUSPENSION ORAL
Qty: 1200 ML | Refills: 3 | Status: SHIPPED | OUTPATIENT
Start: 2018-04-30 | End: 2019-03-12 | Stop reason: ALTCHOICE

## 2018-04-30 RX ADMIN — INSULIN LISPRO 12 UNITS: 100 INJECTION, SOLUTION INTRAVENOUS; SUBCUTANEOUS at 08:32

## 2018-04-30 RX ADMIN — NYSTATIN: 100000 POWDER TOPICAL at 08:35

## 2018-04-30 RX ADMIN — PANTOPRAZOLE SODIUM 40 MG: 40 TABLET, DELAYED RELEASE ORAL at 08:31

## 2018-04-30 RX ADMIN — INSULIN GLARGINE 40 UNITS: 100 INJECTION, SOLUTION SUBCUTANEOUS at 08:31

## 2018-04-30 RX ADMIN — SEVELAMER CARBONATE 1600 MG: 800 TABLET, FILM COATED ORAL at 08:31

## 2018-04-30 NOTE — PROGRESS NOTES
INTERNAL MEDICINE ATTENDING NOTE    S: Mr. Estephania Alexander is a patient of mine who was seen by me today during rounds. At this time, he is awake, alert, and calm. Nausea is better. Headaches better. ROS otherwise unremarkable except as noted elsewhere. O: Blood pressure 105/85, pulse 92, temperature 98.5 °F (36.9 °C), resp. rate 18, height 5' 9\" (1.753 m), weight 302 lb 0.5 oz (137 kg), SpO2 97 %. Gen: Patient is in no acute distress. Lungs: CTAB. Heart: RRR. Abd: S, NT, ND, BS present. Extremities: Warm.        Recent Results (from the past 12 hour(s))   GLUCOSE, POC    Collection Time: 04/29/18  8:56 PM   Result Value Ref Range    Glucose (POC) 101 (H) 65 - 100 mg/dL    Performed by Kika Man (PCT)    METABOLIC PANEL, BASIC    Collection Time: 04/30/18  4:35 AM   Result Value Ref Range    Sodium 138 136 - 145 mmol/L    Potassium 4.0 3.5 - 5.1 mmol/L    Chloride 102 97 - 108 mmol/L    CO2 27 21 - 32 mmol/L    Anion gap 9 5 - 15 mmol/L    Glucose 89 65 - 100 mg/dL    BUN 43 (H) 6 - 20 MG/DL    Creatinine 6.93 (H) 0.70 - 1.30 MG/DL    BUN/Creatinine ratio 6 (L) 12 - 20      GFR est AA 11 (L) >60 ml/min/1.73m2    GFR est non-AA 9 (L) >60 ml/min/1.73m2    Calcium 9.3 8.5 - 10.1 MG/DL   MAGNESIUM    Collection Time: 04/30/18  4:35 AM   Result Value Ref Range    Magnesium 2.2 1.6 - 2.4 mg/dL   CBC W/O DIFF    Collection Time: 04/30/18  4:35 AM   Result Value Ref Range    WBC 10.4 4.1 - 11.1 K/uL    RBC 3.05 (L) 4.10 - 5.70 M/uL    HGB 9.3 (L) 12.1 - 17.0 g/dL    HCT 28.8 (L) 36.6 - 50.3 %    MCV 94.4 80.0 - 99.0 FL    MCH 30.5 26.0 - 34.0 PG    MCHC 32.3 30.0 - 36.5 g/dL    RDW 16.4 (H) 11.5 - 14.5 %    PLATELET 836 449 - 218 K/uL    MPV 10.4 8.9 - 12.9 FL    NRBC 0.2 (H) 0  WBC    ABSOLUTE NRBC 0.02 (H) 0.00 - 0.01 K/uL   PHOSPHORUS    Collection Time: 04/30/18  4:35 AM   Result Value Ref Range    Phosphorus 5.8 (H) 2.6 - 4.7 MG/DL   GLUCOSE, POC    Collection Time: 04/30/18  8:00 AM   Result Value Ref Range    Glucose (POC) 96 65 - 100 mg/dL    Performed by Content Ramenmed Ax (PCT)         CXR 4/15: No PTX. CXR 4/11: Minimal R ML infiltrate. CXR 4/12: No acute abnormality. US Retroperitoneal 4/14: Normal size and appearance of the kidneys without hydronephrosis. Nondistended urinary bladder with a Grigsby catheter. US abdomen 4/17: Echogenic liver suggesting fatty infiltration with some areas of relative sparing. Contracted gallbladder. No obvious gallstones. Pancreas not visualized due to overlying bowel gas. No hydronephrosis. EGD 4/20: Ulcerative esophagitis, Hiatal hernia. A/P:   1. Hemolytic anemia: Hgb appears to have stabilized. He has been receiving transfusions with dialysis; Per Nephrology, richard for discharge. Hem-Onc is following now as well. Work up in progress. 2. Rhabdomyolysis: CK trending down   3. LTITLE: HD per Nephrology. Continue outpatient dialysis T-Th-Sat  4. Hyperosmolar coma due to secondary diabetes ([facility] [address] (4/12/2018): Treated with IVF and insulin. Glc is improved. I'll send him home on lantus 30 units bid, and add back his glimepiride. Consider mealtime insulin if gluc spikes. (He has been on 40 units lantus bid here + about 10 units of mealtime humalog; At home had been on 27 units once a day of insulin+metformin+glimepiride). 5. Epigastric pain and N/V: Ulcerative esophagitis on EGD; appreicate GI input. Continue Prn maalox. Prn zofran. 6. Hypernatremia: hydration; watch labs. 7. Sepsis due to aspiration pneumonia, POA: Empiric antibiotics. 8. Hypoxia: O2 as needed. 9. Acute encephalopathy (4/12/2018): Improving. He is now awake, no longer paranoid, seems at baseline. 10. Hyperlipidemia (5/1/2012): Hold statin  11. Morbid obesity ([address] (5/6/2013)  12. Hypertension (10/31/2014): BP okay.        Radha Adair MD, FACP  Best contact is via Pager: [phone], or via hospital  at [phone]

## 2018-04-30 NOTE — PROGRESS NOTES
Bedside and Verbal shift change report given to Medhat Mullins RN (oncoming nurse) by Deepthi Dailey RN (offgoing nurse). Report included the following information SBAR, Kardex, Intake/Output, MAR and Cardiac Rhythm Normal sinus.     Zone Phone for oncoming shift:   0612    Shift Summary: uneventful shift, possible discharge    LDAs               Peripheral IV 04/20/18 Left Hand (Active)   Site Assessment Clean, dry, & intact 4/30/2018  3:45 AM   Phlebitis Assessment 0 4/30/2018  3:45 AM   Infiltration Assessment 0 4/30/2018  3:45 AM   Dressing Status Clean, dry, & intact 4/30/2018  3:45 AM   Dressing Type Transparent;Tape 4/30/2018  3:45 AM   Hub Color/Line Status Blue 4/30/2018  3:45 AM   Action Taken Dressing changed;Dressing reinforced 4/29/2018  2:30 PM   Alcohol Cap Used Yes 4/30/2018  3:45 AM       Peripheral IV 04/25/18 Left;Upper Arm (Active)   Site Assessment Clean, dry, & intact 4/30/2018  3:45 AM   Phlebitis Assessment 0 4/30/2018  3:45 AM   Infiltration Assessment 0 4/30/2018  3:45 AM   Dressing Status Clean, dry, & intact 4/30/2018  3:45 AM   Dressing Type Transparent;Tape 4/30/2018  3:45 AM   Hub Color/Line Status Blue 4/30/2018  3:45 AM   Alcohol Cap Used Yes 4/29/2018  7:07 PM        Hemodialysis Access 04/26/18 (Active)   Central Line Being Utilized Yes 4/30/2018  3:45 AM   Criteria for Appropriate Use Dialysis/apheresis 4/30/2018  3:45 AM   Site Assessment Clean, dry, & intact 4/30/2018  3:45 AM   Date of Last Dressing Change 04/28/18 4/29/2018  7:07 PM   Dressing Status Clean, dry, & intact 4/30/2018  3:45 AM   Dressing Type Transparent;Tape 4/30/2018  3:45 AM   Proximal Hub Color/Line Status Blue 4/30/2018  3:45 AM   Distal Hub Color/Line Status Red 4/30/2018  3:45 AM                [REMOVED] Urinary Catheter 04/12/18 Grigsby-Indications for Use: Strict I/Os, every 1-2 hours   Intake & Output   Date 04/29/18 0700 - 04/30/18 0659 04/30/18 0700 - 05/01/18 0659   Shift 2208-9442 8941-8893 24 Hour Total 4634-3314 3079-3654 24 Hour Total   I  N  T  A  K  E   P.O. 1080  1080         P. O. 1080 1080       Shift Total  (mL/kg) 1080  (7.8)  1080  (7.9)      O  U  T  P  U  T   Shift Total  (mL/kg)         NET 1080 1080      Weight (kg) 137.9 137 137 137 137 137      Last Bowel Movement Last Bowel Movement Date: 04/29/18   Glucose Checks [] N/A  [x] AC/HS  [] Q6  Concerns: 40 units of Lantus, no sliding scale coverage needed   Nutrition Active Orders   Diet    DIET DIABETIC CONSISTENT CARB Regular; 2 GM NA (House Low NA);  Low Phosphorus, Low Potassium       Consults []PT  []OT  []Speech  [x]Case Management   Cardiac Monitoring []N/A [x]Yes Expires:

## 2018-04-30 NOTE — ROUTINE PROCESS
PCP [facility]MA[facility] appt scheduled with Dr. Eladio Klein on 5/7/18 at 10:45AM. Appt added to AVS. Javier Carter CM Specialist

## 2018-04-30 NOTE — PROGRESS NOTES
Problem: Falls - Risk of  Goal: *Absence of Falls  Document Beth Fall Risk and appropriate interventions in the flowsheet.    Outcome: Progressing Towards Goal  Fall Risk Interventions:  Mobility Interventions: Communicate number of staff needed for ambulation/transfer    Mentation Interventions: Adequate sleep, hydration, pain control, Update white board, Increase mobility    Medication Interventions: Teach patient to arise slowly    Elimination Interventions: Call light in reach, Urinal in reach    History of Falls Interventions: Consult care management for discharge planning

## 2018-04-30 NOTE — DISCHARGE INSTRUCTIONS
PATIENT DISCHARGE INSTRUCTIONS      PATIENT DISCHARGE INSTRUCTIONS    Karolyn Hatchet / [id number] : 1975    Admitted 2018 Discharged: 2018       · It is important that you take the medication exactly as they are prescribed. · Keep your medication in the bottles provided by the pharmacist and keep a list of the medication names, dosages, and times to be taken in your wallet. · Do not take other medications without consulting your doctor.      What to do at Home    Recommended Diet: Diabetic Diet and Renal Diet    Recommended Activity: Activity as tolerated        Signed By: Yuni David MD     2018

## 2018-04-30 NOTE — PROGRESS NOTES
Renal-pt seen/examined. OK with dc. D/W [facility] dialysis unit.   Daija Bennett MD

## 2018-04-30 NOTE — DISCHARGE SUMMARY
Physician Discharge Summary     Patient ID:  Melvin Jiménez  [id number]  77 y.o.  1975    Admit date: 4/11/2018    Discharge date and time: 4/30/2018    Admission Diagnoses: Hyperosmolar coma due to secondary diabetes (HCC);epigastri*    Discharge Diagnoses:  Principal Diagnosis <principal problem not specified>                                              Active Problems:    Hyperlipidemia (5/1/2012)      Morbid obesity ([address] (5/6/2013)      Hypertension (10/31/2014)      Type II diabetes mellitus, uncontrolled ([address] (6/30/2016)      LITTLE (acute kidney injury) ([facility] (7/9/2017)      Hyperosmolar coma due to secondary diabetes ([facility] (4/12/2018)      Acute encephalopathy (4/12/2018)      Non-traumatic rhabdomyolysis (4/15/2018)       Consults: Pulmonary/Intensive care, GI, Nephrology and Hematology/Oncology    Significant Diagnostic Studies:     CXR 4/15: No PTX.      CXR 4/11: Minimal R ML infiltrate.      CXR 4/12: No acute abnormality.      US Retroperitoneal 4/14: Normal size and appearance of the kidneys without hydronephrosis. Nondistended urinary bladder with a Grigsby catheter.     US abdomen 4/17: Echogenic liver suggesting fatty infiltration with some areas of relative sparing. Contracted gallbladder. No obvious gallstones. Pancreas not visualized due to overlying bowel gas. No hydronephrosis.     EGD 4/20: Ulcerative esophagitis, Hiatal hernia. Hospital Course: Mr. Melvin Jiménez is a patient of mine who presented with the problems above. See the initial H and P for full details. CHIEF COMPLAINT: altered mental status     HISTORY OF PRESENT ILLNESS:     Elisha Martinez is a 36 y.o.  male who presents with altered mental status. As per pt's significant other she noted pt to be lethargic in the morning which gotten worse as the day progressed. In ED pt noted somnolent and hard to arouse.  Pt noted to have blood sugar of > 600 on POC BS and upon sending lab, lab didn't run it as it was noted > 1500 and lab didn't believe the value. Per bedside RN, labs were sent 3 times already. Labs now sent again and lab is requested to run despite whatever the value is. History is limited due to patient's altered level of responsiveness.      We were asked to admit for work up and evaluation of the above problems. By problem. ..    1. Hemolytic anemia: His hemoglobin appears to have stabilized. He has been receiving transfusions with dialysis; Per Nephrology, he is okay for discharge. Hem-Onc, Dr. Mingo Davis, has following; Work up in progress. Per her last note 4/27:   Coomb's testing negative so not an obvious autoimmune hemolytic anemia. B12 okay so not intramedullary hemolysis. No evidence of plasma cell dyscrasia (given flipped alb:globulin - has no M spike and normal free light chain ratio) and hgb electropheresis is normal, ruling out a variety of hemoglobinopathies. Still await the two most important - G6PD and pyruvate kinase. Reviewed with him  2. Rhabdomyolysis: CK has improved. 3. LITTLE: HD per Nephrology. He has not had significant renal recovery, and this could take weeks, per Nephrology notes. Continue outpatient dialysis T-Th-Sat  4. Hyperosmolar coma due to secondary diabetes ([facility] (4/12/2018): Treated with IVF and insulin. Glc is improved. I'll send him home on lantus 30 units bid, and add back his glimepiride. Consider mealtime insulin if gluc spikes. (He has been on 40 units lantus bid here + about 10 units of mealtime humalog; At home had been on 27 units once a day of insulin +metformin  ii per day +glimepiride 4 mg daily; with good contol--A1C 5.5-6.1 as recently as Dec 2017). Stop metformin. 5. Epigastric pain and N/V: Ulcerative esophagitis on EGD; appreicate GI input. Continue Prn maalox. Prn zofran. 6. Hypernatremia: hydration; watch labs. 7. Sepsis due to aspiration pneumonia, POA: Empiric antibiotics. 8. Hypoxia: O2 as needed.    9. Acute encephalopathy (4/12/2018): Improving. He is now awake, no longer paranoid, seems at baseline. 10. Hyperlipidemia (5/1/2012): Hold statin  11. Morbid obesity ([address] (5/6/2013)  12. Hypertension (10/31/2014): BP okay. Discharge Exam:  See progress note from today. Disposition: home    Patient Instructions:   Current Discharge Medication List      START taking these medications    Details   sevelamer carbonate (RENVELA) 800 mg tab tab Take 2 Tabs by mouth three (3) times daily (with meals). Qty: 90 Tab, Refills: 1      sucralfate (CARAFATE) 100 mg/mL suspension Take 10 mL by mouth Before breakfast, lunch, dinner and at bedtime. Qty: 1200 mL, Refills: 3      pantoprazole (PROTONIX) 40 mg tablet Take 1 Tab by mouth Before breakfast and dinner. Qty: 30 Tab, Refills: 3         CONTINUE these medications which have CHANGED    Details   insulin glargine (LANTUS) 100 unit/mL injection 30 Units by SubCUTAneous route two (2) times a day. Qty: 1 Vial, Refills: 11         CONTINUE these medications which have NOT CHANGED    Details   glimepiride (AMARYL) 4 mg tablet TAKE 1 TABLET BY MOUTH EVERY MORNING  Qty: 30 Tab, Refills: 5      pravastatin (PRAVACHOL) 20 mg tablet Take 1 Tab by mouth nightly. Qty: 30 Tab, Refills: 11      Lancets misc Use as directed to test blood sugar three times daily -DX-DM-E11.9  Qty: 90 Each, Refills: 11    Associated Diagnoses: Uncontrolled diabetes mellitus type 2 without complications, unspecified long term insulin use status      glucose blood VI test strips (BLOOD GLUCOSE TEST) strip Use as directed to test blood sugar three times daily -DX-DM-E11.9  Qty: 90 Strip, Refills: 11    Associated Diagnoses: Uncontrolled diabetes mellitus type 2 without complications, unspecified long term insulin use status      Blood-Glucose Meter monitoring kit Use as directed to test blood sugar three times daily -DX-DM-E11.9  Qty: 1 Kit, Refills: 0      DOCOSAHEXANOIC ACID/EPA (FISH OIL PO) Take  by mouth.          STOP taking these medications       losartan-hydroCHLOROthiazide (HYZAAR) 100-25 mg per tablet Comments:   Reason for Stopping:         metFORMIN ER (GLUCOPHAGE XR) 750 mg tablet Comments:   Reason for Stopping:             Recommended Diet: Diabetic Diet and Renal Diet  Recommended Activity: Activity as tolerated    Follow-up With  Details  Why  Contact Info   [address]       do paperwork between 9:30am-noon on 4/30. Rodney Nelson  In 1 week    [address] [facility]  [phone]     Be Grimes    as directed by him, for erosive esophagitis  Kamryn Rice [address] [facility]    as directed, for hemolytic anemia  [address]  [address]  [facility]  [phone]         Signed:  Jazzy Castanon MD  4/30/2018  9:06 AM    Note: Greater than 30 minutes were spent on activities related to this discharge.

## 2018-04-30 NOTE — PROGRESS NOTES
1010 Discharge paperwork discussed with patient. Telemetry box and x2 PIVs removed. Permacath in place for outpatient HD. Handouts provided on new medications & side effects and handout provided on renal and diabetic diet.

## 2018-04-30 NOTE — TELEPHONE ENCOUNTER
Duane Rodarte pt's caretaker, is calling to speak with nurse regarding an e-mail they can send pt's fmla paperwork. She stated he was just discharged from hospital. The best contact is [phone].        Message received & copied from [facility], [place]

## 2018-04-30 NOTE — PROGRESS NOTES
Problem: Pressure Injury - Risk of  Goal: *Prevention of pressure ulcer  Outcome: Progressing Towards Goal   04/18/18 0815 04/29/18 0845 04/29/18 1907   Wound Prevention and Protection Methods   Orientation of Wound Prevention --  --  Posterior   Location of Wound Prevention --  --  Sacrum/Coccyx   Dressing Present  --  No --    Dressing Status Intact --  --    Read Only, Retired: Wound Treatment (non-mechanical)   Wound Offloading (Prevention Methods) --  --  Bed, pressure reduction mattress;Repositioning

## 2018-04-30 NOTE — PROGRESS NOTES
CM noted discharge order for Pt is in. Pt will be going to [facility] this morning to complete paperwork today with Jamari Sat. They are expecting him between 930 AM and Noon. His chairtime is T/Th/S at 6:15. [facility] on Tuesday 5/1/18. Phone: [phone]    CM emailed CM Specialist to make PCP appointment with Dr. Renetta Altamirano. No further CM needs. Care Management Interventions  PCP Verified by CM: Yes  Palliative Care Criteria Met (RRAT>21 & CHF Dx)?: No  Mode of Transport at Discharge:  Other (see comment)  Transition of Care Consult (CM Consult): Discharge Planning  MyChart Signup: No  Discharge Durable Medical Equipment: No  Physical Therapy Consult: No  Occupational Therapy Consult: No  Speech Therapy Consult: No  Current Support Network: Lives with Spouse, Own Home  Confirm Follow Up Transport: Self  Plan discussed with Pt/Family/Caregiver: Yes  Freedom of Choice Offered: Yes  Discharge Location  Discharge Placement: Home with outpatient services    Indra Kelly1 SHALOM Ortega

## 2018-05-01 NOTE — ADT AUTH CERT NOTES
Utilization Review           Diabetes - Care Day 18 (4/29/2018) by Ivon Sparrow        Review Status Review Entered       Completed 5/1/2018       Details              Care Day: 18 Care Date: 4/29/2018 Level of Care: Telemetry       Guideline Day 2        Clinical Status       (X) * Alert       ( ) * No precipitating cause identified or cause manageable in outpatient setting       ( ) * Hemodynamic stability       5/1/2018 1:04 PM EDT by Eduardo Escobar         98.1-116/-51-99% RA              (X) * Acceptable acid-base balance       ( ) * Blood glucose under acceptable control       5/1/2018 1:04 PM EDT by Eduardo Escobar         glu 170              (X) * Dehydration absent       (X) * Electrolytes acceptable       (X) * Diet tolerated       ( ) * Discharge plans and education understood              Activity       ( ) * Ambulatory              Routes       (X) * Oral hydration, medications, and diet              Medications       (X) Subcutaneous insulin       5/1/2018 1:04 PM EDT by Eduardo Escobar         insulin sc x6              ( ) * Outpatient diabetic medication regimen established                                   * Milestone              Additional Notes       Hemolytic anemia: last transfusion was on 04/26 and hemoglobin today is 10.0.  Hem-Onc is following and  Work up in progress.  Reviewed Hem onc note. Will monitor patient (CBC) for another 24 hours        Rhabdomyolysis: CK trending down        LITTLE without signs of renal recovery: followed by renal. patient is on HD, permcath placed 04/26. Patient is set for outpatient HD at [facility] IN[facility] T[facility]        Hyperosmolar coma due to secondary diabetes: resolved, currently on lantus long acting 40 units and will decrease short acting to 12 units with meals given episode of hypoglycemia.  He may need meal insulin on discharge / possibly starting with largest meal.        Ulcerative esophagitis: nausea/ vomiting improved. EGD on 04/20 . Continue pantoprazole BID ; Continue Prn maalox. Prn zofran.       Hypernatremia: resolved        Sepsis due to aspiration pneumonia: completed antibiotics       Acute encephalopathy:  Improved       Hyperlipidemia:  Holding statin, resume at discharge       Morbid obesity: counseled on diet              rbc 3.27 hgb 10 hct 30. 6        protonix po x2           Diabetes - Care Day 17 (4/28/2018) by Yao Thomas        Review Status Review Entered       Completed 5/1/2018       Details              Care Day: 17 Care Date: 4/28/2018 Level of Care: Telemetry       Guideline Day 2        Clinical Status       (X) * Alert       ( ) * No precipitating cause identified or cause manageable in outpatient setting       ( ) * Hemodynamic stability       5/1/2018 12:56 PM EDT by Cezar Vargas         97.9-106/-17-95% RA              (X) * Acceptable acid-base balance       ( ) * Blood glucose under acceptable control       5/1/2018 12:56 PM EDT by Cezar Vargas         glu 176              (X) * Dehydration absent       ( ) * Electrolytes acceptable       5/1/2018 12:56 PM EDT by Cezar Vargas         phos 7.3              (X) * Diet tolerated       ( ) * Discharge plans and education understood              Activity       ( ) * Ambulatory              Routes       (X) * Oral hydration, medications, and diet              Medications       (X) Subcutaneous insulin       5/1/2018 12:56 PM EDT by Cezar Vargas         insulin sc x5              ( ) * Outpatient diabetic medication regimen established                                   * Milestone              Additional Notes       Hemolytic anemia: last transfusion was on 04/26 and hemoglobin today is 8. 6.   Hem-Onc is following and part of the work is pending. Reviewed  Hem onc note.  Will monitor patient (CBC) for another 24 hours        Rhabdomyolysis: CK trending down        ILTTLE without signs of renal recovery: followed by renal. patient is on HD, permcath placed 04/26. Patient is set for outpatient HD at [facility] T[facility]        Hyperosmolar coma due to secondary diabetes: resolved, currently on lantus long acting 40 units and will decrease short acting to 13 units with meals given episode of hypoglycemia        Ulcerative esophagitis: nausea/ vomiting improved. EGD on 04/20 . Continue pantoprazole BID ; Continue Prn maalox.  Prn zofran.       Hypernatremia: resolved        Sepsis due to aspiration pneumonia: completed antibiotics       Acute encephalopathy:  Improved       Hyperlipidemia:  Holding statin       Morbid obesity: counseled on diet              bun 53 creat 8.46 rbc 2.81 hgb 8.6 hct 26.3        hemodialysis inpatient       epogen sc x1       protonix po x1       ultram po x1           Diabetes - Care Day 16 (4/27/2018) by Sveta Sparrow        Review Status Review Entered       Completed 4/27/2018       Details              Care Day: 16 Care Date: 4/27/2018 Level of Care: Telemetry       Guideline Day 2        Clinical Status       (X) * Alert       ( ) * No precipitating cause identified or cause manageable in outpatient setting       (X) * Hemodynamic stability       4/27/2018 4:17 PM EDT by LiquidPlanner         97.9-132/82-87-18-98% RA              (X) * Acceptable acid-base balance       ( ) * Blood glucose under acceptable control       4/27/2018 4:17 PM EDT by LiquidPlanner         glu 192              (X) * Dehydration absent       (X) * Electrolytes acceptable       (X) * Diet tolerated       4/27/2018 4:17 PM EDT by LiquidPlanner         diabetic diet              ( ) * Discharge plans and education understood              Activity       ( ) * Ambulatory              Routes       (X) * Oral hydration, medications, and diet              Medications       (X) Subcutaneous insulin       4/27/2018 4:17 PM EDT by LiquidPlanner         insulin sc x3              ( ) * Outpatient diabetic medication regimen established                                  * Milestone              Additional Notes       Hemolytic anemia: Has been receiving transfusions with dialysis; Per Nephrology. Bhargavi Connernce is following now as well.  Work up in progress.       Rhabdomyolysis: CK trending down        LITTLE: HD per Nephrology.         Hyperosmolar coma due to secondary diabetes ([facility] (4/12/2018): Treated with IVF and insulin.  Glc is improved.        Epigastric pain and N/V: Ulcerative esophagitis on EGD; Continue Prn maalox. Prn zofran.       Hypernatremia: hydration; watch labs.       Sepsis due to aspiration pneumonia, POA: Empiric antibiotics.         Hypoxia: O2 as needed.        Acute encephalopathy (4/12/2018): Improving. He is now awake, no longer paranoid, seems at baseline.        Hyperlipidemia (5/1/2012): Hold statin       Morbid obesity ([facility] (5/6/2013)       Hypertension (10/31/2014): BP okay.         HD #1 4/15. HD TTS schedule.  Watch for renal recovery (none so far).  Suspect that recovery will take time.  Approved for outpatient HD at [address] [place] [address] 4/26                2 units prbc's on 2/23 and again 4/26; ulcerative esophagitis               Has a hemolytic anemia as well (spherocytes); Dr. Yuliana Good following; on epo 10 k sc tiw               Renvela with meals       rbc 2.81 hgb 8.7 hct 26.2        transfused 2U PRBCs 4/26/18       protonix po x2

## 2018-05-01 NOTE — TELEPHONE ENCOUNTER
Message was left for patient to bring in forms to be completed or they can be mailed to the office. parents present/ school letter provided

## 2018-05-02 ENCOUNTER — TELEPHONE (OUTPATIENT)
Dept: INTERNAL MEDICINE CLINIC | Age: 43
End: 2018-05-02

## 2018-05-02 NOTE — TELEPHONE ENCOUNTER
Pt states that he is having buttocks pain and numbness. Please call and advise. Deb Gautam on hippa also states that pt has some forms that need to be completed by 5/4/18, I advised that forms do take about 7 to 10 business days to be completed. Ivana Campbell would like a call in regards to forms, # is [phone].

## 2018-05-02 NOTE — TELEPHONE ENCOUNTER
HIPAA verified with Yas Case,  ,  Patient having pain and numbness  In bottocks . Also call ing about the forms that need to need to be completed by May 4th.

## 2018-05-04 NOTE — TELEPHONE ENCOUNTER
Message was left  For Reese Ely at [phone] to fax FMLA forms that needed to be completed. HIPAA verifed with Vineet Rock that provided this information`. Informed  That the [employer] notice of eligibility forms are at the office but the forms that the providr has to complete was not with the package. Vineet Rock reports that they will bring a copy of what is needed on Monday.

## 2018-05-07 ENCOUNTER — OFFICE VISIT (OUTPATIENT)
Dept: INTERNAL MEDICINE CLINIC | Age: 43
End: 2018-05-07

## 2018-05-07 VITALS
HEART RATE: 119 BPM | TEMPERATURE: 99.5 F | RESPIRATION RATE: 20 BRPM | OXYGEN SATURATION: 97 % | WEIGHT: 298.8 LBS | DIASTOLIC BLOOD PRESSURE: 69 MMHG | BODY MASS INDEX: 44.25 KG/M2 | SYSTOLIC BLOOD PRESSURE: 107 MMHG | HEIGHT: 69 IN

## 2018-05-07 DIAGNOSIS — N17.9 AKI (ACUTE KIDNEY INJURY) (HCC): ICD-10-CM

## 2018-05-07 DIAGNOSIS — E11.00 HYPEROSMOLAR NON-KETOTIC STATE IN PATIENT WITH TYPE 2 DIABETES MELLITUS (HCC): ICD-10-CM

## 2018-05-07 DIAGNOSIS — M54.5 LOW BACK PAIN, UNSPECIFIED BACK PAIN LATERALITY, UNSPECIFIED CHRONICITY, WITH SCIATICA PRESENCE UNSPECIFIED: ICD-10-CM

## 2018-05-07 DIAGNOSIS — M62.82 NON-TRAUMATIC RHABDOMYOLYSIS: ICD-10-CM

## 2018-05-07 DIAGNOSIS — D59.8 OTHER ACQUIRED HEMOLYTIC ANEMIAS (HCC): Primary | ICD-10-CM

## 2018-05-07 PROBLEM — E11.21 TYPE 2 DIABETES WITH NEPHROPATHY (HCC): Status: ACTIVE | Noted: 2018-05-07

## 2018-05-07 NOTE — PROGRESS NOTES
1. Have you been to the ER, urgent care clinic since your last visit? Hospitalized since your last visit? yes     2. Have you seen or consulted any other health care providers outside of the [address] [place] [address] since your last visit? Include any pap smears or colon screening.  no

## 2018-05-07 NOTE — MR AVS SNAPSHOT
[address] [facility] 
[phone] Patient: Melvin Jiménez MRN:  [id number] Visit Information Date & Time Provider Department Dept. Phone Encounter #  
 5/7/2018 10:45 AM Dillan Scott, [address] [facility] [address] [facility] [address] [phone] [id number] Follow-up Instructions Return in about 3 months (around 8/7/2018). Upcoming Health Maintenance Date Due Pneumococcal 19-64 Medium Risk (1 of 1 - PPSV23) 8/2/1993 DTaP/Tdap/Td series (1 - Tdap) 8/2/1995 EYE EXAM RETINAL OR DILATED Q1 5/4/2018 Influenza Age 5 to Adult 8/1/2018 MICROALBUMIN Q1 9/6/2018 HEMOGLOBIN A1C Q6M 10/12/2018 FOOT EXAM Q1 12/5/2018 LIPID PANEL Q1 12/5/2018 Allergies as of 5/7/2018  Review Complete On: 5/7/2018 By: Hayde Abel No Known Allergies Current Immunizations  Reviewed on 4/19/2018 Name Date Influenza Vaccine (Quad) PF 9/5/2017, 12/30/2016, 1/26/2016 Not reviewed this visit You Were Diagnosed With   
  
 Codes Comments Other acquired hemolytic anemias ([facility]    -  Primary ICD-10-CM: D59.8 ICD-9-CM: 283.2 Hyperosmolar non-ketotic state in patient with type 2 diabetes mellitus ([facility]     ICD-10-CM: E11.01 
ICD-9-CM: 250.20 Non-traumatic rhabdomyolysis     ICD-10-CM: M62.82 ICD-9-CM: 728.88 LITTLE (acute kidney injury) ([facility]     ICD-10-CM: N17.9 ICD-9-CM: 865. 9 Vitals BP Pulse Temp Resp Height(growth percentile) Weight(growth percentile) 107/69 (BP 1 Location: Left arm, BP Patient Position: Sitting) (!) 119 99.5 °F (37.5 °C) (Oral) 20 5' 9\" (1.753 m) 298 lb 12.8 oz (135.5 kg) SpO2 BMI Smoking Status 97% 44.13 kg/m2 Never Smoker Vitals History BMI and BSA Data Body Mass Index Body Surface Area  
 44.13 kg/m 2 2.57 m 2 Preferred Pharmacy Pharmacy Name Phone [facility] [address]  [place] [phone] Your Updated Medication List  
  
   
This list is accurate as of 5/7/18 12:18 PM.  Always use your most recent med list.  
  
  
  
  
 Blood-Glucose Meter monitoring kit Use as directed to test blood sugar three times daily -DX-DM-E11.9 FISH OIL PO Take  by mouth.  
  
 glimepiride 4 mg tablet Commonly known as:  AMARYL  
TAKE 1 TABLET BY MOUTH EVERY MORNING  
  
 glucose blood VI test strips strip Commonly known as:  blood glucose test  
Use as directed to test blood sugar three times daily -DX-DM-E11.9  
  
 insulin glargine 100 unit/mL injection Commonly known as:  LANTUS  
30 Units by SubCUTAneous route two (2) times a day. Lancets Misc Use as directed to test blood sugar three times daily -DX-DM-E11.9 pantoprazole 40 mg tablet Commonly known as:  PROTONIX Take 1 Tab by mouth Before breakfast and dinner. pravastatin 20 mg tablet Commonly known as:  PRAVACHOL Take 1 Tab by mouth nightly. sevelamer carbonate 800 mg Tab tab Commonly known as:  Donata Kassandra Take 2 Tabs by mouth three (3) times daily (with meals). sucralfate 100 mg/mL suspension Commonly known as:  Charo Sers Take 10 mL by mouth Before breakfast, lunch, dinner and at bedtime. We Performed the Following REFERRAL TO HEMATOLOGY ONCOLOGY [YJQ83 Custom] Follow-up Instructions Return in about 3 months (around 8/7/2018). Referral Information Referral ID Referred By Referred To  
  
 [id number] Ewelina Veras MD   
   [address] Phone: [phone] Fax: [phone] Visits Status Start Date End Date 1 New Request 5/7/18 5/7/19  If your referral has a status of pending review or denied, additional information will be sent to support the outcome of this decision. Introducing [facility]! Chandrika Roman introduces [employer] patient portal. Now you can access parts of your medical record, email your doctor's office, and request medication refills online. 1. In your internet browser, go to [email or web address] 2. Click on the First Time User? Click Here link in the Sign In box. You will see the New Member Sign Up page. 3. Enter your [employer] Access Code exactly as it appears below. You will not need to use this code after youve completed the sign-up process. If you do not sign up before the expiration date, you must request a new code. · [employer] Access Code: [email or web address] Expires: 7/10/2018  9:59 PM 
 
4. Enter the last four digits of your Social Security Number (xxxx) and Date of Birth (mm/dd/yyyy) as indicated and click Submit. You will be taken to the next sign-up page. 5. Create a [employer] ID. This will be your [employer] login ID and cannot be changed, so think of one that is secure and easy to remember. 6. Create a [employer] password. You can change your password at any time. 7. Enter your Password Reset Question and Answer. This can be used at a later time if you forget your password. 8. Enter your e-mail address. You will receive e-mail notification when new information is available in [address] 9. Click Sign Up. You can now view and download portions of your medical record. 10. Click the Download Summary menu link to download a portable copy of your medical information. If you have questions, please visit the Frequently Asked Questions section of the [employer] website. Remember, [employer] is NOT to be used for urgent needs. For medical emergencies, dial 911. Now available from your iPhone and Android! Please provide this summary of care documentation to your next provider. Your primary care clinician is listed as [place].  If you have any questions after today's visit, please call [phone].

## 2018-05-07 NOTE — PROGRESS NOTES
SUBJECTIVE  Mr. Elizabeth Gomez presents today acutely for     Chief Complaint   Patient presents with   [facility] Follow Up     pt here today for hosp f.u from [facility] for hyperosmolar coma secondary to diabeties and FMLA forms completed     Buttocks pain     pt c.o stiffness in buttocks from laying in hospital bed x 2 weeks       He was hospitalized 4/11-4/30 on my service:     Hospital Course: Mr. Elizabeth Gomez is a patient of mine who presented with the problems above. See the initial H and P for full details.      CHIEF COMPLAINT: altered mental status      HISTORY OF PRESENT ILLNESS:     Renan Snow is a 36 y.o.   male who presents with altered mental status. As per pt's significant other she noted pt to be lethargic in the morning which gotten worse as the day progressed. In ED pt noted somnolent and hard to arouse. Pt noted to have blood sugar of > 600 on POC BS and upon sending lab, lab didn't run it as it was noted > 1500 and lab didn't believe the value. Per bedside RN, labs were sent 3 times already. Labs now sent again and lab is requested to run despite whatever the value is. History is limited due to patient's altered level of responsiveness.       We were asked to admit for work up and evaluation of the above problems.      By problem. ..     1. Hemolytic anemia: His hemoglobin appears to have stabilized. He has been receiving transfusions with dialysis; Per Nephrology, he is okay for discharge. Iram Holguin, Dr. Alli Danielle, has following; Work up in progress.  Per her last note 4/27:   Coomb's testing negative so not an obvious autoimmune hemolytic anemia. B12 okay so not intramedullary hemolysis. No evidence of plasma cell dyscrasia (given flipped alb:globulin - has no M spike and normal free light chain ratio) and hgb electropheresis is normal, ruling out a variety of hemoglobinopathies. Still await the two most important - G6PD and pyruvate kinase. Reviewed with him  2.  Rhabdomyolysis: CK has improved. 3. LITTLE: HD per Nephrology. He has not had significant renal recovery, and this could take weeks, per Nephrology notes. [facility] outpatient dialysis T-Th-Sat  4. Hyperosmolar coma due to secondary diabetes ([facility] (4/12/2018): Treated with IVF and insulin.  Glc is improved.  I'll send him home on lantus 30 units bid, and add back his glimepiride.  Consider mealtime insulin if gluc spikes. (He has been on 40 units lantus bid here + about 10 units of mealtime humalog; At home had been on 27 units once a day of insulin +metformin  ii per day +glimepiride 4 mg daily; with good contol--A1C 5.5-6.1 as recently as Dec 2017). Stop metformin. 5. Epigastric pain and N/V: Ulcerative esophagitis on EGD; appreicate GI input. Continue Prn maalox. Prn zofran. 6. Hypernatremia: hydration; watch labs. 7. Sepsis due to aspiration pneumonia, POA: Empiric antibiotics.    8. Hypoxia: O2 as needed. 9. Acute encephalopathy (4/12/2018): Improving. He is now awake, no longer paranoid, seems at baseline. 10. Hyperlipidemia (5/1/2012): Hold statin  11. Morbid obesity ([facility] (5/6/2013)  12. Hypertension (10/31/2014): BP okay.      Now, complains of pain in \"butt\", and this is worse with movement. \"I think it's from all that sitting around. OBJECTIVE  Visit Vitals    /69 (BP 1 Location: Left arm, BP Patient Position: Sitting)    Pulse (!) 119    Temp 99.5 °F (37.5 °C) (Oral)    Resp 20    Ht 5' 9\" (1.753 m)    Wt 298 lb 12.8 oz (135.5 kg)    SpO2 97%    BMI 44.13 kg/m2     Gen: Pleasant 36 y.o.  male in NAD.  He is morbidly obese.  HEENT: PERRLA. EOMI. OP moist and pink.  Neck: Supple.  No LAD.  HEART: RRR, No M/G/R.   LUNGS: CTAB No W/R.   ABDOMEN: S, NT, ND, BS+.   EXTREMITIES: Warm. No C/C/E. MUSCULOSKELETAL: Normal ROM, muscle strength 5/5 all groups. NEURO: Alert and oriented x 3.  Cranial nerves grossly intact.  No focal sensory or motor deficits noted. SKIN: Warm. Dry.  No rashes or other lesions noted. CXR 4/15: No PTX.       CXR 4/11: Minimal R ML infiltrate.       CXR 4/12: No acute abnormality.       US Retroperitoneal 4/14: Normal size and appearance of the kidneys without hydronephrosis. Nondistended urinary bladder with a Grigsby catheter.      US abdomen 4/17: Echogenic liver suggesting fatty infiltration with some areas of relative sparing. Contracted gallbladder. No obvious gallstones. Pancreas not visualized due to overlying bowel gas. No hydronephrosis.     EGD 4/20: Ulcerative esophagitis, Hiatal hernia. ASSESSMENT / PLAN  1. Hemolytic anemia: Labs being checked now through dialysis. At time of hospital discharge, his hemoglobin had stabilized. He will need to establish follow up visit in near future with Hem-Onc, Dr. Mingo Davis. 2. Rhabdomyolysis: CK has improved. Still has some weakness and low back / buttock pain. PT ordered. 3. LITTLE:  Continue outpatient dialysis T-Th-Sat  4. DM, Hyperosmolar coma due to secondary diabetes ([facility] (4/12/2018): Treated with IVF and insulin.  Glc is improved.  We discharged him on lantus 30 units bid, and glimepiride.  Consider mealtime insulin if gluc spikes. (In hospital he was on 40 units lantus bid here + about 10 units of mealtime humalog. Stop metformin. 5. Epigastric pain and N/V: Doing better. Ulcerative esophagitis on EGD; appreicate GI input. Continue Prn maalox. Prn zofran. 6. Sepsis due to aspiration pneumonia, POA: Completed empiric antibiotics.    7. Hypoxia: O2 as needed. 8. Hyperlipidemia (5/1/2012): Hold statin  9. Morbid obesity ([facility] (5/6/2013)  10. Hypertension (10/31/2014): BP okay.      I have reviewed with the patient details of the assessment and plan and all questions were answered. Relevant patient education was performed. Follow-up Disposition:  Return in about 3 months (around 8/7/2018).

## 2018-05-11 ENCOUNTER — TELEPHONE (OUTPATIENT)
Dept: INTERNAL MEDICINE CLINIC | Age: 43
End: 2018-05-11

## 2018-05-11 NOTE — TELEPHONE ENCOUNTER
#[phone] pt states when he was at dialysis today they told him he had to take insulin at lunch time as well as AM & PM.  Pt needs to know what to do? Please call as soon as possible today as he would need to take this if so.

## 2018-05-11 NOTE — TELEPHONE ENCOUNTER
30 units bid. I tried to call and got a message \"The mailbox is full and cannot accept messages at this time. \"     BJF

## 2018-05-11 NOTE — TELEPHONE ENCOUNTER
Identified patient 2 identifiers verified. Patient called to verify Insulin dose was he supposed to take lantus at morning and at night?

## 2018-05-15 ENCOUNTER — TELEPHONE (OUTPATIENT)
Dept: INTERNAL MEDICINE CLINIC | Age: 43
End: 2018-05-15

## 2018-05-15 NOTE — TELEPHONE ENCOUNTER
Call completed to patient, two identifiers verified. Patient advised per Dr. Noris Velazquez to administer 30 Units by SubCUTAneous route two (2) times a day of insulin glargine (LANTUS) 100 unit/mL injection. Patient verbalized an understanding.

## 2018-05-16 NOTE — TELEPHONE ENCOUNTER
Message was left for Alysha at [facility]. Patient was in  The hospital from 4/11/18 to 4/30/18. Was seen by Dr. Rangel Romo on 5/7/18.  Patient is on outpatient dialysisT- Thurs and

## 2018-05-18 ENCOUNTER — HOSPITAL ENCOUNTER (OUTPATIENT)
Dept: PHYSICAL THERAPY | Age: 43
Discharge: HOME | End: 2018-05-18
Payer: COMMERCIAL

## 2018-05-18 DIAGNOSIS — M54.5 LOW BACK PAIN, UNSPECIFIED BACK PAIN LATERALITY, UNSPECIFIED CHRONICITY, WITH SCIATICA PRESENCE UNSPECIFIED: ICD-10-CM

## 2018-05-18 PROCEDURE — 97162 PT EVAL MOD COMPLEX 30 MIN: CPT | Performed by: PHYSICAL THERAPIST

## 2018-05-18 PROCEDURE — 97110 THERAPEUTIC EXERCISES: CPT | Performed by: PHYSICAL THERAPIST

## 2018-05-18 NOTE — PROGRESS NOTES
PT INITIAL EVALUATION NOTE 2-15    Patient Name: Carly Serrato  Date:2018  : 1975  [x]  Patient  Verified  Payor: [employer] / Plan: Jamal Cantu [id number] PPO / Product Type: PPO /    In time: 7:40am  Out time: 8:35am  Total Treatment Time (min): 55  Visit #: 1     Treatment Area: Low back pain, unspecified back pain laterality, unspecified chronicity, with sciatica presence unspecified [M54.5]    SUBJECTIVE  Pain Level (0-10 scale): 5 (0-5/10)  Any medication changes, allergies to medications, adverse drug reactions, diagnosis change, or new procedure performed?: [] No    [x] Yes (see summary sheet for update)  Subjective: The patient was hospitalized recently due to a syncopal episode due to sugars over 1500, where he stayed for almost a month in April. He started dialysis 3x/wk two weeks. He was active taking caring of his mom, who past last week. His butt started hurting while in the hospital, mainly on the right side. When he walks around around [facility] after 30 minutes it will get stiff. It will sometimes get tingling while laying on his right side. Laying on his left side helps.        OBJECTIVE    Posture: scapular protraction bilaterally  Other Observations: right glute knot/bulge; extension centralized pain  Gait and Functional Mobility: antalgic  Palpation: tender to palpate right glute/piriformis        Lumbar AROM:          R     L     Flexion    75%, increased discomfort in right glute      Extension   Centralized pain      Side Bending   Increased right glute pain  WFL     Rotation   WFL     WFL        LOWER QUARTER   MUSCLE STRENGTH  KEY       R  L  0 - No Contraction  L1, L2 Psoas  4, p!  5  1 - Trace   L3 Quads  5  5  2 - Poor   L4 Tib Ant  5  5  3 - Fair    L5 EHL  5  5  4 - Good   S1 Peroneals  5  5  5 - Normal   S2 Hams  5, p!  5    Flexibility: tight hamstrings and piriformis on right  Mobility Assessment: hypomobile in lumbar      MMT:               HIP Abd: 3+/5 bilaterally  Neurological: Reflexes / Sensations: WFL  Special Tests:    H.S. SLR: pull in right glute    25 min Therapeutic Exercise:  [x] See flow sheet :   Rationale: increase ROM, increase strength and improve coordination to improve the patients ability to perform daily activities.           With   [x] TE   [] TA   [] neuro   [] other: Patient Education: [x] Review HEP    [x] Progressed/Changed HEP based on:   [x] positioning   [x] body mechanics   [] transfers   [] heat/ice application    [] other:        Other Objective/Functional Measures: FOTO= 63    Pain Level (0-10 scale) post treatment: 5      ASSESSMENT:      [x]  See Plan of [facility], PT 5/18/2018  7:36 AM

## 2018-05-18 NOTE — PROGRESS NOTES
[address] [address] ([facility]), [address] [place]una  Phone: [phone] Fax: [phone]    Plan of Care/Statement of Necessity for Physical Therapy Services  2-15    Patient name: Inés Garcia  : 1975  Provider#: [id number]  Referral source: Jaz Paulino MD      Medical/Treatment Diagnosis: Low back pain, unspecified back pain laterality, unspecified chronicity, with sciatica presence unspecified [M54.5]     Prior Hospitalization: see medical history     Comorbidities: BMI over 30, DM II, currently on Dialysis due to recent hospitalization    Prior Level of Function: 20min of exercise seldom or never  Medications: Verified on Patient Summary List    Start of Care: 18      Onset Date: 2018       The [address] [place] [address] and following information is based on the information from the initial evaluation. Assessment/ key information: The patient presents with right glute pain/discomfort with occasional numbness and tingling after being hospitalized for one month due to being in a diabetic coma (he states his sugar levels were around 1500). He does have palpable tenderness to his right glute and there is no breakdown in the tissue itself (per patient report). He had significant discomfort with a gentle piriformis stretch and decreased discomfort with lumbar extension. His pain is most likely lumbar radiculopathy and will need improved lumbar mobility and hip/glute strengthening overall. The patient will benefit from guided therapeutic interventions such as therex for strengthening and neuromuscular re-eduction, manual techniques for joint mobility and soft tissue extensibility, and modalities for pain management in order to improve functional mobility with daily activities.     Evaluation Complexity History HIGH Complexity :3+ comorbidities / personal factors will impact the outcome/ POC ; Examination MEDIUM Complexity : 3 Standardized tests and measures addressing body structure, function, activity limitation and / or participation in recreation  ;Presentation MEDIUM Complexity : Evolving with changing characteristics  ; Clinical Decision Making MEDIUM Complexity : FOTO score of 26-74  Overall Complexity Rating: MEDIUM    Problem List: pain affecting function, decrease ROM, decrease strength, edema affecting function, impaired gait/ balance, decrease ADL/ functional abilitiies, decrease activity tolerance, decrease flexibility/ joint mobility and decrease transfer abilities   Treatment Plan may include any combination of the following: Therapeutic exercise, Therapeutic activities, Neuromuscular re-education, Physical agent/modality, Gait/balance training, Manual therapy, Patient education, Self Care training, Functional mobility training, Home safety training and Stair training  Patient / Family readiness to learn indicated by: asking questions, trying to perform skills and interest  Persons(s) to be included in education: patient (P)  Barriers to Learning/Limitations: None  Patient Goal (s): to not have pain  Patient Self Reported Health Status: fair  Rehabilitation Potential: good    Short Term Goals: To be accomplished in 2 treatments:   1.) The patient will be independent with his HEP consistently for at least one week. Long Term Goals: To be accomplished in 16 treatments:   1.) The patient will have at most 3/10 right glute discomfort for an entire day. 2.) The patient will be able to be on his feet or sit for longer than 15 minutes with at most 3/10 discomfort in the right glute. 3.) The patient will be able to improve his FOTO score from 63 to at least 70 to show improvement in functional mobility. Frequency / Duration: Patient to be seen 2 times per week for 16 treatments.     Patient/ Caregiver education and instruction: self care, activity modification and exercises    [x]  Plan of care has been reviewed with SILVIA Petersen, PT 5/18/2018 10:01 AM    ________________________________________________________________________    I certify that the above Therapy Services are being furnished while the patient is under my care. I agree with the treatment plan and certify that this therapy is necessary.     [de-identified] Signature:____________________  Date:____________Time: _________

## 2018-05-29 ENCOUNTER — TELEPHONE (OUTPATIENT)
Dept: INTERNAL MEDICINE CLINIC | Age: 43
End: 2018-05-29

## 2018-05-29 NOTE — TELEPHONE ENCOUNTER
Call completed to patient, two identifiers verified.  Patient offered and accepted an appointment for Friday, June 01, 2018 10:30 AM

## 2018-05-29 NOTE — TELEPHONE ENCOUNTER
Patient is concerned about a lump on his neck/chest area. Also he said Dr. Neftaly Dykes wanted to see him before the end of this month. Please call him back.  Thanks

## 2018-05-30 ENCOUNTER — APPOINTMENT (OUTPATIENT)
Dept: PHYSICAL THERAPY | Age: 43
End: 2018-05-30
Payer: COMMERCIAL

## 2018-05-30 NOTE — PROGRESS NOTES
[facility]  [address] ([facility]), [address] [facility] Elina Mccullough  Phone: [phone] Fax: [phone]    Discharge Summary  2-15    Patient name: Karolyn Hatchet  : 1975  Provider#: [id number]  Referral source: Marquez Merlos MD      Medical/Treatment Diagnosis: Low back pain, unspecified back pain laterality, unspecified chronicity, with sciatica presence unspecified [M54.5]     Prior Hospitalization: see medical history     Comorbidities: See Plan of Care  Prior Level of Function:See Plan of Care  Medications: Verified on Patient Summary List    Start of Care: 18      Onset Date: 2018   Visits from Start of Care: 1     Missed Visits: 3  Reporting Period : 18 to 18      ASSESSMENT/SUMMARY OF CARE: The patient did not return after the initial evaluation for follow up appointments, he's missed three consecutive visits, and will now be discharged per clinic policy. Pt is discharged today, 2018, as they have stopped attending therapy. Final objective data and outcomes were unable to be obtained.         RECOMMENDATIONS:  [x]Discontinue therapy: []Patient has reached or is progressing toward set goals      [x]Patient is non-compliant or has abdicated      []Due to lack of appreciable progress towards set goals      []Other    Vista Apley, PT 2018 7:25 PM

## 2018-06-01 ENCOUNTER — OFFICE VISIT (OUTPATIENT)
Dept: INTERNAL MEDICINE CLINIC | Age: 43
End: 2018-06-01

## 2018-06-01 VITALS
OXYGEN SATURATION: 98 % | RESPIRATION RATE: 20 BRPM | BODY MASS INDEX: 46.65 KG/M2 | SYSTOLIC BLOOD PRESSURE: 128 MMHG | DIASTOLIC BLOOD PRESSURE: 84 MMHG | HEART RATE: 90 BPM | WEIGHT: 315 LBS | HEIGHT: 69 IN | TEMPERATURE: 99.2 F

## 2018-06-01 DIAGNOSIS — R22.1 NECK SWELLING: Primary | ICD-10-CM

## 2018-06-01 DIAGNOSIS — N17.9 AKI (ACUTE KIDNEY INJURY) (HCC): ICD-10-CM

## 2018-06-01 NOTE — PROGRESS NOTES
SUBJECTIVE  Mr. Danie Olson presents today acutely for     Chief Complaint   Patient presents with   Chris Nghiao Mass     pt here today for lump on R side of neck from dialysis- pt states that he has discomfort       Area of swelling at R supraclavicular area, for about a week, \"since they took the catheter out. \"    OBJECTIVE  Visit Vitals    /84 (BP 1 Location: Left arm, BP Patient Position: Sitting)    Pulse 90    Temp 99.2 °F (37.3 °C) (Oral)    Resp 20    Ht 5' 9\" (1.753 m)    Wt 322 lb 6.4 oz (146.2 kg)    SpO2 98%    BMI 47.61 kg/m2     Gen: Pleasant 36 y.o.  male in NAD.   HEENT: PERRLA. EOMI. OP moist and pink.  Neck: Supple.  No LAD.  The area of swelling is round, compressible. ASSESSMENT / PLAN  1. Neck mass: Likely fluid, and should resorb over time. Reassured. 2. LITTLE / Rhabdo:  Still on dialysis, weakness gradually improving. Work note given for him to go back to work in 2 weeks--tentatively. I have reviewed with the patient details of the assessment and plan and all questions were answered. Relevant patient education was performed. Follow-up Disposition: As planned.

## 2018-06-01 NOTE — LETTER
NOTIFICATION RETURN TO WORK / SCHOOL 
 
6/1/2018 10:47 AM 
 
Mr. Juvencio Corley [address] [phone] To Whom It May Concern: 
 
Juvencio Irizarry is currently under the care of [facility]. He will return to work/school on: Paula 15, 2018 If there are questions or concerns please have the patient contact our office.  
 
 
 
Sincerely, 
 
 
Sima Hernandez MD

## 2018-06-04 ENCOUNTER — APPOINTMENT (OUTPATIENT)
Dept: PHYSICAL THERAPY | Age: 43
End: 2018-06-04

## 2018-06-06 ENCOUNTER — APPOINTMENT (OUTPATIENT)
Dept: PHYSICAL THERAPY | Age: 43
End: 2018-06-06

## 2018-06-13 ENCOUNTER — TELEPHONE (OUTPATIENT)
Dept: INTERNAL MEDICINE CLINIC | Age: 43
End: 2018-06-13

## 2018-06-13 NOTE — TELEPHONE ENCOUNTER
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 His number is [phone]. Please send the papers so his claim can get started.

## 2018-06-13 NOTE — TELEPHONE ENCOUNTER
----- Message from Leonela Rodríguez sent at 6/13/2018  3:39 PM EDT -----  Regarding: Dr. Nicol Perkins, from [facility], is inquiring on a status for a medical request.    Best contact number [phone]        Message copied/pasted from [facility]

## 2018-06-14 NOTE — TELEPHONE ENCOUNTER
Documents were faxed to [facility], fax confirmation received. Identified patient 2 identifiers verified. Patient made aware.

## 2018-06-14 NOTE — TELEPHONE ENCOUNTER
Message was left for Jefferson Garcia that  The form is pending signature from provider and will be faxed today.

## 2018-07-15 RX ORDER — CALCIUM CARB/VITAMIN D3/VIT K1 500-100-40
TABLET,CHEWABLE ORAL
Qty: 100 SYRINGE | Refills: 0 | Status: SHIPPED | OUTPATIENT
Start: 2018-07-15 | End: 2018-08-14 | Stop reason: SDUPTHER

## 2018-08-14 ENCOUNTER — OFFICE VISIT (OUTPATIENT)
Dept: INTERNAL MEDICINE CLINIC | Age: 43
End: 2018-08-14

## 2018-08-14 VITALS
WEIGHT: 315 LBS | OXYGEN SATURATION: 96 % | HEIGHT: 69 IN | DIASTOLIC BLOOD PRESSURE: 100 MMHG | HEART RATE: 91 BPM | RESPIRATION RATE: 16 BRPM | SYSTOLIC BLOOD PRESSURE: 161 MMHG | BODY MASS INDEX: 46.65 KG/M2 | TEMPERATURE: 98.6 F

## 2018-08-14 DIAGNOSIS — E11.21 TYPE 2 DIABETES WITH NEPHROPATHY (HCC): ICD-10-CM

## 2018-08-14 DIAGNOSIS — I10 ESSENTIAL HYPERTENSION: ICD-10-CM

## 2018-08-14 DIAGNOSIS — E78.5 HYPERLIPIDEMIA, UNSPECIFIED HYPERLIPIDEMIA TYPE: ICD-10-CM

## 2018-08-14 DIAGNOSIS — M62.82 NON-TRAUMATIC RHABDOMYOLYSIS: ICD-10-CM

## 2018-08-14 RX ORDER — PRAVASTATIN SODIUM 20 MG/1
20 TABLET ORAL
Qty: 30 TAB | Refills: 11 | Status: SHIPPED | OUTPATIENT
Start: 2018-08-14 | End: 2019-03-19 | Stop reason: ALTCHOICE

## 2018-08-14 RX ORDER — INSULIN GLARGINE 100 [IU]/ML
30 INJECTION, SOLUTION SUBCUTANEOUS 2 TIMES DAILY
Qty: 1 VIAL | Refills: 11 | Status: SHIPPED | OUTPATIENT
Start: 2018-08-14 | End: 2018-09-04 | Stop reason: SDUPTHER

## 2018-08-14 RX ORDER — CALCIUM CARB/VITAMIN D3/VIT K1 500-100-40
TABLET,CHEWABLE ORAL
Qty: 100 SYRINGE | Refills: 3 | Status: SHIPPED | OUTPATIENT
Start: 2018-08-14 | End: 2019-03-12 | Stop reason: SDUPTHER

## 2018-08-14 RX ORDER — LANCETS
EACH MISCELLANEOUS
Qty: 90 EACH | Refills: 11 | Status: SHIPPED | OUTPATIENT
Start: 2018-08-14 | End: 2021-10-18 | Stop reason: SDUPTHER

## 2018-08-14 NOTE — PATIENT INSTRUCTIONS
Body Mass Index: Care Instructions  Your Care Instructions    Body mass index (BMI) can help you see if your weight is raising your risk for health problems. It uses a formula to compare how much you weigh with how tall you are. · A BMI lower than 18.5 is considered underweight. · A BMI between 18.5 and 24.9 is considered healthy. · A BMI between 25 and 29.9 is considered overweight. A BMI of 30 or higher is considered obese. If your BMI is in the normal range, it means that you have a lower risk for weight-related health problems. If your BMI is in the overweight or obese range, you may be at increased risk for weight-related health problems, such as high blood pressure, heart disease, stroke, arthritis or joint pain, and diabetes. If your BMI is in the underweight range, you may be at increased risk for health problems such as fatigue, lower protection (immunity) against illness, muscle loss, bone loss, hair loss, and hormone problems. BMI is just one measure of your risk for weight-related health problems. You may be at higher risk for health problems if you are not active, you eat an unhealthy diet, or you drink too much alcohol or use tobacco products. Follow-up care is a key part of your treatment and safety. Be sure to make and go to all appointments, and call your doctor if you are having problems. It's also a good idea to know your test results and keep a list of the medicines you take. How can you care for yourself at home? · Practice healthy eating habits. This includes eating plenty of fruits, vegetables, whole grains, lean protein, and low-fat dairy. · If your doctor recommends it, get more exercise. Walking is a good choice. Bit by bit, increase the amount you walk every day. Try for at least 30 minutes on most days of the week. · Do not smoke. Smoking can increase your risk for health problems. If you need help quitting, talk to your doctor about stop-smoking programs and medicines. These can increase your chances of quitting for good. · Limit alcohol to 2 drinks a day for men and 1 drink a day for women. Too much alcohol can cause health problems. If you have a BMI higher than 25  · Your doctor may do other tests to check your risk for weight-related health problems. This may include measuring the distance around your waist. A waist measurement of more than 40 inches in men or 35 inches in women can increase the risk of weight-related health problems. · Talk with your doctor about steps you can take to stay healthy or improve your health. You may need to make lifestyle changes to lose weight and stay healthy, such as changing your diet and getting regular exercise. If you have a BMI lower than 18.5  · Your doctor may do other tests to check your risk for health problems. · Talk with your doctor about steps you can take to stay healthy or improve your health. You may need to make lifestyle changes to gain or maintain weight and stay healthy, such as getting more healthy foods in your diet and doing exercises to build muscle. Where can you learn more? Go to http://chang-karson.info/. Enter S176 in the search box to learn more about \"Body Mass Index: Care Instructions. \"  Current as of: October 13, 2016  Content Version: 11.4  © 6192-1150 [employer]. Care instructions adapted under license by [employer] (which disclaims liability or warranty for this information). If you have questions about a medical condition or this instruction, always ask your healthcare professional. [address] any warranty or liability for your use of this information.

## 2018-08-14 NOTE — PROGRESS NOTES
1. Have you been to the ER, urgent care clinic since your last visit? Hospitalized since your last visit?no    2. Have you seen or consulted any other health care providers outside of the [address] [place] [address] since your last visit? Include any pap smears or colon screening.  no
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
## 2018-08-14 NOTE — MR AVS SNAPSHOT
[address] [facility] 
[phone] Patient: Sofia Shaffer MRN:  [id number] Visit Information Date & Time Provider Department Dept. Phone Encounter #  
 8/14/2018  4:00 PM Karly Barth, [address] [place] [address],[address] [phone] [id number] Follow-up Instructions Return in about 3 months (around 11/14/2018) for DM. Upcoming Health Maintenance Date Due Pneumococcal 19-64 Medium Risk (1 of 1 - PPSV23) 8/2/1993 DTaP/Tdap/Td series (1 - Tdap) 8/2/1995 EYE EXAM RETINAL OR DILATED Q1 5/4/2018 Influenza Age 5 to Adult 8/1/2018 HEMOGLOBIN A1C Q6M 11/11/2018 FOOT EXAM Q1 12/5/2018 LIPID PANEL Q1 5/3/2019 MICROALBUMIN Q1 8/14/2019 Allergies as of 8/14/2018  Review Complete On: 8/14/2018 By: Karly Barth MD  
 No Known Allergies Current Immunizations  Reviewed on 4/19/2018 Name Date Influenza Vaccine (Quad) PF 9/5/2017, 12/30/2016, 1/26/2016 Not reviewed this visit You Were Diagnosed With   
  
 Codes Comments Uncontrolled diabetes mellitus type 2 without complications, unspecified whether long term insulin use ([facility]    -  Primary ICD-10-CM: E11.65 ICD-9-CM: 250.02 Type 2 diabetes with nephropathy (HCC)     ICD-10-CM: E11.21 
ICD-9-CM: 250.40, 583.81 Hyperlipidemia, unspecified hyperlipidemia type     ICD-10-CM: E78.5 ICD-9-CM: 272.4 Essential hypertension     ICD-10-CM: I10 
ICD-9-CM: 401.9 Non-traumatic rhabdomyolysis     ICD-10-CM: M62.82 ICD-9-CM: 728.88 Vitals BP Pulse Temp Resp Height(growth percentile) Weight(growth percentile) (!) 161/100 (BP 1 Location: Left arm, BP Patient Position: Sitting) 91 98.6 °F (37 °C) (Oral) 16 5' 9\" (1.753 m) 344 lb (156 kg) SpO2 BMI Smoking Status 96% 50.8 kg/m2 Never Smoker Vitals History BMI and BSA Data Body Mass Index Body Surface Area 50.8 kg/m 2 2.76 m 2 Preferred Pharmacy Pharmacy Name Phone [facility] [address]  [place] [phone] Your Updated Medication List  
  
   
This list is accurate as of 8/14/18  4:50 PM.  Always use your most recent med list.  
  
  
  
  
 Blood-Glucose Meter monitoring kit Use as directed to test blood sugar three times daily -DX-DM-E11.9 FISH OIL PO Take  by mouth. glucose blood VI test strips strip Commonly known as:  blood glucose test  
Use as directed to test blood sugar three times daily -DX-DM-E11.9  
  
 insulin glargine 100 unit/mL injection Commonly known as:  LANTUS  
30 Units by SubCUTAneous route two (2) times a day. Insulin Syringe-Needle U-100 0.3 mL 31 gauge x 5/16 Syrg  
USE TO INJECT INSULIN ONCE DAILY Lancets [facility] Use as directed to test blood sugar three times daily -DX-DM-E11.9 pantoprazole 40 mg tablet Commonly known as:  PROTONIX Take 1 Tab by mouth Before breakfast and dinner. pravastatin 20 mg tablet Commonly known as:  PRAVACHOL Take 1 Tab by mouth nightly. sevelamer carbonate 800 mg Tab tab Commonly known as:  Gonzalez Scheuermann Take 2 Tabs by mouth three (3) times daily (with meals). sucralfate 100 mg/mL suspension Commonly known as:  Juliene Kalaheo Take 10 mL by mouth Before breakfast, lunch, dinner and at bedtime. Prescriptions Sent to Pharmacy Refills Lancets [facility] Sig: Use as directed to test blood sugar three times daily -DX-DM-E11.9 Class: Normal  
 Pharmacy: [facility] [address] Ph #: [phone] Insulin Syringe-Needle U-100 0.3 mL 31 gauge x 5/16 syrg 3 Sig: USE TO INJECT INSULIN ONCE DAILY  Class: Normal  
 Pharmacy: [facility] [place] [address] [place] AT [address] [place] [address] Ph #: [phone] insulin glargine (LANTUS) 100 unit/mL injection 11 Si Units by SubCUTAneous route two (2) times a day. Class: Normal  
 Pharmacy: [facility] [address] Ph #: [phone] Route: SubCUTAneous  
 pravastatin (PRAVACHOL) 20 mg tablet 11 Sig: Take 1 Tab by mouth nightly. Class: Normal  
 Pharmacy: [facility] [address] Ph #: [phone] Route: Oral  
  
We Performed the Following CBC WITH AUTOMATED DIFF [51626 CPT(R)] CK [id number] CPT(R)] HEMOGLOBIN A1C WITH EAG [28389 CPT(R)] LIPID PANEL [88413 CPT(R)] METABOLIC PANEL, COMPREHENSIVE [81886 CPT(R)] Follow-up Instructions Return in about 3 months (around 2018) for DM. Patient Instructions Body Mass Index: Care Instructions Your Care Instructions Body mass index (BMI) can help you see if your weight is raising your risk for health problems. It uses a formula to compare how much you weigh with how tall you are. · A BMI lower than 18.5 is considered underweight. · A BMI between 18.5 and 24.9 is considered healthy. · A BMI between 25 and 29.9 is considered overweight. A BMI of 30 or higher is considered obese. If your BMI is in the normal range, it means that you have a lower risk for weight-related health problems. If your BMI is in the overweight or obese range, you may be at increased risk for weight-related health problems, such as high blood pressure, heart disease, stroke, arthritis or joint pain, and diabetes. If your BMI is in the underweight range, you may be at increased risk for health problems such as fatigue, lower protection (immunity) against illness, muscle loss, bone loss, hair loss, and hormone problems. BMI is just one measure of your risk for weight-related health problems. You may be at higher risk for health problems if you are not active, you eat an unhealthy diet, or you drink too much alcohol or use tobacco products. Follow-up care is a key part of your treatment and safety. Be sure to make and go to all appointments, and call your doctor if you are having problems. It's also a good idea to know your test results and keep a list of the medicines you take. How can you care for yourself at home? · Practice healthy eating habits. This includes eating plenty of fruits, vegetables, whole grains, lean protein, and low-fat dairy. · If your doctor recommends it, get more exercise. Walking is a good choice. Bit by bit, increase the amount you walk every day. Try for at least 30 minutes on most days of the week. · Do not smoke. Smoking can increase your risk for health problems. If you need help quitting, talk to your doctor about stop-smoking programs and medicines. These can increase your chances of quitting for good. · Limit alcohol to 2 drinks a day for men and 1 drink a day for women. Too much alcohol can cause health problems. If you have a BMI higher than 25 · Your doctor may do other tests to check your risk for weight-related health problems. This may include measuring the distance around your waist. A waist measurement of more than 40 inches in men or 35 inches in women can increase the risk of weight-related health problems. · Talk with your doctor about steps you can take to stay healthy or improve your health. You may need to make lifestyle changes to lose weight and stay healthy, such as changing your diet and getting regular exercise. If you have a BMI lower than 18.5 · Your doctor may do other tests to check your risk for health problems. · Talk with your doctor about steps you can take to stay healthy or improve your health.  You may need to make lifestyle changes to gain or maintain weight and stay healthy, such as getting more healthy foods in your diet and doing exercises to build muscle. Where can you learn more? Go to http://chang-karson.info/. Enter S176 in the search box to learn more about \"Body Mass Index: Care Instructions. \" Current as of: October 13, 2016 Content Version: 11.4 © 0573-0926 [employer]. Care instructions adapted under license by [employer] (which disclaims liability or warranty for this information). If you have questions about a medical condition or this instruction, always ask your healthcare professional. [address] any warranty or liability for your use of this information. Introducing [facility] & HEALTH SERVICES! Duong Leblanc introduces Sparkcloud patient portal. Now you can access parts of your medical record, email your doctor's office, and request medication refills online. 1. In your internet browser, go to [email or web address] 2. Click on the First Time User? Click Here link in the Sign In box. You will see the New Member Sign Up page. 3. Enter your Sparkcloud Access Code exactly as it appears below. You will not need to use this code after youve completed the sign-up process. If you do not sign up before the expiration date, you must request a new code. · Sparkcloud Access Code: [email or web address] Expires: 11/12/2018  4:50 PM 
 
4. Enter the last four digits of your Social Security Number (xxxx) and Date of Birth (mm/dd/yyyy) as indicated and click Submit. You will be taken to the next sign-up page. 5. Create a [employer] ID. This will be your Sparkcloud login ID and cannot be changed, so think of one that is secure and easy to remember. 6. Create a Sparkcloud password. You can change your password at any time. 7. Enter your Password Reset Question and Answer. This can be used at a later time if you forget your password. 8. Enter your e-mail address. You will receive e-mail notification when new information is available in [address] 9. Click Sign Up. You can now view and download portions of your medical record. 10. Click the Download Summary menu link to download a portable copy of your medical information. If you have questions, please visit the Frequently Asked Questions section of the Forerun website. Remember, Forerun is NOT to be used for urgent needs. For medical emergencies, dial 911. Now available from your iPhone and Android! Please provide this summary of care documentation to your next provider. Your primary care clinician is listed as [place]. If you have any questions after today's visit, please call [phone].

## 2018-08-21 ENCOUNTER — TELEPHONE (OUTPATIENT)
Dept: INTERNAL MEDICINE CLINIC | Age: 43
End: 2018-08-21

## 2018-08-21 ENCOUNTER — APPOINTMENT (OUTPATIENT)
Dept: INTERNAL MEDICINE CLINIC | Age: 43
End: 2018-08-21

## 2018-08-21 DIAGNOSIS — R11.2 NAUSEA AND VOMITING, INTRACTABILITY OF VOMITING NOT SPECIFIED, UNSPECIFIED VOMITING TYPE: Primary | ICD-10-CM

## 2018-08-21 DIAGNOSIS — K21.9 GASTROESOPHAGEAL REFLUX DISEASE, ESOPHAGITIS PRESENCE NOT SPECIFIED: ICD-10-CM

## 2018-08-21 NOTE — LETTER
NOTIFICATION RETURN TO WORK / SCHOOL 
 
8/21/2018 9:39 AM 
 
Mr. Estrella Corley [address] [address] To Whom It May Concern: 
 
Estrella Dumont is currently under the care of [facility]. He will return to work/school on: 8/21/18 If there are questions or concerns please have the patient contact our office. Sincerely, 
 
 
Bessy Lynch LPN/ Dr. Zeinab Neves

## 2018-08-21 NOTE — TELEPHONE ENCOUNTER
Identified patient 2 identifiers verified. Patient was here today for lab work and wanted to report that he experiencing nausea and vomiting after eating and that his GERD is getting worse. He would like a referral to [facility]. Also , patient requested a letter to return to work today because he had to wait to have labs done longer  Than what he thought.

## 2018-08-22 LAB
ALBUMIN SERPL-MCNC: 4 G/DL (ref 3.5–5.5)
ALBUMIN/GLOB SERPL: 1.2 {RATIO} (ref 1.2–2.2)
ALP SERPL-CCNC: 75 IU/L (ref 39–117)
ALT SERPL-CCNC: 20 IU/L (ref 0–44)
AST SERPL-CCNC: 22 IU/L (ref 0–40)
BASOPHILS # BLD AUTO: 0 X10E3/UL (ref 0–0.2)
BASOPHILS NFR BLD AUTO: 0 %
BILIRUB SERPL-MCNC: 0.3 MG/DL (ref 0–1.2)
BUN SERPL-MCNC: 15 MG/DL (ref 6–24)
BUN/CREAT SERPL: 13 (ref 9–20)
CALCIUM SERPL-MCNC: 9.6 MG/DL (ref 8.7–10.2)
CHLORIDE SERPL-SCNC: 94 MMOL/L (ref 96–106)
CHOLEST SERPL-MCNC: 219 MG/DL (ref 100–199)
CK SERPL-CCNC: 182 U/L (ref 24–204)
CO2 SERPL-SCNC: 24 MMOL/L (ref 20–29)
CREAT SERPL-MCNC: 1.19 MG/DL (ref 0.76–1.27)
EOSINOPHIL # BLD AUTO: 0.1 X10E3/UL (ref 0–0.4)
EOSINOPHIL NFR BLD AUTO: 2 %
ERYTHROCYTE [DISTWIDTH] IN BLOOD BY AUTOMATED COUNT: 13.2 % (ref 12.3–15.4)
EST. AVERAGE GLUCOSE BLD GHB EST-MCNC: 258 MG/DL
GLOBULIN SER CALC-MCNC: 3.4 G/DL (ref 1.5–4.5)
GLUCOSE SERPL-MCNC: 371 MG/DL (ref 65–99)
HBA1C MFR BLD: 10.6 % (ref 4.8–5.6)
HCT VFR BLD AUTO: 44.7 % (ref 37.5–51)
HDLC SERPL-MCNC: 21 MG/DL
HGB BLD-MCNC: 14.5 G/DL (ref 13–17.7)
IMM GRANULOCYTES # BLD: 0 X10E3/UL (ref 0–0.1)
IMM GRANULOCYTES NFR BLD: 0 %
LDLC SERPL CALC-MCNC: ABNORMAL MG/DL (ref 0–99)
LYMPHOCYTES # BLD AUTO: 2.2 X10E3/UL (ref 0.7–3.1)
LYMPHOCYTES NFR BLD AUTO: 29 %
MCH RBC QN AUTO: 32 PG (ref 26.6–33)
MCHC RBC AUTO-ENTMCNC: 32.4 G/DL (ref 31.5–35.7)
MCV RBC AUTO: 99 FL (ref 79–97)
MONOCYTES # BLD AUTO: 0.5 X10E3/UL (ref 0.1–0.9)
MONOCYTES NFR BLD AUTO: 7 %
NEUTROPHILS # BLD AUTO: 4.6 X10E3/UL (ref 1.4–7)
NEUTROPHILS NFR BLD AUTO: 62 %
PLATELET # BLD AUTO: 231 X10E3/UL (ref 150–379)
POTASSIUM SERPL-SCNC: 4.1 MMOL/L (ref 3.5–5.2)
PROT SERPL-MCNC: 7.4 G/DL (ref 6–8.5)
RBC # BLD AUTO: 4.53 X10E6/UL (ref 4.14–5.8)
SODIUM SERPL-SCNC: 134 MMOL/L (ref 134–144)
TRIGL SERPL-MCNC: 1315 MG/DL (ref 0–149)
VLDLC SERPL CALC-MCNC: ABNORMAL MG/DL (ref 5–40)
WBC # BLD AUTO: 7.5 X10E3/UL (ref 3.4–10.8)

## 2018-08-24 DIAGNOSIS — Z79.4 TYPE 2 DIABETES MELLITUS WITH COMPLICATION, WITH LONG-TERM CURRENT USE OF INSULIN (HCC): Primary | ICD-10-CM

## 2018-08-24 DIAGNOSIS — E11.8 TYPE 2 DIABETES MELLITUS WITH COMPLICATION, WITH LONG-TERM CURRENT USE OF INSULIN (HCC): Primary | ICD-10-CM

## 2018-08-24 NOTE — PROGRESS NOTES
Identified patient 2 identifiers verified.  Patient aware of lab results and increased insulin dose and new script for Fenofibrate

## 2018-08-24 NOTE — PROGRESS NOTES
His DM is uncontrolled. Recommend increasing the insulin by 5 units. Start fenofibrate 154 mg daily for the high triglycerides. Let's see him back in a month with glucose logs.  BJF

## 2018-08-26 RX ORDER — SYRINGE AND NEEDLE,INSULIN,1ML 31GX15/64"
SYRINGE, EMPTY DISPOSABLE MISCELLANEOUS
Qty: 100 PEN NEEDLE | Refills: 5 | Status: SHIPPED | OUTPATIENT
Start: 2018-08-26 | End: 2019-03-12 | Stop reason: SDUPTHER

## 2018-09-04 ENCOUNTER — OFFICE VISIT (OUTPATIENT)
Dept: INTERNAL MEDICINE CLINIC | Age: 43
End: 2018-09-04

## 2018-09-04 VITALS
TEMPERATURE: 97.7 F | SYSTOLIC BLOOD PRESSURE: 119 MMHG | HEIGHT: 69 IN | RESPIRATION RATE: 16 BRPM | DIASTOLIC BLOOD PRESSURE: 82 MMHG | WEIGHT: 315 LBS | HEART RATE: 96 BPM | OXYGEN SATURATION: 95 % | BODY MASS INDEX: 46.65 KG/M2

## 2018-09-04 LAB
BILIRUB UR QL STRIP: NEGATIVE
GLUCOSE UR-MCNC: NORMAL MG/DL
KETONES P FAST UR STRIP-MCNC: NORMAL MG/DL
PH UR STRIP: 5 [PH] (ref 4.6–8)
PROT UR QL STRIP: NORMAL
SP GR UR STRIP: 1.01 (ref 1–1.03)
UA UROBILINOGEN AMB POC: NORMAL (ref 0.2–1)
URINALYSIS CLARITY POC: CLEAR
URINALYSIS COLOR POC: YELLOW
URINE BLOOD POC: NEGATIVE
URINE LEUKOCYTES POC: NEGATIVE
URINE NITRITES POC: NEGATIVE

## 2018-09-04 RX ORDER — INSULIN GLARGINE 100 [IU]/ML
40 INJECTION, SOLUTION SUBCUTANEOUS 2 TIMES DAILY
Qty: 1 VIAL | Refills: 11 | Status: SHIPPED | OUTPATIENT
Start: 2018-09-04 | End: 2018-09-18 | Stop reason: SDUPTHER

## 2018-09-04 NOTE — MR AVS SNAPSHOT
[address] [facility] [address] [facility] 
[phone] Patient: Christopher Echeverria MRN:  [id number] Visit Information Date & Time Provider Department Dept. Phone Encounter #  
 9/4/2018 11:45 AM Gonzalo Nava, [address] [id number] Follow-up Instructions Return in about 2 weeks (around 9/18/2018) for DM. Upcoming Health Maintenance Date Due Pneumococcal 19-64 Medium Risk (1 of 1 - PPSV23) 8/2/1993 DTaP/Tdap/Td series (1 - Tdap) 8/2/1995 EYE EXAM RETINAL OR DILATED Q1 5/4/2018 Influenza Age 5 to Adult 8/1/2018 FOOT EXAM Q1 12/5/2018 HEMOGLOBIN A1C Q6M 2/21/2019 MICROALBUMIN Q1 8/14/2019 LIPID PANEL Q1 8/21/2019 Allergies as of 9/4/2018  Review Complete On: 9/4/2018 By: Gonzalo Nava MD  
 No Known Allergies Current Immunizations  Reviewed on 4/19/2018 Name Date Influenza Vaccine (Quad) PF 9/5/2017, 12/30/2016, 1/26/2016 Not reviewed this visit You Were Diagnosed With   
  
 Codes Comments Uncontrolled diabetes mellitus type 2 without complications, unspecified whether long term insulin use ([facility]    -  Primary ICD-10-CM: E11.65 ICD-9-CM: 250.02 Vitals BP Pulse Temp Resp Height(growth percentile) Weight(growth percentile) 119/82 (BP 1 Location: Left arm, BP Patient Position: Sitting) 96 97.7 °F (36.5 °C) (Oral) 16 5' 9\" (1.753 m) 340 lb (154.2 kg) SpO2 BMI Smoking Status 95% 50.21 kg/m2 Never Smoker Vitals History BMI and BSA Data Body Mass Index Body Surface Area  
 50.21 kg/m 2 2.74 m 2 Preferred Pharmacy Pharmacy Name Phone Em[facility] [address]  [place] [phone] Your Updated Medication List  
  
   
This list is accurate as of 9/4/18 12:09 PM.  Always use your most recent med list.  
  
  
  
  
 Blood-Glucose Meter monitoring kit Use as directed to test blood sugar three times daily -DX-DM-E11.9 FISH OIL PO Take  by mouth. glucose blood VI test strips strip Commonly known as:  blood glucose test  
Use as directed to test blood sugar three times daily -DX-DM-E11.9  
  
 insulin glargine 100 unit/mL injection Commonly known as:  LANTUS  
40 Units by SubCUTAneous route two (2) times a day. Insulin Syringe-Needle U-100 0.3 mL 31 gauge x 5/16 Syrg  
USE TO INJECT INSULIN ONCE DAILY  
  
 insulin syringe-needle U-100 1 mL 31 gauge x 15/64\" Syrg Use up to TID as directed Lancets Misc Use as directed to test blood sugar three times daily -DX-DM-E11.9 pantoprazole 40 mg tablet Commonly known as:  PROTONIX Take 1 Tab by mouth Before breakfast and dinner. pravastatin 20 mg tablet Commonly known as:  PRAVACHOL Take 1 Tab by mouth nightly. sevelamer carbonate 800 mg Tab tab Commonly known as:  Asher Blue Take 2 Tabs by mouth three (3) times daily (with meals). sucralfate 100 mg/mL suspension Commonly known as:  Ronita Hy Take 10 mL by mouth Before breakfast, lunch, dinner and at bedtime. Prescriptions Sent to Pharmacy Refills  
 insulin glargine (LANTUS) 100 unit/mL injection 11 Si Units by SubCUTAneous route two (2) times a day. Class: Normal  
 Pharmacy: [facility] [address] Ph #: [phone] Route: SubCUTAneous We Performed the Following AMB POC URINALYSIS DIP STICK AUTO W/O MICRO [27256 CPT(R)] Follow-up Instructions Return in about 2 weeks (around 2018) for DM. Introducing [facility] & [facility]! Mariluz Vázquez introduces [employer] patient portal. Now you can access parts of your medical record, email your doctor's office, and request medication refills online.    
 
1. In your internet browser, go to [email or web address] 2. Click on the First Time User? Click Here link in the Sign In box. You will see the New Member Sign Up page. 3. Enter your MagForce Access Code exactly as it appears below. You will not need to use this code after youve completed the sign-up process. If you do not sign up before the expiration date, you must request a new code. · MagForce Access Code: [email or web address] Expires: 11/12/2018  4:50 PM 
 
4. Enter the last four digits of your Social Security Number (xxxx) and Date of Birth (mm/dd/yyyy) as indicated and click Submit. You will be taken to the next sign-up page. 5. Create a MagForce ID. This will be your MagForce login ID and cannot be changed, so think of one that is secure and easy to remember. 6. Create a MagForce password. You can change your password at any time. 7. Enter your Password Reset Question and Answer. This can be used at a later time if you forget your password. 8. Enter your e-mail address. You will receive e-mail notification when new information is available in [address] 9. Click Sign Up. You can now view and download portions of your medical record. 10. Click the Download Summary menu link to download a portable copy of your medical information. If you have questions, please visit the Frequently Asked Questions section of the MagForce website. Remember, MagForce is NOT to be used for urgent needs. For medical emergencies, dial 911. Now available from your iPhone and Android! Please provide this summary of care documentation to your next provider. Your primary care clinician is listed as [place]. If you have any questions after today's visit, please call [phone].

## 2018-09-04 NOTE — PROGRESS NOTES
SUBJECTIVE  Mr. Nino Vasquez presents today acutely for     Chief Complaint   Patient presents with    Diabetes     pt here today for routine f.u    Results     pt here to discuss lab work     Urinary Odor     pt concerned of odor to his urine        His DM is poorly controlled. Gluc running 400s to 500s. He is taking 30 units lantus bid. Sees his nephrologist in the next week. OBJECTIVE  Visit Vitals    /82 (BP 1 Location: Left arm, BP Patient Position: Sitting)    Pulse 96    Temp 97.7 °F (36.5 °C) (Oral)    Resp 16    Ht 5' 9\" (1.753 m)    Wt 340 lb (154.2 kg)    SpO2 95%    BMI 50.21 kg/m2     Gen: Pleasant 39 y.o.  male in NAD.   HEENT: PERRLA. EOMI. OP moist and pink.  Neck: Supple.  No LAD.  HEART: RRR, No M/G/R.   LUNGS: CTAB No W/R.   ABDOMEN: S, NT, ND, BS+.   EXTREMITIES: Warm. SKIN: Warm. Dry. No rashes or other lesions noted. Recent Results (from the past 12 hour(s))   AMB POC URINALYSIS DIP STICK AUTO W/O MICRO    Collection Time: 09/04/18 12:11 PM   Result Value Ref Range    Color (UA POC) Yellow     Clarity (UA POC) Clear     Glucose (UA POC) 1+ Negative    Bilirubin (UA POC) Negative Negative    Ketones (UA POC) 1+ Negative    Specific gravity (UA POC) 1.010 1.001 - 1.035    Blood (UA POC) Negative Negative    pH (UA POC) 5.0 4.6 - 8.0    Protein (UA POC) Trace Negative    Urobilinogen (UA POC) 0.2 mg/dL 0.2 - 1    Nitrites (UA POC) Negative Negative    Leukocyte esterase (UA POC) Negative Negative         ASSESSMENT / PLAN    ICD-10-CM ICD-9-CM    1. Uncontrolled diabetes mellitus type 2 without complications, unspecified whether long term insulin use ([facility]) E11.65 250.02 AMB POC URINALYSIS DIP STICK AUTO W/O MICRO      insulin glargine (LANTUS) 100 unit/mL injection       I have reviewed with the patient details of the assessment and plan and all questions were answered. Relevant patient education was performed.     Follow-up Disposition:  Return in about 2 weeks (around 9/18/2018) for DM.

## 2018-09-04 NOTE — PROGRESS NOTES
1. Have you been to the ER, urgent care clinic since your last visit? Hospitalized since your last visit?no    2. Have you seen or consulted any other health care providers outside of the [address] [place] [address] since your last visit? Include any pap smears or colon screening.  no

## 2018-09-18 ENCOUNTER — OFFICE VISIT (OUTPATIENT)
Dept: INTERNAL MEDICINE CLINIC | Age: 43
End: 2018-09-18

## 2018-09-18 VITALS
RESPIRATION RATE: 16 BRPM | TEMPERATURE: 99 F | DIASTOLIC BLOOD PRESSURE: 76 MMHG | HEART RATE: 95 BPM | WEIGHT: 315 LBS | HEIGHT: 69 IN | BODY MASS INDEX: 46.65 KG/M2 | OXYGEN SATURATION: 94 % | SYSTOLIC BLOOD PRESSURE: 127 MMHG

## 2018-09-18 RX ORDER — INSULIN GLARGINE 100 [IU]/ML
50 INJECTION, SOLUTION SUBCUTANEOUS 2 TIMES DAILY
Qty: 1 VIAL | Refills: 11 | Status: SHIPPED | OUTPATIENT
Start: 2018-09-18 | End: 2019-03-05 | Stop reason: SDUPTHER

## 2018-09-18 NOTE — PROGRESS NOTES
1. Have you been to the ER, urgent care clinic since your last visit? Hospitalized since your last visit?no    2. Have you seen or consulted any other health care providers outside of the [address] [place] [address] since your last visit? Include any pap smears or colon screening.  no

## 2018-09-18 NOTE — MR AVS SNAPSHOT
[address] [facility] 83. 
[phone] Patient: Dante Joseph MRN:  [id number] Visit Information Date & Time Provider Department Dept. Phone Encounter #  
 9/18/2018  3:45 PM Jose Guadalupe Travis, [address] [place] [address],[address] [phone] [id number] Follow-up Instructions Return in about 2 weeks (around 10/2/2018) for DM. Upcoming Health Maintenance Date Due Pneumococcal 19-64 Medium Risk (1 of 1 - PPSV23) 8/2/1993 DTaP/Tdap/Td series (1 - Tdap) 8/2/1995 EYE EXAM RETINAL OR DILATED Q1 5/4/2018 Influenza Age 5 to Adult 8/1/2018 FOOT EXAM Q1 12/5/2018 HEMOGLOBIN A1C Q6M 2/21/2019 MICROALBUMIN Q1 8/14/2019 LIPID PANEL Q1 8/21/2019 Allergies as of 9/18/2018  Review Complete On: 9/4/2018 By: Jose Guadalupe Travis MD  
 No Known Allergies Current Immunizations  Reviewed on 4/19/2018 Name Date Influenza Vaccine (Quad) PF 9/5/2017, 12/30/2016, 1/26/2016 Not reviewed this visit You Were Diagnosed With   
  
 Codes Comments Uncontrolled diabetes mellitus type 2 without complications, unspecified whether long term insulin use ([facility].)     ICD-10-CM: E11.65 ICD-9-CM: 250.02 Vitals BP Pulse Temp Resp Height(growth percentile) Weight(growth percentile) 127/76 (BP 1 Location: Left arm, BP Patient Position: Sitting) 95 99 °F (37.2 °C) (Oral) 16 5' 9\" (1.753 m) 340 lb (154.2 kg) SpO2 BMI Smoking Status 94% 50.21 kg/m2 Never Smoker BMI and BSA Data Body Mass Index Body Surface Area  
 50.21 kg/m 2 2.74 m 2 Preferred Pharmacy Pharmacy Name Phone [facility]  [place] [phone] Your Updated Medication List  
  
   
This list is accurate as of 9/18/18  4:13 PM.  Always use your most recent med list.  
  
  
  
  
 Blood-Glucose Meter monitoring kit Use as directed to test blood sugar three times daily -DX-DM-E11.9 FISH OIL PO Take  by mouth. glucose blood VI test strips strip Commonly known as:  blood glucose test  
Use as directed to test blood sugar three times daily -DX-DM-E11.9  
  
 insulin glargine 100 unit/mL injection Commonly known as:  LANTUS  
50 Units by SubCUTAneous route two (2) times a day. Insulin Syringe-Needle U-100 0.3 mL 31 gauge x 5/16 Syrg  
USE TO INJECT INSULIN ONCE DAILY  
  
 insulin syringe-needle U-100 1 mL 31 gauge x 15/64\" Syrg Use up to TID as directed Lancets Misc Use as directed to test blood sugar three times daily -DX-DM-E11.9 pantoprazole 40 mg tablet Commonly known as:  PROTONIX Take 1 Tab by mouth Before breakfast and dinner. pravastatin 20 mg tablet Commonly known as:  PRAVACHOL Take 1 Tab by mouth nightly. sevelamer carbonate 800 mg Tab tab Commonly known as:  Asher Blue Take 2 Tabs by mouth three (3) times daily (with meals). sucralfate 100 mg/mL suspension Commonly known as:  Ronita Hy Take 10 mL by mouth Before breakfast, lunch, dinner and at bedtime. Prescriptions Sent to Pharmacy Refills  
 insulin glargine (LANTUS) 100 unit/mL injection 11 Si Units by SubCUTAneous route two (2) times a day. Class: Normal  
 Pharmacy: [facility] [address] #: [phone] Route: SubCUTAneous Follow-up Instructions Return in about 2 weeks (around 10/2/2018) for DM. Introducing [facility] & HEALTH SERVICES! Mariluz Vázquez introduces Department of Health and Human Services patient portal. Now you can access parts of your medical record, email your doctor's office, and request medication refills online. 1. In your internet browser, go to [email or web address]/The ANT Works 2. Click on the First Time User? Click Here link in the Sign In box. You will see the New Member Sign Up page. 3. Enter your [employer] Access Code exactly as it appears below. You will not need to use this code after youve completed the sign-up process. If you do not sign up before the expiration date, you must request a new code. · [employer] Access Code: [email or web address] Expires: 11/12/2018  4:50 PM 
 
4. Enter the last four digits of your Social Security Number (xxxx) and Date of Birth (mm/dd/yyyy) as indicated and click Submit. You will be taken to the next sign-up page. 5. Create a [employer] ID. This will be your [employer] login ID and cannot be changed, so think of one that is secure and easy to remember. 6. Create a [employer] password. You can change your password at any time. 7. Enter your Password Reset Question and Answer. This can be used at a later time if you forget your password. 8. Enter your e-mail address. You will receive e-mail notification when new information is available in [address] 9. Click Sign Up. You can now view and download portions of your medical record. 10. Click the Download Summary menu link to download a portable copy of your medical information. If you have questions, please visit the Frequently Asked Questions section of the [employer] website. Remember, [employer] is NOT to be used for urgent needs. For medical emergencies, dial 911. Now available from your iPhone and Android! Please provide this summary of care documentation to your next provider. Your primary care clinician is listed as [place]. If you have any questions after today's visit, please call [phone].

## 2018-10-09 ENCOUNTER — OFFICE VISIT (OUTPATIENT)
Dept: INTERNAL MEDICINE CLINIC | Age: 43
End: 2018-10-09

## 2018-10-09 VITALS
HEIGHT: 69 IN | BODY MASS INDEX: 46.65 KG/M2 | TEMPERATURE: 97.3 F | WEIGHT: 315 LBS | OXYGEN SATURATION: 97 % | RESPIRATION RATE: 14 BRPM | SYSTOLIC BLOOD PRESSURE: 132 MMHG | HEART RATE: 94 BPM | DIASTOLIC BLOOD PRESSURE: 88 MMHG

## 2018-10-09 DIAGNOSIS — E11.65 UNCONTROLLED TYPE 2 DIABETES MELLITUS WITH HYPERGLYCEMIA (HCC): Primary | ICD-10-CM

## 2018-10-09 NOTE — PROGRESS NOTES
SUBJECTIVE  Mr. Darius Vasquez presents today acutely for     Chief Complaint   Patient presents with    Diabetes     pt here today for 2 week f.u       His DM is poorly controlled. Gluc running 120 to 240s (this is down from previous visit). He is taking 50 units lantus bid. Nephrologist saw him last week, \"I'm doing okay. \"     OBJECTIVE  Visit Vitals    /88 (BP 1 Location: Left arm, BP Patient Position: Sitting)    Pulse 94    Temp 97.3 °F (36.3 °C) (Oral)    Resp 14    Ht 5' 9\" (1.753 m)    Wt 315 lb 12.8 oz (143.2 kg)    SpO2 97%    BMI 46.64 kg/m2     Gen: Pleasant 39 y.o.  male in NAD.   HEENT: PERRLA. EOMI. OP moist and pink.  Neck: Supple.  No LAD.  HEART: RRR, No M/G/R.   LUNGS: CTAB No W/R.   ABDOMEN: S, NT, ND, BS+.   EXTREMITIES: Warm. SKIN: Warm. Dry. No rashes or other lesions noted. ASSESSMENT / PLAN    ICD-10-CM ICD-9-CM    1. Uncontrolled type 2 diabetes mellitus with hyperglycemia (HCC) E11.65 250.02        Increase dose to 52 bid. I have reviewed with the patient details of the assessment and plan and all questions were answered. Relevant patient education was performed. Follow-up Disposition:  Return in about 1 month (around 11/9/2018) for DM, etc.  Labs ordered.

## 2018-10-09 NOTE — PROGRESS NOTES
1. Have you been to the ER, urgent care clinic since your last visit? Hospitalized since your last visit?no    2. Have you seen or consulted any other health care providers outside of the [address] [place] [address] since your last visit? Include any pap smears or colon screening.  no

## 2018-10-09 NOTE — MR AVS SNAPSHOT
[address] [place] [address] 
[phone] Patient: Richard Hammond MRN:  [id number] Visit Information Date & Time Provider Department Dept. Phone Encounter #  
 10/9/2018  3:45 PM Leon Alexander, [address] [place] [address],[address] [phone] [id number] Follow-up Instructions Return in about 1 month (around 11/9/2018) for DM, etc. Upcoming Health Maintenance Date Due Pneumococcal 19-64 Medium Risk (1 of 1 - PPSV23) 8/2/1993 DTaP/Tdap/Td series (1 - Tdap) 8/2/1995 EYE EXAM RETINAL OR DILATED Q1 5/4/2018 Influenza Age 5 to Adult 8/1/2018 FOOT EXAM Q1 12/5/2018 HEMOGLOBIN A1C Q6M 2/21/2019 MICROALBUMIN Q1 8/14/2019 LIPID PANEL Q1 8/21/2019 Allergies as of 10/9/2018  Review Complete On: 10/9/2018 By: Leon Alexander MD  
 No Known Allergies Current Immunizations  Reviewed on 4/19/2018 Name Date Influenza Vaccine (Quad) PF 9/5/2017, 12/30/2016, 1/26/2016 Not reviewed this visit You Were Diagnosed With   
  
 Codes Comments Uncontrolled type 2 diabetes mellitus with hyperglycemia (HCC)    -  Primary ICD-10-CM: E11.65 ICD-9-CM: 250.02 Vitals BP Pulse Temp Resp Height(growth percentile) Weight(growth percentile) 132/88 (BP 1 Location: Left arm, BP Patient Position: Sitting) 94 97.3 °F (36.3 °C) (Oral) 14 5' 9\" (1.753 m) 315 lb 12.8 oz (143.2 kg) SpO2 BMI Smoking Status 97% 46.64 kg/m2 Never Smoker Vitals History BMI and BSA Data Body Mass Index Body Surface Area  
 46.64 kg/m 2 2.64 m 2 Preferred Pharmacy Pharmacy Name Phone Emil Pierce [facility] [address] [place] [address]  [place] [phone] Your Updated Medication List  
  
   
This list is accurate as of 10/9/18  4:04 PM.  Always use your most recent med list.  
  
  
  
  
 Blood-Glucose Meter monitoring kit Use as directed to test blood sugar three times daily -DX-DM-E11.9 FISH OIL PO Take  by mouth. glucose blood VI test strips strip Commonly known as:  blood glucose test  
Use as directed to test blood sugar three times daily -DX-DM-E11.9  
  
 insulin glargine 100 unit/mL injection Commonly known as:  LANTUS  
50 Units by SubCUTAneous route two (2) times a day. Insulin Syringe-Needle U-100 0.3 mL 31 gauge x 5/16 Syrg  
USE TO INJECT INSULIN ONCE DAILY  
  
 insulin syringe-needle U-100 1 mL 31 gauge x 15/64\" Syrg Use up to TID as directed Lancets Misc Use as directed to test blood sugar three times daily -DX-DM-E11.9 pantoprazole 40 mg tablet Commonly known as:  PROTONIX Take 1 Tab by mouth Before breakfast and dinner. pravastatin 20 mg tablet Commonly known as:  PRAVACHOL Take 1 Tab by mouth nightly. sevelamer carbonate 800 mg Tab tab Commonly known as:  Nyra Pam Take 2 Tabs by mouth three (3) times daily (with meals). sucralfate 100 mg/mL suspension Commonly known as:  [place] Ruths Take 10 mL by mouth Before breakfast, lunch, dinner and at bedtime. We Performed the Following CBC WITH AUTOMATED DIFF [70908 CPT(R)] HEMOGLOBIN A1C WITH EAG [87600 CPT(R)] LIPID PANEL [30652 CPT(R)] METABOLIC PANEL, COMPREHENSIVE [21719 CPT(R)] Follow-up Instructions Return in about 1 month (around 11/9/2018) for DM, etc.  
  
  
Introducing \[facility]\"" & HEALTH SERVICES! [employer] introduces [employer] patient portal. Now you can access parts of your medical record, email your doctor's office, and request medication refills online. 1. In your internet browser, go to [email or web address] 2. Click on the First Time User? Click Here link in the Sign In box. You will see the New Member Sign Up page. 3. Enter your [employer] Access Code exactly as it appears below.  You will not need to use this code after youve completed the sign-up process. If you do not sign up before the expiration date, you must request a new code. · [employer] Access Code: [email or web address] Expires: 11/12/2018  4:50 PM 
 
4. Enter the last four digits of your Social Security Number (xxxx) and Date of Birth (mm/dd/yyyy) as indicated and click Submit. You will be taken to the next sign-up page. 5. Create a [employer] ID. This will be your [employer] login ID and cannot be changed, so think of one that is secure and easy to remember. 6. Create a [employer] password. You can change your password at any time. 7. Enter your Password Reset Question and Answer. This can be used at a later time if you forget your password. 8. Enter your e-mail address. You will receive e-mail notification when new information is available in [address] 9. Click Sign Up. You can now view and download portions of your medical record. 10. Click the Download Summary menu link to download a portable copy of your medical information. If you have questions, please visit the Frequently Asked Questions section of the [employer] website. Remember, [employer] is NOT to be used for urgent needs. For medical emergencies, dial 911. Now available from your iPhone and Android! Please provide this summary of care documentation to your next provider. Your primary care clinician is listed as [place]. If you have any questions after today's visit, please call [phone].

## 2018-11-27 ENCOUNTER — OFFICE VISIT (OUTPATIENT)
Dept: INTERNAL MEDICINE CLINIC | Age: 43
End: 2018-11-27

## 2018-11-27 VITALS
OXYGEN SATURATION: 96 % | BODY MASS INDEX: 45.74 KG/M2 | SYSTOLIC BLOOD PRESSURE: 138 MMHG | TEMPERATURE: 98.7 F | HEART RATE: 83 BPM | HEIGHT: 69 IN | WEIGHT: 308.8 LBS | DIASTOLIC BLOOD PRESSURE: 84 MMHG | RESPIRATION RATE: 14 BRPM

## 2018-11-27 DIAGNOSIS — Z23 ENCOUNTER FOR IMMUNIZATION: Primary | ICD-10-CM

## 2018-11-29 LAB
ALBUMIN SERPL-MCNC: 4 G/DL (ref 3.5–5.5)
ALBUMIN/GLOB SERPL: 1.3 {RATIO} (ref 1.2–2.2)
ALP SERPL-CCNC: 85 IU/L (ref 39–117)
ALT SERPL-CCNC: 19 IU/L (ref 0–44)
AST SERPL-CCNC: 18 IU/L (ref 0–40)
BASOPHILS # BLD AUTO: 0 X10E3/UL (ref 0–0.2)
BASOPHILS NFR BLD AUTO: 0 %
BILIRUB SERPL-MCNC: 0.3 MG/DL (ref 0–1.2)
BUN SERPL-MCNC: 13 MG/DL (ref 6–24)
BUN/CREAT SERPL: 15 (ref 9–20)
CALCIUM SERPL-MCNC: 9.5 MG/DL (ref 8.7–10.2)
CHLORIDE SERPL-SCNC: 92 MMOL/L (ref 96–106)
CHOLEST SERPL-MCNC: 256 MG/DL (ref 100–199)
CO2 SERPL-SCNC: 24 MMOL/L (ref 20–29)
CREAT SERPL-MCNC: 0.85 MG/DL (ref 0.76–1.27)
EOSINOPHIL # BLD AUTO: 0.1 X10E3/UL (ref 0–0.4)
EOSINOPHIL NFR BLD AUTO: 1 %
ERYTHROCYTE [DISTWIDTH] IN BLOOD BY AUTOMATED COUNT: 12.5 % (ref 12.3–15.4)
EST. AVERAGE GLUCOSE BLD GHB EST-MCNC: >398 MG/DL
GLOBULIN SER CALC-MCNC: 3.1 G/DL (ref 1.5–4.5)
GLUCOSE SERPL-MCNC: 435 MG/DL (ref 65–99)
HBA1C MFR BLD: >15.5 % (ref 4.8–5.6)
HCT VFR BLD AUTO: 44 % (ref 37.5–51)
HDLC SERPL-MCNC: 19 MG/DL
HGB BLD-MCNC: 14.9 G/DL (ref 13–17.7)
IMM GRANULOCYTES # BLD: 0 X10E3/UL (ref 0–0.1)
IMM GRANULOCYTES NFR BLD: 0 %
LDLC SERPL CALC-MCNC: ABNORMAL MG/DL (ref 0–99)
LYMPHOCYTES # BLD AUTO: 3 X10E3/UL (ref 0.7–3.1)
LYMPHOCYTES NFR BLD AUTO: 29 %
MCH RBC QN AUTO: 33 PG (ref 26.6–33)
MCHC RBC AUTO-ENTMCNC: 33.9 G/DL (ref 31.5–35.7)
MCV RBC AUTO: 97 FL (ref 79–97)
MONOCYTES # BLD AUTO: 0.5 X10E3/UL (ref 0.1–0.9)
MONOCYTES NFR BLD AUTO: 5 %
NEUTROPHILS # BLD AUTO: 6.5 X10E3/UL (ref 1.4–7)
NEUTROPHILS NFR BLD AUTO: 65 %
PLATELET # BLD AUTO: 219 X10E3/UL (ref 150–379)
POTASSIUM SERPL-SCNC: 4.4 MMOL/L (ref 3.5–5.2)
PROT SERPL-MCNC: 7.1 G/DL (ref 6–8.5)
RBC # BLD AUTO: 4.52 X10E6/UL (ref 4.14–5.8)
SODIUM SERPL-SCNC: 131 MMOL/L (ref 134–144)
TRIGL SERPL-MCNC: 1866 MG/DL (ref 0–149)
VLDLC SERPL CALC-MCNC: ABNORMAL MG/DL (ref 5–40)
WBC # BLD AUTO: 10.2 X10E3/UL (ref 3.4–10.8)

## 2018-12-12 NOTE — PROGRESS NOTES
Identified patient 2 identifiers verified. Patient has been informed of lab results and plan of care, patient aware to increase Insulin to 60 units twice per day and to follow up on one month. Patient  Informed to  Watch diet with low fat foods and to monitor simple carbs. Exercise 3 times per week.

## 2018-12-12 NOTE — PROGRESS NOTES
His glucose and A1C are very high. Verify he is taking his meds as directed. If so, then increase lantus to 60, and have him come back in about 1 month. (Except under extraordinary circumstances, he should never miss a dose).  BJF

## 2019-02-03 NOTE — PROGRESS NOTES
[facility] Consult Service Progress NOTE  Pulmonary, Critical Care, and Sleep Medicine    Name: George Mata MRN: [id number]   : 1975 Hospital: [facility]   Date: 4/15/2018  Admission Date: 2018       IMPRESSION:   1. Rhabdo: CPK 60K  2. Code Tiffany was called  Seemed to have acute delirium, Possible Encephalopathy due to his acute Metabolic Encephalopathy. 3. DKA  4. Hypoxia   5. Sepsis with leukocytosis and tachycardia with lactic acidosis   6. Right middle lobe PNA possible aspiration  7. Hypernatremia from fluid resuscitation,    8. hypocalcemia  9. Acute Encephalopathy from metabolic derangements. 10. HTN  11. Morbid obesity  12. Severe hypophosphatemia  13. erytrhocytosis- wonder about chronic hypoxemia or hemoconcentration  14. Multiorgan dysfunction as outlined above  15. Additional workup outlined below  16. Occasional Etoh on the Weekends. 17. Nonsmoker       RECOMMENDATIONS/PLAN:   1. Back on insulin drip on sterile water  2. Bicarb drip for rhabdo  3. Empiric abx  4. Supplemental oxygen to keep sats > 90%  5. Renal following, renal fx worse  6. Start RRT today  7. Na lower with free water   8. Pt needs IV fluids with additives and Drug therapy requiring intensive monitoring for toxicity  9. Prescription drug management with home med reconciliation reviewed  10. DVT, SUP prophylaxis       My assessment/management discussed with: Nursing for coordination of care         The reason for providing this level of medical care was due to a critical illness that impaired one or more vital organ systems, such that there was a high probability of imminent or life threatening deterioration in the patient's condition. Pt's condition is unstable and unpredictable. Risk of deterioration: moderate   [x] High complexity decision making was performed  [x] See my orders for details  ICU disposition: to floor today          Chart and notes reviewed.  Data reviewed. Pt seen on rounds earlier today. I have evaluated and examined the patient. Pt is unstable and acutely ill in the CCU. Awake, alert, no acute distress  C/o upset stomach    MAR reviewed and pertinent medications noted or modified as needed   Current Facility-Administered Medications   Medication    sodium bicarbonate (8.4%) 150 mEq in sterile water 1,000 mL infusion    glucose chewable tablet 16 g    dextrose (D50W) injection syrg 12.5-25 g    glucagon (GLUCAGEN) injection 1 mg    cefepime (MAXIPIME) 1 g in 0.9% sodium chloride (MBP/ADV) 50 mL    heparin (porcine) injection 5,000 Units    sodium chloride (NS) flush 10-40 mL    sodium chloride (NS) flush 10 mL    insulin regular (NOVOLIN R, HUMULIN R) 100 Units in 0.9% sodium chloride 100 mL infusion    nitroglycerin (NITROBID) 2 % ointment 1 Inch    sodium chloride (NS) flush 5-10 mL    sodium chloride (NS) flush 5-10 mL    acetaminophen (TYLENOL) tablet 650 mg    ondansetron (ZOFRAN) injection 4 mg    nystatin (MYCOSTATIN) 100,000 unit/gram powder    mupirocin (BACTROBAN) 2 % ointment         ROS:Review of systems not obtained due to patient factors. Hemodynamics:    CO:    CI:    CVP:    SVR:   PAP Systolic:    PAP Diastolic:    PVR:    BO40:        Ventilator Settings:      Mode Rate TV Press PEEP FiO2 PIP Min.  Vent                            Vital Signs: Intake/Output: Intake/Output:   Visit Vitals    /83    Pulse 98    Temp 98.6 °F (37 °C)    Resp 20    Ht 5' 9\" (1.753 m)    Wt 155.8 kg (343 lb 7.6 oz)    SpO2 95%    BMI 50.72 kg/m2    Temp (24hrs), Av.6 °F (37 °C), Min:97.7 °F (36.5 °C), Max:99 °F (37.2 °C)        Telemetry:    normal sinus rhythm   O2 Device: Room air  O2 Flow Rate (L/min): 4 l/min  Wt Readings from Last 4 Encounters:   04/15/18 155.8 kg (343 lb 7.6 oz)   17 112 kg (247 lb)   17 151 kg (333 lb)   17 144.8 kg (319 lb 3.6 oz)          Intake/Output Summary (Last 24 hours) at 04/15/18 0918  Last data filed at 04/15/18 0807   Gross per 24 hour   Intake           1433.1 ml   Output              555 ml   Net            878.1 ml     Last shift:      04/15 0701 - 04/15 1900  In: -   Out: 45 [Urine:45]  Last 3 shifts: 04/13 1901 - 04/15 0700  In: 3662.3 [P.O.:1640; I.V.:2022.3]  Out: 675 [Urine:675]     Physical Exam:    Jarome Manner American male; in no respiratory distress and acyanotic,    HEAD: Normocephalic, without obvious abnormality, atraumatic   EYES: conjunctivae clear. PERRL,  AN Icteric sclerae   NOSE: nares normal, no drainage, no nasal flaring,    THROAT: mucous membranes dry; Lips, mucosa dry; No Thrush; tongue midline   Neck: Supple, symmetrical, trachea midline,  No accessory mm use; No Stridor/ cuff leak, No goiter or thyroid tenderness   LYMPH: No abnormally enlarged lymph nodes. in neck or groin   Chest: normal   Lungs: Had good air movement anteriorly. Heart: Regular rate and rhythm ; NO edema nl s1, 2; No MRG. Abdomen: soft, protuberant, distended, nontender, obese   : normal;  Grigsby, clear urine; NO gross hematuria   Extremity: negative, clubbing; no joint swelling or erythema   Neuro: non focal   Psych: cooperative   Skin: Skin unremarkable;    Pulses:Bilateral, Radial, 2+   Capillary refill: normal ;warm,      Tubes:   Grigsby    DATA:    Interval lab and diagnostic data was reviewed. Interval radiology images were independently viewed and available reports were reviewed. Lab results reviewed. For significant abnormal values and values requiring intervention, see assessment and plan.     MAR reviewed and pertinent medications noted or modified as needed  MEDS:   Current Facility-Administered Medications   Medication    sodium bicarbonate (8.4%) 150 mEq in sterile water 1,000 mL infusion    glucose chewable tablet 16 g    dextrose (D50W) injection syrg 12.5-25 g    glucagon (GLUCAGEN) injection 1 mg    cefepime (MAXIPIME) 1 g in 0.9% sodium chloride (MBP/ADV) 50 mL    heparin (porcine) injection 5,000 Units    sodium chloride (NS) flush 10-40 mL    sodium chloride (NS) flush 10 mL    insulin regular (NOVOLIN R, HUMULIN R) 100 Units in 0.9% sodium chloride 100 mL infusion    nitroglycerin (NITROBID) 2 % ointment 1 Inch    sodium chloride (NS) flush 5-10 mL    sodium chloride (NS) flush 5-10 mL    acetaminophen (TYLENOL) tablet 650 mg    ondansetron (ZOFRAN) injection 4 mg    nystatin (MYCOSTATIN) 100,000 unit/gram powder    mupirocin (BACTROBAN) 2 % ointment        Labs:    Recent Labs      04/15/18   0359  04/14/18   0404  04/13/18   0358   WBC  12.8*  15.9*  18.6*   HGB  11.6*  13.4  15.5   PLT  89*  115*  156     Recent Labs      04/15/18   0359  04/14/18   0404  04/13/18   2308   04/13/18   0358   04/12/18   1334   NA  136  155*  157*   < >  162*   < >   --    K  4.6  3.5  3.7   < >  5.3*   < >   --    CL  103  123*  123*   < >  131*   < >   --    CO2  14*  21  20*   < >  20*   < >   --    GLU  717*  290*  243*   < >  242*   < >   --    BUN  109*  74*  73*   < >  61*   < >   --    CREA  8.83*  6.23*  5.70*   < >  3.92*   < >   --    CA  7.1*  7.4*  7.6*   < >  8.3*   < >   --    MG  2.4  2.2  2.5*   < >   --    < >   --    PHOS  7.0*  4.6  3.9   < >  5.5*   < >   --    LAC   --    --    --    --    --    --   3.4*   ALB   --    --    --    --   2.6*   --    --    SGOT   --    --    --    --   715*   --    --    ALT   --    --    --    --   165*   --    --     < > = values in this interval not displayed.      Recent Labs      04/15/18   0610   PH  7.37   PCO2  31*   PO2  73*   HCO3  17*     Recent Labs      04/15/18   0359  04/14/18   1026   CPK  60165*  49625*     No results found for: BNPP, BNP   Lab Results   Component Value Date/Time    Culture result: NO GROWTH 4 DAYS 04/11/2018 10:26 PM     Lab Results   Component Value Date/Time    CK 26008 () 04/15/2018 03:59 AM    CK 56985 () 04/14/2018 10:26 AM     No results found for: IRON, FE, TIBC, IBCT, PSAT, FERR  Lab Results   Component Value Date/Time    RPR Non Reactive 09/06/2017 09:19 AM    TSH 1.530 12/29/2014 11:32 AM      Lab Results   Component Value Date/Time    RPR Non Reactive 09/06/2017 09:19 AM       Imaging: This care involved high complexity decision making which includes independently reviewing the patient's past medical records, current laboratory results, medication profiles that were immediately available to me and actual Xray images at the bedside in order to assess, support vital system function, and to treat this degree of vital organ system failure, and to prevent further life threatening deterioration of the patients condition. I was in direct communication with the nursing staff throughout this time. I have personally and independently reviewed the patients interval lab, diagnostic data, radiographs and the reports. I have ordered additional labs to follow the current medical conditions and to monitor treatment responses over the next 24 hours or sooner if needed. I will order additional imaging to follow longitudinal changes found on the most current imaging.      Moses Akhtar MD (0) content, relaxed/(0) normal position or relaxed/(0) no particular expression or smile/(0) no cry (awake or asleep)/(0) lying quietly, normal position, moves easily

## 2019-03-05 ENCOUNTER — OFFICE VISIT (OUTPATIENT)
Dept: INTERNAL MEDICINE CLINIC | Age: 44
End: 2019-03-05

## 2019-03-05 VITALS
RESPIRATION RATE: 20 BRPM | HEIGHT: 69 IN | SYSTOLIC BLOOD PRESSURE: 127 MMHG | TEMPERATURE: 97.3 F | HEART RATE: 99 BPM | OXYGEN SATURATION: 96 % | WEIGHT: 300.4 LBS | DIASTOLIC BLOOD PRESSURE: 87 MMHG | BODY MASS INDEX: 44.49 KG/M2

## 2019-03-05 DIAGNOSIS — K22.10 EROSIVE ESOPHAGITIS: ICD-10-CM

## 2019-03-05 DIAGNOSIS — R19.7 NAUSEA VOMITING AND DIARRHEA: Primary | ICD-10-CM

## 2019-03-05 DIAGNOSIS — R11.2 NAUSEA VOMITING AND DIARRHEA: Primary | ICD-10-CM

## 2019-03-05 RX ORDER — INSULIN GLARGINE 100 [IU]/ML
55 INJECTION, SOLUTION SUBCUTANEOUS 2 TIMES DAILY
Qty: 1 VIAL | Refills: 11 | Status: SHIPPED | OUTPATIENT
Start: 2019-03-05 | End: 2019-03-12

## 2019-03-05 RX ORDER — ONDANSETRON 4 MG/1
4 TABLET, ORALLY DISINTEGRATING ORAL
Qty: 30 TAB | Refills: 1 | Status: SHIPPED | OUTPATIENT
Start: 2019-03-05 | End: 2021-10-04

## 2019-03-05 NOTE — PROGRESS NOTES
1. Have you been to the ER, urgent care clinic since your last visit? Hospitalized since your last visit?no    2. Have you seen or consulted any other health care providers outside of the [address] [place] [address] since your last visit? Include any pap smears or colon screening.  no

## 2019-03-05 NOTE — LETTER
NOTIFICATION RETURN TO WORK / SCHOOL 
 
3/5/2019 4:40 PM 
 
Mr. Arslan Lyonss [address] [phone] To Whom It May Concern: 
 
Arslan River is currently under the care of [facility]. Please excuse Mr Siria Floyd from the following day 3/4/19, and Mr Siria Floyd had an appointment with our office on today 3/5/19. If there are questions or concerns please have the patient contact our office.  
 
 
 
Sincerely, 
 
 
Analy Joshi MD

## 2019-03-06 ENCOUNTER — TELEPHONE (OUTPATIENT)
Dept: INTERNAL MEDICINE CLINIC | Age: 44
End: 2019-03-06

## 2019-03-06 LAB
ALBUMIN SERPL-MCNC: 3.9 G/DL (ref 3.5–5.5)
ALBUMIN/GLOB SERPL: 1.1 {RATIO} (ref 1.2–2.2)
ALP SERPL-CCNC: 87 IU/L (ref 39–117)
ALT SERPL-CCNC: 21 IU/L (ref 0–44)
AST SERPL-CCNC: 19 IU/L (ref 0–40)
BASOPHILS # BLD AUTO: 0 X10E3/UL (ref 0–0.2)
BASOPHILS NFR BLD AUTO: 0 %
BILIRUB SERPL-MCNC: 0.3 MG/DL (ref 0–1.2)
BUN SERPL-MCNC: 17 MG/DL (ref 6–24)
BUN/CREAT SERPL: 15 (ref 9–20)
CALCIUM SERPL-MCNC: 10 MG/DL (ref 8.7–10.2)
CHLORIDE SERPL-SCNC: 87 MMOL/L (ref 96–106)
CHOLEST SERPL-MCNC: 368 MG/DL (ref 100–199)
CO2 SERPL-SCNC: 20 MMOL/L (ref 20–29)
CREAT SERPL-MCNC: 1.16 MG/DL (ref 0.76–1.27)
EOSINOPHIL # BLD AUTO: 0.1 X10E3/UL (ref 0–0.4)
EOSINOPHIL NFR BLD AUTO: 1 %
ERYTHROCYTE [DISTWIDTH] IN BLOOD BY AUTOMATED COUNT: 12.3 % (ref 12.3–15.4)
EST. AVERAGE GLUCOSE BLD GHB EST-MCNC: 355 MG/DL
GLOBULIN SER CALC-MCNC: 3.6 G/DL (ref 1.5–4.5)
GLUCOSE SERPL-MCNC: 514 MG/DL (ref 65–99)
HBA1C MFR BLD: 14 % (ref 4.8–5.6)
HCT VFR BLD AUTO: 46.6 % (ref 37.5–51)
HDLC SERPL-MCNC: 15 MG/DL
HGB BLD-MCNC: 16.2 G/DL (ref 13–17.7)
IMM GRANULOCYTES # BLD AUTO: 0 X10E3/UL (ref 0–0.1)
IMM GRANULOCYTES NFR BLD AUTO: 0 %
LDLC SERPL CALC-MCNC: ABNORMAL MG/DL (ref 0–99)
LYMPHOCYTES # BLD AUTO: 2.5 X10E3/UL (ref 0.7–3.1)
LYMPHOCYTES NFR BLD AUTO: 33 %
MCH RBC QN AUTO: 33 PG (ref 26.6–33)
MCHC RBC AUTO-ENTMCNC: 34.8 G/DL (ref 31.5–35.7)
MCV RBC AUTO: 95 FL (ref 79–97)
MONOCYTES # BLD AUTO: 0.4 X10E3/UL (ref 0.1–0.9)
MONOCYTES NFR BLD AUTO: 5 %
NEUTROPHILS # BLD AUTO: 4.7 X10E3/UL (ref 1.4–7)
NEUTROPHILS NFR BLD AUTO: 61 %
PLATELET # BLD AUTO: 192 X10E3/UL (ref 150–379)
POTASSIUM SERPL-SCNC: 4.4 MMOL/L (ref 3.5–5.2)
PROT SERPL-MCNC: 7.5 G/DL (ref 6–8.5)
RBC # BLD AUTO: 4.91 X10E6/UL (ref 4.14–5.8)
SODIUM SERPL-SCNC: 128 MMOL/L (ref 134–144)
TRIGL SERPL-MCNC: 2637 MG/DL (ref 0–149)
VLDLC SERPL CALC-MCNC: ABNORMAL MG/DL (ref 5–40)
WBC # BLD AUTO: 7.6 X10E3/UL (ref 3.4–10.8)

## 2019-03-07 ENCOUNTER — TELEPHONE (OUTPATIENT)
Dept: INTERNAL MEDICINE CLINIC | Age: 44
End: 2019-03-07

## 2019-03-07 RX ORDER — FENOFIBRATE 145 MG/1
145 TABLET, COATED ORAL DAILY
Qty: 30 TAB | Refills: 11 | Status: SHIPPED | OUTPATIENT
Start: 2019-03-07 | End: 2021-10-04

## 2019-03-07 RX ORDER — INSULIN ASPART 100 [IU]/ML
10 INJECTION, SOLUTION INTRAVENOUS; SUBCUTANEOUS
Qty: 10 ML | Refills: 11 | Status: SHIPPED | OUTPATIENT
Start: 2019-03-07 | End: 2019-03-19 | Stop reason: ALTCHOICE

## 2019-03-07 NOTE — TELEPHONE ENCOUNTER
Pharmacy Progress Note - Telephone Encounter    S/O: Attempted to contact Mr. Inés Garcia [facility]. to schedule a diabetes management visit today. His mailbox is full. Will attempt to call patient again soon.      Thank you,  Avani Wakefield, PharmD

## 2019-03-07 NOTE — PROGRESS NOTES
The labs are alarming--he is to drink plenty of fluids. Let's have him go up to 60 bid on the insulin. I'll call in novolog to start taking at supper. Cut out all excess sugar from his diet. Let's order recheck of the BMP to do tomorrow or Monday at the latest.  I'll also start fenofibrate. Let's get him to come in next week to see Luba and myself, if possible.

## 2019-03-07 NOTE — TELEPHONE ENCOUNTER
Identified patient 2 identifiers verified. Patient has been notified of plan of care new and  medication orders. Reviewed with patient the importance of managing his diet to control diabetes and cholesterol levels.   Patient informed that Selam Montalvo the pharmacist will be contacting him to schedule an appointemnt

## 2019-03-07 NOTE — PROGRESS NOTES
Identified patient 2 identifiers verified. Reviewed lab results and plan of care to patient, aware of increase  with insulin and new script for Fenofibrate, and that Dong Degroot will be contacting him.

## 2019-03-11 ENCOUNTER — TELEPHONE (OUTPATIENT)
Dept: INTERNAL MEDICINE CLINIC | Age: 44
End: 2019-03-11

## 2019-03-11 NOTE — TELEPHONE ENCOUNTER
Pharmacy Progress Note - Telephone Encounter    S/O: Mr. Fabienne Gupta 39 y.o. male called to provide an update on his blood sugar.      - Reports fasting SMBG : 334 mg/dL (~12:30 PM) - states that this is high for him. - Ran out of his insulin syringes this morning. Has not given Lantus 55 units yet. - Has not picked up Novolog from the pharmacy. - Largest meal of the day is either lunch or dinner    A/P:  - Fasting SMBG significantly elevated for his goal.    - Encourage patient to  insulin syringes and Novolog. Give Novolog 10 units and Lantus 55 units ASAP. Eat a meal.   - Patient endorses understanding to the provided information. All questions were answered.      Thank you,  Kurtis Nieto, PharmD

## 2019-03-11 NOTE — TELEPHONE ENCOUNTER
----- Message from Lizabeth Gillette sent at 3/11/2019 11:13 AM EDT -----  Regarding: Dr. Sloane Brown Telephone   Contact: [phone]  Pt requesting a return call concerning feeling jittery.      Copy/paste Envera

## 2019-03-11 NOTE — TELEPHONE ENCOUNTER
Pharmacy Progress Note - Telephone Encounter    S/O: Mr. Inés Garcia 39 y.o. male was contacted via an outbound telephone call  today. Verified patients identifiers (name & ) per HIPAA policy. - Pt endorses feeling \"off\" and \"jittery\" this morning. Wants to see if Dr. Linda Villalta would be willing to write him a note for his absence at work today. - Did not check his SMBG this morning  - Willing to come in to meet with me to discuss glycemic control and medications    A/P:  - Recommend patient check SMBG now and call me back with results. - Med management appointment scheduled for tomorrow, 3/12/19 @ 3:30PM.  Pt is to bring in glucometer/log and meds to visit. - Patient endorses understanding to the provided information. All questions were answered.      Thank you,  Avani Wakefield, PharmD

## 2019-03-12 ENCOUNTER — OFFICE VISIT (OUTPATIENT)
Dept: INTERNAL MEDICINE CLINIC | Age: 44
End: 2019-03-12

## 2019-03-12 RX ORDER — INSULIN LISPRO 100 [IU]/ML
INJECTION, SOLUTION INTRAVENOUS; SUBCUTANEOUS
Qty: 1 VIAL | Refills: 0 | Status: SHIPPED | COMMUNITY
Start: 2019-03-12 | End: 2019-03-19 | Stop reason: ALTCHOICE

## 2019-03-12 RX ORDER — INSULIN GLARGINE 100 [IU]/ML
INJECTION, SOLUTION SUBCUTANEOUS
Qty: 30 ML | Refills: 3 | Status: SHIPPED | OUTPATIENT
Start: 2019-03-12 | End: 2019-04-16

## 2019-03-12 RX ORDER — INSULIN GLARGINE 100 [IU]/ML
60 INJECTION, SOLUTION SUBCUTANEOUS 2 TIMES DAILY
Qty: 1 VIAL | Refills: 11 | Status: SHIPPED | OUTPATIENT
Start: 2019-03-12 | End: 2019-03-12 | Stop reason: SDUPTHER

## 2019-03-12 RX ORDER — ASPIRIN 81 MG/1
81 TABLET ORAL DAILY
Qty: 30 TAB | Refills: 11
Start: 2019-03-12

## 2019-03-12 NOTE — PROGRESS NOTES
Pharmacy Progress Note - Diabetes Management    S/O: Mr. Elizabeth Gomez is a 39 y.o. male , referred by Dr. Will Zuñiga MD, with a PMH of T2DM, Hx of HHS, CKD w/ hx of HD - now off; HLD, hypertriglyceridemia, NAFLD, was seen for Diabetes Management for the first time today. Pt's last A1c (2019) was 14%, a slight decrease from >15.5% in 2018. TG = 2637; [address] = 368; HLD = 15. 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 [employer]     Diabetes Management:  Diabetes History:  - Could not specify when he was dx - \"has been years\" ; has had experience with diabetes tx center   - Family hx: both parents - now  - father passed d/t DM complications  - Previous anti-hyperglycemic agents includes: could not recall specific names; recognizes metformin; has only been on Lantus insulin vials    Current anti-hyperglycemic regimen includes:    - Lantus vial - 55 units BID  - Novolog - 10 units daily - has yet to start this     - On ASA: No - recommend today   - On Statin: Yes - Pravastatin 20 mg - has not been compliant with this ; has yet to start fenofibrate 145 mg daily  - Nicotine dependence?:  No    ASCVD Risk:   Lifetime risk: 69%    ROS:  (+) vision changes; polydipsia and polyuria  Denies neuropathy; polyphagia; CP/SOB; nausea at this time - has not needed zofran yet.     Self Monitoring Blood Glucose (SMBG):  - Brought in home glucometer/blood glucose log today:  yes  - Endorses he has not been checking his blood sugar. When he does, it would be fasting.    - Did not check this morning     Nutrition:  - Eats 2 meals/day - tries to incorporate vegetables to all meals  - Breakfast: tends to skip  - Lunch (10:30 - 11 AM): last - subway turkey sandwich + chips  - Dinner (6:30 - 8PM) usually a protein (turkey/chicken) + vegetable + bread  - Snack(s): fruit or granola bar ; chips/popcorn  - Beverage(s): water ; reg. sweetened tea    Physical Activity: no  - None at this time      Vitals: Wt Readings from Last 3 Encounters:   03/05/19 300 lb 6.4 oz (136.3 kg)   11/27/18 308 lb 12.8 oz (140.1 kg)   10/09/18 315 lb 12.8 oz (143.2 kg)     BP Readings from Last 3 Encounters:   03/05/19 127/87   11/27/18 138/84   10/09/18 132/88     Pulse Readings from Last 3 Encounters:   03/05/19 99   11/27/18 83   10/09/18 94       Past Medical History:   Diagnosis Date    Hypertension 10/31/2014    Insomnia 5/6/2013    Morbid obesity ([facility] [address] 5/6/2013    Type II diabetes mellitus, uncontrolled ([facility] [address] 6/30/2016     No Known Allergies    Current Outpatient Medications   Medication Sig    fenofibrate nanocrystallized (TRICOR) 145 mg tablet Take 1 Tab by mouth daily.  insulin aspart U-100 (NOVOLOG) 100 unit/mL injection 10 Units by SubCUTAneous route Daily (before dinner).  insulin glargine (LANTUS) 100 unit/mL injection 55 Units by SubCUTAneous route two (2) times a day.  ondansetron (ZOFRAN ODT) 4 mg disintegrating tablet Take 1 Tab by mouth every eight (8) hours as needed for Nausea.  insulin syringe-needle U-100 1 mL 31 gauge x 15/64\" syrg Use up to TID as directed    Lancets misc Use as directed to test blood sugar three times daily -DX-DM-E11.9    Insulin Syringe-Needle U-100 0.3 mL 31 gauge x 5/16 syrg USE TO INJECT INSULIN ONCE DAILY    pravastatin (PRAVACHOL) 20 mg tablet Take 1 Tab by mouth nightly.  sevelamer carbonate (RENVELA) 800 mg tab tab Take 2 Tabs by mouth three (3) times daily (with meals).     sucralfate (CARAFATE) 100 mg/mL suspension Take 10 mL by mouth Before breakfast, lunch, dinner and at bedtime.  pantoprazole (PROTONIX) 40 mg tablet Take 1 Tab by mouth Before breakfast and dinner.  glucose blood VI test strips (BLOOD GLUCOSE TEST) strip Use as directed to test blood sugar three times daily -DX-DM-E11.9    Blood-Glucose Meter monitoring kit Use as directed to test blood sugar three times daily -DX-DM-E11.9    DOCOSAHEXANOIC ACID/EPA (FISH OIL PO) Take  by mouth. No current facility-administered medications for this visit.         Lab Results   Component Value Date/Time    Sodium 128 (L) 03/05/2019 04:36 PM    Potassium 4.4 03/05/2019 04:36 PM    Chloride 87 (L) 03/05/2019 04:36 PM    CO2 20 03/05/2019 04:36 PM    Anion gap 9 04/30/2018 04:35 AM    Glucose 514 (>) 03/05/2019 04:36 PM    BUN 17 03/05/2019 04:36 PM    Creatinine 1.16 03/05/2019 04:36 PM    BUN/Creatinine ratio 15 03/05/2019 04:36 PM    GFR est AA 88 03/05/2019 04:36 PM    GFR est non-AA 76 03/05/2019 04:36 PM    Calcium 10.0 03/05/2019 04:36 PM       Lab Results   Component Value Date/Time    WBC 7.6 03/05/2019 04:36 PM    HGB 16.2 03/05/2019 04:36 PM    HCT 46.6 03/05/2019 04:36 PM    PLATELET 569 84/40/6446 04:36 PM    MCV 95 03/05/2019 04:36 PM       Lab Results   Component Value Date/Time    Microalb/Creat ratio (ug/mg creat.) 8.0 09/28/2016 08:06 AM     HbA1c:  Lab Results   Component Value Date/Time    Hemoglobin A1c 14.0 (H) 03/05/2019 04:36 PM    Hemoglobin A1c (POC) 11.9 (A) 01/21/2016 04:40 PM     Last Point of Care HGB A1C  Hemoglobin A1c (POC)   Date Value Ref Range Status   01/21/2016 11.9 (A) 4.8 - 5.6 % Final      Lab Results   Component Value Date/Time    Cholesterol, total 368 (H) 03/05/2019 04:36 PM    Cholesterol (POC) 136 05/06/2013 04:32 PM    HDL Cholesterol 15 (L) 03/05/2019 04:36 PM    HDL Cholesterol (POC) 22 05/06/2013 04:32 PM    LDL Cholesterol (POC) 51 05/06/2013 04:32 PM    LDL, calculated Comment 03/05/2019 04:36 PM    VLDL, calculated Comment 03/05/2019 04:36 PM    Triglyceride 2,637 ([place]) 03/05/2019 04:36 PM    Triglycerides (POC) 321 05/06/2013 04:32 PM     CrCl: 111 ml/min    A/P:    Medication Adherence:  - Pt is not adherent to current medication regimen.  - Note, [employer] insurance prefers insulin pens over vials. Copay - $40 for 2 boxes of Lantus Solarstar vs. $80 for 1 Lantus vial.   - Will provide Lantus Solarstar copay to further assists w/ cost - verified w/ pharmacy - successful for $0 copay. Diabetes Management/Hyperlipidemia  - Per ADA guidelines, Pt's A1c is not at his goal of <7%. Reviewed's insulin role in glycemic control and complications relating to extensive uncontrolled DM.     - Significant elevated TG and low HDL. Non HDL = 353 . Combined w/ DM, weight, these are significant risk factors for patient's high ASCVD risk.   - Recommend starting a high intensity statin - crestor 20 mg daily. Pt wants to finish current pravastatin supply first before starting new statin. - Start fenofibrate today. Recommend ASA 81 mg daily for primary  prevention of CVD. - Will provide Humalog sample today. Start 10 units this PM before dinner.    - Lantus 60 units BID. Mindful of patient's weight. Anticipates additional adjustment will be needed moving forward    - Will provide insulin pen administration teaching next week.      Self-Monitoring Blood Glucose Review:  - Is not checking and documenting his SMBGs  - Recommend twice daily checks - fasting, pre-bedtime, and when he feels \"off\"  ; log provided today  - Reviewed s/sx of hypo and hyperglycemia and how to manage hypoglycemic episodes     Nutrition/Lifestyle Modifications:  - Pt has a fair baseline understanding of nutrition; motivated to do better  - Review importance of daily foot exams  - Encourage patient to schedule routine dental and eye exam   - Will focus on nutrition/lifestyle at the next visit     Resources provided:  - Humalog vial x 1 + insulin syringes  - Glucose log  - Education materials on A1c, nutrition; and diabetes health management    Diabetes Health Maintenance:  · ASA therapy: no  · ACE/ARB therapy: no - not on any HTN meds  · Statin therapy:  yes  · Last eye exam: due   · Last foot exam: due  · Last influenza vaccine: 11/2018  · Last Pneumovax 23 vaccine: DUE  · Last Prevnar-13 vaccine: n/a  · Hepatitis B Series: DUE       Medication reconciliation was completed during the visit. There are no discontinued medications. Patient verbalized understanding of the information presented and all of the patients questions were answered. AVS was handed to the patient. Patient advised to call the office with any additional questions or concerns. Notifications of recommendations will be sent to Dr. Constantino Benitez MD for review. Patient will return to clinic in 1 week(s) for follow up.      Thank you for the consult,  Trinity Garza, NarcisaD

## 2019-03-12 NOTE — PATIENT INSTRUCTIONS
For your cholesterol:   · Start fenofibrate as soon as possible. · Finish your current pravastatin supply. For your diabetes:   · Increase Lantus to 60 units twice daily. This is your long acting insulin. · Start Humalog 10 units this afternoon before dinner as soon as possible. You should take this at least 15 minutes before you eat. This is your short acting insulin. · Start a baby Aspirin 81 mg daily. · Check and document your blood sugar in the provided log twice daily - first thing in the morning and before bedtime. Bring your log to future visits.

## 2019-03-13 ENCOUNTER — DOCUMENTATION ONLY (OUTPATIENT)
Dept: INTERNAL MEDICINE CLINIC | Age: 44
End: 2019-03-13

## 2019-03-13 NOTE — PROGRESS NOTES
The following prescription was faxed into pt's [address] w/ confirmation received: Insulin Needles, Disposable, 30 gauge x 1/3\" [[id number]]   Order Details   Dose: 1 Pen Needle Route: SubCUTAneous Frequency: 2 TIMES DAILY   Dispense Quantity: 1 Package Refills: 11 Fills remaining: --           Si Pen Needle by SubCUTAneous route two (2) times a day. Use to inject Lantus insulin pen under the skin twice daily. E11.9. Dispense 1 box of pen needles.          Written Date: 19 Expiration Date: --     Start Date: 19 End Date: --            Ordering Provider:  -- EDDIE #:  -- NPI:  --    Authorizing Provider:  Rodney Nelson MD EDDIE #:  [id number] NPI:  [id number]    Ordering User:  EDEN AlvaradoD      Cosigner:   Rodney Nelson MD Signed:  3/13/2019 11:10              Pharmacy:  [facility] [address] [facility] AT [address] [place] [address]

## 2019-03-19 ENCOUNTER — TELEPHONE (OUTPATIENT)
Dept: INTERNAL MEDICINE CLINIC | Age: 44
End: 2019-03-19

## 2019-03-19 ENCOUNTER — OFFICE VISIT (OUTPATIENT)
Dept: INTERNAL MEDICINE CLINIC | Age: 44
End: 2019-03-19

## 2019-03-19 VITALS
WEIGHT: 309 LBS | HEIGHT: 69 IN | DIASTOLIC BLOOD PRESSURE: 90 MMHG | OXYGEN SATURATION: 94 % | HEART RATE: 89 BPM | BODY MASS INDEX: 45.77 KG/M2 | SYSTOLIC BLOOD PRESSURE: 137 MMHG

## 2019-03-19 DIAGNOSIS — E11.21 TYPE 2 DIABETES WITH NEPHROPATHY (HCC): Primary | ICD-10-CM

## 2019-03-19 RX ORDER — INSULIN GLARGINE 100 [IU]/ML
INJECTION, SOLUTION SUBCUTANEOUS
Qty: 1 PEN | Refills: 0 | Status: SHIPPED | COMMUNITY
Start: 2019-03-19 | End: 2019-04-16 | Stop reason: ALTCHOICE

## 2019-03-19 RX ORDER — INSULIN LISPRO 100 [IU]/ML
INJECTION, SOLUTION INTRAVENOUS; SUBCUTANEOUS
Qty: 1 PACKAGE | Refills: 2 | Status: SHIPPED | OUTPATIENT
Start: 2019-03-19 | End: 2019-04-16

## 2019-03-19 RX ORDER — ATORVASTATIN CALCIUM 40 MG/1
40 TABLET, FILM COATED ORAL DAILY
Qty: 90 TAB | Refills: 0 | Status: SHIPPED | OUTPATIENT
Start: 2019-03-19 | End: 2019-12-12 | Stop reason: SDUPTHER

## 2019-03-19 NOTE — PATIENT INSTRUCTIONS
· For your Humalog (this is your short acting insulin). Give 10 units before lunch and 10 units before dinner. If your pre-dinner check is above 300 mg, give an additional 4 units. · For your Lantus (this is your long acting insulin). Give 55 units twice a day. · Atorvastatin will replace your pravastatin. This is your cholesterol medication. Continue fenofibrate. ·  aspirin 81 mg daily, a new vial of test strip (yours has ), Lantus and Humalog insulin pens at your pharmacy. - Aim for 45 - 60 grams of carbohydrates per meal and 15 grams of carbs/snack. Aim for at least 1 serving of protein with your meals.     - Continue check your blood sugar twice daily. Bring your meter to future visits.     - Recommend 30 mins of consistent physical activity daily x 5 days. Start slow and build up as tolerated.

## 2019-03-19 NOTE — TELEPHONE ENCOUNTER
Honorio[facility] states he needs a call back on [employer] for patient's Novolog 100 units. Please call.  Thank you

## 2019-03-19 NOTE — PROGRESS NOTES
Pharmacy Progress Note - Diabetes Management    S/O: Mr. Haydee Bowling is a 39 y.o. male , referred by Dr. Kayce Gonzalez MD, with a PMH of T2DM, Hx of HHS, CKD w/ hx of HD - now off; HLD, hypertriglyceridemia, NAFLD, was seen for Diabetes Management follow up and insulin pen teaching. Pt's last A1c (2019) was 14%, a slight decrease from >15.5% in 2018. Interim update: Started fenofibrate. States he self-titrated Humalog to 20 units pre dinner 2-3 days ago because his readings were still in the 300s range. Now checking his SMBG fasting and evening. Denies any s/sx of hypoglycemia. Has yet to  insulin supply at pharmacy. Medication Adherence/Access:  - Brought in home medications including prescription/non-prescriptions:  no  - Endorses barriers to affording/accessing medications : no  - Uses a pill box/other methods to organize medications: no  - Uses [facility] ; Rx insurance is: [employer]  - denies any missed doses since last visit    Diabetes Management:  Diabetes History:  - Could not specify when he was dx - \"has been years\" ; has had experience with diabetes tx center   - Family hx: both parents - now  - father passed d/t DM complications  - Previous anti-hyperglycemic agents includes: could not recall specific names; recognizes metformin; has only been on Lantus insulin vials    ASCVD Risk:   Lifetime risk: 69%    Current anti-hyperglycemic regimen includes:    - Lantus vial - 60 units BID - last dose was this morning  - Humalog U-100 vial sample - 20 units before dinner - self titrated 2-3 days ago from 10 units.     - On ASA: No - has yet to start   - On Statin: Yes - on pravastatin - last  TG = 2637; [address] = 368; HLD = 15. LDL unable to calculate   - Not on any BP meds   - Nicotine dependence?:  No    ROS:  - (+) improvement in polyuria - decreased from going at least 5 times a day to now 2-3x/day   - Denies nausea; has not needed to take zofran.    -  Denies other s/sx of hyper glycemia; no hypoglycemia     Self Monitoring Blood Glucose (SMBG):  - Brought in home glucometer/blood glucose log today:  yes  ; note  His vial of Accuc[place]k test strips  in 2018.  - Conducts fasting and pre-bedtime   - Fasting SMBG today: 215   - SMBG range:    Date Fasting Pre-dinner Comments   3/11/2019 334  Humalog 10 units w/ dinner   3/14/2019 266 458    3/15/2019 259 430    3/16/2019 279 359 Increased Humalog to 20 units w/ dinner   3/17/2019 256     3/18/2019 294     Today 215     Average 272         Nutrition:  - Eats 2 meals/day   - Breakfast: still skipping breakfast   - Lunch: usually 6 inch subway club sandwich w/ vegetables - reviewed nutrition label - has 46 grams of carbs/serving  - Dinner: last night - 8 chicken nuggets + 1 serving of rice w/ vegetables - reviewed nutrition label - would have ~ 35 grams/carb total  - Beverage(s): water primarily now     Physical Activity: no   Willing to work on this. Weight today: 309 lbs - also had on more layers today. Vitals: Wt Readings from Last 3 Encounters:   19 309 lb (140.2 kg)   19 300 lb 6.4 oz (136.3 kg)   18 308 lb 12.8 oz (140.1 kg)     BP Readings from Last 3 Encounters:   19 137/90   19 127/87   18 138/84     Pulse Readings from Last 3 Encounters:   19 89   19 99   18 83       Past Medical History:   Diagnosis Date    Hypertension 10/31/2014    Insomnia 2013    Morbid obesity ([facility] [address] 2013    Type II diabetes mellitus, uncontrolled ([facility] [address] 2016     No Known Allergies    Current Outpatient Medications   Medication Sig    aspirin delayed-release 81 mg tablet Take 1 Tab by mouth daily.  insulin lispro (HUMALOG) 100 unit/mL injection Inject 10 units under the skin before a meal daily.  Insulin Needles, Disposable, 30 gauge x 1/3\" 1 Pen Needle by SubCUTAneous route two (2) times a day. Use to inject Lantus insulin pen under the skin twice daily. E11.9. Dispense 1 box of pen needles.  insulin glargine (LANTUS,BASAGLAR) 100 unit/mL (3 mL) inpn Inject 60 units under the skin twice daily.  fenofibrate nanocrystallized (TRICOR) 145 mg tablet Take 1 Tab by mouth daily.  insulin aspart U-100 (NOVOLOG) 100 unit/mL injection 10 Units by SubCUTAneous route Daily (before dinner).  ondansetron (ZOFRAN ODT) 4 mg disintegrating tablet Take 1 Tab by mouth every eight (8) hours as needed for Nausea.  Lancets misc Use as directed to test blood sugar three times daily -DX-DM-E11.9    pravastatin (PRAVACHOL) 20 mg tablet Take 1 Tab by mouth nightly.  glucose blood VI test strips (BLOOD GLUCOSE TEST) strip Use as directed to test blood sugar three times daily -DX-DM-E11.9    Blood-Glucose Meter monitoring kit Use as directed to test blood sugar three times daily -DX-DM-E11.9     No current facility-administered medications for this visit.         Lab Results   Component Value Date/Time    Sodium 128 (L) 03/05/2019 04:36 PM    Potassium 4.4 03/05/2019 04:36 PM    Chloride 87 (L) 03/05/2019 04:36 PM    CO2 20 03/05/2019 04:36 PM    Anion gap 9 04/30/2018 04:35 AM    Glucose 514 (>) 03/05/2019 04:36 PM    BUN 17 03/05/2019 04:36 PM    Creatinine 1.16 03/05/2019 04:36 PM    BUN/Creatinine ratio 15 03/05/2019 04:36 PM    GFR est AA 88 03/05/2019 04:36 PM    GFR est non-AA 76 03/05/2019 04:36 PM    Calcium 10.0 03/05/2019 04:36 PM       Lab Results   Component Value Date/Time    WBC 7.6 03/05/2019 04:36 PM    HGB 16.2 03/05/2019 04:36 PM    HCT 46.6 03/05/2019 04:36 PM    PLATELET 510 48/69/3240 04:36 PM    MCV 95 03/05/2019 04:36 PM       Lab Results   Component Value Date/Time    Microalb/Creat ratio (ug/mg creat.) 8.0 09/28/2016 08:06 AM       HbA1c:  Lab Results   Component Value Date/Time    Hemoglobin A1c 14.0 (H) 03/05/2019 04:36 PM    Hemoglobin A1c (POC) 11.9 (A) 01/21/2016 04:40 PM     Last Point of Care HGB A1C  Hemoglobin A1c (POC)   Date Value Ref Range Status   2016 11.9 (A) 4.8 - 5.6 % Final        Estimated Creatinine Clearance: 113.2 mL/min (based on SCr of 1.16 mg/dL). A/P:    Medication Adherence:  - Pt is adherent to current medication regimen. Diabetes Management:  - Per ADA guidelines, Pt's A1c is above his goal of < 7%. - For hyperlipidemia, ASCVD risk of 69%, start atorvastatin 40 mg daily today (lower copay). Will replace pravastatin. Continue fenofibrate 145 mg daily.   - Start Humalin U-100 KwikPen (preferred by insurance) - 10 units before lunch and 10 units before dinner, if pre-dinner is > 300 mg/dL, increase to 14 units.   - Recommend Lantus Solar Star 55 units twice daily.   - Encourage patient to  Lantus SolarStar at pharmacy. Coupon provided to assists w/ copay cost ($0).   - Provide education on insulin pen priming, administration, storage, and disposal.  Patient was able to confirm understanding w/ teach back method. Self-Monitoring Blood Glucose Review:  - Current SMBGs remain above goal.  No more readings in the 400s. - Reviewed fasting and post prandial goals. - Recommend fasting, pre-dinner, checks. - bring glucometer to future visits. Recommend pt refill test strips since his have . Nutrition/Lifestyle Modifications:  - Pt has a fair understanding of nutrition and effects on glycemic control. Very sedentary lifestyle at this time. - Discuss insulin resistance and weight. Goal is to lose at least 5% of current weight.   - Recommend ~30 minutes consistent exercise/day or ~150 minutes/week. - Start small, stay consistent, and increase length and types of exercise, as tolerated. Pt agreed that our objective for next visit is to check in on his progress with this. - Educate pt about reading nutrition labels w/ a focus on carbohydrate/protein/sugar/fiber content.  Reviewed meal options.   - Recommend moderating and diversifying carbohydrate intake to ~45-60 grams/meal and 15 grams/snack ; at least 1 serving of protein/meal  - Provided ADA resources on 1500 calories diet, food groups, and s/sx of hypo/hyperglycemia to assist Pt w/ decision making/meal planning      Resources provided:  - lantus solarstar x 1   - pen needles  - nutrition guide     Diabetes Health Maintenance:  · ASA therapy: no - recommend to start today   · ACE/ARB therapy: no - not on any HTN meds  · Statin therapy:  yes  · Last eye exam: due   · Last foot exam: due  · Last influenza vaccine: 11/2018  · Last Pneumovax 23 vaccine: DUE  · Last Prevnar-13 vaccine: n/a  · Hepatitis B Series: DUE        Medication reconciliation was completed during the visit. Medications Discontinued During This Encounter   Medication Reason    insulin aspart U-100 (NOVOLOG) 100 unit/mL injection Alternate Therapy    pravastatin (PRAVACHOL) 20 mg tablet Therapy Completed    glucose blood VI test strips (BLOOD GLUCOSE TEST) strip Reorder       Patient verbalized understanding of the information presented and all of the patients questions were answered. AVS was handed to the patient. Patient advised to call the office with any additional questions or concerns. Notifications of recommendations will be sent to Dr. Shellie James MD for review. Patient will return to clinic in 2 week(s) for follow up.      Thank you for the consult,  Trinity Grimes, NarcisaD

## 2019-04-02 ENCOUNTER — TELEPHONE (OUTPATIENT)
Dept: INTERNAL MEDICINE CLINIC | Age: 44
End: 2019-04-02

## 2019-04-02 DIAGNOSIS — E11.49 TYPE II OR UNSPECIFIED TYPE DIABETES MELLITUS WITH NEUROLOGICAL MANIFESTATIONS, UNCONTROLLED(250.62) (HCC): Primary | ICD-10-CM

## 2019-04-02 NOTE — TELEPHONE ENCOUNTER
Pharmacy Progress Note - Telephone Encounter    S/O: Mr. Qi Jarrett 39 y.o. male was contacted via an outbound telephone call to discuss pt's request to r/s missed diabetes management appt today. Verified patients identifiers (name & ) per HIPAA policy. - Pt states he feels good overall. No missed doses. - Now on Lantus 55 units BID. Humalog 10 units w/ lunch and dinner. Has not needed to give an extra 4 units of Humalog for BG > 300. - Checking his SMBG fasting and pre-bedtime.  - States SMBGs are no longer in the 300s and 400s. Runs primarily in the 200-250s. A/P:  - Rescheduled diabetes management visit will be for  @ 3:15PM  - Continue to keep up the great work with checking SMBG and taking meds daily. - Continue same regimen   - Patient endorses understanding to the provided information. All questions were answered at this time.        Thank you,  Trinity Underwood, PharmD

## 2019-04-02 NOTE — TELEPHONE ENCOUNTER
----- Message from Adelia Spencer sent at 4/2/2019  3:22 PM EDT -----  Regarding: Dr. Zachary Hairston: [phone]  Patient is requesting a new appointment date and time for his appointment his missed for today 4.2.19 at 3:15pm. Patient would like a call, patient's best contact #[phone]      Copy/paste envera

## 2019-04-09 ENCOUNTER — TELEPHONE (OUTPATIENT)
Dept: INTERNAL MEDICINE CLINIC | Age: 44
End: 2019-04-09

## 2019-04-09 DIAGNOSIS — Z79.4 TYPE 2 DIABETES MELLITUS WITH COMPLICATION, WITH LONG-TERM CURRENT USE OF INSULIN (HCC): Primary | ICD-10-CM

## 2019-04-09 DIAGNOSIS — E11.8 TYPE 2 DIABETES MELLITUS WITH COMPLICATION, WITH LONG-TERM CURRENT USE OF INSULIN (HCC): Primary | ICD-10-CM

## 2019-04-09 NOTE — TELEPHONE ENCOUNTER
Pharmacy Progress Note - Telephone Encounter    S/O: Mr. Alistair Rizo 39 y.o. male contacted office via an inbound telephone call to discuss his missed appointment and DM today. Verified patients identifiers (name & ) per HIPAA policy.     - He had to work today ; reports feeling well - no nausea/GI discomfort or s/sx of hypoglycemia/hyperglycemia   - Still on Lantus 55 units BID and Humalog 10 units before lunch and dinner.   - Did report to missing Humalog dose 1-2 x in the past 2 weeks ; denies missing dose for all other meds   - Checking fasting daily; trying to check before dinner more often  - Reports SMBG range: 180-low 200s; no readings in the 250s. - Trying to walk more consistently now     A/P:  - Will see me on  at 3:15PM for diabetes management follow up   - Patient endorses understanding to the provided information. All questions were answered at this time.        Thank you,  Trinity Cheung, PharmD

## 2019-04-11 NOTE — PROGRESS NOTES
Report given to Fatmata Spaulding RN Nephrology progress note    Patient is seen and examined, events over the last 24 h noted .    Allergies:  No Known Allergies    Hospital Medications:   MEDICATIONS  (STANDING):  amLODIPine   Tablet 10 milliGRAM(s) Oral daily  atorvastatin Oral Tab/Cap - Peds 40 milliGRAM(s) Oral daily  calcium acetate 667 milliGRAM(s) Oral three times a day with meals  cefepime   IVPB 1000 milliGRAM(s) IV Intermittent every 24 hours  chlorhexidine 2% Cloths 1 Application(s) Topical daily  chlorhexidine 4% Liquid 1 Application(s) Topical every 12 hours  cholecalciferol 2000 Unit(s) Oral daily  cloNIDine 0.1 milliGRAM(s) Oral three times a day  dextrose 5%. 1000 milliLiter(s) (50 mL/Hr) IV Continuous <Continuous>  dextrose 50% Injectable 12.5 Gram(s) IV Push once  dextrose 50% Injectable 25 Gram(s) IV Push once  dextrose 50% Injectable 25 Gram(s) IV Push once  furosemide    Tablet 40 milliGRAM(s) Oral daily  heparin  Injectable 5000 Unit(s) SubCutaneous every 8 hours  hydrALAZINE 100 milliGRAM(s) Oral every 8 hours  influenza   Vaccine 0.5 milliLiter(s) IntraMuscular once  insulin glargine Injectable (LANTUS) 24 Unit(s) SubCutaneous at bedtime  insulin lispro (HumaLOG) corrective regimen sliding scale   SubCutaneous three times a day before meals  insulin lispro Injectable (HumaLOG) 8 Unit(s) SubCutaneous three times a day before meals  labetalol 300 milliGRAM(s) Oral every 8 hours  pantoprazole    Tablet 40 milliGRAM(s) Oral before breakfast  predniSONE   Tablet 20 milliGRAM(s) Oral daily  senna 2 Tablet(s) Oral at bedtime        VITALS:  T(F): 97.6 (19 @ 05:59), Max: 98.8 (04-10-19 @ 21:41)  HR: 80 (19 @ 05:59)  BP: 141/87 (19 @ 05:59)  RR: 24 (19 @ 05:59)  SpO2: --  Wt(kg): --     @ 07:01  -  04-10 @ 07:00  --------------------------------------------------------  IN: 1570 mL / OUT: 1670 mL / NET: -100 mL    04-10 @ 07: @ 07:00  --------------------------------------------------------  IN: 840 mL / OUT: 1350 mL / NET: -510 mL     @ 07: @ 10:58  --------------------------------------------------------  IN: 120 mL / OUT: 300 mL / NET: -180 mL          PHYSICAL EXAM:  Constitutional: NAD  HEENT: anicteric sclera, oropharynx clear, MMM  Neck: No JVD  Respiratory: CTAB, no wheezes, rales or rhonchi  Cardiovascular: S1, S2, RRR  Gastrointestinal: BS+, soft, NT/ND  Extremities: No cyanosis or clubbing. No peripheral edema  :  No khan.   Skin: No rashes    LABS:      146  |  106  |  110<HH>  ----------------------------<  284<H>  5.4<H>   |  25  |  2.9<H>  Creatinine Trend: 2.9<--, 3.0<--, 3.6<--, 3.7<--, 3.5<--, 3.9<--  Ca    9.0      2019 05:46  Phos  5.5     11  Mg     2.6                                 10.7   7.19  )-----------( 249      ( 2019 05:46 )             35.2       Urine Studies:  Urinalysis Basic - ( 2019 17:00 )    Color: Yellow / Appearance: Turbid / S.020 / pH:   Gluc:  / Ketone: Negative  / Bili: Negative / Urobili: 1.0 mg/dL   Blood:  / Protein: 100 mg/dL / Nitrite: Negative   Leuk Esterase: Small / RBC:  / WBC 6-10 /HPF   Sq Epi:  / Non Sq Epi: Few /HPF / Bacteria:       Creatinine, Random Urine: 249 mg/dL ( @ 11:42)  Protein/Creatinine Ratio Calculation: >1.5 Ratio ( @ 16:34)  Creatinine, Random Urine: 378 mg/dL ( @ 16:34)    RADIOLOGY & ADDITIONAL STUDIES: Nephrology progress note    Patient is seen and examined, events over the last 24 h noted .  edema is better   feels ok    Allergies:  No Known Allergies    Hospital Medications:   MEDICATIONS  (STANDING):  amLODIPine   Tablet 10 milliGRAM(s) Oral daily  atorvastatin Oral Tab/Cap - Peds 40 milliGRAM(s) Oral daily  calcium acetate 667 milliGRAM(s) Oral three times a day with meals  cefepime   IVPB 1000 milliGRAM(s) IV Intermittent every 24 hours  cholecalciferol 2000 Unit(s) Oral daily  cloNIDine 0.1 milliGRAM(s) Oral three times a day  furosemide    Tablet 40 milliGRAM(s) Oral daily  heparin  Injectable 5000 Unit(s) SubCutaneous every 8 hours  hydrALAZINE 100 milliGRAM(s) Oral every 8 hours  influenza   Vaccine 0.5 milliLiter(s) IntraMuscular once  insulin glargine Injectable (LANTUS) 24 Unit(s) SubCutaneous at bedtime  insulin lispro (HumaLOG) corrective regimen sliding scale   SubCutaneous three times a day before meals  insulin lispro Injectable (HumaLOG) 8 Unit(s) SubCutaneous three times a day before meals  labetalol 300 milliGRAM(s) Oral every 8 hours  pantoprazole    Tablet 40 milliGRAM(s) Oral before breakfast  predniSONE   Tablet 20 milliGRAM(s) Oral daily  senna 2 Tablet(s) Oral at bedtime        VITALS:  T(F): 97.6 (19 @ 05:59), Max: 98.8 (04-10-19 @ 21:41)  HR: 80 (19 @ 05:59)  BP: 141/87 (19 @ 05:59)  RR: 24 (19 @ 05:59)       @ 07:  -  04-10 @ 07:00  --------------------------------------------------------  IN: 1570 mL / OUT: 1670 mL / NET: -100 mL    04-10 @ 07:01  -   @ 07:00  --------------------------------------------------------  IN: 840 mL / OUT: 1350 mL / NET: -510 mL     @ 07:01  -   @ 10:58  --------------------------------------------------------  IN: 120 mL / OUT: 300 mL / NET: -180 mL          PHYSICAL EXAM:  Constitutional: NAD  HEENT: anicteric sclera, oropharynx clear, MMM  Neck: No JVD  Respiratory: CTAB, no wheezes, rales or rhonchi  Cardiovascular: S1, S2, RRR  Gastrointestinal: BS+, soft, NT/ND  Extremities: No cyanosis or clubbing. plus one lower ext edema   :  No khan.   Skin: No rashes    LABS:      146  |  106  |  110<HH>  ----------------------------<  284<H>  5.4<H>   |  25  |  2.9<H>  Creatinine Trend: 2.9<--, 3.0<--, 3.6<--, 3.7<--, 3.5<--, 3.9<--  Ca    9.0      2019 05:46  Phos  5.5       Mg     2.6                                 10.7   7.19  )-----------( 249      ( 2019 05:46 )             35.2       Urine Studies:  Urinalysis Basic - ( 2019 17:00 )    Color: Yellow / Appearance: Turbid / S.020 / pH:   Gluc:  / Ketone: Negative  / Bili: Negative / Urobili: 1.0 mg/dL   Blood:  / Protein: 100 mg/dL / Nitrite: Negative   Leuk Esterase: Small / RBC:  / WBC 6-10 /HPF   Sq Epi:  / Non Sq Epi: Few /HPF / Bacteria:       Creatinine, Random Urine: 249 mg/dL ( @ 11:42)  Protein/Creatinine Ratio Calculation: >1.5 Ratio ( @ 16:34)  Creatinine, Random Urine: 378 mg/dL ( @ 16:34)    RADIOLOGY & ADDITIONAL STUDIES:

## 2019-04-16 ENCOUNTER — OFFICE VISIT (OUTPATIENT)
Dept: INTERNAL MEDICINE CLINIC | Age: 44
End: 2019-04-16

## 2019-04-16 VITALS — BODY MASS INDEX: 44.58 KG/M2 | WEIGHT: 301 LBS | HEIGHT: 69 IN

## 2019-04-16 DIAGNOSIS — E11.8 TYPE 2 DIABETES MELLITUS WITH COMPLICATION, WITH LONG-TERM CURRENT USE OF INSULIN (HCC): Primary | ICD-10-CM

## 2019-04-16 DIAGNOSIS — Z79.4 TYPE 2 DIABETES MELLITUS WITH COMPLICATION, WITH LONG-TERM CURRENT USE OF INSULIN (HCC): Primary | ICD-10-CM

## 2019-04-16 RX ORDER — INSULIN LISPRO 100 [IU]/ML
INJECTION, SOLUTION INTRAVENOUS; SUBCUTANEOUS
Qty: 1 PACKAGE | Refills: 2
Start: 2019-04-16 | End: 2021-10-04

## 2019-04-16 RX ORDER — INSULIN GLARGINE 100 [IU]/ML
INJECTION, SOLUTION SUBCUTANEOUS
Qty: 30 ML | Refills: 3
Start: 2019-04-16 | End: 2020-04-06

## 2019-04-16 NOTE — PATIENT INSTRUCTIONS
· Increase Humalog to 14 units before lunch and 14 units before dinner    · Continue Lantus 55 units twice daily     · Continue to reduce your carbohydrates intake -  · 45-60 grams of carbs/meal or 15 grams/snack  · Increase fiber, protein intake slowly   · Barbarar carbmaster protein smoothie

## 2019-04-16 NOTE — PROGRESS NOTES
Pharmacy Progress Note - Diabetes Management    S/O: Mr. Renan Snow is R 89 y. o. male , referred by Dr. Valera, Em Ballesteros MD, with a PMH of T2DM, Hx of HHS, CKD w/ hx of HD - now off; HLD, hypertriglyceridemia, NAFLD, was seen for Diabetes Management follow up and insulin pen teaching. Pt's last A1c (03/2019) was 14%, a slight decrease from >15.5% in Nov. 2018.  Last visit with me was on 3/19/19. Medication Adherence/Access:  - Brought in home medications including prescription/non-prescriptions:  no  - Endorses barriers to affording/accessing medications : no  - Uses a pill box/other methods to organize medications: no  - Endorses adherence to current regimen?: yes    Diabetes Management:  Current anti-hyperglycemic regimen includes:    -  Lantus Solarstar 55 units BID ---> reports to taking 50 twice daily x 2-3 times since last visit - could not explain why  - Humalog pen 10 units pre-lunch and dinner     ASCVD Risk Assessment/Prevention:  Lifetime risk: 69%    - On ASA: No - has yet to start   - On Statin: Yes - on pravastatin - last  TG = 2637; [address] = 368; HLD = 15. LDL unable to calculate   - Not on any BP meds   - Nicotine dependence?:  No    ROS:  - Symptoms of Hyperglycemia: none  - Symptoms of Hypoglycemia: none    Self Monitoring Blood Glucose (SMBG) or CGM:  - Brought in home glucometer/CGM reader/blood glucose log today:  no  - Did not check SMBG this morning  - States SMBG most days are in the 280-290s now. - On Sunday AM - had 1 reading in the 487 ---> later trended to 300s in the PM ---> states this occurred w/ Lantus 50 units instead of 55 units       Nutrition:  - Eats 3 meals/day   - Breakfast - typically a granola bar (fiber one/nutri grain) - reviewed carb content (27 grams carbs/ 2 grams protein /serving)   - Instead of breads ---. Choosing wraps for his usual lunch \"sandwiches\"   - Dinner: protein + frozen mixed vegetables  - Alcohol consumption?  No    Physical Activity: yes  - Wt down 8 lbs since March 2019   - States he's taking the stairs/parking further away   - Trying to walk daily     Vitals: Wt Readings from Last 3 Encounters:   04/16/19 301 lb (136.5 kg)   03/19/19 309 lb (140.2 kg)   03/05/19 300 lb 6.4 oz (136.3 kg)     BP Readings from Last 3 Encounters:   03/19/19 137/90   03/05/19 127/87   11/27/18 138/84     Pulse Readings from Last 3 Encounters:   03/19/19 89   03/05/19 99   11/27/18 83       Past Medical History:   Diagnosis Date    Hypertension 10/31/2014    Insomnia 5/6/2013    Morbid obesity ([facility] [address] 5/6/2013    Type II diabetes mellitus, uncontrolled ([facility] [address] 6/30/2016     No Known Allergies    Current Outpatient Medications   Medication Sig    insulin lispro (HUMALOG) 100 unit/mL kwikpen Inject 10 units before lunch and 10 units before dinner daily.  atorvastatin (LIPITOR) 40 mg tablet Take 1 Tab by mouth daily.  glucose blood VI test strips (ASCENSIA AUTODISC VI, ONE TOUCH ULTRA TEST VI) strip Use to check blood sugar up to 3 times daily. E11.9. Patient has Accu-check luigi meter.  insulin glargine (LANTUS,BASAGLAR) 100 unit/mL (3 mL) inpn Inject 55 units under the skin twice daily    aspirin delayed-release 81 mg tablet Take 1 Tab by mouth daily.  Insulin Needles, Disposable, 30 gauge x 1/3\" 1 Pen Needle by SubCUTAneous route two (2) times a day. Use to inject Lantus insulin pen under the skin twice daily. E11.9. Dispense 1 box of pen needles.  insulin glargine (LANTUS,BASAGLAR) 100 unit/mL (3 mL) inpn Inject 60 units under the skin twice daily.  fenofibrate nanocrystallized (TRICOR) 145 mg tablet Take 1 Tab by mouth daily.  ondansetron (ZOFRAN ODT) 4 mg disintegrating tablet Take 1 Tab by mouth every eight (8) hours as needed for Nausea.     Lancets misc Use as directed to test blood sugar three times daily -DX-DM-E11.9    Blood-Glucose Meter monitoring kit Use as directed to test blood sugar three times daily -DX-DM-E11.9     No current facility-administered medications for this visit. Lab Results   Component Value Date/Time    Sodium 128 (L) 03/05/2019 04:36 PM    Potassium 4.4 03/05/2019 04:36 PM    Chloride 87 (L) 03/05/2019 04:36 PM    CO2 20 03/05/2019 04:36 PM    Anion gap 9 04/30/2018 04:35 AM    Glucose 514 (>) 03/05/2019 04:36 PM    BUN 17 03/05/2019 04:36 PM    Creatinine 1.16 03/05/2019 04:36 PM    BUN/Creatinine ratio 15 03/05/2019 04:36 PM    GFR est AA 88 03/05/2019 04:36 PM    GFR est non-AA 76 03/05/2019 04:36 PM    Calcium 10.0 03/05/2019 04:36 PM       Lab Results   Component Value Date/Time    WBC 7.6 03/05/2019 04:36 PM    HGB 16.2 03/05/2019 04:36 PM    HCT 46.6 03/05/2019 04:36 PM    PLATELET 004 56/50/3454 04:36 PM    MCV 95 03/05/2019 04:36 PM       Lab Results   Component Value Date/Time    Microalb/Creat ratio (ug/mg creat.) 8.0 09/28/2016 08:06 AM       HbA1c:  Lab Results   Component Value Date/Time    Hemoglobin A1c 14.0 (H) 03/05/2019 04:36 PM    Hemoglobin A1c (POC) 11.9 (A) 01/21/2016 04:40 PM     No components found for: 2     Last Point of Care HGB A1C  Hemoglobin A1c (POC)   Date Value Ref Range Status   01/21/2016 11.9 (A) 4.8 - 5.6 % Final        Estimated Creatinine Clearance: 111.5 mL/min (based on SCr of 1.16 mg/dL). A/P:    Diabetes Management:  - Per ADA guidelines, Pt's A1c is not at his goal of < 7%  - Discussed reported SMBGs remain above his fasting (<130) and post-prandial goals (<180).    - Emphasize the importance of self medication dosage titration   - Continue Lantus 55 units BID  - Increase Humalog to 14 units before lunch and 14 units before dinner  - Discussed that if SMBGs continue to remain elevated - will need additional pharmacological interventions - pt understands this --wants to focus more on lifestyle changes at this time   - Bring glucometer to future visits     Nutrition/Lifestyle Modifications:  - Pt is making small changes to nutrition - trying to reduce his starches/breads.   - Discussed substituting regular sodas for diet versions   - Continue to aim for at least  ~30 minutes consistent exercise/day or ~150 minutes/week. Start small, stay consistent, and increase length and types of exercise, as tolerated. - Discuss goal is to reduce at least 5% of current total body weight.   - Reviewed pt about reading nutrition labels w/ a focus on carbohydrate/protein/sugar/fiber/fat content  - Emphasized the importance of moderating and diversifying carbohydrate intake to ~45-60 grams/meal and 15 grams/snack ; at least 1 serving of protein/meal    Established patient-centered goal(s) to follow up on at the next visit:        Diabetes Health Maintenance:  · ASA therapy: no - recommend to start today   · ACE/ARB therapy: no - not on any HTN meds  · Statin therapy:  yes  · Last eye exam: due   · Last foot exam: due  · Last influenza vaccine: 11/2018  · Last Pneumovax 23 vaccine: DUE  · Last Prevnar-13 vaccine: n/a  · Hepatitis B Series: DUE      Medication reconciliation was completed during the visit. Medications Discontinued During This Encounter   Medication Reason    insulin glargine (LANTUS,BASAGLAR) 100 unit/mL (3 mL) inpn Therapy Completed    insulin glargine (LANTUS,BASAGLAR) 100 unit/mL (3 mL) inpn     insulin lispro (HUMALOG) 100 unit/mL kwikpen        Patient verbalized understanding of the information presented and all of the patients questions were answered. AVS was handed to the patient. Patient advised to call the office with any additional questions or concerns. Notifications of recommendations will be sent to Dr. Torito Cooper MD for review. Patient will return to clinic in 2 week(s) for follow up. Pt has PCP visit on 4/23/19.      Thank you for the consult,  Trinity Devlin, NarcisaD

## 2019-04-30 ENCOUNTER — TELEPHONE (OUTPATIENT)
Dept: INTERNAL MEDICINE CLINIC | Age: 44
End: 2019-04-30

## 2019-04-30 NOTE — TELEPHONE ENCOUNTER
Pharmacy Progress Note - Telephone Call    Mr. Sheryle Mirza 39 y. o. was contacted via an outbound telephone call regarding his missed appointment today. A voicemail was left for patient to return my call.        Thank you,  Trinity Maza, PharmD, CDE

## 2019-06-12 ENCOUNTER — TELEPHONE (OUTPATIENT)
Dept: INTERNAL MEDICINE CLINIC | Age: 44
End: 2019-06-12

## 2019-06-12 NOTE — TELEPHONE ENCOUNTER
Pharmacy Progress Note - Telephone Encounter    S/O: Mr. Connor Kwon 39 y.o. male, was contacted via an outbound telephone call to discuss his diabetes management today. Verified patients identifiers (name & ) per HIPAA policy. - Pt due for follow up. States he's doing ok. A/P:  - Appt scheduled for  @ 3:45 PM.  Pt to bring in glucometer & home meds. - Patient endorses understanding to the provided information. All questions were answered at this time.        Thank you,  Trinity Alba, PharmD, CDE

## 2019-12-13 RX ORDER — ATORVASTATIN CALCIUM 40 MG/1
TABLET, FILM COATED ORAL
Qty: 90 TAB | Refills: 0 | Status: SHIPPED | OUTPATIENT
Start: 2019-12-13 | End: 2020-06-15

## 2020-03-05 ENCOUNTER — TELEPHONE (OUTPATIENT)
Dept: INTERNAL MEDICINE CLINIC | Age: 45
End: 2020-03-05

## 2020-03-05 NOTE — TELEPHONE ENCOUNTER
#[phone] Abby with [employer] is asking if you cancelled the PA for Novalog 100 units? It shows cancelled and they want to be sure this isn't a mistake? Did you prescribe an alternative? Please call.

## 2020-03-06 NOTE — TELEPHONE ENCOUNTER
He could use either novolog or humalog as a fasting acting mealtime insulin. I'd go with whichever is covered. It appears he is overdue for a visit, as well.    BJF

## 2020-03-12 NOTE — TELEPHONE ENCOUNTER
Tried to call patient and see if he's taking Novolog but no answer. Called cover[employer] meds. Spoke w/ Rafa Pope ([employer]). Two pt identifiers confirmed. Rafa Pope informed at this time Novolog is not apart of patient's medication and no PA is needed. Rafa Pope verbalized understanding of information discussed w/ no further questions at this time.

## 2020-03-16 RX ORDER — PEN NEEDLE, DIABETIC 31 GX5/16"
NEEDLE, DISPOSABLE MISCELLANEOUS
Qty: 100 PEN NEEDLE | Refills: 3 | Status: SHIPPED | OUTPATIENT
Start: 2020-03-16 | End: 2020-10-23

## 2020-04-03 ENCOUNTER — TELEPHONE (OUTPATIENT)
Dept: INTERNAL MEDICINE CLINIC | Age: 45
End: 2020-04-03

## 2020-04-03 NOTE — TELEPHONE ENCOUNTER
Reason for call: to offer virtual visit    I called pt, no answer- could not leave voice message due to mail box being full. Pt last seen in our office on 4/16/19.

## 2020-04-06 RX ORDER — INSULIN GLARGINE 100 [IU]/ML
INJECTION, SOLUTION SUBCUTANEOUS
Qty: 30 ML | Refills: 3 | Status: SHIPPED | OUTPATIENT
Start: 2020-04-06 | End: 2020-07-21

## 2020-04-27 ENCOUNTER — TELEPHONE (OUTPATIENT)
Dept: INTERNAL MEDICINE CLINIC | Age: 45
End: 2020-04-27

## 2020-04-27 NOTE — TELEPHONE ENCOUNTER
----- Message from Flaquito Charles MD sent at 4/27/2020  9:06 AM EDT -----  Regarding: LetS make appt  Let's have him do virtual visit in next 2-3 days, for hyperglycemia.    BJF

## 2020-05-01 ENCOUNTER — VIRTUAL VISIT (OUTPATIENT)
Dept: INTERNAL MEDICINE CLINIC | Age: 45
End: 2020-05-01

## 2020-05-01 ENCOUNTER — TELEPHONE (OUTPATIENT)
Dept: INTERNAL MEDICINE CLINIC | Age: 45
End: 2020-05-01

## 2020-05-01 DIAGNOSIS — E11.8 TYPE 2 DIABETES MELLITUS WITH COMPLICATION, WITH LONG-TERM CURRENT USE OF INSULIN (HCC): ICD-10-CM

## 2020-05-01 DIAGNOSIS — E11.49 TYPE II OR UNSPECIFIED TYPE DIABETES MELLITUS WITH NEUROLOGICAL MANIFESTATIONS, UNCONTROLLED(250.62) (HCC): Primary | ICD-10-CM

## 2020-05-01 DIAGNOSIS — N17.9 AKI (ACUTE KIDNEY INJURY) (HCC): ICD-10-CM

## 2020-05-01 DIAGNOSIS — I10 ESSENTIAL HYPERTENSION: ICD-10-CM

## 2020-05-01 DIAGNOSIS — M62.82 NON-TRAUMATIC RHABDOMYOLYSIS: ICD-10-CM

## 2020-05-01 DIAGNOSIS — D59.8 OTHER ACQUIRED HEMOLYTIC ANEMIAS (HCC): ICD-10-CM

## 2020-05-01 DIAGNOSIS — Z79.4 TYPE 2 DIABETES MELLITUS WITH COMPLICATION, WITH LONG-TERM CURRENT USE OF INSULIN (HCC): ICD-10-CM
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
## 2020-05-01 NOTE — PATIENT INSTRUCTIONS
Office Policies Phone calls/patient messages: Please allow up to 24 hours for someone in the office to contact you about your call or message. Be mindful your provider may be out of the office or your message may require further review. We encourage you to use [employer] for your messages as this is a faster, more efficient way to communicate with our office Medication Refills: 
         
Prescription medications require 48-72 business hours to process. We encourage you to use [employer] for your refills. For controlled medications: Please allow 72 business hours to process. Certain medications may require you to  a written prescription at our office. NO narcotic/controlled medications will be prescribed after 4pm Monday through Friday or on weekends Form/Paperwork Completion: 
         
Please note a $25 fee may incur for all paperwork for completed by our providers. We ask that you allow 7-10 business days. Pre-payment is due prior to picking up/faxing the completed form. You may also download your forms to [employer] to have your doctor print off. 
 
 
1. Have you been to the ER, urgent care clinic since your last visit? Hospitalized since your last visit?no 2. Have you seen or consulted any other health care providers outside of the [address] [place] [address] since your last visit? Include any pap smears or colon screening.  no

## 2020-06-15 RX ORDER — ATORVASTATIN CALCIUM 40 MG/1
TABLET, FILM COATED ORAL
Qty: 90 TAB | Refills: 0 | Status: SHIPPED | OUTPATIENT
Start: 2020-06-15 | End: 2020-09-11

## 2020-07-21 RX ORDER — INSULIN GLARGINE 100 [IU]/ML
INJECTION, SOLUTION SUBCUTANEOUS
Qty: 30 ML | Refills: 3 | Status: SHIPPED | OUTPATIENT
Start: 2020-07-21 | End: 2021-01-04

## 2020-08-03 ENCOUNTER — TELEPHONE (OUTPATIENT)
Dept: INTERNAL MEDICINE CLINIC | Age: 45
End: 2020-08-03

## 2020-08-03 ENCOUNTER — VIRTUAL VISIT (OUTPATIENT)
Dept: INTERNAL MEDICINE CLINIC | Age: 45
End: 2020-08-03
Payer: COMMERCIAL

## 2020-08-03 DIAGNOSIS — E11.8 TYPE 2 DIABETES MELLITUS WITH COMPLICATION, WITH LONG-TERM CURRENT USE OF INSULIN (HCC): Primary | ICD-10-CM

## 2020-08-03 DIAGNOSIS — I10 ESSENTIAL HYPERTENSION: ICD-10-CM

## 2020-08-03 DIAGNOSIS — Z79.4 TYPE 2 DIABETES MELLITUS WITH COMPLICATION, WITH LONG-TERM CURRENT USE OF INSULIN (HCC): Primary | ICD-10-CM

## 2020-08-03 DIAGNOSIS — E11.21 TYPE 2 DIABETES WITH NEPHROPATHY (HCC): ICD-10-CM

## 2020-08-03 DIAGNOSIS — E78.5 HYPERLIPIDEMIA, UNSPECIFIED HYPERLIPIDEMIA TYPE: ICD-10-CM

## 2020-08-03 DIAGNOSIS — M62.82 NON-TRAUMATIC RHABDOMYOLYSIS: ICD-10-CM

## 2020-08-03 PROCEDURE — 99214 OFFICE O/P EST MOD 30 MIN: CPT | Performed by: INTERNAL MEDICINE
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
## 2020-08-04 ENCOUNTER — PATIENT OUTREACH (OUTPATIENT)
Dept: CASE MANAGEMENT | Age: 45
End: 2020-08-04

## 2020-09-11 RX ORDER — ATORVASTATIN CALCIUM 40 MG/1
TABLET, FILM COATED ORAL
Qty: 90 TAB | Refills: 0 | Status: SHIPPED | OUTPATIENT
Start: 2020-09-11 | End: 2020-12-18

## 2020-10-23 RX ORDER — PEN NEEDLE, DIABETIC 31 GX5/16"
NEEDLE, DISPOSABLE MISCELLANEOUS
Qty: 100 PEN NEEDLE | Refills: 5 | Status: SHIPPED | OUTPATIENT
Start: 2020-10-23 | End: 2021-10-18 | Stop reason: SDUPTHER

## 2020-12-18 RX ORDER — ATORVASTATIN CALCIUM 40 MG/1
TABLET, FILM COATED ORAL
Qty: 90 TAB | Refills: 0 | Status: SHIPPED | OUTPATIENT
Start: 2020-12-18 | End: 2021-03-15

## 2021-01-04 RX ORDER — INSULIN GLARGINE 100 [IU]/ML
INJECTION, SOLUTION SUBCUTANEOUS
Qty: 30 ML | Refills: 3 | Status: SHIPPED | OUTPATIENT
Start: 2021-01-04 | End: 2021-07-08

## 2021-03-15 RX ORDER — ATORVASTATIN CALCIUM 40 MG/1
TABLET, FILM COATED ORAL
Qty: 90 TAB | Refills: 0 | Status: SHIPPED | OUTPATIENT
Start: 2021-03-15 | End: 2021-06-15

## 2021-03-18 NOTE — TELEPHONE ENCOUNTER
Message was left for patient to contact this office about  question about a work letter and to inform that a new medication has been prescribed for him. Name band; 0.07

## 2021-04-22 ENCOUNTER — TELEPHONE (OUTPATIENT)
Dept: INTERNAL MEDICINE CLINIC | Age: 46
End: 2021-04-22

## 2021-04-22 NOTE — TELEPHONE ENCOUNTER
#[phone]   Pt states he would like to reschedule his missed appt for tomorrow as he is off work. Please call to let him know either way.

## 2021-04-23 NOTE — TELEPHONE ENCOUNTER
Identified patient 2 identifiers verified. Spoke with patient, appointment was scheduled with Dr. Royce Friedman on 4/30/21.

## 2021-06-15 RX ORDER — ATORVASTATIN CALCIUM 40 MG/1
TABLET, FILM COATED ORAL
Qty: 90 TABLET | Refills: 0 | Status: SHIPPED | OUTPATIENT
Start: 2021-06-15 | End: 2021-09-17

## 2021-08-21 RX ORDER — INSULIN GLARGINE 100 [IU]/ML
INJECTION, SOLUTION SUBCUTANEOUS
Qty: 30 ML | Refills: 0 | OUTPATIENT
Start: 2021-08-21

## 2021-08-24 NOTE — TELEPHONE ENCOUNTER
Future Appointments:  No future appointments.      Last Appointment With Me:  Visit date not found     Requested Prescriptions     Pending Prescriptions Disp Refills    insulin glargine (Lantus Solostar U-100 Insulin) 100 unit/mL (3 mL) inpn 30 mL 0     Refused Prescriptions Disp Refills    insulin glargine (Lantus Solostar U-100 Insulin) 100 unit/mL (3 mL) inpn [Pharmacy Med Name: LANTUS SOLOSTAR PEN INJ 3ML] 30 mL 0     Sig: ADMINISTER 60 UNITS UNDER THE SKIN TWICE DAILY     Refused By: Marya Galdamez     Reason for Refusal: Not the prescriber of this medication

## 2021-08-25 RX ORDER — INSULIN GLARGINE 100 [IU]/ML
INJECTION, SOLUTION SUBCUTANEOUS
Qty: 30 ML | Refills: 0 | Status: SHIPPED | OUTPATIENT
Start: 2021-08-25 | End: 2021-09-17

## 2021-08-31 ENCOUNTER — VIRTUAL VISIT (OUTPATIENT)
Dept: INTERNAL MEDICINE CLINIC | Age: 46
End: 2021-08-31
Payer: COMMERCIAL

## 2021-08-31 ENCOUNTER — TELEPHONE (OUTPATIENT)
Dept: INTERNAL MEDICINE CLINIC | Age: 46
End: 2021-08-31

## 2021-08-31 DIAGNOSIS — M54.50 ACUTE LEFT-SIDED LOW BACK PAIN WITHOUT SCIATICA: Primary | ICD-10-CM

## 2021-08-31 PROCEDURE — 99213 OFFICE O/P EST LOW 20 MIN: CPT | Performed by: FAMILY MEDICINE

## 2021-08-31 RX ORDER — DICLOFENAC SODIUM 75 MG/1
75 TABLET, DELAYED RELEASE ORAL 2 TIMES DAILY
Qty: 30 TABLET | Refills: 1 | Status: SHIPPED | OUTPATIENT
Start: 2021-08-31 | End: 2021-10-18 | Stop reason: ALTCHOICE

## 2021-08-31 NOTE — PROGRESS NOTES
Megha Browning is a 51 y.o. male patient of Dr Rishi Newell who presents with left sided back pain  2/10 intensity. No known injury. Patient reports sitting on bleachers at a game, the pain started the next day. Has not taken any medication for the pain. No heat or ice used. No radicular pain. Sits at work, . Patient overdue for follow-up    This is an established visit conducted via telemedicine with video. The patient has been instructed that this meets HIPAA criteria and acknowledges and agrees to this method of visitation. Pursuant to the emergency declaration under the [address] [place] [address], Critical access hospital waiver authority and the [employer] Act, this Virtual Visit was conducted, with patient's consent, to reduce the patient's risk of exposure to COVID-19 and provide continuity of care for an established patient. Services were provided through a video synchronous discussion virtually to substitute for in-person clinic visit.         Past Medical History:   Diagnosis Date    Hypertension 10/31/2014    Insomnia 5/6/2013    Morbid obesity ([facility] [address] [address] 5/6/2013    Type II diabetes mellitus, uncontrolled ([facility] [address] 6/30/2016       Family History   Problem Relation Age of Onset    Asthma Mother     Diabetes Mother     Diabetes Father        Social History     Socioeconomic History    Marital status: SINGLE     Spouse name: Not on file    Number of children: Not on file    Years of education: Not on file    Highest education level: Not on file   Occupational History    Not on file   Tobacco Use    Smoking status: Never Smoker    Smokeless tobacco: Never Used   Substance and Sexual Activity    Alcohol use: Yes     Comment: socially    Drug use: Yes     Types: Marijuana    Sexual activity: Yes     Partners: Female   Other Topics Concern    Not on file   Social History Narrative    Not on file     Social Determinants of Health Financial Resource Strain:     Difficulty of Paying Living Expenses:    Food Insecurity:     Worried About Running Out of Food in the Last Year:     [address] in the Last Year:    Transportation Needs:     Lack of Transportation (Medical):  Lack of Transportation (Non-Medical):    Physical Activity:     Days of Exercise per Week:     Minutes of Exercise per Session:    Stress:     Feeling of Stress :    Social Connections:     Frequency of Communication with Friends and Family:     Frequency of Social Gatherings with Friends and Family:     Attends Nondenominational Services:     Active Member of Clubs or Organizations:     Attends Club or Organization Meetings:     Marital Status:    Intimate Partner Violence:     Fear of Current or Ex-Partner:     Emotionally Abused:     Physically Abused:     Sexually Abused:        Current Outpatient Medications on File Prior to Visit   Medication Sig Dispense Refill    insulin glargine (Lantus Solostar U-100 Insulin) 100 unit/mL (3 mL) inpn INJECT 60 UNITS UNDER THE SKIN TWICE DAILY 30 mL 0    atorvastatin (LIPITOR) 40 mg tablet TAKE 1 TABLET BY MOUTH EVERY DAY 90 Tablet 0    BD Ultra-Fine Mini Pen Needle 31 gauge x 3/16\" ndle USE TO INJECT LANTUS INSULIN UNDER THE SKIN TWICE DAILY. 100 Pen Needle 5    insulin lispro (HUMALOG) 100 unit/mL kwikpen Inject 14 units before lunch and 14 units before dinner daily. 1 Package 2    glucose blood VI test strips (ASCENSIA AUTODISC VI, ONE TOUCH ULTRA TEST VI) strip Use to check blood sugar up to 3 times daily. E11.9. Patient has Accu-check luigi meter. 100 Strip 11    aspirin delayed-release 81 mg tablet Take 1 Tab by mouth daily. 30 Tab 11    Insulin Needles, Disposable, 30 gauge x 1/3\" 1 Pen Needle by SubCUTAneous route two (2) times a day. Use to inject Lantus insulin pen under the skin twice daily. E11.9. Dispense 1 box of pen needles.  1 Package 11    fenofibrate nanocrystallized (TRICOR) 145 mg tablet Take 1 Tab by mouth daily. 30 Tab 11    ondansetron (ZOFRAN ODT) 4 mg disintegrating tablet Take 1 Tab by mouth every eight (8) hours as needed for Nausea. 30 Tab 1    Lancets misc Use as directed to test blood sugar three times daily -DX-DM-E11.9 90 Each 11    Blood-Glucose Meter monitoring kit Use as directed to test blood sugar three times daily -DX-DM-E11.9 1 Kit 0     No current facility-administered medications on file prior to visit. Review of Systems  Pertinent items are noted in HPI. Objective:     Gen: well appearing male  Resp: normal respiratory effort, no audible wheezing. CV: patient does not feel palpitations or heart irregularity  Neuro: Alert and oriented, able to answer questions without difficulty, able to move all extremities and walk normally  Back: Patient reports left-sided low back pain with bending and twisting      Assessment/Plan:       ICD-10-CM ICD-9-CM    1. Acute left-sided low back pain without sciatica  M54.5 724.2 diclofenac EC (VOLTAREN) 75 mg EC tablet   Advise heat, over-the-counter topicals, diclofenac as needed. Patient is overdue for follow-up with Dr. Alyse Quiroz. This was a telemedicine visit with video. Syed Gunter MD    Follow-up and Dispositions    · Return if symptoms worsen or fail to improve.

## 2021-09-17 RX ORDER — ATORVASTATIN CALCIUM 40 MG/1
TABLET, FILM COATED ORAL
Qty: 90 TABLET | Refills: 0 | Status: SHIPPED | OUTPATIENT
Start: 2021-09-17 | End: 2021-10-18 | Stop reason: SDUPTHER

## 2021-09-17 RX ORDER — INSULIN GLARGINE 100 [IU]/ML
INJECTION, SOLUTION SUBCUTANEOUS
Qty: 30 ML | Refills: 0 | Status: SHIPPED | OUTPATIENT
Start: 2021-09-17 | End: 2021-10-12

## 2021-10-04 ENCOUNTER — OFFICE VISIT (OUTPATIENT)
Dept: INTERNAL MEDICINE CLINIC | Age: 46
End: 2021-10-04
Payer: COMMERCIAL

## 2021-10-04 VITALS
TEMPERATURE: 98.1 F | SYSTOLIC BLOOD PRESSURE: 150 MMHG | WEIGHT: 292 LBS | HEART RATE: 88 BPM | OXYGEN SATURATION: 98 % | DIASTOLIC BLOOD PRESSURE: 105 MMHG | RESPIRATION RATE: 16 BRPM | BODY MASS INDEX: 43.25 KG/M2 | HEIGHT: 69 IN

## 2021-10-04 DIAGNOSIS — E66.01 OBESITY, CLASS III, BMI 40-49.9 (MORBID OBESITY) (HCC): ICD-10-CM

## 2021-10-04 DIAGNOSIS — Z12.11 SCREEN FOR COLON CANCER: ICD-10-CM

## 2021-10-04 DIAGNOSIS — E11.8 TYPE 2 DIABETES MELLITUS WITH COMPLICATION, WITH LONG-TERM CURRENT USE OF INSULIN (HCC): Primary | ICD-10-CM

## 2021-10-04 DIAGNOSIS — Z79.4 TYPE 2 DIABETES MELLITUS WITH COMPLICATION, WITH LONG-TERM CURRENT USE OF INSULIN (HCC): Primary | ICD-10-CM

## 2021-10-04 DIAGNOSIS — M62.82 NON-TRAUMATIC RHABDOMYOLYSIS: ICD-10-CM

## 2021-10-04 DIAGNOSIS — D59.8 OTHER ACQUIRED HEMOLYTIC ANEMIAS (HCC): ICD-10-CM

## 2021-10-04 PROCEDURE — 99396 PREV VISIT EST AGE 40-64: CPT | Performed by: INTERNAL MEDICINE

## 2021-10-04 RX ORDER — INSULIN LISPRO 100 [IU]/ML
INJECTION, SOLUTION INTRAVENOUS; SUBCUTANEOUS
Qty: 4 ADJUSTABLE DOSE PRE-FILLED PEN SYRINGE | Refills: 5 | Status: SHIPPED | OUTPATIENT
Start: 2021-10-04
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
## 2021-10-04 NOTE — PATIENT INSTRUCTIONS
Well Visit, Ages 25 to 48: Care Instructions  Overview     Well visits can help you stay healthy. Your doctor has checked your overall health and may have suggested ways to take good care of yourself. Your doctor also may have recommended tests. At home, you can help prevent illness with healthy eating, regular exercise, and other steps. Follow-up care is a key part of your treatment and safety. Be sure to make and go to all appointments, and call your doctor if you are having problems. It's also a good idea to know your test results and keep a list of the medicines you take. How can you care for yourself at home? · Get screening tests that you and your doctor decide on. Screening helps find diseases before any symptoms appear. · Eat healthy foods. Choose fruits, vegetables, whole grains, protein, and low-fat dairy foods. Limit fat, especially saturated fat. Reduce salt in your diet. · Limit alcohol. If you are a man, have no more than 2 drinks a day or 14 drinks a week. If you are a woman, have no more than 1 drink a day or 7 drinks a week. · Get at least 30 minutes of physical activity on most days of the week. Walking is a good choice. You also may want to do other activities, such as running, swimming, cycling, or playing tennis or team sports. Discuss any changes in your exercise program with your doctor. · Reach and stay at a healthy weight. This will lower your risk for many problems, such as obesity, diabetes, heart disease, and high blood pressure. · Do not smoke or allow others to smoke around you. If you need help quitting, talk to your doctor about stop-smoking programs and medicines. These can increase your chances of quitting for good. · Care for your mental health. It is easy to get weighed down by worry and stress. Learn strategies to manage stress, like deep breathing and mindfulness, and stay connected with your family and community.  If you find you often feel sad or hopeless, talk with your doctor. Treatment can help. · Talk to your doctor about whether you have any risk factors for sexually transmitted infections (STIs). You can help prevent STIs if you wait to have sex with a new partner (or partners) until you've each been tested for STIs. It also helps if you use condoms (male or female condoms) and if you limit your sex partners to one person who only has sex with you. Vaccines are available for some STIs, such as HPV. · Use birth control if it's important to you to prevent pregnancy. Talk with your doctor about the choices available and what might be best for you. · If you think you may have a problem with alcohol or drug use, talk to your doctor. This includes prescription medicines (such as amphetamines and opioids) and illegal drugs (such as cocaine and methamphetamine). Your doctor can help you figure out what type of treatment is best for you. · Protect your skin from too much sun. When you're outdoors from 10 a.m. to 4 p.m., stay in the shade or cover up with clothing and a hat with a wide brim. Wear sunglasses that block UV rays. Even when it's cloudy, put broad-spectrum sunscreen (SPF 30 or higher) on any exposed skin. · See a dentist one or two times a year for checkups and to have your teeth cleaned. · Wear a seat belt in the car. When should you call for help? Watch closely for changes in your health, and be sure to contact your doctor if you have any problems or symptoms that concern you. Where can you learn more? Go to http://www.Th[employer].com/  Enter P072 in the search box to learn more about \"Well Visit, Ages 25 to 48: Care Instructions. \"  Current as of: February 11, 2021               Content Version: 13.0  © 3097-0164 Healthwise, Incorporated. Care instructions adapted under license by [employer] (which disclaims liability or warranty for this information).  If you have questions about a medical condition or this instruction, always ask your healthcare professional. [place] [address] any warranty or liability for your use of this information.

## 2021-10-04 NOTE — PROGRESS NOTES
Identified pt with two pt identifiers(name and ). Reviewed record in preparation for visit and have obtained necessary documentation. Chief Complaint   Patient presents with    Complete Physical    Diabetes     HM foot exam due, order pended        Vitals:    10/04/21 1609 10/04/21 1613 10/04/21 1616   BP: (!) 158/102 (!) 155/100 (!) 150/105   Pulse: 90 90 88   Resp: 16     Temp: 98.1 °F (36.7 °C)     TempSrc: Oral     SpO2: 98%     Weight: 292 lb (132.5 kg)     Height: 5' 9\" (1.753 m)     PainSc:   0 - No pain         Health Maintenance Due   Topic    COVID-19 Vaccine (1)    DTaP/Tdap/Td series (1 - Tdap)    Foot Exam Q1     Eye Exam Retinal or Dilated     Colorectal Cancer Screening Combo     MICROALBUMIN Q1     A1C test (Diabetic or Prediabetic)     Lipid Screen     Flu Vaccine (1)   - pt declines flu shot at this time    Coordination of Care Questionnaire:  :   1) Have you been to an emergency room, urgent care, or hospitalized since your last visit? If yes, where when, and reason for visit? no       2. Have seen or consulted any other health care provider since your last visit? If yes, where when, and reason for visit? NO      Patient is accompanied by self I have received verbal consent from Milo Broussard to discuss any/all medical information while they are present in the room.

## 2021-10-11 ENCOUNTER — TELEPHONE (OUTPATIENT)
Dept: INTERNAL MEDICINE CLINIC | Age: 46
End: 2021-10-11

## 2021-10-11 NOTE — TELEPHONE ENCOUNTER
Unable to leave message for patient to return call to office to schedule a DM NP appointment with Luba due to patient's voicemail full.

## 2021-10-11 NOTE — TELEPHONE ENCOUNTER
Pt is asking for a call back from the lady that called him. He would  Like to speak to you after 4:25 pm today if possible. Thanks.

## 2021-10-12 LAB
ALBUMIN SERPL-MCNC: 4.3 G/DL (ref 4–5)
ALBUMIN/CREAT UR: 82 MG/G CREAT (ref 0–29)
ALBUMIN/GLOB SERPL: 1.4 {RATIO} (ref 1.2–2.2)
ALP SERPL-CCNC: 87 IU/L (ref 44–121)
ALT SERPL-CCNC: 18 IU/L (ref 0–44)
AST SERPL-CCNC: 12 IU/L (ref 0–40)
BASOPHILS # BLD AUTO: 0.1 X10E3/UL (ref 0–0.2)
BASOPHILS NFR BLD AUTO: 1 %
BILIRUB SERPL-MCNC: 0.5 MG/DL (ref 0–1.2)
BUN SERPL-MCNC: 12 MG/DL (ref 6–24)
BUN/CREAT SERPL: 14 (ref 9–20)
CALCIUM SERPL-MCNC: 9.8 MG/DL (ref 8.7–10.2)
CHLORIDE SERPL-SCNC: 95 MMOL/L (ref 96–106)
CHOLEST SERPL-MCNC: 118 MG/DL (ref 100–199)
CK SERPL-CCNC: 173 U/L (ref 49–439)
CO2 SERPL-SCNC: 27 MMOL/L (ref 20–29)
CREAT SERPL-MCNC: 0.87 MG/DL (ref 0.76–1.27)
CREAT UR-MCNC: 45.7 MG/DL
EOSINOPHIL # BLD AUTO: 0.1 X10E3/UL (ref 0–0.4)
EOSINOPHIL NFR BLD AUTO: 1 %
ERYTHROCYTE [DISTWIDTH] IN BLOOD BY AUTOMATED COUNT: 11.9 % (ref 11.6–15.4)
EST. AVERAGE GLUCOSE BLD GHB EST-MCNC: 338 MG/DL
GLOBULIN SER CALC-MCNC: 3.1 G/DL (ref 1.5–4.5)
GLUCOSE SERPL-MCNC: 443 MG/DL (ref 65–99)
HBA1C MFR BLD: 13.4 % (ref 4.8–5.6)
HCT VFR BLD AUTO: 46.4 % (ref 37.5–51)
HDLC SERPL-MCNC: 30 MG/DL
HGB BLD-MCNC: 15.8 G/DL (ref 13–17.7)
IMM GRANULOCYTES # BLD AUTO: 0 X10E3/UL (ref 0–0.1)
IMM GRANULOCYTES NFR BLD AUTO: 0 %
LDLC SERPL CALC-MCNC: 35 MG/DL (ref 0–99)
LYMPHOCYTES # BLD AUTO: 2.4 X10E3/UL (ref 0.7–3.1)
LYMPHOCYTES NFR BLD AUTO: 26 %
MCH RBC QN AUTO: 32.6 PG (ref 26.6–33)
MCHC RBC AUTO-ENTMCNC: 34.1 G/DL (ref 31.5–35.7)
MCV RBC AUTO: 96 FL (ref 79–97)
MICROALBUMIN UR-MCNC: 37.5 UG/ML
MONOCYTES # BLD AUTO: 0.7 X10E3/UL (ref 0.1–0.9)
MONOCYTES NFR BLD AUTO: 7 %
NEUTROPHILS # BLD AUTO: 6.1 X10E3/UL (ref 1.4–7)
NEUTROPHILS NFR BLD AUTO: 65 %
PLATELET # BLD AUTO: 258 X10E3/UL (ref 150–450)
POTASSIUM SERPL-SCNC: 4.2 MMOL/L (ref 3.5–5.2)
PROT SERPL-MCNC: 7.4 G/DL (ref 6–8.5)
RBC # BLD AUTO: 4.84 X10E6/UL (ref 4.14–5.8)
SODIUM SERPL-SCNC: 135 MMOL/L (ref 134–144)
TRIGL SERPL-MCNC: 362 MG/DL (ref 0–149)
VLDLC SERPL CALC-MCNC: 53 MG/DL (ref 5–40)
WBC # BLD AUTO: 9.2 X10E3/UL (ref 3.4–10.8)

## 2021-10-12 RX ORDER — INSULIN GLARGINE 100 [IU]/ML
INJECTION, SOLUTION SUBCUTANEOUS
Qty: 30 ML | Refills: 0 | Status: SHIPPED | OUTPATIENT
Start: 2021-10-12 | End: 2021-11-13

## 2021-10-16 NOTE — PROGRESS NOTES
Pharmacy Progress Note - Diabetes Management    Assessment / Plan:   Diabetes Management:  - Per ADA guidelines, Pt's A1c is at goal of < 7%. BP elevated for goal 130/80  - Based on current cardiorenal risk factors, start lisinopril 20 mg daily.  - Based on reported SMBGs, BG remains elevated for fasting and post prandial goals. Will see if we can get the CGM covered for him. - Increase Lantus to 65 units daily in the AM  - Discuss role of GLP1RA on his current glycemic management. Discuss injection technique, frequency, and SE profile. Start Ozempic 0.25 mg weekly x 4 weeks, then increase to 0.5 mg weekly thereafter  - Hold off on Humalog for now. Check: Vitals, Weight and Medication Adherence at the next visit. S/O: Mr. Wong Mistry is a 51 y.o. male, referred by Dr. Luis Sun MD, with a PMH of T2DM+nepropathy, Hx HHS, HTN, HLD, fatty liver disease, Hx rhabdomyolysis, EUSEBIO, morbid obesity , was seen today for diabetes management follow up. Patient's last A1c was 13.4% (Oct 2021), 14% (March 2019), a slight decrease from >15.5% (Nov. 2018).   Last visit with me was on 3/19/19.      Patient was last seen by me in April 2019. Interim update:   Saw Dr Cecilia Olson for f/u 10/4/21  Recall hx of hyperosmolar coma, hemolytic anemia, LITTLE requiring HD. 2018. No longer on HD. No recent follow up with nephrology    Received 2nd Problemsolutions24 19 vaccination on 10/12/21    Current anti-hyperglycemic regimen include(s):    - Lantus 60 units twice daily --- giving 60 units once daily -- stopped taking BID on his old for a few months now   - Humalog 20 units twice daily --- reports he has been off of this insulin for > 6 months now. Reports to forgetting to give insulin around meal time.      Atorvastatin 40 mg daily, ASA 81 mg daily,   LDL 35 ;  (down from 2637) in Oct 2021    ROS:  Today, Pt endorses:  - Symptoms of Hyperglycemia: none  - Symptoms of Hypoglycemia: none    Self Monitoring Blood Glucose (SMBG) or CGM:  No meter today. Checking once daily usually pre-dinner  Reports readings have been in the 200s. A few readings in the 300s. 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 Eating 2-3 meals/day    Declines flu shot today    The ASCVD Risk score ([address] et al., 2013) failed to calculate for the following reasons: The valid total cholesterol range is 130 to 320 mg/dL     Vitals: Wt Readings from Last 3 Encounters:   10/18/21 294 lb (133.4 kg)   10/04/21 292 lb (132.5 kg)   04/16/19 301 lb (136.5 kg)     BP Readings from Last 3 Encounters:   10/18/21 (!) 149/105   10/04/21 (!) 150/105   03/19/19 137/90     Pulse Readings from Last 3 Encounters:   10/18/21 (!) 109   10/04/21 88   03/19/19 89       Past Medical History:   Diagnosis Date    Hypertension 10/31/2014    Insomnia 5/6/2013    Morbid obesity ([facility] [address] [address] 5/6/2013    Type II diabetes mellitus, uncontrolled ([facility] [address] 6/30/2016     No Known Allergies    Current Outpatient Medications   Medication Sig    Lantus Solostar U-100 Insulin 100 unit/mL (3 mL) inpn ADMINISTER 60 UNITS UNDER THE SKIN TWICE DAILY    insulin lispro (HUMALOG) 100 unit/mL kwikpen Inject 14 units before lunch and 14 units before dinner daily.  atorvastatin (LIPITOR) 40 mg tablet TAKE 1 TABLET BY MOUTH EVERY DAY    diclofenac EC (VOLTAREN) 75 mg EC tablet Take 1 Tablet by mouth two (2) times a day. As needed for pain    BD Ultra-Fine Mini Pen Needle 31 gauge x 3/16\" ndle USE TO INJECT LANTUS INSULIN UNDER THE SKIN TWICE DAILY.  glucose blood VI test strips (ASCENSIA AUTODISC VI, ONE TOUCH ULTRA TEST VI) strip Use to check blood sugar up to 3 times daily. E11.9. Patient has Accu-check luigi meter.  aspirin delayed-release 81 mg tablet Take 1 Tab by mouth daily.  Insulin Needles, Disposable, 30 gauge x 1/3\" 1 Pen Needle by SubCUTAneous route two (2) times a day.  Use to inject Lantus insulin pen under the skin twice daily. E11.9. Dispense 1 box of pen needles.  Lancets misc Use as directed to test blood sugar three times daily -DX-DM-E11.9    Blood-Glucose Meter monitoring kit Use as directed to test blood sugar three times daily -DX-DM-E11.9     No current facility-administered medications for this visit. Lab Results   Component Value Date/Time    Sodium 135 10/11/2021 04:17 PM    Potassium 4.2 10/11/2021 04:17 PM    Chloride 95 (L) 10/11/2021 04:17 PM    CO2 27 10/11/2021 04:17 PM    Anion gap 9 04/30/2018 04:35 AM    Glucose 443 (H) 10/11/2021 04:17 PM    BUN 12 10/11/2021 04:17 PM    Creatinine 0.87 10/11/2021 04:17 PM    BUN/Creatinine ratio 14 10/11/2021 04:17 PM    GFR est  10/11/2021 04:17 PM    GFR est non- 10/11/2021 04:17 PM    Calcium 9.8 10/11/2021 04:17 PM    Bilirubin, total 0.5 10/11/2021 04:17 PM    Alk.  phosphatase 87 10/11/2021 04:17 PM    Protein, total 7.4 10/11/2021 04:17 PM    Albumin 4.3 10/11/2021 04:17 PM    Globulin 3.7 04/20/2018 03:49 AM    A-G Ratio 1.4 10/11/2021 04:17 PM    ALT (SGPT) 18 10/11/2021 04:17 PM       Lab Results   Component Value Date/Time    Cholesterol, total 118 10/11/2021 04:17 PM    Cholesterol (POC) 136 05/06/2013 04:32 PM    HDL Cholesterol 30 (L) 10/11/2021 04:17 PM    HDL Cholesterol (POC) 22 05/06/2013 04:32 PM    LDL Cholesterol (POC) 51 05/06/2013 04:32 PM    LDL, calculated 35 10/11/2021 04:17 PM    LDL, calculated Comment 03/05/2019 04:36 PM    VLDL, calculated 53 (H) 10/11/2021 04:17 PM    VLDL, calculated Comment 03/05/2019 04:36 PM    Triglyceride 362 (H) 10/11/2021 04:17 PM    Triglycerides (POC) 321 05/06/2013 04:32 PM       Lab Results   Component Value Date/Time    WBC 9.2 10/11/2021 04:17 PM    HGB 15.8 10/11/2021 04:17 PM    HCT 46.4 10/11/2021 04:17 PM    PLATELET 390 66/31/8075 04:17 PM    MCV 96 10/11/2021 04:17 PM       Lab Results   Component Value Date/Time    Microalb/Creat ratio (ug/mg creat.) 82 (H) 10/11/2021 04:17 PM       HbA1c:  Lab Results   Component Value Date/Time    Hemoglobin A1c 13.4 (H) 10/11/2021 04:17 PM    Hemoglobin A1c (POC) 11.9 (A) 2016 04:40 PM     Last Point of Care HGB A1C  Hemoglobin A1c (POC)   Date Value Ref Range Status   2016 11.9 (A) 4.8 - 5.6 % Final      Estimated Creatinine Clearance: 142.2 mL/min (by C-G formula based on SCr of 0.87 mg/dL). Medication reconciliation was completed during the visit. Medications Discontinued During This Encounter   Medication Reason    diclofenac EC (VOLTAREN) 75 mg EC tablet Therapy Completed    BD Ultra-Fine Mini Pen Needle 31 gauge x 3/16\" ndle REORDER    Insulin Needles, Disposable, 30 gauge x 7/2\" DUPLICATE ORDER    Lancets misc REORDER    atorvastatin (LIPITOR) 40 mg tablet REORDER      Orders Placed This Encounter    Insulin Needles, Disposable, (Diane Pen Needle) 32 gauge x 5/32\" ndle     Si Pen Needle by SubCUTAneous route two (2) times a day. E11.65     Dispense:  100 Pen Needle     Refill:  11    lancets misc     Sig: Use as directed to test blood sugar three times daily E11.65     Dispense:  90 Each     Refill:  11    atorvastatin (LIPITOR) 40 mg tablet     Sig: Take 1 Tablet by mouth daily. Dispense:  90 Tablet     Refill:  1    flash glucose sensor (FreeStyle Allie 2 Sensor) kit     Si Each by Does Not Apply route See Admin Instructions. Apply and replace sensor every 14 days. Use to scan sensor 4 times daily     Dispense:  2 Kit     Refill:  11    flash glucose scanning reader (FreeStyle Allie 2 [place]) misc     Si Each by Does Not Apply route See Admin Instructions. Apply and replace sensor every 14 days. Use to scan sensor 4 times daily. E11.65     Dispense:  1 Each     Refill:  0    lisinopriL (PRINIVIL, ZESTRIL) 20 mg tablet     Sig: Take 1 Tablet by mouth daily.      Dispense:  30 Tablet     Refill:  2    semaglutide (OZEMPIC) 0.25 mg or 0.5 mg/dose (2 mg/1.5 ml) subq pen     Si.25 mg by SubCUTAneous route every seven (7) days. For 4 weeks, then increase 0.5 mg weekly     Dispense:  1 Box     Refill:  1     Patient's Immunization History:    Immunizations by Immunization family     Influenza Vaccine 1/26/2016 12/30/2016 9/5/2017 11/27/2018    Sars-cov-2 (Covid-19) Vaccine, Unspecified  9/22/2021 10/12/2021            Patient verbalized understanding of the information presented and all of the patients questions were answered. AVS was handed to the patient. Patient advised to call the office with any additional questions or concerns. Notifications of recommendations will be sent to Dr. Omid Cheung MD for review. Patient will return to clinic in 3 week(s) for follow up. Thank you for the consult,  Trinity Guzman, PharmD, BCACP, [address] [place] [address] in place:  Yes   Recommendation Provided To: Patient/Caregiver: 13 via In person   Intervention Detail: Adherence Monitoring: 3, Device Training, Discontinued Rx: 5, reason: Duplicate Therapy, Patient Preference and Therapy Complete, Dose Adjustment: 1, reason: Therapy Optimization, New Rx: 4, reason: Improve Adherence and Needs Additional Therapy, Refill(s) Provided, Scheduled Appointment and Vaccine Recommended/Administered   Gap Closed?:    Intervention Accepted By: Patient/Caregiver: 13   Time Spent (min): 60

## 2021-10-18 ENCOUNTER — OFFICE VISIT (OUTPATIENT)
Dept: INTERNAL MEDICINE CLINIC | Age: 46
End: 2021-10-18

## 2021-10-18 VITALS
DIASTOLIC BLOOD PRESSURE: 105 MMHG | HEIGHT: 69 IN | HEART RATE: 109 BPM | BODY MASS INDEX: 43.55 KG/M2 | WEIGHT: 294 LBS | SYSTOLIC BLOOD PRESSURE: 149 MMHG | OXYGEN SATURATION: 97 %

## 2021-10-18 DIAGNOSIS — E11.8 TYPE 2 DIABETES MELLITUS WITH COMPLICATION, WITH LONG-TERM CURRENT USE OF INSULIN (HCC): Primary | ICD-10-CM

## 2021-10-18 DIAGNOSIS — Z79.4 TYPE 2 DIABETES MELLITUS WITH COMPLICATION, WITH LONG-TERM CURRENT USE OF INSULIN (HCC): Primary | ICD-10-CM

## 2021-10-18 RX ORDER — LISINOPRIL 20 MG/1
20 TABLET ORAL DAILY
Qty: 30 TABLET | Refills: 2 | Status: SHIPPED | OUTPATIENT
Start: 2021-10-18 | End: 2022-10-26 | Stop reason: SDUPTHER

## 2021-10-18 RX ORDER — FLASH GLUCOSE SCANNING READER
1 EACH MISCELLANEOUS SEE ADMIN INSTRUCTIONS
Qty: 1 EACH | Refills: 0 | Status: SHIPPED | OUTPATIENT
Start: 2021-10-18

## 2021-10-18 RX ORDER — LANCETS
EACH MISCELLANEOUS
Qty: 90 EACH | Refills: 11 | Status: SHIPPED | OUTPATIENT
Start: 2021-10-18

## 2021-10-18 RX ORDER — FLASH GLUCOSE SENSOR
1 KIT MISCELLANEOUS SEE ADMIN INSTRUCTIONS
Qty: 2 KIT | Refills: 11 | Status: SHIPPED | OUTPATIENT
Start: 2021-10-18

## 2021-10-18 RX ORDER — PEN NEEDLE, DIABETIC 31 GX3/16"
1 NEEDLE, DISPOSABLE MISCELLANEOUS 2 TIMES DAILY
Qty: 100 PEN NEEDLE | Refills: 11 | Status: SHIPPED | OUTPATIENT
Start: 2021-10-18

## 2021-10-18 RX ORDER — ATORVASTATIN CALCIUM 40 MG/1
40 TABLET, FILM COATED ORAL DAILY
Qty: 90 TABLET | Refills: 1 | Status: SHIPPED | OUTPATIENT
Start: 2021-10-18 | End: 2022-06-23 | Stop reason: SDUPTHER

## 2021-10-18 NOTE — PATIENT INSTRUCTIONS
· Start Lisinopril 20 mg daily - this your blood pressure medication  · Let me know when you are able to  the [facility] meter & sensor  · Increase Lantus insulin to 65 units daily in the morning   · Start Ozempic at 0.25 mg weekly for 4 weeks, then increase to 0.5 mg weekly  · Hold off on your Humalog insulin for now

## 2021-10-18 NOTE — LETTER
10/18/2021 4:50 PM    Mr. Tigist Leyva  [address] [place] [id number]      Mr. Abraham Celis,    Please see your recent lab results below:    Results for orders placed or performed in visit on 10/04/21   LIPID PANEL   Result Value Ref Range    Cholesterol, total 118 100 - 199 mg/dL    Triglyceride 362 (H) 0 - 149 mg/dL    HDL Cholesterol 30 (L) >39 mg/dL    VLDL, calculated 53 (H) 5 - 40 mg/dL    LDL, calculated 35 0 - 99 mg/dL   HEMOGLOBIN A1C WITH EAG   Result Value Ref Range    Hemoglobin A1c 13.4 (H) 4.8 - 5.6 %    Estimated average glucose 973 mg/dL   METABOLIC PANEL, COMPREHENSIVE   Result Value Ref Range    Glucose 443 (H) 65 - 99 mg/dL    BUN 12 6 - 24 mg/dL    Creatinine 0.87 0.76 - 1.27 mg/dL    GFR est non- >59 mL/min/1.73    GFR est  >59 mL/min/1.73    BUN/Creatinine ratio 14 9 - 20    Sodium 135 134 - 144 mmol/L    Potassium 4.2 3.5 - 5.2 mmol/L    Chloride 95 (L) 96 - 106 mmol/L    CO2 27 20 - 29 mmol/L    Calcium 9.8 8.7 - 10.2 mg/dL    Protein, total 7.4 6.0 - 8.5 g/dL    Albumin 4.3 4.0 - 5.0 g/dL    GLOBULIN, TOTAL 3.1 1.5 - 4.5 g/dL    A-G Ratio 1.4 1.2 - 2.2    Bilirubin, total 0.5 0.0 - 1.2 mg/dL    Alk. phosphatase 87 44 - 121 IU/L    AST (SGOT) 12 0 - 40 IU/L    ALT (SGPT) 18 0 - 44 IU/L   CBC WITH AUTOMATED DIFF   Result Value Ref Range    WBC 9.2 3.4 - 10.8 x10E3/uL    RBC 4.84 4.14 - 5.80 x10E6/uL    HGB 15.8 13.0 - 17.7 g/dL    HCT 46.4 37.5 - 51.0 %    MCV 96 79 - 97 fL    MCH 32.6 26.6 - 33.0 pg    MCHC 34.1 31.5 - 35.7 g/dL    RDW 11.9 11.6 - 15.4 %    PLATELET 550 232 - 012 x10E3/uL    NEUTROPHILS 65 Not Estab. %    Lymphocytes 26 Not Estab. %    MONOCYTES 7 Not Estab. %    EOSINOPHILS 1 Not Estab. %    BASOPHILS 1 Not Estab. %    ABS. NEUTROPHILS 6.1 1.4 - 7.0 x10E3/uL    Abs Lymphocytes 2.4 0.7 - 3.1 x10E3/uL    ABS. MONOCYTES 0.7 0.1 - 0.9 x10E3/uL    ABS. EOSINOPHILS 0.1 0.0 - 0.4 x10E3/uL    ABS.  BASOPHILS 0.1 0.0 - 0.2 x10E3/uL    IMMATURE GRANULOCYTES 0 Not Estab. %    ABS. IMM.  GRANS. 0.0 0.0 - 0.1 x10E3/uL   MICROALBUMIN, UR, RAND W/ MICROALB/CREAT RATIO   Result Value Ref Range    Creatinine, urine 45.7 Not Estab. mg/dL    Microalbumin, urine 37.5 Not Estab. ug/mL    Microalb/Creat ratio (ug/mg creat.) 82 (H) 0 - 29 mg/g creat   CK   Result Value Ref Range    Creatine Kinase,Total 173 49 - 439 U/L         u Karine Fabian, PharmD, [place] N[facility]ez

## 2021-10-19 ENCOUNTER — DOCUMENTATION ONLY (OUTPATIENT)
Dept: INTERNAL MEDICINE CLINIC | Age: 46
End: 2021-10-19

## 2021-10-19 ENCOUNTER — TELEPHONE (OUTPATIENT)
Dept: INTERNAL MEDICINE CLINIC | Age: 46
End: 2021-10-19

## 2021-10-19 NOTE — PROGRESS NOTES
Pharmacy Progress Note - Medication Access    Entry for Mr. Sarahi Cash 51 y.o. 's Ozempic prior authorization. Verdict pending. Thank you for the consult,  Narcisa ChristiansonD, SYLVIACP, [address] in place: Yes   Recommendation Provided To:  Other: 1   Intervention Detail: Patient Access Assistance/Sample Provided   Gap Closed?:    Intervention Accepted By: Other: 1   Time Spent (min): 10

## 2021-10-19 NOTE — TELEPHONE ENCOUNTER
Pharmacy Progress Note     Ravi Antis now approved until 10/2024. Case [id number]    Confirmed with [address] Copay is now $25.     Thank you for the consult,  Trinity Fabian, PharmD, BCACP, [facility] in place:  Yes   Recommendation Provided To: Pharmacy: 1   Intervention Detail: New Rx: 1, reason: Improve Adherence   Gap Closed?:    Intervention Accepted By: Pharmacy: 1   Time Spent (min): 10

## 2021-11-13 RX ORDER — INSULIN GLARGINE 100 [IU]/ML
INJECTION, SOLUTION SUBCUTANEOUS
Qty: 30 ML | Refills: 0 | Status: SHIPPED | OUTPATIENT
Start: 2021-11-13 | End: 2022-07-20 | Stop reason: SDUPTHER

## 2021-12-13 ENCOUNTER — TELEPHONE (OUTPATIENT)
Dept: INTERNAL MEDICINE CLINIC | Age: 46
End: 2021-12-13

## 2021-12-13 NOTE — TELEPHONE ENCOUNTER
Pharmacy Progress Note - Telephone Call    Mr. Musa West 51 y.o. was contacted via an outbound telephone call regarding his diabetes follow up today. VM box is full at this time. Thank you,  Trinity Styles, PharmD, BCACP, [address] in place:  Yes   Time Spent (min): 5

## 2022-01-13 RX ORDER — INSULIN GLARGINE 100 [IU]/ML
INJECTION, SOLUTION SUBCUTANEOUS
Qty: 30 ML | Refills: 2 | Status: CANCELLED | OUTPATIENT
Start: 2022-01-13

## 2022-01-13 NOTE — TELEPHONE ENCOUNTER
Patient states he needs to request Approval on refill for his Insulin as he only has enough for tomorrow & didn't realize No Refills left. Please call if any questions or to advise when approved.  Thank you      Pharmacy is [facility] on file/Indicated

## 2022-01-14 RX ORDER — INSULIN GLARGINE-YFGN 100 [IU]/ML
INJECTION, SOLUTION SUBCUTANEOUS
Status: CANCELLED | OUTPATIENT
Start: 2022-01-14

## 2022-01-14 RX ORDER — INSULIN GLARGINE 100 [IU]/ML
60 INJECTION, SOLUTION SUBCUTANEOUS 2 TIMES DAILY
Qty: 36 ML | Refills: 11 | Status: SHIPPED | OUTPATIENT
Start: 2022-01-14 | End: 2022-07-18 | Stop reason: SDUPTHER

## 2022-01-18 ENCOUNTER — TELEPHONE (OUTPATIENT)
Dept: INTERNAL MEDICINE CLINIC | Age: 47
End: 2022-01-18

## 2022-01-18 RX ORDER — PEN NEEDLE, DIABETIC 31 GX5/16"
NEEDLE, DISPOSABLE MISCELLANEOUS
Qty: 100 PEN NEEDLE | Refills: 5 | Status: SHIPPED | OUTPATIENT
Start: 2022-01-18 | End: 2022-08-29 | Stop reason: SDUPTHER

## 2022-01-18 NOTE — TELEPHONE ENCOUNTER
Patient states he needs a call back in reference to Insulin changes made & if instructions are any different. Please call to discuss & advise.  Thank you

## 2022-01-18 NOTE — TELEPHONE ENCOUNTER
Patient states he needs to get refill approval today as he will be out of these needles. Please call if any questions.  Thank you

## 2022-01-19 NOTE — TELEPHONE ENCOUNTER
Called, unable to LM to schedule pt for overdue f/up w/ L[facility]. Pt will need to schedule a 30 min appt for pharm mgmt. Waiting for a call back.

## 2022-01-19 NOTE — TELEPHONE ENCOUNTER
Pharmacy Progress Note - Telephone Call    Mr. Pebbles Tinoco 51 y.o. was contacted via an outbound telephone call regarding his previous call today. Mailbox is full. Overdue for DM follow up. Thank you,  Thu Vivek Crawford, PharmD, BCACP, [facility] [address] in place:  Yes   Time Spent (min): 5

## 2022-03-19 PROBLEM — E13.01 HYPEROSMOLAR COMA DUE TO SECONDARY DIABETES (HCC): Status: ACTIVE | Noted: 2018-04-12

## 2022-03-19 PROBLEM — E11.00 HYPEROSMOLAR NON-KETOTIC STATE IN PATIENT WITH TYPE 2 DIABETES MELLITUS (HCC): Status: ACTIVE | Noted: 2017-07-09

## 2022-03-19 PROBLEM — E11.21 TYPE 2 DIABETES WITH NEPHROPATHY (HCC): Status: ACTIVE | Noted: 2018-05-07

## 2022-03-19 PROBLEM — N17.9 AKI (ACUTE KIDNEY INJURY) (HCC): Status: ACTIVE | Noted: 2017-07-09

## 2022-03-19 PROBLEM — G93.40 ACUTE ENCEPHALOPATHY: Status: ACTIVE | Noted: 2018-04-12

## 2022-03-19 PROBLEM — M62.82 NON-TRAUMATIC RHABDOMYOLYSIS: Status: ACTIVE | Noted: 2018-04-15

## 2022-06-20 NOTE — PROGRESS NOTES
Dr Sena Wu paged for Blood Sugar [id number] Spoke with patient family this morning on conference call. Filled them in on the two facilities closer to them hat would accept patient. after speaking with them they decided they would prefer for patient to return to [facility] due to the care he received here from dr. David Lehman and wanting to make sure he had follow ups with . they also want me to talk with the director of nursing at [facility] to ensure their brother/patient will get special care. Will call Marla Guzmna ([phone]). And at [phone].      1973: spoke to DON and she has also spoken with family.  Plan is for discharge tomorrow and acceptance back to [address]

## 2022-06-23 NOTE — TELEPHONE ENCOUNTER
Future Appointments:  No future appointments. Last Appointment With Me:  Visit date not found     Requested Prescriptions     Pending Prescriptions Disp Refills    atorvastatin (LIPITOR) 40 mg tablet 90 Tablet 1     Sig: Take 1 Tablet by mouth daily.

## 2022-06-24 RX ORDER — ATORVASTATIN CALCIUM 40 MG/1
40 TABLET, FILM COATED ORAL DAILY
Qty: 90 TABLET | Refills: 1 | Status: SHIPPED | OUTPATIENT
Start: 2022-06-24

## 2022-07-18 RX ORDER — INSULIN GLARGINE 100 [IU]/ML
60 INJECTION, SOLUTION SUBCUTANEOUS 2 TIMES DAILY
Qty: 36 ML | Refills: 11 | Status: SHIPPED | OUTPATIENT
Start: 2022-07-18

## 2022-07-18 NOTE — TELEPHONE ENCOUNTER
Future Appointments:  No future appointments. Last Appointment With Me:  Visit date not found     Requested Prescriptions     Pending Prescriptions Disp Refills    insulin glargine (Basaglar KwikPen U-100 Insulin) 100 unit/mL (3 mL) inpn 36 mL 11     Si Units by SubCUTAneous route two (2) times a day.

## 2022-07-18 NOTE — TELEPHONE ENCOUNTER
Patient states he needs refill done thru [facility] on file/indicate. Please call if any questions. Thank you      Patient reminded of [address] around on refills.

## 2022-07-20 NOTE — TELEPHONE ENCOUNTER
PCP: Ilda Rojas MD    Last appt: 11/9/2021  No future appointments.     Requested Prescriptions     Pending Prescriptions Disp Refills    insulin glargine (Lantus Solostar U-100 Insulin) 100 unit/mL (3 mL) inpn 30 mL 0

## 2022-07-22 RX ORDER — INSULIN GLARGINE 100 [IU]/ML
INJECTION, SOLUTION SUBCUTANEOUS
Qty: 30 ML | Refills: 0 | Status: SHIPPED | OUTPATIENT
Start: 2022-07-22

## 2022-07-25 ENCOUNTER — TELEPHONE (OUTPATIENT)
Dept: INTERNAL MEDICINE CLINIC | Age: 47
End: 2022-07-25

## 2022-07-25 NOTE — TELEPHONE ENCOUNTER
Called, spoke with Pt. Two pt identifiers confirmed. Per Pharmacy the do have the [facility] available for , rep states semglee is the preferred product for the insulin glargine. Patient notified, stated understanding.

## 2022-07-25 NOTE — TELEPHONE ENCOUNTER
Pt states he called on Friday and was told this went to the pharmacy. It shows in chart that it did go through to the pharmacy. Pt states they still do not have the refill. Please call that in as soon as possible as pt has been out since last Monday he states.

## 2022-08-29 DIAGNOSIS — E11.21 TYPE 2 DIABETES WITH NEPHROPATHY (HCC): Primary | ICD-10-CM

## 2022-08-29 RX ORDER — PEN NEEDLE, DIABETIC 31 GX3/16"
NEEDLE, DISPOSABLE MISCELLANEOUS
Qty: 200 PEN NEEDLE | Refills: 5 | Status: SHIPPED | OUTPATIENT
Start: 2022-08-29

## 2022-08-29 NOTE — TELEPHONE ENCOUNTER
Called, spoke with Pt. Two pt identifiers confirmed. Has been without needles since Friday 8/26/2022  Dianfatoumata Quiles been checking blood sugars off and on, they have been under 300    Reports diarrhea, has been to the bathroom roughly 10 plus time with liquifed BM. Patient denies taking stool softeners  Denies contact with anyone with similar symptoms  Complains of nausea after eating.     Has tried taking Pepto bismol, it won't stay down

## 2022-08-29 NOTE — TELEPHONE ENCOUNTER
Pt states he has been without the needles for a bit now and is not feeling well at all. He has nausea and diarrhea. Please call pt as he needs these today and needs advise on the diarrhea and nausea.
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
## 2022-09-13 ENCOUNTER — TELEPHONE (OUTPATIENT)
Dept: INTERNAL MEDICINE CLINIC | Age: 47
End: 2022-09-13

## 2022-09-13 DIAGNOSIS — E11.21 TYPE 2 DIABETES WITH NEPHROPATHY (HCC): Primary | ICD-10-CM

## 2022-09-13 RX ORDER — INSULIN GLARGINE-YFGN 100 [IU]/ML
60 INJECTION, SOLUTION SUBCUTANEOUS 2 TIMES DAILY
Qty: 30 ML | Refills: 2 | Status: SHIPPED | OUTPATIENT
Start: 2022-09-13

## 2022-09-13 NOTE — TELEPHONE ENCOUNTER
Spoke w/ pt, informed pt is overdue for a CPE and med refill appt.  Pt was at work and will call back to schedule CPE

## 2022-09-13 NOTE — TELEPHONE ENCOUNTER
PCP: Omid Cheung MD    Last appt: 2021  No future appointments. Requested Prescriptions     Pending Prescriptions Disp Refills    insulin glargine-yfgn (Semglee,insulin glarg-yfgn,Pen) 100 unit/mL (3 mL) inpn 30 mL 2     Si Units by SubCUTAneous route two (2) times a day.

## 2022-09-14 ENCOUNTER — TELEPHONE (OUTPATIENT)
Dept: INTERNAL MEDICINE CLINIC | Age: 47
End: 2022-09-14

## 2022-09-14 NOTE — TELEPHONE ENCOUNTER
Called, spoke with Pt. Two pt identifiers confirmed.     Patient states [facility] to see which medication it is and update office

## 2022-09-14 NOTE — TELEPHONE ENCOUNTER
----- Message from [place] sent at 9/14/2022  8:24 AM EDT -----  Subject: Message to Provider    QUESTIONS  Information for Provider? Beau told patient that one of his   mediations is on back order. Pt is unsure what the name of the medication   is. Please call pt back. ---------------------------------------------------------------------------  --------------  Yan BEAL  [phone]; OK to leave message on voicemail  ---------------------------------------------------------------------------  --------------  SCRIPT ANSWERS  Relationship to Patient?  Self

## 2022-10-21 ENCOUNTER — OFFICE VISIT (OUTPATIENT)
Dept: INTERNAL MEDICINE CLINIC | Age: 47
End: 2022-10-21
Payer: COMMERCIAL

## 2022-10-21 VITALS
DIASTOLIC BLOOD PRESSURE: 100 MMHG | HEART RATE: 98 BPM | SYSTOLIC BLOOD PRESSURE: 164 MMHG | WEIGHT: 285 LBS | HEIGHT: 69 IN | OXYGEN SATURATION: 95 % | TEMPERATURE: 97.8 F | RESPIRATION RATE: 16 BRPM | BODY MASS INDEX: 42.21 KG/M2

## 2022-10-21 DIAGNOSIS — Z79.4 TYPE 2 DIABETES MELLITUS WITH COMPLICATION, WITH LONG-TERM CURRENT USE OF INSULIN (HCC): ICD-10-CM

## 2022-10-21 DIAGNOSIS — E66.01 OBESITY, CLASS III, BMI 40-49.9 (MORBID OBESITY) (HCC): ICD-10-CM

## 2022-10-21 DIAGNOSIS — Z12.11 SCREEN FOR COLON CANCER: ICD-10-CM

## 2022-10-21 DIAGNOSIS — M62.82 NON-TRAUMATIC RHABDOMYOLYSIS: ICD-10-CM

## 2022-10-21 DIAGNOSIS — E11.21 TYPE 2 DIABETES WITH NEPHROPATHY (HCC): Primary | ICD-10-CM

## 2022-10-21 DIAGNOSIS — E11.8 TYPE 2 DIABETES MELLITUS WITH COMPLICATION, WITH LONG-TERM CURRENT USE OF INSULIN (HCC): ICD-10-CM

## 2022-10-21 PROCEDURE — 99214 OFFICE O/P EST MOD 30 MIN: CPT | Performed by: INTERNAL MEDICINE

## 2022-10-21 NOTE — PROGRESS NOTES
1. \"Have you been to the ER, urgent care clinic since your last visit? Hospitalized since your last visit? \" No    2. \"Have you seen or consulted any other health care providers outside of the [address] [place] [address] since your last visit? \" No     3. For patients aged 39-70: Has the patient had a colonoscopy / FIT/ Cologuard?  No
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
## 2022-10-22 LAB
ALBUMIN SERPL-MCNC: 3.6 G/DL (ref 3.5–5)
ALBUMIN/GLOB SERPL: 1 {RATIO} (ref 1.1–2.2)
ALP SERPL-CCNC: 80 U/L (ref 45–117)
ALT SERPL-CCNC: 21 U/L (ref 12–78)
ANION GAP SERPL CALC-SCNC: 8 MMOL/L (ref 5–15)
AST SERPL-CCNC: 12 U/L (ref 15–37)
BASOPHILS # BLD: 0 K/UL (ref 0–0.1)
BASOPHILS NFR BLD: 1 % (ref 0–1)
BILIRUB SERPL-MCNC: 0.6 MG/DL (ref 0.2–1)
BUN SERPL-MCNC: 13 MG/DL (ref 6–20)
BUN/CREAT SERPL: 14 (ref 12–20)
CALCIUM SERPL-MCNC: 9.2 MG/DL (ref 8.5–10.1)
CHLORIDE SERPL-SCNC: 104 MMOL/L (ref 97–108)
CHOLEST SERPL-MCNC: 101 MG/DL
CK SERPL-CCNC: 172 U/L (ref 39–308)
CO2 SERPL-SCNC: 26 MMOL/L (ref 21–32)
CREAT SERPL-MCNC: 0.9 MG/DL (ref 0.7–1.3)
CREAT UR-MCNC: 94.1 MG/DL
DIFFERENTIAL METHOD BLD: ABNORMAL
EOSINOPHIL # BLD: 0.2 K/UL (ref 0–0.4)
EOSINOPHIL NFR BLD: 2 % (ref 0–7)
ERYTHROCYTE [DISTWIDTH] IN BLOOD BY AUTOMATED COUNT: 11.3 % (ref 11.5–14.5)
EST. AVERAGE GLUCOSE BLD GHB EST-MCNC: 269 MG/DL
GLOBULIN SER CALC-MCNC: 3.6 G/DL (ref 2–4)
GLUCOSE SERPL-MCNC: 305 MG/DL (ref 65–100)
HBA1C MFR BLD: 11 % (ref 4–5.6)
HCT VFR BLD AUTO: 44.7 % (ref 36.6–50.3)
HDLC SERPL-MCNC: 29 MG/DL
HDLC SERPL: 3.5 {RATIO} (ref 0–5)
HGB BLD-MCNC: 15.1 G/DL (ref 12.1–17)
IMM GRANULOCYTES # BLD AUTO: 0 K/UL (ref 0–0.04)
IMM GRANULOCYTES NFR BLD AUTO: 0 % (ref 0–0.5)
LDLC SERPL CALC-MCNC: 22.8 MG/DL (ref 0–100)
LYMPHOCYTES # BLD: 1.7 K/UL (ref 0.8–3.5)
LYMPHOCYTES NFR BLD: 21 % (ref 12–49)
MCH RBC QN AUTO: 32.8 PG (ref 26–34)
MCHC RBC AUTO-ENTMCNC: 33.8 G/DL (ref 30–36.5)
MCV RBC AUTO: 97 FL (ref 80–99)
MICROALBUMIN UR-MCNC: 12.8 MG/DL
MICROALBUMIN/CREAT UR-RTO: 136 MG/G (ref 0–30)
MONOCYTES # BLD: 0.6 K/UL (ref 0–1)
MONOCYTES NFR BLD: 8 % (ref 5–13)
NEUTS SEG # BLD: 5.4 K/UL (ref 1.8–8)
NEUTS SEG NFR BLD: 68 % (ref 32–75)
NRBC # BLD: 0 K/UL (ref 0–0.01)
NRBC BLD-RTO: 0 PER 100 WBC
PLATELET # BLD AUTO: 240 K/UL (ref 150–400)
PMV BLD AUTO: 11.4 FL (ref 8.9–12.9)
POTASSIUM SERPL-SCNC: 3.9 MMOL/L (ref 3.5–5.1)
PROT SERPL-MCNC: 7.2 G/DL (ref 6.4–8.2)
RBC # BLD AUTO: 4.61 M/UL (ref 4.1–5.7)
SODIUM SERPL-SCNC: 138 MMOL/L (ref 136–145)
TRIGL SERPL-MCNC: 246 MG/DL (ref ?–150)
VLDLC SERPL CALC-MCNC: 49.2 MG/DL
WBC # BLD AUTO: 7.9 K/UL (ref 4.1–11.1)

## 2022-10-26 RX ORDER — LISINOPRIL 20 MG/1
20 TABLET ORAL DAILY
Qty: 30 TABLET | Refills: 2 | Status: SHIPPED | OUTPATIENT
Start: 2022-10-26 | End: 2022-11-10 | Stop reason: SDUPTHER

## 2022-10-26 NOTE — TELEPHONE ENCOUNTER
PCP: Katie Halsted, MD    Last appt: 10/21/2022  Future Appointments   Date Time Provider Holley Krueger   11/4/2022  4:00 PM [facility] NUR[facility] BS AMB   4/21/2023  9:00 AM Katie Halsted, MD [facility] BS AMB       Requested Prescriptions     Pending Prescriptions Disp Refills    lisinopriL (PRINIVIL, ZESTRIL) 20 mg tablet 30 Tablet 2     Sig: Take 1 Tablet by mouth daily.

## 2022-10-31 NOTE — PROGRESS NOTES
The main finding is poorly controlled DM. Blood sugar was 305. Let's have him add a 20 mg dose of lantus (60 + 60 + 20). Perhaps he can work with pharmacist DIANN Longoria as well, when she returns.  BJF

## 2022-11-01 NOTE — PROGRESS NOTES
Two pt identifiers confirmed. Per , Pam Legronak main finding is poorly controlled DM. Blood sugar was 305\".  wants him to add a 20 mg dose of lantus (60 + 60 + 20).  would like him work with pharmacist Trey Davis. Pt verbalized understanding of information discussed w/ no further questions at this time.   Signed By: Hilario Olsen LPN    November 1, 2834

## 2022-11-04 ENCOUNTER — CLINICAL SUPPORT (OUTPATIENT)
Dept: INTERNAL MEDICINE CLINIC | Age: 47
End: 2022-11-04
Payer: COMMERCIAL

## 2022-11-04 VITALS
BODY MASS INDEX: 41.62 KG/M2 | SYSTOLIC BLOOD PRESSURE: 150 MMHG | RESPIRATION RATE: 18 BRPM | DIASTOLIC BLOOD PRESSURE: 101 MMHG | WEIGHT: 281 LBS | HEIGHT: 69 IN | HEART RATE: 97 BPM | TEMPERATURE: 98.1 F | OXYGEN SATURATION: 99 %

## 2022-11-04 DIAGNOSIS — I10 ESSENTIAL HYPERTENSION: Primary | ICD-10-CM

## 2022-11-04 PROCEDURE — 99211 OFF/OP EST MAY X REQ PHY/QHP: CPT | Performed by: INTERNAL MEDICINE

## 2022-11-25 DIAGNOSIS — E11.21 TYPE 2 DIABETES WITH NEPHROPATHY (HCC): ICD-10-CM

## 2022-11-25 RX ORDER — INSULIN GLARGINE-YFGN 100 [IU]/ML
60 INJECTION, SOLUTION SUBCUTANEOUS 2 TIMES DAILY
Qty: 30 ML | Refills: 2 | Status: SHIPPED | OUTPATIENT
Start: 2022-11-25

## 2022-11-25 NOTE — TELEPHONE ENCOUNTER
PCP: Muriel Salgado MD    Last appt: 2022  Future Appointments   Date Time Provider Holley Krueger   2023  9:00 AM Muriel Salgado MD [facility] BS AMB       Requested Prescriptions     Pending Prescriptions Disp Refills    insulin glargine-yfgn (Semglee,insulin glarg-yfgn,Pen) 100 unit/mL (3 mL) inpn 30 mL 2     Si Units by SubCUTAneous route two (2) times a day.

## 2023-02-03 DIAGNOSIS — E11.21 TYPE 2 DIABETES WITH NEPHROPATHY (HCC): ICD-10-CM

## 2023-02-03 RX ORDER — INSULIN GLARGINE-YFGN 100 [IU]/ML
60 INJECTION, SOLUTION SUBCUTANEOUS 2 TIMES DAILY
Qty: 30 ML | Refills: 2 | Status: SHIPPED | OUTPATIENT
Start: 2023-02-03

## 2023-02-03 NOTE — TELEPHONE ENCOUNTER
Patient states he needs refill done thru [facility] Mail order for 90 days supplies. Please call if any questions.  Thank you      Patient reminded of [address] turn around on refills

## 2023-02-03 NOTE — TELEPHONE ENCOUNTER
Future Appointments:  Future Appointments   Date Time Provider Holley Vallesi   2023  9:00 AM Alonzo Rashid MD [facility] BS AMB        Last Appointment With Me:  10/21/2022     Requested Prescriptions     Pending Prescriptions Disp Refills    insulin glargine-yfgn (Semglee,insulin glarg-yfgn,Pen) 100 unit/mL (3 mL) inpn 30 mL 2     Si Units by SubCUTAneous route two (2) times a day.

## 2023-02-13 DIAGNOSIS — E11.21 TYPE 2 DIABETES WITH NEPHROPATHY (HCC): ICD-10-CM

## 2023-02-13 RX ORDER — INSULIN GLARGINE-YFGN 100 [IU]/ML
60 INJECTION, SOLUTION SUBCUTANEOUS 2 TIMES DAILY
Qty: 30 ML | Refills: 2 | Status: SHIPPED | OUTPATIENT
Start: 2023-02-13

## 2023-02-13 NOTE — TELEPHONE ENCOUNTER
Future Appointments:  Future Appointments   Date Time Provider Holley Vallesi   2023  9:00 AM Ofelia Marshall MD [facility] BS AMB        Last Appointment With Me:  10/21/2022     Requested Prescriptions     Pending Prescriptions Disp Refills    insulin glargine-yfgn (Semglee,insulin glarg-yfgn,Pen) 100 unit/mL (3 mL) inpn 30 mL 2     Si Units by SubCUTAneous route two (2) times a day.
Les Guillen(PA)

## 2023-04-21 ENCOUNTER — OFFICE VISIT (OUTPATIENT)
Dept: INTERNAL MEDICINE CLINIC | Age: 48
End: 2023-04-21

## 2023-04-21 VITALS
HEIGHT: 69 IN | WEIGHT: 275.4 LBS | TEMPERATURE: 97.2 F | HEART RATE: 103 BPM | BODY MASS INDEX: 40.79 KG/M2 | OXYGEN SATURATION: 96 % | RESPIRATION RATE: 16 BRPM | SYSTOLIC BLOOD PRESSURE: 164 MMHG | DIASTOLIC BLOOD PRESSURE: 105 MMHG

## 2023-04-21 DIAGNOSIS — D59.8 OTHER ACQUIRED HEMOLYTIC ANEMIAS (HCC): ICD-10-CM

## 2023-04-21 DIAGNOSIS — E11.21 TYPE 2 DIABETES WITH NEPHROPATHY (HCC): Primary | ICD-10-CM

## 2023-04-21 DIAGNOSIS — I10 ESSENTIAL HYPERTENSION: ICD-10-CM

## 2023-04-21 DIAGNOSIS — E66.01 OBESITY, CLASS III, BMI 40-49.9 (MORBID OBESITY) (HCC): ICD-10-CM

## 2023-04-21 LAB
ALBUMIN SERPL-MCNC: 3.6 G/DL (ref 3.5–5)
ALBUMIN/GLOB SERPL: 1 (ref 1.1–2.2)
ALP SERPL-CCNC: 83 U/L (ref 45–117)
ALT SERPL-CCNC: 22 U/L (ref 12–78)
ANION GAP SERPL CALC-SCNC: 5 MMOL/L (ref 5–15)
AST SERPL-CCNC: 15 U/L (ref 15–37)
BASOPHILS # BLD: 0 K/UL (ref 0–0.1)
BASOPHILS NFR BLD: 0 % (ref 0–1)
BILIRUB SERPL-MCNC: 0.5 MG/DL (ref 0.2–1)
BUN SERPL-MCNC: 12 MG/DL (ref 6–20)
BUN/CREAT SERPL: 13 (ref 12–20)
CALCIUM SERPL-MCNC: 9.5 MG/DL (ref 8.5–10.1)
CHLORIDE SERPL-SCNC: 103 MMOL/L (ref 97–108)
CHOLEST SERPL-MCNC: 86 MG/DL
CO2 SERPL-SCNC: 28 MMOL/L (ref 21–32)
CREAT SERPL-MCNC: 0.92 MG/DL (ref 0.7–1.3)
DIFFERENTIAL METHOD BLD: ABNORMAL
EOSINOPHIL # BLD: 0.7 K/UL (ref 0–0.4)
EOSINOPHIL NFR BLD: 6 % (ref 0–7)
ERYTHROCYTE [DISTWIDTH] IN BLOOD BY AUTOMATED COUNT: 11.3 % (ref 11.5–14.5)
EST. AVERAGE GLUCOSE BLD GHB EST-MCNC: 280 MG/DL
GLOBULIN SER CALC-MCNC: 3.6 G/DL (ref 2–4)
GLUCOSE SERPL-MCNC: 316 MG/DL (ref 65–100)
HBA1C MFR BLD: 11.4 % (ref 4–5.6)
HCT VFR BLD AUTO: 43.8 % (ref 36.6–50.3)
HDLC SERPL-MCNC: 27 MG/DL
HDLC SERPL: 3.2 (ref 0–5)
HGB BLD-MCNC: 14.8 G/DL (ref 12.1–17)
IMM GRANULOCYTES # BLD AUTO: 0 K/UL (ref 0–0.04)
IMM GRANULOCYTES NFR BLD AUTO: 0 % (ref 0–0.5)
LDLC SERPL CALC-MCNC: 11.6 MG/DL (ref 0–100)
LYMPHOCYTES # BLD: 1.7 K/UL (ref 0.8–3.5)
LYMPHOCYTES NFR BLD: 13 % (ref 12–49)
MCH RBC QN AUTO: 31.8 PG (ref 26–34)
MCHC RBC AUTO-ENTMCNC: 33.8 G/DL (ref 30–36.5)
MCV RBC AUTO: 94.2 FL (ref 80–99)
MONOCYTES # BLD: 0.8 K/UL (ref 0–1)
MONOCYTES NFR BLD: 7 % (ref 5–13)
NEUTS SEG # BLD: 9.2 K/UL (ref 1.8–8)
NEUTS SEG NFR BLD: 74 % (ref 32–75)
NRBC # BLD: 0 K/UL (ref 0–0.01)
NRBC BLD-RTO: 0 PER 100 WBC
PLATELET # BLD AUTO: 246 K/UL (ref 150–400)
PMV BLD AUTO: 11.6 FL (ref 8.9–12.9)
POTASSIUM SERPL-SCNC: 4 MMOL/L (ref 3.5–5.1)
PROT SERPL-MCNC: 7.2 G/DL (ref 6.4–8.2)
RBC # BLD AUTO: 4.65 M/UL (ref 4.1–5.7)
SODIUM SERPL-SCNC: 136 MMOL/L (ref 136–145)
TRIGL SERPL-MCNC: 237 MG/DL (ref ?–150)
VLDLC SERPL CALC-MCNC: 47.4 MG/DL
WBC # BLD AUTO: 12.4 K/UL (ref 4.1–11.1)

## 2023-04-21 NOTE — PROGRESS NOTES
1. \"Have you been to the ER, urgent care clinic since your last visit? Hospitalized since your last visit? \" No    2. \"Have you seen or consulted any other health care providers outside of the [address] [place] [address] since your last visit? \" No     3. For patients aged 39-70: Has the patient had a colonoscopy / FIT/ Cologuard?  No
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
## 2023-04-26 DIAGNOSIS — E11.21 TYPE 2 DIABETES WITH NEPHROPATHY (HCC): Primary | ICD-10-CM

## 2023-08-07 ENCOUNTER — TELEPHONE (OUTPATIENT)
Age: 48
End: 2023-08-07

## 2023-08-07 ENCOUNTER — CLINICAL DOCUMENTATION (OUTPATIENT)
Age: 48
End: 2023-08-07

## 2023-08-08 NOTE — TELEPHONE ENCOUNTER
Pt called after hours c/o mld headaches and feeling shaky. He is asking if he needs more medication to lower his glucose but he does not have a glucometer. A1c was high in April. On basaglar 60 u qd. Denies excess thirst , urination  cp sob etc. Eating and drinking ok. Advised to hydrate. Go to ER if feeling worse. Call office in AM for evaluation.

## 2023-08-09 NOTE — TELEPHONE ENCOUNTER
The patient stated he is feeling much better. He drank a slurpy and that is the reason his blood sugar was so high. I asked him how he was feeling, he said he was feeling fine, much better. How was his BG been, he stated he did not know, he has not been checking it. I asked him to check his BG over the couple of days and let me know what it has been running. Patient verbalized understanding of information discussed w/ no further questions at this time.

## 2023-10-26 ENCOUNTER — TELEPHONE (OUTPATIENT)
Age: 48
End: 2023-10-26

## 2023-10-26 NOTE — TELEPHONE ENCOUNTER
Called, unable to LM for pt as MB is full. PT is overdue for a 4 month f/up and A1C check with PCP.  Pt needs to call back and schedule appt

## 2023-12-06 ENCOUNTER — TELEPHONE (OUTPATIENT)
Age: 48
End: 2023-12-06

## 2023-12-06 DIAGNOSIS — E11.21 TYPE 2 DIABETES WITH NEPHROPATHY (HCC): Primary | ICD-10-CM

## 2023-12-06 NOTE — TELEPHONE ENCOUNTER
Future Appointments:  No future appointments.      Last Appointment With Me:  2023     Requested Prescriptions     Signed Prescriptions Disp Refills    Insulin Pen Needle 32G X 4 MM MISC 100 each 5     Si each by Does not apply route daily     Authorizing Provider: Lia Ledbetter     Ordering User: Nicole Ledbetter MD / Sera Guerra

## 2023-12-06 NOTE — TELEPHONE ENCOUNTER
Pt states he needs pen needles for insulin. [facility] #[phone]      Pt is out of the needles and has to have today.

## 2024-01-12 RX ORDER — ATORVASTATIN CALCIUM 40 MG/1
40 TABLET, FILM COATED ORAL DAILY
Qty: 90 TABLET | Refills: 3 | Status: SHIPPED | OUTPATIENT
Start: 2024-01-12

## 2024-01-18 RX ORDER — INSULIN GLARGINE 100 [IU]/ML
60 INJECTION, SOLUTION SUBCUTANEOUS 2 TIMES DAILY
Qty: 5 ADJUSTABLE DOSE PRE-FILLED PEN SYRINGE | Refills: 3 | Status: SHIPPED | OUTPATIENT
Start: 2024-01-18 | End: 2024-01-19

## 2024-01-19 DIAGNOSIS — E11.21 TYPE 2 DIABETES WITH NEPHROPATHY (HCC): Primary | ICD-10-CM

## 2024-01-19 NOTE — TELEPHONE ENCOUNTER
Pt states the incorrect insulin went to pharm.  He states there is another that is not on this or previous med list.    Pt will be out of insulin on Monday.      Please call to go over with pt.     [facility] #[phone]

## 2024-01-19 NOTE — TELEPHONE ENCOUNTER
Future Appointments:  Future Appointments   Date Time Provider Department Center   2/19/2024  3:30 PM Patric Lerma MD [facility] AM[facility]        Last Appointment With Me:  4/21/2023     Requested Prescriptions     Pending Prescriptions Disp Refills    Semaglutide,0.25 or 0.5MG/DOS, 2 MG/1.5ML SOPN 4 Adjustable Dose Pre-filled Pen Syringe 2     Sig: Inject 0.25 mg into the skin every 7 days     The patient stated he does not take Basaglar, he uses semaglutide. I asked him if I could take Basaglar off of his med list, he said yes. He was advised to call his mail order pharmacy and cancel the Basaglar.  I advised the patient, I will send his RF request to .Patient verbalized understanding of information discussed w/ no further questions at this time.

## 2024-01-22 DIAGNOSIS — E11.21 TYPE 2 DIABETES WITH NEPHROPATHY (HCC): ICD-10-CM

## 2024-01-22 NOTE — TELEPHONE ENCOUNTER
Regarding medication:  Semglee    Problem:  States cannot get it at [facility]    Suggested options:  Needs order sent to [facility]                Caller confirms readback of documented phone/fax number(s) as correct.

## 2024-01-23 NOTE — TELEPHONE ENCOUNTER
Future Appointments:  Future Appointments   Date Time Provider Department Center   2/19/2024  3:30 PM Patric Lerma MD [facility] AM[facility]        Last Appointment With Me:  4/21/2023     Requested Prescriptions     Pending Prescriptions Disp Refills    Semaglutide,0.25 or 0.5MG/DOS, 2 MG/1.5ML SOPN 12 Adjustable Dose Pre-filled Pen Syringe 2     Sig: Inject 0.5 mg into the skin every 7 days

## 2024-01-24 DIAGNOSIS — E11.21 TYPE 2 DIABETES WITH NEPHROPATHY (HCC): ICD-10-CM

## 2024-01-26 ENCOUNTER — TELEPHONE (OUTPATIENT)
Age: 49
End: 2024-01-26

## 2024-01-26 RX ORDER — INSULIN GLARGINE-YFGN 100 [IU]/ML
60 INJECTION, SOLUTION SUBCUTANEOUS 2 TIMES DAILY
Qty: 108 ML | Refills: 3 | Status: SHIPPED | OUTPATIENT
Start: 2024-01-26

## 2024-01-26 NOTE — TELEPHONE ENCOUNTER
Pt states that he can not get his insulin mailed until the script is signed with [facility].  How can we fix this?

## 2024-01-26 NOTE — TELEPHONE ENCOUNTER
Pt states that this script needs to be signed in order for pt to get it mailed to him.    Can this be taken care of as soon as possible as pt needs the medication.

## 2024-02-19 ENCOUNTER — OFFICE VISIT (OUTPATIENT)
Age: 49
End: 2024-02-19
Payer: COMMERCIAL

## 2024-02-19 VITALS
HEIGHT: 69 IN | BODY MASS INDEX: 42.24 KG/M2 | SYSTOLIC BLOOD PRESSURE: 175 MMHG | TEMPERATURE: 97.2 F | WEIGHT: 285.2 LBS | HEART RATE: 109 BPM | RESPIRATION RATE: 18 BRPM | DIASTOLIC BLOOD PRESSURE: 107 MMHG | OXYGEN SATURATION: 98 %

## 2024-02-19 DIAGNOSIS — E11.21 TYPE 2 DIABETES MELLITUS WITH DIABETIC NEPHROPATHY, UNSPECIFIED WHETHER LONG TERM INSULIN USE (HCC): ICD-10-CM

## 2024-02-19 DIAGNOSIS — Z00.00 ENCOUNTER FOR WELL ADULT EXAM WITHOUT ABNORMAL FINDINGS: Primary | ICD-10-CM

## 2024-02-19 PROCEDURE — 99396 PREV VISIT EST AGE 40-64: CPT | Performed by: INTERNAL MEDICINE

## 2024-02-19 PROCEDURE — 3080F DIAST BP >= 90 MM HG: CPT | Performed by: INTERNAL MEDICINE

## 2024-02-19 PROCEDURE — 3077F SYST BP >= 140 MM HG: CPT | Performed by: INTERNAL MEDICINE

## 2024-02-19 SDOH — ECONOMIC STABILITY: HOUSING INSECURITY
IN THE LAST 12 MONTHS, WAS THERE A TIME WHEN YOU DID NOT HAVE A STEADY PLACE TO SLEEP OR SLEPT IN A SHELTER (INCLUDING NOW)?: NO

## 2024-02-19 SDOH — ECONOMIC STABILITY: INCOME INSECURITY: HOW HARD IS IT FOR YOU TO PAY FOR THE VERY BASICS LIKE FOOD, HOUSING, MEDICAL CARE, AND HEATING?: NOT HARD AT ALL

## 2024-02-19 SDOH — ECONOMIC STABILITY: FOOD INSECURITY: WITHIN THE PAST 12 MONTHS, THE FOOD YOU BOUGHT JUST DIDN'T LAST AND YOU DIDN'T HAVE MONEY TO GET MORE.: NEVER TRUE

## 2024-02-19 SDOH — ECONOMIC STABILITY: FOOD INSECURITY: WITHIN THE PAST 12 MONTHS, YOU WORRIED THAT YOUR FOOD WOULD RUN OUT BEFORE YOU GOT MONEY TO BUY MORE.: NEVER TRUE

## 2024-02-19 ASSESSMENT — PATIENT HEALTH QUESTIONNAIRE - PHQ9
SUM OF ALL RESPONSES TO PHQ QUESTIONS 1-9: 0
SUM OF ALL RESPONSES TO PHQ9 QUESTIONS 1 & 2: 0
SUM OF ALL RESPONSES TO PHQ QUESTIONS 1-9: 0
1. LITTLE INTEREST OR PLEASURE IN DOING THINGS: 0
2. FEELING DOWN, DEPRESSED OR HOPELESS: 0

## 2024-02-19 NOTE — PROGRESS NOTES
1. \"Have you been to the ER, urgent care clinic since your last visit?  Hospitalized since your last visit?\" No    2. \"Have you seen or consulted any other health care providers outside of the [facility] since your last visit?\" No     3. For patients aged 45-75: Has the patient had a colonoscopy / FIT/ Cologuard? No

## 2024-02-19 NOTE — PROGRESS NOTES
PROGRESS NOTE  Name: Antoni Agudelo   : 1975       ASSESSMENT/ PLAN:     CPE: Normal exam except for weight.     DM--Due for recheck. On insulin glargine 70.  He was getting some help from our pharmacist but appears again to be lost to follow up with her.   HTN: BP high today; keep an eye on this.  \"I rushed to get up here.\"  Hx of Rhabdomyolysis: Normal CPK at last check.  Hemolytic anemia: Recheck labs.   Renal failure--improved; off dialysis now. Lost to follow up with Nephrology.   He has fatty liver and was seen by Dr. Huang--no fibrosis on fibroscan.   JORGE LUIS: On CPAP  Hyperlipidemia: Check lipids.   Insomnia: Ongoing. Stable.    Morbid obesity. Lifestyle efforts.     RTC 4 months, DM    I have reviewed the patient's medications and risks/side effects/benefits were discussed. Diagnosis(-es) explained to patient and questions answered. Literature provided where appropriate.                      SUBJECTIVE:   Mr. Antoni Agudelo is a 47 y.o. male who is here for follow up of routine medical issues.      Chief Complaint   Patient presents with    Annual Exam       His foot pain is better. He has used cushioned inserts.     Re DM, he is on meds noted below.  His DM has been poorly controlled. Gluc generally running in 200s. He is taking 60 units lantus qd.  He is not having vision changes, excessive thirst / polyuria.      He was following with a nephrologist--lost to follow up.  He is off dialysis.      It is recalled that he was hospitalized in 2018 for Hyperosmolar coma due to secondary diabetes.  Also had rhabdomyolysis, hemolytic anemia, and BRENT, requiring dialysis.      It is recalled that he was hospitalized with [facility] in 2017. Admitted through ER for Glc over 800.  Noncompliance was the etiology of his problem.     He was diagnosed with DM in , while investigating polyuria.  His gluc read \"high\" and urine showed gluc and ketones.  Sent to ER. He has been through [facility] education.    At this

## 2024-02-19 NOTE — PATIENT INSTRUCTIONS
family and friends involved to provide support. Talk to them about why you are trying to lose weight, and ask them to help. They can help by participating in exercise and having meals with you, even if they may be eating something different.  Find what works best for you. If you do not have time or do not like to cook, a program that offers meal replacement bars or shakes may be better for you. Or if you like to prepare meals, finding a plan that includes daily menus and recipes may be best.  Ask your doctor about other health professionals who can help you achieve your weight loss goals.  A dietitian can help you make healthy changes in your diet.  An exercise specialist or  can help you develop a safe and effective exercise program.  A counselor or psychiatrist can help you cope with issues such as depression, anxiety, or family problems that can make it hard to focus on weight loss.  Consider joining a support group for people who are trying to lose weight. Your doctor can suggest groups in your area.  Where can you learn more?  Go to https://www.PFSweb.net/patientEd and enter U357 to learn more about \"Starting a Weight Loss Plan: Care Instructions.\"  Current as of: September 20, 2023               Content Version: 13.9  © 2006-2023 [employer].   Care instructions adapted under license by [employer]. If you have questions about a medical condition or this instruction, always ask your healthcare professional. [employer] disclaims any warranty or liability for your use of this information.

## 2024-02-22 DIAGNOSIS — Z00.00 ENCOUNTER FOR WELL ADULT EXAM WITHOUT ABNORMAL FINDINGS: ICD-10-CM

## 2024-02-23 LAB
ALBUMIN SERPL-MCNC: 3.7 G/DL (ref 3.5–5)
ALBUMIN/GLOB SERPL: 1 (ref 1.1–2.2)
ALP SERPL-CCNC: 86 U/L (ref 45–117)
ALT SERPL-CCNC: 24 U/L (ref 12–78)
ANION GAP SERPL CALC-SCNC: 4 MMOL/L (ref 5–15)
AST SERPL-CCNC: 14 U/L (ref 15–37)
BASOPHILS # BLD: 0 K/UL (ref 0–0.1)
BASOPHILS NFR BLD: 0 % (ref 0–1)
BILIRUB SERPL-MCNC: 0.7 MG/DL (ref 0.2–1)
BUN SERPL-MCNC: 13 MG/DL (ref 6–20)
BUN/CREAT SERPL: 15 (ref 12–20)
CALCIUM SERPL-MCNC: 9.4 MG/DL (ref 8.5–10.1)
CHLORIDE SERPL-SCNC: 102 MMOL/L (ref 97–108)
CHOLEST SERPL-MCNC: 140 MG/DL
CO2 SERPL-SCNC: 27 MMOL/L (ref 21–32)
CREAT SERPL-MCNC: 0.87 MG/DL (ref 0.7–1.3)
DIFFERENTIAL METHOD BLD: NORMAL
EOSINOPHIL # BLD: 0.1 K/UL (ref 0–0.4)
EOSINOPHIL NFR BLD: 1 % (ref 0–7)
ERYTHROCYTE [DISTWIDTH] IN BLOOD BY AUTOMATED COUNT: 11.5 % (ref 11.5–14.5)
EST. AVERAGE GLUCOSE BLD GHB EST-MCNC: 286 MG/DL
GLOBULIN SER CALC-MCNC: 3.6 G/DL (ref 2–4)
GLUCOSE SERPL-MCNC: 311 MG/DL (ref 65–100)
HBA1C MFR BLD: 11.6 % (ref 4–5.6)
HCT VFR BLD AUTO: 42.3 % (ref 36.6–50.3)
HDLC SERPL-MCNC: 40 MG/DL
HDLC SERPL: 3.5 (ref 0–5)
HGB BLD-MCNC: 14.7 G/DL (ref 12.1–17)
IMM GRANULOCYTES # BLD AUTO: 0 K/UL (ref 0–0.04)
IMM GRANULOCYTES NFR BLD AUTO: 0 % (ref 0–0.5)
LDLC SERPL CALC-MCNC: 42.4 MG/DL (ref 0–100)
LYMPHOCYTES # BLD: 1.8 K/UL (ref 0.8–3.5)
LYMPHOCYTES NFR BLD: 21 % (ref 12–49)
MCH RBC QN AUTO: 32.8 PG (ref 26–34)
MCHC RBC AUTO-ENTMCNC: 34.8 G/DL (ref 30–36.5)
MCV RBC AUTO: 94.4 FL (ref 80–99)
MONOCYTES # BLD: 0.6 K/UL (ref 0–1)
MONOCYTES NFR BLD: 7 % (ref 5–13)
NEUTS SEG # BLD: 6.3 K/UL (ref 1.8–8)
NEUTS SEG NFR BLD: 71 % (ref 32–75)
NRBC # BLD: 0 K/UL (ref 0–0.01)
NRBC BLD-RTO: 0 PER 100 WBC
PLATELET # BLD AUTO: 202 K/UL (ref 150–400)
PMV BLD AUTO: 11.3 FL (ref 8.9–12.9)
POTASSIUM SERPL-SCNC: 3.7 MMOL/L (ref 3.5–5.1)
PROT SERPL-MCNC: 7.3 G/DL (ref 6.4–8.2)
RBC # BLD AUTO: 4.48 M/UL (ref 4.1–5.7)
SODIUM SERPL-SCNC: 133 MMOL/L (ref 136–145)
TRIGL SERPL-MCNC: 288 MG/DL
VLDLC SERPL CALC-MCNC: 57.6 MG/DL
WBC # BLD AUTO: 8.9 K/UL (ref 4.1–11.1)

## 2024-02-26 ENCOUNTER — TELEPHONE (OUTPATIENT)
Age: 49
End: 2024-02-26

## 2024-02-26 ENCOUNTER — PHARMACY VISIT (OUTPATIENT)
Age: 49
End: 2024-02-26

## 2024-02-26 DIAGNOSIS — E11.21 TYPE 2 DIABETES WITH NEPHROPATHY (HCC): ICD-10-CM

## 2024-02-26 DIAGNOSIS — E11.21 TYPE 2 DIABETES MELLITUS WITH DIABETIC NEPHROPATHY, UNSPECIFIED WHETHER LONG TERM INSULIN USE (HCC): Primary | ICD-10-CM

## 2024-02-26 RX ORDER — SEMAGLUTIDE 0.68 MG/ML
0.25 INJECTION, SOLUTION SUBCUTANEOUS WEEKLY
Qty: 3 ML | Refills: 0 | Status: SHIPPED | OUTPATIENT
Start: 2024-02-26

## 2024-02-26 RX ORDER — LISINOPRIL 20 MG/1
20 TABLET ORAL DAILY
Qty: 90 TABLET | Refills: 1 | Status: SHIPPED | OUTPATIENT
Start: 2024-02-26

## 2024-02-26 NOTE — PROGRESS NOTES
14.7 02/22/2024    HCT 42.3 02/22/2024    MCV 94.4 02/22/2024     02/22/2024       Lab Results   Component Value Date    MALBCR 136 (H) 10/21/2022       Last HbA1c(s):  Hemoglobin A1C   Date Value Ref Range Status   02/22/2024 11.6 (H) 4.0 - 5.6 % Final     Comment:     (NOTE)  HbA1C Interpretive Ranges  <5.7              Normal  5.7 - 6.4         Consider Prediabetes  >6.5              Consider Diabetes         Hemoglobin A1C   Date Value Ref Range Status   02/22/2024 11.6 (H) 4.0 - 5.6 % Final     Comment:     (NOTE)  HbA1C Interpretive Ranges  <5.7              Normal  5.7 - 6.4         Consider Prediabetes  >6.5              Consider Diabetes     04/21/2023 11.4 (H) 4.0 - 5.6 % Final     Comment:     NEW METHOD  PLEASE NOTE NEW REFERENCE RANGE  (NOTE)  HbA1C Interpretive Ranges  <5.7              Normal  5.7 - 6.4         Consider Prediabetes  >6.5              Consider Diabetes     10/21/2022 11.0 (H) 4.0 - 5.6 % Final     Comment:     NEW METHOD  PLEASE NOTE NEW REFERENCE RANGE  (NOTE)  HbA1C Interpretive Ranges  <5.7              Normal  5.7 - 6.4         Consider Prediabetes  >6.5              Consider Diabetes           Estimated Creatinine Clearance: 137 mL/min (based on SCr of 0.87 mg/dL).      Medications Discontinued During This Encounter   Medication Reason    lisinopril (PRINIVIL;ZESTRIL) 20 MG tablet REORDER    Semaglutide,0.25 or 0.5MG/DOS, 2 MG/1.5ML SOPN Not A Current Medication     Orders Placed This Encounter    lisinopril (PRINIVIL;ZESTRIL) 20 MG tablet     Sig: Take 1 tablet by mouth daily     Dispense:  90 tablet     Refill:  1    Semaglutide,0.25 or 0.5MG/DOS, (OZEMPIC, 0.25 OR 0.5 MG/DOSE,) 2 MG/3ML SOPN     Sig: Inject 0.25 mg into the skin once a week X 4 weeks, then increase to 0.5 mg weekly     Dispense:  3 mL     Refill:  0      Patient verbalized understanding of the information presented and all of the patient’s questions were answered.   Patient advised to call the office

## 2024-02-26 NOTE — TELEPHONE ENCOUNTER
----- Message from Patric Lerma MD sent at 2/23/2024  1:21 PM EST -----  DM still very poorly controlled. Be compliant with meds. Let's try to get him back in with LATRELL Sandhu. VIVI

## 2024-02-26 NOTE — TELEPHONE ENCOUNTER
I advised the patient per , his \"DM still very poorly controlled.\"  would like him to follow up with LATRELL Sandhu. Patient verbalized understanding of information discussed w/ no further questions at this time.

## 2024-02-28 ENCOUNTER — TELEPHONE (OUTPATIENT)
Age: 49
End: 2024-02-28

## 2024-02-28 RX ORDER — LANCETS
1 EACH MISCELLANEOUS SEE ADMIN INSTRUCTIONS
Qty: 100 EACH | Refills: 11 | Status: SHIPPED | OUTPATIENT
Start: 2024-02-28

## 2024-02-28 RX ORDER — BLOOD SUGAR DIAGNOSTIC
1 STRIP MISCELLANEOUS SEE ADMIN INSTRUCTIONS
Qty: 100 EACH | Refills: 11 | Status: SHIPPED | OUTPATIENT
Start: 2024-02-28

## 2024-02-28 NOTE — TELEPHONE ENCOUNTER
Returning call from [facility] meter is [employer] sruthi    Needs  accucheck smart view test strips    Uses FastClix Flex Lancing device  Accucheck fastclix lancets      [facility]min on [place]

## 2024-02-28 NOTE — TELEPHONE ENCOUNTER
Pharmacy Progress Note     Entry for Mr. Antoni Agudelo 48 y.o. 's diabetic testing supplies.    Orders Placed This Encounter    blood glucose test strips (ACCU-CHEK SMARTVIEW) strip     Si each by Other route See Admin Instructions Use to check blood sugar 3 times daily. E11.65.     Dispense:  100 each     Refill:  11    Accu-Chek FastClix Lancets MISC     Si each by Does not apply route See Admin Instructions Use to check blood sugar 3 times daily. E11.65.     Dispense:  100 each     Refill:  11     Medications Discontinued During This Encounter   Medication Reason    Lancets MISC DUPLICATE        Thank you for the consult,  Terrie Sandhu, PharmD, BCACP, CDCES                For Pharmacy Admin Tracking Only    Program: Medical Group  CPA in place:  Yes  Recommendation Provided To: Patient/Caregiver: 3 via Telephone  Intervention Detail: Discontinued Rx: 1, reason: Duplicate Therapy and New Rx: 2, reason: Needs Additional Therapy, Patient Preference  Intervention Accepted By: Patient/Caregiver: 3  Gap Closed?: Yes   Time Spent (min): 5

## 2024-03-13 NOTE — PROGRESS NOTES
Patient verbalized understanding of the information presented and all of the patient’s questions were answered.  AVS was handed to the patient. Patient advised to call the office with any additional questions or concerns.    Notifications of recommendations will be sent to Patric Vargas MD for review.    Patient will return to clinic in 3 week(s) for follow up.     Thank you for the consult,  Terrie Sandhu PharmD, BCACP, [place]          For Pharmacy Admin Tracking Only    Program: Medical Group  CPA in place:  Yes  Recommendation Provided To: Patient/Caregiver: 12 via In person  Intervention Detail: Adherence Monitorin, Device Training, Discontinued Rx: 1, reason: Therapy Complete, Dose Adjustment: 2, reason: Therapy Optimization, Lab(s) Ordered, New Rx: 2, reason: Improve Adherence, Needs Additional Therapy, Patient Access Assistance/Sample Provided, Refill(s) Provided, and Scheduled Appointment  Intervention Accepted By: Patient/Caregiver: 12  Gap Closed?: Yes   Time Spent (min): 45

## 2024-03-14 ENCOUNTER — PHARMACY VISIT (OUTPATIENT)
Age: 49
End: 2024-03-14

## 2024-03-14 VITALS
DIASTOLIC BLOOD PRESSURE: 98 MMHG | OXYGEN SATURATION: 99 % | SYSTOLIC BLOOD PRESSURE: 149 MMHG | HEART RATE: 72 BPM | WEIGHT: 292 LBS | BODY MASS INDEX: 43.25 KG/M2 | HEIGHT: 69 IN

## 2024-03-14 DIAGNOSIS — E11.21 TYPE 2 DIABETES WITH NEPHROPATHY (HCC): ICD-10-CM

## 2024-03-14 DIAGNOSIS — E11.21 TYPE 2 DIABETES MELLITUS WITH DIABETIC NEPHROPATHY, UNSPECIFIED WHETHER LONG TERM INSULIN USE (HCC): Primary | ICD-10-CM

## 2024-03-14 RX ORDER — SEMAGLUTIDE 0.68 MG/ML
0.25 INJECTION, SOLUTION SUBCUTANEOUS WEEKLY
Qty: 3 ML | Refills: 0 | Status: SHIPPED | COMMUNITY
Start: 2024-03-14

## 2024-03-14 RX ORDER — LISINOPRIL AND HYDROCHLOROTHIAZIDE 25; 20 MG/1; MG/1
1 TABLET ORAL DAILY
Qty: 30 TABLET | Refills: 1 | Status: SHIPPED | OUTPATIENT
Start: 2024-03-14 | End: 2024-03-15 | Stop reason: SDUPTHER

## 2024-03-14 NOTE — PATIENT INSTRUCTIONS
Increase Glargine insulin to 50 units twice daily  Start Ozempic at 0.25 mg weekly on the same day every week for 4 weeks, then increase to 0.5 mg weekly  Stop lisinopril 20 mg daily  Start lisinopril/hydrochlorothiazide 20/25 mg daily. This is your new blood pressure medication  Bring meter to the next visit    Your blood sugar goals:  - Fasting (first thing in the morning)  blood sugar: 80 - 130   - 1 to 2 hours after a meal: less than 180     When you experience symptoms of low blood sugar (example: less than 70):  - Confirm low reading by checking your blood sugar.   - Then treat with 15 grams of carbohydrates (one-half cup of juice or regular soda, or 4-5 glucose tablets).  - Wait 15 minutes to recheck blood sugar.   - Then eat a protein containing meal/snack to prevent another low blood sugar episode. (example: peanut butter + crackers)    Nutrition:  - When reviewing a nutrition label, focus on the serving size, total calories, fat (and type of fats), total carbohydrates, sugar (and amount of added sugar), amount of fiber (good for your digestive), and amount of protein.  Refer to your nutrition label guide for more information.  - For a meal : max 45  grams of carbohydrates  - For a snack: max 15 grams of carbohydrates  - Reduce amount of saturated and trans fat.  Consider more unsaturated fat options as they are better for your heart health.   - Have at least 1 serving of lean fat protein with each meal.    - Increase fiber intake slowly to prevent constipation.   - Substitute fruit juices for the whole fruit

## 2024-03-15 DIAGNOSIS — I10 ESSENTIAL (PRIMARY) HYPERTENSION: Primary | ICD-10-CM

## 2024-03-15 RX ORDER — LISINOPRIL AND HYDROCHLOROTHIAZIDE 25; 20 MG/1; MG/1
1 TABLET ORAL DAILY
Qty: 30 TABLET | Refills: 11 | Status: SHIPPED | OUTPATIENT
Start: 2024-03-15

## 2024-03-15 NOTE — TELEPHONE ENCOUNTER
Future Appointments:  Future Appointments   Date Time Provider Department Center   4/1/2024  1:15 PM Terrie Sandhu, [facility] AMB        Last Appointment With Me:  2/19/2024     Requested Prescriptions     Pending Prescriptions Disp Refills    lisinopril-hydroCHLOROthiazide (PRINZIDE;ZESTORETIC) 20-25 MG per tablet 30 tablet 1     Sig: Take 1 tablet by mouth daily

## 2024-03-19 ENCOUNTER — TELEPHONE (OUTPATIENT)
Age: 49
End: 2024-03-19

## 2024-03-19 DIAGNOSIS — E11.21 TYPE 2 DIABETES WITH NEPHROPATHY (HCC): ICD-10-CM

## 2024-03-19 NOTE — TELEPHONE ENCOUNTER
Patient called the office stating that [facility] would not refill the needles for the insulin. [facility] needs something stating patient is taking the insulin twice a day instead of once a day.   Patient states will be out of needles by Thursday.

## 2024-03-19 NOTE — TELEPHONE ENCOUNTER
Caller requests Refill of:  Insulin Pen Needle 5MM      Please send to:  Jasmin      Visit / Appointment History:  Future Appointment at [facility]:  4/1/2024       Last Appointment With PCP:  2/19/2024       Caller confirmed instructions and dosages as correct.    Caller was advised that Meds will be refilled as soon as possible, however there can be a 48-72 business hour turn around on refill requests.

## 2024-03-20 ENCOUNTER — CLINICAL DOCUMENTATION (OUTPATIENT)
Age: 49
End: 2024-03-20

## 2024-03-20 NOTE — PROGRESS NOTES
Ozempic (0.25 or 0.5 MG/DOSE) 2MG/3ML pen-injectors This request has been approved using information available on the patient's profile. CaseId:[id number];Status:Approved;Review Type:Prior Auth;Coverage Start Date:02/19/2024;Coverage End Date:03/20/2025;

## 2024-03-27 NOTE — TELEPHONE ENCOUNTER
Insulin Pen Needle 32G X 4 MM MISC    BUT pt is requesting pen needle 31 G  3/16 X 5 MM     90 day w/refill to [facility] mail order.

## 2024-03-28 RX ORDER — PEN NEEDLE, DIABETIC 31 GX5/16"
1 NEEDLE, DISPOSABLE MISCELLANEOUS DAILY
Qty: 100 EACH | Refills: 3 | Status: SHIPPED | OUTPATIENT
Start: 2024-03-28

## 2024-03-30 NOTE — PROGRESS NOTES
Pharmacy Progress Note - Diabetes Management    Assessment / Plan:   Diabetes Management:  - Per ADA guidelines, Pt's A1c is not at goal of < 7%. BP trending closer to goal <130/80  - Reported BGM remains elevated for fasting goal   - Continue with current Ozempic titration. Increase dose to 0.5 mg weekly on week 5  - Continue with Lantus 50 units BID  - Continue with  Lisinopril/HCTZ 20/25 mg daily for now  - Encourage patient to integrate more physical activity into daily regimen.      S/O: Mr. Antoni Agudelo is a 48 y.o. male, referred by Patric Vargas MD, with a PMH of T2DM with h/o HHS, HTN, HLD, NAFLD, was seen today for diabetes management follow up.  Patient's last A1c was 11.6% (Feb 2024), 11.4% (April 2023), 11% (Oct 2022), 13.4% (Oct 2021).       Interim update:   Ozempic started at the last visit 3/14/24.   Lisinopril/HCTZ replaced lisinopril. Denies any issues with these two medication additions.     Current anti-hyperglycemic regimen include(s):    - Lantus 50 units BID  - Ozempic 0.25 mg weekly titration -- giving on Thursday --- 2 doses thus far     - Atorvastatin 40 mg daily -   ; LDL 42 (Feb 2024)  - Lisinopril/HCTZ 20/25 mg daily  - ASA 81 mg daily       ROS:  Today, Pt endorses:  - Symptoms of Hyperglycemia: none  - Symptoms of Hypoglycemia: none    Blood Glucose Monitoring (BGM) or CGM:  No meter or log today.   Reports most fasting readings range 230-240s   Had 1 reading <200 -- \"felt okay\"    Nutrition/Lifestyle Modifications:  No changes to nutrition or activity  Wt down 3 lbs since March     The 10-year ASCVD risk score (Mira DK, et al., 2019) is: 17.3%       Vitals:  Wt Readings from Last 3 Encounters:   04/03/24 131.1 kg (289 lb)   03/14/24 132.5 kg (292 lb)   02/19/24 129.4 kg (285 lb 3.2 oz)     BP Readings from Last 3 Encounters:   04/03/24 (!) 140/90   03/14/24 (!) 149/98   02/19/24 (!) 175/107     Pulse Readings from Last 3 Encounters:   04/03/24 (!) 107   03/14/24 72

## 2024-04-03 ENCOUNTER — PHARMACY VISIT (OUTPATIENT)
Age: 49
End: 2024-04-03

## 2024-04-03 VITALS
HEIGHT: 69 IN | HEART RATE: 107 BPM | SYSTOLIC BLOOD PRESSURE: 140 MMHG | OXYGEN SATURATION: 100 % | WEIGHT: 289 LBS | BODY MASS INDEX: 42.8 KG/M2 | DIASTOLIC BLOOD PRESSURE: 90 MMHG

## 2024-04-03 DIAGNOSIS — E11.21 TYPE 2 DIABETES MELLITUS WITH DIABETIC NEPHROPATHY, UNSPECIFIED WHETHER LONG TERM INSULIN USE (HCC): ICD-10-CM

## 2024-04-03 DIAGNOSIS — E11.8 TYPE 2 DIABETES MELLITUS WITH UNSPECIFIED COMPLICATIONS (HCC): ICD-10-CM

## 2024-04-03 DIAGNOSIS — I10 ESSENTIAL (PRIMARY) HYPERTENSION: ICD-10-CM

## 2024-04-03 DIAGNOSIS — E11.21 TYPE 2 DIABETES WITH NEPHROPATHY (HCC): Primary | ICD-10-CM

## 2024-04-03 RX ORDER — SEMAGLUTIDE 0.68 MG/ML
0.5 INJECTION, SOLUTION SUBCUTANEOUS WEEKLY
Qty: 3 ML | Refills: 0 | Status: SHIPPED | OUTPATIENT
Start: 2024-04-03

## 2024-04-03 NOTE — PATIENT INSTRUCTIONS
- Increase Ozempic to 0.5 mg weekly on week 5  - Continue Lantus at 50 units twice daily  - Continue lisinopril/HCTZ 20/25 mg daily for now  - Bring blood sugar meter to the next visit

## (undated) DEVICE — TOWEL 4 PLY TISS 19X30 SUE WHT

## (undated) DEVICE — MEDI-VAC YANK SUCT HNDL W/TPRD BULBOUS TIP: Brand: CARDINAL HEALTH

## (undated) DEVICE — 1200 GUARD II KIT W/5MM TUBE W/O VAC TUBE: Brand: GUARDIAN

## (undated) DEVICE — SET ADMIN 16ML TBNG L100IN 2 Y INJ SITE IV PIGGY BK DISP

## (undated) DEVICE — SYR 3ML LL TIP 1/10ML GRAD --

## (undated) DEVICE — CATH IV AUTOGRD BC PNK 20GA 25 -- INSYTE

## (undated) DEVICE — Device

## (undated) DEVICE — Z DISCONTINUED PER MEDLINE LINE GAS SAMPLING O2/CO2 LNG AD 13 FT NSL W/ TBNG FILTERLINE

## (undated) DEVICE — SOLIDIFIER MEDC 1200ML -- CONVERT TO 356117

## (undated) DEVICE — NEEDLE HYPO 18GA L1.5IN PNK S STL HUB POLYPR SHLD REG BVL

## (undated) DEVICE — NEONATAL-ADULT SPO2 SENSOR: Brand: NELLCOR

## (undated) DEVICE — BLOCK BITE ENDOSCP AD 21 MM W/ DIL BLU LF DISP

## (undated) DEVICE — KENDALL RADIOLUCENT FOAM MONITORING ELECTRODE RECTANGULAR SHAPE: Brand: KENDALL

## (undated) DEVICE — BASIN EMSIS 16OZ GRAPHITE PLAS KID SHP MOLD GRAD FOR ORAL

## (undated) DEVICE — SYR 10ML LUER LOK 1/5ML GRAD --